# Patient Record
Sex: FEMALE | Race: BLACK OR AFRICAN AMERICAN | NOT HISPANIC OR LATINO | Employment: UNEMPLOYED | URBAN - METROPOLITAN AREA
[De-identification: names, ages, dates, MRNs, and addresses within clinical notes are randomized per-mention and may not be internally consistent; named-entity substitution may affect disease eponyms.]

---

## 2018-08-14 ENCOUNTER — HOSPITAL ENCOUNTER (OUTPATIENT)
Facility: HOSPITAL | Age: 48
Discharge: HOME | DRG: 305 | End: 2018-08-15
Attending: EMERGENCY MEDICINE | Admitting: EMERGENCY MEDICINE
Payer: MEDICAID

## 2018-08-14 DIAGNOSIS — I16.0 HYPERTENSIVE URGENCY: Primary | ICD-10-CM

## 2018-08-14 DIAGNOSIS — E21.3 HYPERPARATHYROIDISM: ICD-10-CM

## 2018-08-14 DIAGNOSIS — I15.2 HYPERTENSION DUE TO ENDOCRINE DISORDER: ICD-10-CM

## 2018-08-14 DIAGNOSIS — I10 HYPERTENSION: ICD-10-CM

## 2018-08-14 DIAGNOSIS — E83.52 HYPERCALCEMIA: ICD-10-CM

## 2018-08-14 DIAGNOSIS — F17.210 CIGARETTE NICOTINE DEPENDENCE WITHOUT COMPLICATION: Chronic | ICD-10-CM

## 2018-08-14 LAB
ALBUMIN SERPL BCP-MCNC: 3.6 G/DL
ALP SERPL-CCNC: 117 U/L
ALT SERPL W/O P-5'-P-CCNC: 11 U/L
ANION GAP SERPL CALC-SCNC: 5 MMOL/L
AST SERPL-CCNC: 13 U/L
B-HCG UR QL: NEGATIVE
BASOPHILS # BLD AUTO: 0.04 K/UL
BASOPHILS NFR BLD: 0.4 %
BILIRUB SERPL-MCNC: 0.2 MG/DL
BILIRUB UR QL STRIP: NEGATIVE
BUN SERPL-MCNC: 9 MG/DL
CALCIUM SERPL-MCNC: 12.2 MG/DL
CHLORIDE SERPL-SCNC: 109 MMOL/L
CLARITY UR REFRACT.AUTO: CLEAR
CO2 SERPL-SCNC: 25 MMOL/L
COLOR UR AUTO: COLORLESS
CREAT SERPL-MCNC: 0.8 MG/DL
CTP QC/QA: YES
DIFFERENTIAL METHOD: ABNORMAL
EOSINOPHIL # BLD AUTO: 0.4 K/UL
EOSINOPHIL NFR BLD: 4 %
ERYTHROCYTE [DISTWIDTH] IN BLOOD BY AUTOMATED COUNT: 14.1 %
EST. GFR  (AFRICAN AMERICAN): >60 ML/MIN/1.73 M^2
EST. GFR  (NON AFRICAN AMERICAN): >60 ML/MIN/1.73 M^2
GLUCOSE SERPL-MCNC: 90 MG/DL
GLUCOSE UR QL STRIP: NEGATIVE
HCT VFR BLD AUTO: 42.4 %
HGB BLD-MCNC: 13.9 G/DL
HGB UR QL STRIP: ABNORMAL
IMM GRANULOCYTES # BLD AUTO: 0.03 K/UL
IMM GRANULOCYTES NFR BLD AUTO: 0.3 %
KETONES UR QL STRIP: NEGATIVE
LEUKOCYTE ESTERASE UR QL STRIP: NEGATIVE
LYMPHOCYTES # BLD AUTO: 2.3 K/UL
LYMPHOCYTES NFR BLD: 24.2 %
MAGNESIUM SERPL-MCNC: 2.1 MG/DL
MCH RBC QN AUTO: 31.2 PG
MCHC RBC AUTO-ENTMCNC: 32.8 G/DL
MCV RBC AUTO: 95 FL
MICROSCOPIC COMMENT: NORMAL
MONOCYTES # BLD AUTO: 0.6 K/UL
MONOCYTES NFR BLD: 6.7 %
NEUTROPHILS # BLD AUTO: 6.1 K/UL
NEUTROPHILS NFR BLD: 64.4 %
NITRITE UR QL STRIP: NEGATIVE
NRBC BLD-RTO: 0 /100 WBC
PH UR STRIP: 7 [PH] (ref 5–8)
PHOSPHATE SERPL-MCNC: 2 MG/DL
PLATELET # BLD AUTO: 177 K/UL
PMV BLD AUTO: 11.3 FL
POTASSIUM SERPL-SCNC: 4 MMOL/L
PROT SERPL-MCNC: 7.1 G/DL
PROT UR QL STRIP: NEGATIVE
PTH-INTACT SERPL-MCNC: 322 PG/ML
RBC # BLD AUTO: 4.45 M/UL
RBC #/AREA URNS AUTO: 1 /HPF (ref 0–4)
SODIUM SERPL-SCNC: 139 MMOL/L
SP GR UR STRIP: 1 (ref 1–1.03)
SQUAMOUS #/AREA URNS AUTO: 0 /HPF
TROPONIN I SERPL DL<=0.01 NG/ML-MCNC: <0.006 NG/ML
TSH SERPL DL<=0.005 MIU/L-ACNC: 1.23 UIU/ML
URN SPEC COLLECT METH UR: ABNORMAL
UROBILINOGEN UR STRIP-ACNC: NEGATIVE EU/DL
WBC # BLD AUTO: 9.41 K/UL
WBC #/AREA URNS AUTO: 0 /HPF (ref 0–5)

## 2018-08-14 PROCEDURE — 25000003 PHARM REV CODE 250: Performed by: PHYSICIAN ASSISTANT

## 2018-08-14 PROCEDURE — 83970 ASSAY OF PARATHORMONE: CPT

## 2018-08-14 PROCEDURE — 85025 COMPLETE CBC W/AUTO DIFF WBC: CPT

## 2018-08-14 PROCEDURE — 81025 URINE PREGNANCY TEST: CPT | Performed by: STUDENT IN AN ORGANIZED HEALTH CARE EDUCATION/TRAINING PROGRAM

## 2018-08-14 PROCEDURE — 84484 ASSAY OF TROPONIN QUANT: CPT

## 2018-08-14 PROCEDURE — 99291 CRITICAL CARE FIRST HOUR: CPT | Mod: ,,, | Performed by: EMERGENCY MEDICINE

## 2018-08-14 PROCEDURE — 93010 ELECTROCARDIOGRAM REPORT: CPT | Mod: ,,, | Performed by: INTERNAL MEDICINE

## 2018-08-14 PROCEDURE — 96376 TX/PRO/DX INJ SAME DRUG ADON: CPT

## 2018-08-14 PROCEDURE — 25000003 PHARM REV CODE 250: Performed by: EMERGENCY MEDICINE

## 2018-08-14 PROCEDURE — 96361 HYDRATE IV INFUSION ADD-ON: CPT

## 2018-08-14 PROCEDURE — G0378 HOSPITAL OBSERVATION PER HR: HCPCS

## 2018-08-14 PROCEDURE — 84100 ASSAY OF PHOSPHORUS: CPT

## 2018-08-14 PROCEDURE — 80053 COMPREHEN METABOLIC PANEL: CPT

## 2018-08-14 PROCEDURE — 81001 URINALYSIS AUTO W/SCOPE: CPT

## 2018-08-14 PROCEDURE — 84443 ASSAY THYROID STIM HORMONE: CPT

## 2018-08-14 PROCEDURE — 99223 1ST HOSP IP/OBS HIGH 75: CPT | Mod: ,,, | Performed by: PHYSICIAN ASSISTANT

## 2018-08-14 PROCEDURE — 12000002 HC ACUTE/MED SURGE SEMI-PRIVATE ROOM

## 2018-08-14 PROCEDURE — 25000003 PHARM REV CODE 250: Performed by: STUDENT IN AN ORGANIZED HEALTH CARE EDUCATION/TRAINING PROGRAM

## 2018-08-14 PROCEDURE — 96374 THER/PROPH/DIAG INJ IV PUSH: CPT

## 2018-08-14 PROCEDURE — 99285 EMERGENCY DEPT VISIT HI MDM: CPT

## 2018-08-14 PROCEDURE — 83735 ASSAY OF MAGNESIUM: CPT

## 2018-08-14 RX ORDER — GLUCAGON 1 MG
1 KIT INJECTION
Status: DISCONTINUED | OUTPATIENT
Start: 2018-08-15 | End: 2018-08-15 | Stop reason: HOSPADM

## 2018-08-14 RX ORDER — IBUPROFEN 200 MG
24 TABLET ORAL
Status: DISCONTINUED | OUTPATIENT
Start: 2018-08-15 | End: 2018-08-15 | Stop reason: HOSPADM

## 2018-08-14 RX ORDER — AMOXICILLIN 250 MG
1 CAPSULE ORAL 2 TIMES DAILY
Status: DISCONTINUED | OUTPATIENT
Start: 2018-08-14 | End: 2018-08-15 | Stop reason: HOSPADM

## 2018-08-14 RX ORDER — LISINOPRIL 10 MG/1
10 TABLET ORAL
Status: COMPLETED | OUTPATIENT
Start: 2018-08-14 | End: 2018-08-14

## 2018-08-14 RX ORDER — NIFEDIPINE 60 MG/1
60 TABLET, EXTENDED RELEASE ORAL DAILY
Status: DISCONTINUED | OUTPATIENT
Start: 2018-08-15 | End: 2018-08-14

## 2018-08-14 RX ORDER — PROMETHAZINE HYDROCHLORIDE 12.5 MG/1
25 TABLET ORAL EVERY 6 HOURS PRN
Status: DISCONTINUED | OUTPATIENT
Start: 2018-08-15 | End: 2018-08-15 | Stop reason: HOSPADM

## 2018-08-14 RX ORDER — RAMELTEON 8 MG/1
8 TABLET ORAL NIGHTLY PRN
Status: DISCONTINUED | OUTPATIENT
Start: 2018-08-15 | End: 2018-08-15 | Stop reason: HOSPADM

## 2018-08-14 RX ORDER — ACETAMINOPHEN 325 MG/1
650 TABLET ORAL EVERY 4 HOURS PRN
Status: DISCONTINUED | OUTPATIENT
Start: 2018-08-15 | End: 2018-08-15 | Stop reason: HOSPADM

## 2018-08-14 RX ORDER — LABETALOL HYDROCHLORIDE 5 MG/ML
20 INJECTION, SOLUTION INTRAVENOUS
Status: COMPLETED | OUTPATIENT
Start: 2018-08-14 | End: 2018-08-14

## 2018-08-14 RX ORDER — SODIUM CHLORIDE 0.9 % (FLUSH) 0.9 %
5 SYRINGE (ML) INJECTION
Status: DISCONTINUED | OUTPATIENT
Start: 2018-08-15 | End: 2018-08-15 | Stop reason: HOSPADM

## 2018-08-14 RX ORDER — LISINOPRIL 20 MG/1
20 TABLET ORAL
Status: DISCONTINUED | OUTPATIENT
Start: 2018-08-14 | End: 2018-08-14

## 2018-08-14 RX ORDER — SODIUM CHLORIDE 9 MG/ML
1000 INJECTION, SOLUTION INTRAVENOUS
Status: COMPLETED | OUTPATIENT
Start: 2018-08-14 | End: 2018-08-14

## 2018-08-14 RX ORDER — ONDANSETRON 8 MG/1
8 TABLET, ORALLY DISINTEGRATING ORAL EVERY 8 HOURS PRN
Status: DISCONTINUED | OUTPATIENT
Start: 2018-08-15 | End: 2018-08-15 | Stop reason: HOSPADM

## 2018-08-14 RX ORDER — IPRATROPIUM BROMIDE AND ALBUTEROL SULFATE 2.5; .5 MG/3ML; MG/3ML
3 SOLUTION RESPIRATORY (INHALATION) EVERY 4 HOURS PRN
Status: DISCONTINUED | OUTPATIENT
Start: 2018-08-15 | End: 2018-08-15 | Stop reason: HOSPADM

## 2018-08-14 RX ORDER — NIFEDIPINE 30 MG/1
60 TABLET, EXTENDED RELEASE ORAL DAILY
Status: DISCONTINUED | OUTPATIENT
Start: 2018-08-14 | End: 2018-08-15 | Stop reason: HOSPADM

## 2018-08-14 RX ORDER — IBUPROFEN 200 MG
16 TABLET ORAL
Status: DISCONTINUED | OUTPATIENT
Start: 2018-08-15 | End: 2018-08-15 | Stop reason: HOSPADM

## 2018-08-14 RX ORDER — LABETALOL HYDROCHLORIDE 5 MG/ML
10 INJECTION, SOLUTION INTRAVENOUS
Status: COMPLETED | OUTPATIENT
Start: 2018-08-14 | End: 2018-08-14

## 2018-08-14 RX ORDER — IBUPROFEN 200 MG
1 TABLET ORAL DAILY
Status: DISCONTINUED | OUTPATIENT
Start: 2018-08-15 | End: 2018-08-15 | Stop reason: HOSPADM

## 2018-08-14 RX ADMIN — SODIUM CHLORIDE 1000 ML: 0.9 INJECTION, SOLUTION INTRAVENOUS at 06:08

## 2018-08-14 RX ADMIN — DOCUSATE SODIUM -SENNOSIDES 1 TABLET: 50; 8.6 TABLET, COATED ORAL at 11:08

## 2018-08-14 RX ADMIN — LABETALOL HYDROCHLORIDE 20 MG: 5 INJECTION, SOLUTION INTRAVENOUS at 09:08

## 2018-08-14 RX ADMIN — NIFEDIPINE 60 MG: 30 TABLET, FILM COATED, EXTENDED RELEASE ORAL at 07:08

## 2018-08-14 RX ADMIN — LABETALOL HYDROCHLORIDE 10 MG: 5 INJECTION, SOLUTION INTRAVENOUS at 11:08

## 2018-08-14 RX ADMIN — LISINOPRIL 10 MG: 20 TABLET ORAL at 08:08

## 2018-08-14 NOTE — ED TRIAGE NOTES
Pt came in c/o 8/10 headache and having high blood pressure for the first time of her knowledge. Pt denies cp, SOB, dizziness and n/v.

## 2018-08-15 ENCOUNTER — TELEPHONE (OUTPATIENT)
Dept: ENDOCRINOLOGY | Facility: HOSPITAL | Age: 48
End: 2018-08-15

## 2018-08-15 VITALS
RESPIRATION RATE: 20 BRPM | WEIGHT: 156.13 LBS | HEART RATE: 74 BPM | TEMPERATURE: 98 F | DIASTOLIC BLOOD PRESSURE: 98 MMHG | OXYGEN SATURATION: 98 % | SYSTOLIC BLOOD PRESSURE: 160 MMHG | HEIGHT: 72 IN | BODY MASS INDEX: 21.15 KG/M2

## 2018-08-15 PROBLEM — E21.0 PRIMARY HYPERPARATHYROIDISM: Status: ACTIVE | Noted: 2018-08-15

## 2018-08-15 PROBLEM — E55.9 VITAMIN D DEFICIENCY: Status: ACTIVE | Noted: 2018-08-15

## 2018-08-15 PROBLEM — I15.2 HYPERTENSION DUE TO ENDOCRINE DISORDER: Status: ACTIVE | Noted: 2018-08-14

## 2018-08-15 LAB
25(OH)D3+25(OH)D2 SERPL-MCNC: 9 NG/ML
ALBUMIN SERPL BCP-MCNC: 3.1 G/DL
ALBUMIN SERPL BCP-MCNC: 3.2 G/DL
ALP SERPL-CCNC: 104 U/L
ALT SERPL W/O P-5'-P-CCNC: 10 U/L
ANION GAP SERPL CALC-SCNC: 4 MMOL/L
ANION GAP SERPL CALC-SCNC: 5 MMOL/L
AST SERPL-CCNC: 13 U/L
BASOPHILS # BLD AUTO: 0.04 K/UL
BASOPHILS NFR BLD: 0.4 %
BILIRUB SERPL-MCNC: 0.4 MG/DL
BUN SERPL-MCNC: 6 MG/DL
BUN SERPL-MCNC: 9 MG/DL
CA-I BLDV-SCNC: 1.53 MMOL/L
CALCIUM SERPL-MCNC: 11.2 MG/DL
CALCIUM SERPL-MCNC: 11.3 MG/DL
CHLORIDE SERPL-SCNC: 108 MMOL/L
CHLORIDE SERPL-SCNC: 110 MMOL/L
CO2 SERPL-SCNC: 23 MMOL/L
CO2 SERPL-SCNC: 26 MMOL/L
CREAT SERPL-MCNC: 0.6 MG/DL
CREAT SERPL-MCNC: 0.7 MG/DL
DIFFERENTIAL METHOD: ABNORMAL
EOSINOPHIL # BLD AUTO: 0.4 K/UL
EOSINOPHIL NFR BLD: 4.2 %
ERYTHROCYTE [DISTWIDTH] IN BLOOD BY AUTOMATED COUNT: 14.3 %
EST. GFR  (AFRICAN AMERICAN): >60 ML/MIN/1.73 M^2
EST. GFR  (AFRICAN AMERICAN): >60 ML/MIN/1.73 M^2
EST. GFR  (NON AFRICAN AMERICAN): >60 ML/MIN/1.73 M^2
EST. GFR  (NON AFRICAN AMERICAN): >60 ML/MIN/1.73 M^2
ESTIMATED AVG GLUCOSE: 88 MG/DL
GLUCOSE SERPL-MCNC: 83 MG/DL
GLUCOSE SERPL-MCNC: 84 MG/DL
HBA1C MFR BLD HPLC: 4.7 %
HCT VFR BLD AUTO: 40.8 %
HGB BLD-MCNC: 12.8 G/DL
IMM GRANULOCYTES # BLD AUTO: 0.03 K/UL
IMM GRANULOCYTES NFR BLD AUTO: 0.3 %
LYMPHOCYTES # BLD AUTO: 2.8 K/UL
LYMPHOCYTES NFR BLD: 28.7 %
MAGNESIUM SERPL-MCNC: 2.1 MG/DL
MCH RBC QN AUTO: 30.4 PG
MCHC RBC AUTO-ENTMCNC: 31.4 G/DL
MCV RBC AUTO: 97 FL
MONOCYTES # BLD AUTO: 0.7 K/UL
MONOCYTES NFR BLD: 7.3 %
NEUTROPHILS # BLD AUTO: 5.7 K/UL
NEUTROPHILS NFR BLD: 59.1 %
NRBC BLD-RTO: 0 /100 WBC
PHOSPHATE SERPL-MCNC: 2 MG/DL
PHOSPHATE SERPL-MCNC: 2.1 MG/DL
PLATELET # BLD AUTO: 174 K/UL
PMV BLD AUTO: 10.8 FL
POCT GLUCOSE: 101 MG/DL (ref 70–110)
POTASSIUM SERPL-SCNC: 3.6 MMOL/L
POTASSIUM SERPL-SCNC: 4.4 MMOL/L
PROT SERPL-MCNC: 6.3 G/DL
RBC # BLD AUTO: 4.21 M/UL
SODIUM SERPL-SCNC: 137 MMOL/L
SODIUM SERPL-SCNC: 139 MMOL/L
WBC # BLD AUTO: 9.61 K/UL

## 2018-08-15 PROCEDURE — 83735 ASSAY OF MAGNESIUM: CPT

## 2018-08-15 PROCEDURE — 25000003 PHARM REV CODE 250: Performed by: EMERGENCY MEDICINE

## 2018-08-15 PROCEDURE — 82306 VITAMIN D 25 HYDROXY: CPT

## 2018-08-15 PROCEDURE — 99232 SBSQ HOSP IP/OBS MODERATE 35: CPT | Mod: ,,, | Performed by: INTERNAL MEDICINE

## 2018-08-15 PROCEDURE — 82652 VIT D 1 25-DIHYDROXY: CPT

## 2018-08-15 PROCEDURE — 25000003 PHARM REV CODE 250: Performed by: PHYSICIAN ASSISTANT

## 2018-08-15 PROCEDURE — G0378 HOSPITAL OBSERVATION PER HR: HCPCS

## 2018-08-15 PROCEDURE — 94761 N-INVAS EAR/PLS OXIMETRY MLT: CPT

## 2018-08-15 PROCEDURE — 80053 COMPREHEN METABOLIC PANEL: CPT

## 2018-08-15 PROCEDURE — 99239 HOSP IP/OBS DSCHRG MGMT >30: CPT | Mod: ,,, | Performed by: HOSPITALIST

## 2018-08-15 PROCEDURE — 20600001 HC STEP DOWN PRIVATE ROOM

## 2018-08-15 PROCEDURE — 36415 COLL VENOUS BLD VENIPUNCTURE: CPT

## 2018-08-15 PROCEDURE — 80069 RENAL FUNCTION PANEL: CPT

## 2018-08-15 PROCEDURE — 85025 COMPLETE CBC W/AUTO DIFF WBC: CPT

## 2018-08-15 PROCEDURE — 83036 HEMOGLOBIN GLYCOSYLATED A1C: CPT

## 2018-08-15 PROCEDURE — 84100 ASSAY OF PHOSPHORUS: CPT

## 2018-08-15 PROCEDURE — 25000003 PHARM REV CODE 250: Performed by: HOSPITALIST

## 2018-08-15 PROCEDURE — 82330 ASSAY OF CALCIUM: CPT

## 2018-08-15 RX ORDER — VIT C/E/ZN/COPPR/LUTEIN/ZEAXAN 250MG-90MG
1000 CAPSULE ORAL DAILY
Refills: 0 | Status: ON HOLD | COMMUNITY
Start: 2018-08-15 | End: 2022-06-23 | Stop reason: HOSPADM

## 2018-08-15 RX ORDER — HYDRALAZINE HYDROCHLORIDE 25 MG/1
50 TABLET, FILM COATED ORAL EVERY 6 HOURS PRN
Status: DISCONTINUED | OUTPATIENT
Start: 2018-08-15 | End: 2018-08-15 | Stop reason: HOSPADM

## 2018-08-15 RX ORDER — NIFEDIPINE 60 MG/1
60 TABLET, EXTENDED RELEASE ORAL DAILY
Qty: 90 TABLET | Refills: 3 | Status: ON HOLD | OUTPATIENT
Start: 2018-08-16 | End: 2022-06-02 | Stop reason: HOSPADM

## 2018-08-15 RX ORDER — SODIUM CHLORIDE 9 MG/ML
INJECTION, SOLUTION INTRAVENOUS CONTINUOUS
Status: DISCONTINUED | OUTPATIENT
Start: 2018-08-15 | End: 2018-08-15 | Stop reason: HOSPADM

## 2018-08-15 RX ORDER — LISINOPRIL 10 MG/1
10 TABLET ORAL DAILY
Qty: 90 TABLET | Refills: 3 | Status: ON HOLD | OUTPATIENT
Start: 2018-08-16 | End: 2022-06-02 | Stop reason: HOSPADM

## 2018-08-15 RX ORDER — LISINOPRIL 10 MG/1
10 TABLET ORAL DAILY
Status: DISCONTINUED | OUTPATIENT
Start: 2018-08-15 | End: 2018-08-15 | Stop reason: HOSPADM

## 2018-08-15 RX ADMIN — SODIUM CHLORIDE: 0.9 INJECTION, SOLUTION INTRAVENOUS at 03:08

## 2018-08-15 RX ADMIN — NIFEDIPINE 60 MG: 30 TABLET, FILM COATED, EXTENDED RELEASE ORAL at 09:08

## 2018-08-15 RX ADMIN — SODIUM CHLORIDE: 0.9 INJECTION, SOLUTION INTRAVENOUS at 09:08

## 2018-08-15 RX ADMIN — LISINOPRIL 10 MG: 10 TABLET ORAL at 09:08

## 2018-08-15 NOTE — H&P
[facility] Hospital Medicine  History & Physical    Patient Name: Sammie Belcher  MRN: [id number]  Admission Date: 8/14/2018  Attending Physician: Karishma Clements MD   Primary Care Provider: Primary Doctor [facility] Medicine Team: [facility]OSP MED O Elidia Bauer PA-C     Patient information was obtained from patient, past medical records and ER records.     Subjective:     Principal Problem:Hypertensive urgency    Chief Complaint:   Chief Complaint   Patient presents with    Hypertension        HPI: Patient is a 48 year old lady with a h/o tobacco abuse.  She presents with headache x3 days.  She denies any chest pain, SOB, dizziness, palpitations, fever/chills, N/V/D.  Denies vision changes, unilateral weakness, slurred speech.  She states that she took her BP at home for the first time in years and noticed it was very high.  She has not been to the doctor in years.  She does not take any antihypertensive medications.      On arrival in the ER, her BP was noted to be 219/127.  She received Nifedipine 60mg, lisinopril 10mg, Labetalol IV 20mg, and Labetalol 10mg IV with improvement to 170/91.  She notes her headache has improved with decrease in blood pressure.    History reviewed. No pertinent past medical history.    History reviewed. No pertinent surgical history.    Review of patient's allergies indicates:  No Known Allergies    No current facility-administered medications on file prior to encounter.      No current outpatient medications on file prior to encounter.     Family History     None        Tobacco Use    Smoking status: Current Every Day Smoker     Types: Cigarettes    Smokeless tobacco: Current User   Substance and Sexual Activity    Alcohol use: No     Frequency: Never    Drug use: No    Sexual activity: Not on file     Review of Systems   Constitutional: Negative for activity change, appetite change, chills, fatigue, fever and unexpected weight change.   HENT:  Negative for congestion, rhinorrhea, sore throat, trouble swallowing and voice change.    Eyes: Negative for visual disturbance.   Respiratory: Negative for cough, choking, chest tightness, shortness of breath and wheezing.    Cardiovascular: Negative for chest pain, palpitations and leg swelling.   Gastrointestinal: Negative for abdominal distention, abdominal pain, anal bleeding, blood in stool, constipation, diarrhea, nausea and vomiting.   Endocrine: Negative for cold intolerance, heat intolerance, polydipsia and polyuria.   Genitourinary: Negative for dysuria, flank pain, frequency, hematuria and urgency.   Musculoskeletal: Negative for arthralgias, back pain, joint swelling and myalgias.   Skin: Negative for color change and rash.   Neurological: Positive for headaches. Negative for dizziness, seizures, syncope, facial asymmetry, speech difficulty, weakness, light-headedness and numbness.   Hematological: Negative for adenopathy. Does not bruise/bleed easily.   Psychiatric/Behavioral: Negative for agitation, confusion, hallucinations and suicidal ideas.     Objective:     Vital Signs (Most Recent):  Temp: 97.7 °F (36.5 °C) (08/14/18 2349)  Pulse: 75 (08/15/18 0002)  Resp: (!) 22 (08/15/18 0002)  BP: (!) 170/91 (08/15/18 0002)  SpO2: 98 % (08/15/18 0002) Vital Signs (24h Range):  Temp:  [97.7 °F (36.5 °C)-97.9 °F (36.6 °C)] 97.7 °F (36.5 °C)  Pulse:  [] 75  Resp:  [11-24] 22  SpO2:  [97 %-100 %] 98 %  BP: (168-223)/() 170/91     Weight: 65.8 kg (145 lb 1 oz)  Body mass index is 19.14 kg/m².    Physical Exam   Constitutional: She is oriented to person, place, and time. She appears well-developed and well-nourished. No distress.   HENT:   Head: Normocephalic and atraumatic.   Neck: Neck supple. Carotid bruit is not present. No thyromegaly present.   Cardiovascular: Normal rate and regular rhythm. Exam reveals no gallop.   No murmur heard.  Pulmonary/Chest: Effort normal and breath sounds normal. No  respiratory distress. She has no wheezes.   Abdominal: Soft. Bowel sounds are normal. She exhibits no distension and no mass. There is no splenomegaly or hepatomegaly. There is no tenderness.   Musculoskeletal: Normal range of motion. She exhibits no edema or deformity.   Neurological: She is alert and oriented to person, place, and time. No cranial nerve deficit or sensory deficit.   Skin: Skin is warm and dry. No rash noted.   Psychiatric: She has a normal mood and affect.           Significant Labs:   CBC:   Recent Labs   Lab  08/14/18   1749   WBC  9.41   HGB  13.9   HCT  42.4   PLT  177     CMP:   Recent Labs   Lab  08/14/18   1749   NA  139   K  4.0   CL  109   CO2  25   GLU  90   BUN  9   CREATININE  0.8   CALCIUM  12.2*   PROT  7.1   ALBUMIN  3.6   BILITOT  0.2   ALKPHOS  117   AST  13   ALT  11   ANIONGAP  5*   EGFRNONAA  >60.0     Cardiac Markers: No results for input(s): CKMB, MYOGLOBIN, BNP, TROPISTAT in the last 48 hours.  Troponin:   Recent Labs   Lab  08/14/18   1749   TROPONINI  <0.006       Significant Imaging: I have reviewed all pertinent imaging results/findings within the past 24 hours.    Assessment/Plan:     * Hypertensive urgency    - SBP as high as 223 in ER.  She received Nifedipine 60mg, lisinopril 10mg, Labetalol IV 20mg, and Labetalol 10mg IV with improvement to 170/91.  - No acute abnormalities on CT head.  - Will start patient on Lisinopril 10mg daily and titrate as needed.  - Patient has not seen a doctor in years.  Will need follow-up with PCP on discharge.          Hypercalcemia    - Ca 12.2.  - Will check PTH, vitamin D, urine Ca, and ionized Ca.  - Will consult endocrine.          Cigarette nicotine dependence without complication    - Complete cessation recommended.  Nicoderm patch.            VTE Risk Mitigation (From admission, onward)        Ordered     IP VTE LOW RISK PATIENT  Once      08/14/18 9295     Place sequential compression device  Until discontinued      08/14/18  2337     Place JOSEFINA hose  Until discontinued      08/14/18 2337             Elidia Bauer PA-C  Department of [facility] Medicine   [facility]

## 2018-08-15 NOTE — PLAN OF CARE
Problem: Patient Care Overview  Goal: Plan of Care Review  Outcome: Ongoing (interventions implemented as appropriate)   08/15/18 1118   Coping/Psychosocial   Plan Of Care Reviewed With patient       Problem: Infection, Risk/Actual (Adult)  Goal: Identify Related Risk Factors and Signs and Symptoms  Related risk factors and signs and symptoms are identified upon initiation of Human Response Clinical Practice Guideline (CPG)  Outcome: Ongoing (interventions implemented as appropriate)   08/15/18 1118   Infection, Risk/Actual   Related Risk Factors (Infection, Risk/Actual) treatment plan   Signs and Symptoms (Infection, Risk/Actual) heart rate increase

## 2018-08-15 NOTE — NURSING
Reviewed discharge instructions with pt, satisfactory teach back given per pt.  IV discontinued, cath intact, no redness, swelling or pain to site.  Gauze and coban applied.  Copy of AVS given to pt.  Waiting for pharmacy to deliver pt medications then transport will be called.  Will monitor until transport arrives.

## 2018-08-15 NOTE — SUBJECTIVE & OBJECTIVE
Interval HPI:   Overnight events:  VS stable    Eatin%  Nausea: No  Hypoglycemia and intervention: No  Fever: No  TPN and/or TF: No    PMH, PSH, FH, SH updated and reviewed     ROS:  Review of Systems   Constitutional: Negative for unexpected weight change.   Eyes: Negative for visual disturbance.   Respiratory: Negative for shortness of breath.    Cardiovascular: Negative for chest pain.   Gastrointestinal: Negative for abdominal pain.   Genitourinary: Negative for urgency.   Musculoskeletal: Negative for arthralgias.   Skin: Negative for wound.   Neurological: Negative for headaches.   Hematological: Does not bruise/bleed easily.   Psychiatric/Behavioral: Negative for sleep disturbance.       PHYSICAL EXAMINATION:  Vitals:    08/15/18 1118   BP:    Pulse: 70   Resp:    Temp:      Body mass index is 20.59 kg/m².    Physical Exam   Constitutional: She is oriented to person, place, and time. She appears well-developed and well-nourished.   HENT:   Head: Normocephalic and atraumatic.   Neck: Neck supple. No thyromegaly present.   Cardiovascular: Normal rate, regular rhythm and normal heart sounds.   Pulmonary/Chest: Effort normal. No respiratory distress.   Abdominal: Soft. There is no tenderness.   Neurological: She is alert and oriented to person, place, and time.   Skin: Skin is warm. No rash noted.   Psychiatric: She has a normal mood and affect. Her behavior is normal.

## 2018-08-15 NOTE — ASSESSMENT & PLAN NOTE
Pt currently has asymptomatic mild hypercalcemia (Ca++: 12.2).   Given her current labs, hypercalcemia is most likely secondary to primary hyperparathyroidism (322).     -Encourage PO hydration  -Avoid Hydrochlorothiazide, which can lead to hypercalcemia    Discharge recommendations:   -Pt can follow up with Endocrinology in 2 weeks for further workup.   -Given her current labs, pt meets criteria for parathyroidectomy. Discussed treatment options (both medical and surgical) with pt today.

## 2018-08-15 NOTE — SUBJECTIVE & OBJECTIVE
History reviewed. No pertinent past medical history.    History reviewed. No pertinent surgical history.    Review of patient's allergies indicates:  No Known Allergies    No current facility-administered medications on file prior to encounter.      No current outpatient medications on file prior to encounter.     Family History     None        Tobacco Use    Smoking status: Current Every Day Smoker     Types: Cigarettes    Smokeless tobacco: Current User   Substance and Sexual Activity    Alcohol use: No     Frequency: Never    Drug use: No    Sexual activity: Not on file     Review of Systems   Constitutional: Negative for activity change, appetite change, chills, fatigue, fever and unexpected weight change.   HENT: Negative for congestion, rhinorrhea, sore throat, trouble swallowing and voice change.    Eyes: Negative for visual disturbance.   Respiratory: Negative for cough, choking, chest tightness, shortness of breath and wheezing.    Cardiovascular: Negative for chest pain, palpitations and leg swelling.   Gastrointestinal: Negative for abdominal distention, abdominal pain, anal bleeding, blood in stool, constipation, diarrhea, nausea and vomiting.   Endocrine: Negative for cold intolerance, heat intolerance, polydipsia and polyuria.   Genitourinary: Negative for dysuria, flank pain, frequency, hematuria and urgency.   Musculoskeletal: Negative for arthralgias, back pain, joint swelling and myalgias.   Skin: Negative for color change and rash.   Neurological: Positive for headaches. Negative for dizziness, seizures, syncope, facial asymmetry, speech difficulty, weakness, light-headedness and numbness.   Hematological: Negative for adenopathy. Does not bruise/bleed easily.   Psychiatric/Behavioral: Negative for agitation, confusion, hallucinations and suicidal ideas.     Objective:     Vital Signs (Most Recent):  Temp: 97.7 °F (36.5 °C) (08/14/18 2349)  Pulse: 75 (08/15/18 0002)  Resp: (!) 22 (08/15/18  0002)  BP: (!) 170/91 (08/15/18 0002)  SpO2: 98 % (08/15/18 0002) Vital Signs (24h Range):  Temp:  [97.7 °F (36.5 °C)-97.9 °F (36.6 °C)] 97.7 °F (36.5 °C)  Pulse:  [] 75  Resp:  [11-24] 22  SpO2:  [97 %-100 %] 98 %  BP: (168-223)/() 170/91     Weight: 65.8 kg (145 lb 1 oz)  Body mass index is 19.14 kg/m².    Physical Exam   Constitutional: She is oriented to person, place, and time. She appears well-developed and well-nourished. No distress.   HENT:   Head: Normocephalic and atraumatic.   Neck: Neck supple. Carotid bruit is not present. No thyromegaly present.   Cardiovascular: Normal rate and regular rhythm. Exam reveals no gallop.   No murmur heard.  Pulmonary/Chest: Effort normal and breath sounds normal. No respiratory distress. She has no wheezes.   Abdominal: Soft. Bowel sounds are normal. She exhibits no distension and no mass. There is no splenomegaly or hepatomegaly. There is no tenderness.   Musculoskeletal: Normal range of motion. She exhibits no edema or deformity.   Neurological: She is alert and oriented to person, place, and time. No cranial nerve deficit or sensory deficit.   Skin: Skin is warm and dry. No rash noted.   Psychiatric: She has a normal mood and affect.           Significant Labs:   CBC:   Recent Labs   Lab  08/14/18   1749   WBC  9.41   HGB  13.9   HCT  42.4   PLT  177     CMP:   Recent Labs   Lab  08/14/18   1749   NA  139   K  4.0   CL  109   CO2  25   GLU  90   BUN  9   CREATININE  0.8   CALCIUM  12.2*   PROT  7.1   ALBUMIN  3.6   BILITOT  0.2   ALKPHOS  117   AST  13   ALT  11   ANIONGAP  5*   EGFRNONAA  >60.0     Cardiac Markers: No results for input(s): CKMB, MYOGLOBIN, BNP, TROPISTAT in the last 48 hours.  Troponin:   Recent Labs   Lab  08/14/18   1749   TROPONINI  <0.006       Significant Imaging: I have reviewed all pertinent imaging results/findings within the past 24 hours.

## 2018-08-15 NOTE — PROGRESS NOTES
[facility] Hospital Medicine  Progress Note    Patient Name: Sammie Belcher  MRN: [id number]  Patient Class: IP- Inpatient   Admission Date: 8/14/2018  Length of Stay: 1 days  Attending Physician: Karishma Clements MD  Primary Care Provider: Primary Doctor     Hospital Medicine Team: [facility] HOSP MED O Karishma Clements MD    Subjective:     Principal Problem:Hypertensive urgency    HPI: Patient is a 48 year old lady with a h/o tobacco abuse.  She presents with headache x3 days.    Hospital Course: Patient admitted to hospital medicine, started on antihypertensives with improvement to BP and headache.  Additionally found to have elevated calcium level.    Interval History: Improvement in BP control overnight.  States she has to go home today regardless of recommendations.  Denies any complaints at this time.    Review of Systems   Respiratory: Negative for cough and shortness of breath.    Cardiovascular: Negative for chest pain.   All other systems reviewed and are negative.    Objective:     Vital Signs (Most Recent):  Temp: 97.4 °F (36.3 °C) (08/15/18 0728)  Pulse: 79 (08/15/18 0728)  Resp: 20 (08/15/18 0728)  BP: 131/81 (08/15/18 0728)  SpO2: 97 % (08/15/18 0728) Vital Signs (24h Range):  Temp:  [97.4 °F (36.3 °C)-97.9 °F (36.6 °C)] 97.4 °F (36.3 °C)  Pulse:  [] 79  Resp:  [11-24] 20  SpO2:  [97 %-100 %] 97 %  BP: (131-223)/() 131/81     Weight: 70.8 kg (156 lb 1.6 oz)  Body mass index is 20.59 kg/m².    Intake/Output Summary (Last 24 hours) at 8/15/2018 0824  Last data filed at 8/15/2018 0700  Gross per 24 hour   Intake --   Output 250 ml   Net -250 ml      Physical Exam   Constitutional: She is oriented to person, place, and time. No distress.   HENT:   Head: Normocephalic and atraumatic.   Eyes: EOM are normal. No scleral icterus.   Cardiovascular: Normal rate and regular rhythm.   Pulmonary/Chest: Effort normal. No respiratory distress.   Abdominal: Soft. There is no  tenderness.   Neurological: She is alert and oriented to person, place, and time.   Skin: Skin is warm and dry. She is not diaphoretic.       Significant Labs: Reviewed in Epic.  Of note, 25-OH Vit D low at 9, iPTH elevated at 322.  CBC wnl.  Calcium remains elevated at 11.3 (ionized 1.53), corrects to 11.9 for albumin.  Phos low at 2.1.    Significant Imaging: Reviewed in Epic.    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] 08/14/2018 Yes    Primary hyperparathyroidism [E21.0] 08/15/2018 Yes    Vitamin D deficiency [E55.9] 08/15/2018 Yes    Hypercalcemia [E83.52] 08/14/2018 Yes    Cigarette nicotine dependence without complication [F17.210] 08/14/2018 Yes     Chronic      Problems Resolved During this Admission:     Hypercalcemia  Primary Hyperparathyroidism  Vitamin D deficiency  - Follow up urine calcium, 1,25-OH Vit D  - Follow up endocrine consult  - Initiate NS infusion @150cc/hr for clearance, monitor for need for concurrent loop diuretic  - Trend corrected calcium  - Anticipate Vit D correction, but will discuss with endocrine prior to initiation    Hypertension due to endocrine disorder  Much improved from admission  - Continue ACEi/CCB  - Treat hypercalcemia    Nicotine dependence  - Continue patch    Discharge planning  - Pending endocrinology recommendations  - Patient adamant about leaving hospital today, if plan for safe ongoing outpatient care able to be established will attempt to meet her wishes    VTE Risk Mitigation (From admission, onward)        Ordered     IP VTE LOW RISK PATIENT  Once      08/14/18 2337     Place sequential compression device  Until discontinued      08/14/18 2337     Place JOSEFINA hose  Until discontinued      08/14/18 2337             Karishma Clements MD  Department of Hospital Medicine   [facility]

## 2018-08-15 NOTE — PLAN OF CARE
"CM to bedside - pt provided assessment info. Pt is independent w/ no DME in place, lives w/ her uncle. Pt will likely d/c home w/ no needs. Pt is currently in [place] assisting a family member that recently had a transplant. Pt reports having no local PCP.    CM provided patient anticipated JARET which will be update by nursing staff. Patient provided a Blue "My Health Packet" for d/c planning and health tool. Patient verbalized understanding.     08/15/18 8080   Discharge Assessment   Assessment Type Discharge Planning Assessment   Confirmed/corrected address and phone number on facesheet? Yes   Assessment information obtained from? Patient   Expected Length of Stay (days) 2   Communicated expected length of stay with patient/caregiver yes   Prior to hospitilization cognitive status: Alert/Oriented   Prior to hospitalization functional status: Independent   Current cognitive status: Alert/Oriented   Current Functional Status: Independent   Facility Arrived From: N/A   Lives With other relative(s)  (uncle)   Able to Return to Prior Arrangements yes   Is patient able to care for self after discharge? Yes   Who are your caregiver(s) and their phone number(s)? aunt Khalida Braga [phone]   Patient's perception of discharge disposition home or selfcare   Readmission Within The Last 30 Days no previous admission in last 30 days   Patient currently being followed by outpatient case management? No   Patient currently receives any other outside agency services? No   Equipment Currently Used at Home none   Do you have any problems affording any of your prescribed medications? No   Is the patient taking medications as prescribed? yes   Does the patient have transportation home? Yes   Transportation Available public transportation  (bus)   Dialysis Name and Scheduled days N/A   Does the patient receive services at the [facility]madin Clinic? No   Discharge Plan A Home with family   Discharge Plan B Home with family;Home Health "   Patient/Family In Agreement With Plan yes

## 2018-08-15 NOTE — HPI
Patient is a 48 year old lady with a h/o tobacco abuse.  She presents with headache x3 days.  She denies any chest pain, SOB, dizziness, palpitations, fever/chills, N/V/D.  Denies vision changes, unilateral weakness, slurred speech.  She states that she took her BP at home for the first time in years and noticed it was very high.  She has not been to the doctor in years.  She does not take any antihypertensive medications.      On arrival in the ER, her BP was noted to be 219/127.  She received Nifedipine 60mg, lisinopril 10mg, Labetalol IV 20mg, and Labetalol 10mg IV with improvement to 170/91.  She notes her headache has improved with decrease in blood pressure.

## 2018-08-15 NOTE — ED NOTES
Telemetry Verification   Patient placed on Telemetry Box  Verified with War Room  Box # [id number]   Monitor Tech SHANONN   Rate 82   Rhythm NSR

## 2018-08-15 NOTE — ASSESSMENT & PLAN NOTE
- SBP as high as 223 in ER.  She received Nifedipine 60mg, lisinopril 10mg, Labetalol IV 20mg, and Labetalol 10mg IV with improvement to 170/91.  - No acute abnormalities on CT head.  - Will start patient on Lisinopril 10mg daily and titrate as needed.  - Patient has not seen a doctor in years.  Will need follow-up with PCP on discharge.

## 2018-08-15 NOTE — ASSESSMENT & PLAN NOTE
Most likely secondary to primary hyperparathyroidism, which causes an increase in the metabolism of 25-hydroxyvitamin D to 1,25 hydroxyvitamin D.     Would supplement with Cholecalciferol 1000 units, daily.

## 2018-08-15 NOTE — HPI
A 47 yo woman with no significant PMH has been admitted for treatment of hypertensive urgency. CT Head showed chronic infarcts. Labs showed elevated serum calcium level. Endocrinology has been consulted for evaluation of hypercalcemia.   She denies any symptoms of constipation, polyuria, N/V, abdominal pain. There is no h/o renal stones, or fractures. She doesn't take any medications or supplements at home.   There is no family history of hypercalcemia.   She reports smoking about 1 PPD. Denies any alcohol abuse.     At the time of admission, labs showed:   Serum calcium: 12.2 mg/dl  Phos: 2.1  M.1  PTH: 322  TSH: 1.233  Vitamin D, 25-OH: 9

## 2018-08-15 NOTE — CONSULTS
[facility]  Endocrinology  Consult Note    Consult Requested by: Karishma Clements MD   Reason for admit: Hypertension due to endocrine disorder    HISTORY OF PRESENT ILLNESS:  A 47 yo woman with no significant PMH has been admitted for treatment of hypertensive urgency. CT Head showed chronic infarcts. Labs showed elevated serum calcium level. Endocrinology has been consulted for evaluation of hypercalcemia.   She denies any symptoms of constipation, polyuria, N/V, abdominal pain. There is no h/o renal stones, or fractures. She doesn't take any medications or supplements at home.   There is no family history of hypercalcemia.   She reports smoking about 1 PPD. Denies any alcohol abuse.     At the time of admission, labs showed:   Serum calcium: 12.2 mg/dl  Phos: 2.1  M.1  PTH: 322  TSH: 1.233  Vitamin D, 25-OH: 9    Medications and/or Treatments Impacting Glycemic Control:  Immunotherapy:    Immunosuppressants     None        Steroids:   Hormones (From admission, onward)    None        Pressors:    Autonomic Drugs (From admission, onward)    None          No medications prior to admission.       Current Facility-Administered Medications   Medication Dose Route Frequency Provider Last Rate Last Dose    0.9%  NaCl infusion   Intravenous Continuous Karishma Clements  mL/hr at 08/15/18 1544      acetaminophen tablet 650 mg  650 mg Oral Q4H PRN Elidia Bauer PA-C        albuterol-ipratropium 2.5 mg-0.5 mg/3 mL nebulizer solution 3 mL  3 mL Nebulization Q4H PRN Elidia Bauer PA-C        dextrose 50% injection 12.5 g  12.5 g Intravenous PRN Elidia Bauer PA-C        dextrose 50% injection 25 g  25 g Intravenous PRN Elidia Bauer PA-C        glucagon (human recombinant) injection 1 mg  1 mg Intramuscular PRN Elidia Bauer PA-C        glucose chewable tablet 16 g  16 g Oral PRN Elidia Bauer PA-C        glucose chewable tablet 24 g  24 g Oral  PRN Elidia Bauer PA-C        hydrALAZINE tablet 50 mg  50 mg Oral Q6H PRN Elidia Bauer PA-C        lisinopril tablet 10 mg  10 mg Oral Daily Elidia Bauer PA-C   10 mg at 08/15/18 0912    nicotine 21 mg/24 hr 1 patch  1 patch Transdermal Daily Elidia Bauer PA-C        NIFEdipine 24 hr tablet 60 mg  60 mg Oral Daily Michael Bentley MD   60 mg at 08/15/18 0912    ondansetron disintegrating tablet 8 mg  8 mg Oral Q8H PRN Elidia Bauer PA-C        promethazine tablet 25 mg  25 mg Oral Q6H PRN Elidia Bauer PA-C        ramelteon tablet 8 mg  8 mg Oral Nightly PRN Elidia Bauer PA-C        senna-docusate 8.6-50 mg per tablet 1 tablet  1 tablet Oral BID Elidia Bauer PA-C   1 tablet at 18 2350    sodium chloride 0.9% flush 5 mL  5 mL Intravenous PRN Elidia Bauer PA-C           Interval HPI:   Overnight events:  VS stable    Eatin%  Nausea: No  Hypoglycemia and intervention: No  Fever: No  TPN and/or TF: No    PMH, PSH, FH, SH updated and reviewed     ROS:  Review of Systems   Constitutional: Negative for unexpected weight change.   Eyes: Negative for visual disturbance.   Respiratory: Negative for shortness of breath.    Cardiovascular: Negative for chest pain.   Gastrointestinal: Negative for abdominal pain.   Genitourinary: Negative for urgency.   Musculoskeletal: Negative for arthralgias.   Skin: Negative for wound.   Neurological: Negative for headaches.   Hematological: Does not bruise/bleed easily.   Psychiatric/Behavioral: Negative for sleep disturbance.       PHYSICAL EXAMINATION:  Vitals:    08/15/18 1118   BP:    Pulse: 70   Resp:    Temp:      Body mass index is 20.59 kg/m².    Physical Exam   Constitutional: She is oriented to person, place, and time. She appears well-developed and well-nourished.   HENT:   Head: Normocephalic and atraumatic.   Neck: Neck supple. No thyromegaly present.   Cardiovascular: Normal rate,  regular rhythm and normal heart sounds.   Pulmonary/Chest: Effort normal. No respiratory distress.   Abdominal: Soft. There is no tenderness.   Neurological: She is alert and oriented to person, place, and time.   Skin: Skin is warm. No rash noted.   Psychiatric: She has a normal mood and affect. Her behavior is normal.     .     ASSESSMENT and PLAN:    Vitamin D deficiency    Most likely secondary to primary hyperparathyroidism, which causes an increase in the metabolism of 25-hydroxyvitamin D to 1,25 hydroxyvitamin D.     Would supplement with Cholecalciferol 1000 units, daily.           Hypercalcemia    Pt currently has asymptomatic mild hypercalcemia (Ca++: 12.2).   Given her current labs, hypercalcemia is most likely secondary to primary hyperparathyroidism (322).     -Encourage PO hydration  -Avoid Hydrochlorothiazide, which can lead to hypercalcemia    Discharge recommendations:   -Pt can follow up with Endocrinology in 2 weeks for further workup.   -Given her current labs, pt meets criteria for parathyroidectomy. Discussed treatment options (both medical and surgical) with pt today.            DISCHARGE NEEDS: will assess daily    Cinthya Conrad MD  Endocrinology  [facility]

## 2018-08-15 NOTE — DISCHARGE SUMMARY
[facility] Hospital Medicine  Discharge Summary      Patient Name: Sammie Belcher  MRN: [id number]  Admission Date: 8/14/2018  Hospital Length of Stay: 1 days  Discharge Date and Time:  08/15/2018 5:06 PM  Attending Physician: Karishma Clements MD   Discharging Provider: Karishma Clements MD  Primary Care Provider: Primary Doctor [facility] Medicine Team: [facility] MED O Karishma Clements MD    HPI: Patient is a 48 year old lady with a h/o tobacco abuse.  She presents with headache x3 days.  She denies any chest pain, SOB, dizziness, palpitations, fever/chills, N/V/D.  Denies vision changes, unilateral weakness, slurred speech.  She states that she took her BP at home for the first time in years and noticed it was very high.  She has not been to the doctor in years.  She does not take any antihypertensive medications.       On arrival in the ER, her BP was noted to be 219/127.  She received Nifedipine 60mg, lisinopril 10mg, Labetalol IV 20mg, and Labetalol 10mg IV with improvement to 170/91.  She notes her headache has improved with decrease in blood pressure.    Hospital Course: Patient admitted to hospital medicine service and continued on Nifedipine and lisinopril with good control of BP achieved and resolution of headache.  Her calcium was found to be elevated to 12.2 with an elevated iPTH, low phos, and low vitamin D consistent with primary hyperparathyroidism.  She was evaluated by endocrinology who discussed management options with the patient.  She will follow up with endocrinology in clinic in 2 weeks.  The need for a PCP was discussed with the patient; she declines to establish one here at this time as she is only staying in [place] temporarily and would like to find a long term provider closer to home.  She was discharged on Lisinopril 10mg and Nifedipine-ER 60mg daily (delivered bedside), as well as 1000 units cholecalciferol daily (to be purchased OTC).  She was instructed  to remain hydrated and follow up with endocrine as instructed, and to contact the hospital if any new symptoms or concerns.    Consults:   Consults (From admission, onward)        Status Ordering Provider     Inpatient consult to Endocrinology  Once     Provider:  (Not yet assigned)    PANFILO Estevez        Final Active Diagnoses:    Diagnosis Date Noted POA    PRINCIPAL PROBLEM:  Hypertension due to endocrine disorder [I15.2] 08/14/2018 Yes    Primary hyperparathyroidism [E21.0] 08/15/2018 Yes    Vitamin D deficiency [E55.9] 08/15/2018 Yes    Hypercalcemia [E83.52] 08/14/2018 Yes    Cigarette nicotine dependence without complication [F17.210] 08/14/2018 Yes     Chronic      Problems Resolved During this Admission:      Discharged Condition: good    Disposition: Home or Self Care    Follow Up:  Follow-up Information     Cinthya Conrad MD In 2 weeks.    Specialty:  Endocrinology  Why:  Follow up with endocrinology for high calcium.  Contact information:  [address]  [facility] [address]  [phone]                 Patient Instructions:   Epic hypercalcemia patient instructions were provided.    Medications:  Reconciled Home Medications:      Medication List      START taking these medications    cholecalciferol (vitamin D3) 1,000 unit capsule  Take 1 capsule (1,000 Units total) by mouth once daily.     lisinopril 10 MG tablet  Take 1 tablet (10 mg total) by mouth once daily.     NIFEdipine 60 MG (OSM) 24 hr tablet  Commonly known as:  PROCARDIA-XL  Take 1 tablet (60 mg total) by mouth once daily.            Significant Diagnostic Studies: Labs: Calcium 12.2, iPTH 322, Phos 2, Vit D 9    Pending Diagnostic Studies:     Procedure Component Value Units Date/Time    2D echo with color flow doppler [[id number]]     Order Status:  Sent Lab Status:  No result     Renal function panel [[id number]]     Order Status:  Sent Lab Status:  No result     Specimen:  Blood         Indwelling Lines/Drains at  time of discharge:   Lines/Drains/Airways          None        Time spent on the discharge of patient: 33 minutes  Patient was seen and examined on the date of discharge and determined to be suitable for discharge.    Karishma Clements MD  Department of Hospital Medicine  [facility]

## 2018-08-16 LAB — 1,25(OH)2D3 SERPL-MCNC: 96 PG/ML

## 2021-10-20 ENCOUNTER — HISTORICAL (OUTPATIENT)
Dept: ADMINISTRATIVE | Facility: HOSPITAL | Age: 51
End: 2021-10-20

## 2021-10-20 LAB
ABS NEUT (OLG): 4.25 X10(3)/MCL (ref 2.1–9.2)
ALBUMIN SERPL-MCNC: 4 GM/DL (ref 3.5–5)
APPEARANCE, UA: CLEAR
BACTERIA #/AREA URNS AUTO: ABNORMAL /HPF
BASOPHILS # BLD AUTO: 0 X10(3)/MCL (ref 0–0.2)
BASOPHILS NFR BLD AUTO: 1 %
BILIRUB UR QL STRIP: NEGATIVE
BUN SERPL-MCNC: 8 MG/DL (ref 9.8–20.1)
CALCIUM SERPL-MCNC: 12.3 MG/DL (ref 8.4–10.2)
CHLORIDE SERPL-SCNC: 108 MMOL/L (ref 98–107)
CO2 SERPL-SCNC: 28 MMOL/L (ref 22–29)
COLOR UR: ABNORMAL
CREAT SERPL-MCNC: 0.73 MG/DL (ref 0.55–1.02)
DEPRECATED CALCIDIOL+CALCIFEROL SERPL-MC: 9.8 NG/ML (ref 30–80)
EOSINOPHIL # BLD AUTO: 0.3 X10(3)/MCL (ref 0–0.9)
EOSINOPHIL NFR BLD AUTO: 4 %
ERYTHROCYTE [DISTWIDTH] IN BLOOD BY AUTOMATED COUNT: 13.1 % (ref 11.5–14.5)
GLUCOSE (UA): NEGATIVE
GLUCOSE SERPL-MCNC: 86 MG/DL (ref 74–100)
HCT VFR BLD AUTO: 46.1 % (ref 35–46)
HGB BLD-MCNC: 15.3 GM/DL (ref 12–16)
HGB UR QL STRIP: 0.06 MG/DL
HYALINE CASTS #/AREA URNS LPF: ABNORMAL /LPF
IMM GRANULOCYTES # BLD AUTO: 0.01 10*3/UL
IMM GRANULOCYTES NFR BLD AUTO: 0 %
KETONES UR QL STRIP: NEGATIVE
LEUKOCYTE ESTERASE UR QL STRIP: NEGATIVE
LYMPHOCYTES # BLD AUTO: 2.3 X10(3)/MCL (ref 0.6–4.6)
LYMPHOCYTES NFR BLD AUTO: 31 %
MCH RBC QN AUTO: 31.8 PG (ref 26–34)
MCHC RBC AUTO-ENTMCNC: 33.2 GM/DL (ref 31–37)
MCV RBC AUTO: 95.8 FL (ref 80–100)
MONOCYTES # BLD AUTO: 0.5 X10(3)/MCL (ref 0.1–1.3)
MONOCYTES NFR BLD AUTO: 7 %
NEUTROPHILS # BLD AUTO: 4.25 X10(3)/MCL (ref 2.1–9.2)
NEUTROPHILS NFR BLD AUTO: 58 %
NITRITE UR QL STRIP: NEGATIVE
NRBC BLD AUTO-RTO: 0 % (ref 0–0.2)
PH UR STRIP: 6 [PH] (ref 4.5–8)
PHOSPHATE SERPL-MCNC: 1.7 MG/DL (ref 2.3–4.7)
PLATELET # BLD AUTO: 206 X10(3)/MCL (ref 130–400)
PMV BLD AUTO: 10.8 FL (ref 7.4–10.4)
POTASSIUM SERPL-SCNC: 4.1 MMOL/L (ref 3.5–5.1)
PROT UR QL STRIP: NEGATIVE
PTH-INTACT SERPL-MCNC: 554.4 PG/ML (ref 8.7–77)
RBC # BLD AUTO: 4.81 X10(6)/MCL (ref 4–5.2)
RBC #/AREA URNS AUTO: ABNORMAL /HPF
SODIUM SERPL-SCNC: 139 MMOL/L (ref 136–145)
SP GR UR STRIP: 1 (ref 1–1.03)
SQUAMOUS #/AREA URNS LPF: ABNORMAL /LPF
UROBILINOGEN UR STRIP-ACNC: NORMAL
WBC # SPEC AUTO: 7.4 X10(3)/MCL (ref 4.5–11)
WBC #/AREA URNS AUTO: ABNORMAL /HPF

## 2021-11-02 ENCOUNTER — HISTORICAL (OUTPATIENT)
Dept: RADIOLOGY | Facility: HOSPITAL | Age: 51
End: 2021-11-02

## 2021-11-22 ENCOUNTER — HISTORICAL (OUTPATIENT)
Dept: ADMINISTRATIVE | Facility: HOSPITAL | Age: 51
End: 2021-11-22

## 2021-11-22 LAB
ALBUMIN SERPL-MCNC: 3.9 GM/DL (ref 3.5–5)
BUN SERPL-MCNC: 7.7 MG/DL (ref 9.8–20.1)
CALCIUM SERPL-MCNC: 12.8 MG/DL (ref 8.7–10.5)
CHLORIDE SERPL-SCNC: 109 MMOL/L (ref 98–107)
CO2 SERPL-SCNC: 27 MMOL/L (ref 22–29)
CREAT SERPL-MCNC: 0.75 MG/DL (ref 0.55–1.02)
DEPRECATED CALCIDIOL+CALCIFEROL SERPL-MC: 29.5 NG/ML (ref 30–80)
GLUCOSE SERPL-MCNC: 82 MG/DL (ref 74–100)
PHOSPHATE SERPL-MCNC: 1.4 MG/DL (ref 2.3–4.7)
POTASSIUM SERPL-SCNC: 4.4 MMOL/L (ref 3.5–5.1)
PTH-INTACT SERPL-MCNC: 461.5 PG/ML (ref 8.7–77)
SODIUM SERPL-SCNC: 142 MMOL/L (ref 136–145)

## 2021-11-30 ENCOUNTER — HISTORICAL (OUTPATIENT)
Dept: RADIOLOGY | Facility: HOSPITAL | Age: 51
End: 2021-11-30

## 2021-12-06 ENCOUNTER — HISTORICAL (OUTPATIENT)
Dept: ADMINISTRATIVE | Facility: HOSPITAL | Age: 51
End: 2021-12-06

## 2021-12-06 LAB
CALCIUM 24H UR-MCNC: 158 MG/DAY (ref 100–300)
CREAT 24H UR-MCNC: 670 MG/DAY (ref 710–1650)
CREAT UR-MCNC: 67 MG/DL (ref 47–110)

## 2022-01-31 ENCOUNTER — HISTORICAL (OUTPATIENT)
Dept: ADMINISTRATIVE | Facility: HOSPITAL | Age: 52
End: 2022-01-31

## 2022-01-31 ENCOUNTER — HISTORICAL (OUTPATIENT)
Dept: RADIOLOGY | Facility: HOSPITAL | Age: 52
End: 2022-01-31

## 2022-01-31 LAB — POC CREATININE: 0.8 (ref 0.6–1.3)

## 2022-02-21 ENCOUNTER — HOSPITAL ENCOUNTER (OUTPATIENT)
Dept: MEDSURG UNIT | Facility: HOSPITAL | Age: 52
End: 2022-02-24
Attending: INTERNAL MEDICINE | Admitting: INTERNAL MEDICINE

## 2022-02-21 LAB
ABS NEUT (OLG): 3.72 (ref 2.1–9.2)
ALBUMIN SERPL-MCNC: 4 G/DL (ref 3.5–5)
ALBUMIN/GLOB SERPL: 1.2 {RATIO} (ref 1.1–2)
ALP SERPL-CCNC: 93 U/L (ref 40–150)
ALT SERPL-CCNC: 8 U/L (ref 0–55)
AST SERPL-CCNC: 10 U/L (ref 5–34)
BASOPHILS # BLD AUTO: 0.1 10*3/UL (ref 0–0.2)
BASOPHILS NFR BLD AUTO: 1 %
BILIRUB SERPL-MCNC: 0.4 MG/DL
BILIRUBIN DIRECT+TOT PNL SERPL-MCNC: 0.2 (ref 0–0.5)
BILIRUBIN DIRECT+TOT PNL SERPL-MCNC: 0.2 (ref 0–0.8)
BUN SERPL-MCNC: 11.2 MG/DL (ref 9.8–20.1)
CALCIUM SERPL-MCNC: 13.1 MG/DL (ref 8.7–10.5)
CHLORIDE SERPL-SCNC: 105 MMOL/L (ref 98–107)
CO2 SERPL-SCNC: 29 MMOL/L (ref 22–29)
CREAT SERPL-MCNC: 0.83 MG/DL (ref 0.55–1.02)
EOSINOPHIL # BLD AUTO: 0.5 10*3/UL (ref 0–0.9)
EOSINOPHIL NFR BLD AUTO: 6 %
ERYTHROCYTE [DISTWIDTH] IN BLOOD BY AUTOMATED COUNT: 13.1 % (ref 11.5–14.5)
FLAG2 (OHS): 70
FLAG3 (OHS): 80
FLAGS (OHS): 80
GLOBULIN SER-MCNC: 3.3 G/DL (ref 2.4–3.5)
GLUCOSE SERPL-MCNC: 115 MG/DL (ref 74–100)
HCT VFR BLD AUTO: 44.4 % (ref 35–46)
HGB BLD-MCNC: 14.9 G/DL (ref 12–16)
ICTERIC INTERF INDEX SERPL-ACNC: 0
IMM. NE 2 SUSPECT FLAG (OHS): 10
LIPEMIC INTERF INDEX SERPL-ACNC: 4
LOW EVENT # SUSPECT FLAG (OHS): 80
LYMPHOCYTES # BLD AUTO: 3.7 10*3/UL (ref 0.6–4.6)
LYMPHOCYTES NFR BLD AUTO: 44 %
MANUAL DIFF? (OHS): NO
MCH RBC QN AUTO: 31.2 PG (ref 26–34)
MCHC RBC AUTO-ENTMCNC: 33.6 G/DL (ref 31–37)
MCV RBC AUTO: 93.1 FL (ref 80–100)
MO BLASTS SUSPECT FLAG (OHS): 70
MONOCYTES # BLD AUTO: 0.5 10*3/UL (ref 0.1–1.3)
MONOCYTES NFR BLD AUTO: 6 %
NEUTROPHILS # BLD AUTO: 3.72 10*3/UL (ref 2.1–9.2)
NEUTROPHILS NFR BLD AUTO: 44 %
NRBC BLD AUTO-RTO: 0 % (ref 0–0.2)
PLATELET # BLD AUTO: 208 10*3/UL (ref 130–400)
PLATELET CLUMPS SUSPECT FLAG (OHS): 110
PMV BLD AUTO: 10.6 FL (ref 7.4–10.4)
POTASSIUM SERPL-SCNC: 3.5 MMOL/L (ref 3.5–5.1)
PROT SERPL-MCNC: 7.3 G/DL (ref 6.4–8.3)
RBC # BLD AUTO: 4.77 10*6/UL (ref 4–5.2)
SODIUM SERPL-SCNC: 136 MMOL/L (ref 136–145)
WBC # SPEC AUTO: 8.5 10*3/UL (ref 4.5–11)

## 2022-02-22 LAB
ABS NEUT (OLG): 2.7 (ref 2.1–9.2)
ALBUMIN SERPL-MCNC: 3.3 G/DL (ref 3.5–5)
ALBUMIN/GLOB SERPL: 1.3 {RATIO} (ref 1.1–2)
ALP SERPL-CCNC: 78 U/L (ref 40–150)
ALT SERPL-CCNC: 6 U/L (ref 0–55)
AST SERPL-CCNC: 8 U/L (ref 5–34)
BASOPHILS # BLD AUTO: 0 10*3/UL (ref 0–0.2)
BASOPHILS NFR BLD AUTO: 1 %
BILIRUB SERPL-MCNC: 0.3 MG/DL
BILIRUBIN DIRECT+TOT PNL SERPL-MCNC: 0.1 (ref 0–0.5)
BILIRUBIN DIRECT+TOT PNL SERPL-MCNC: 0.2 (ref 0–0.8)
BUN SERPL-MCNC: 11.6 MG/DL (ref 9.8–20.1)
CALCIUM SERPL-MCNC: 11.7 MG/DL (ref 8.7–10.5)
CHLORIDE SERPL-SCNC: 108 MMOL/L (ref 98–107)
CO2 SERPL-SCNC: 27 MMOL/L (ref 22–29)
CREAT SERPL-MCNC: 0.73 MG/DL (ref 0.55–1.02)
EOSINOPHIL # BLD AUTO: 0.4 10*3/UL (ref 0–0.9)
EOSINOPHIL NFR BLD AUTO: 5 %
ERYTHROCYTE [DISTWIDTH] IN BLOOD BY AUTOMATED COUNT: 13 % (ref 11.5–14.5)
FLAG2 (OHS): 70
FLAG3 (OHS): 80
FLAGS (OHS): 70
GLOBULIN SER-MCNC: 2.5 G/DL (ref 2.4–3.5)
GLUCOSE SERPL-MCNC: 86 MG/DL (ref 74–100)
HCT VFR BLD AUTO: 37.5 % (ref 35–46)
HEMOLYSIS INTERF INDEX SERPL-ACNC: 1
HEMOLYSIS INTERF INDEX SERPL-ACNC: <0
HGB BLD-MCNC: 12.4 G/DL (ref 12–16)
ICTERIC INTERF INDEX SERPL-ACNC: 0
IMM GRANULOCYTES # BLD AUTO: 0.01 10*3/UL
IMM GRANULOCYTES NFR BLD AUTO: 0 %
LIPEMIC INTERF INDEX SERPL-ACNC: 1
LOW EVENT # SUSPECT FLAG (OHS): 70
LYMPHOCYTES # BLD AUTO: 3.3 10*3/UL (ref 0.6–4.6)
LYMPHOCYTES NFR BLD AUTO: 48 %
MAGNESIUM SERPL-MCNC: 1.8 MG/DL (ref 1.6–2.6)
MANUAL DIFF? (OHS): NO
MCH RBC QN AUTO: 31.6 PG (ref 26–34)
MCHC RBC AUTO-ENTMCNC: 33.1 G/DL (ref 31–37)
MCV RBC AUTO: 95.4 FL (ref 80–100)
MO BLASTS SUSPECT FLAG (OHS): 70
MONOCYTES # BLD AUTO: 0.4 10*3/UL (ref 0.1–1.3)
MONOCYTES NFR BLD AUTO: 6 %
NEUTROPHILS # BLD AUTO: 2.7 10*3/UL (ref 2.1–9.2)
NEUTROPHILS NFR BLD AUTO: 40 %
NRBC BLD AUTO-RTO: 0 % (ref 0–0.2)
PLATELET # BLD AUTO: 179 10*3/UL (ref 130–400)
PLATELET CLUMPS SUSPECT FLAG (OHS): 10
PMV BLD AUTO: 10.9 FL (ref 7.4–10.4)
POTASSIUM SERPL-SCNC: 3.2 MMOL/L (ref 3.5–5.1)
PROT SERPL-MCNC: 5.8 G/DL (ref 6.4–8.3)
RBC # BLD AUTO: 3.93 10*6/UL (ref 4–5.2)
SODIUM SERPL-SCNC: 138 MMOL/L (ref 136–145)
WBC # SPEC AUTO: 6.8 10*3/UL (ref 4.5–11)

## 2022-02-23 LAB
ABS NEUT (OLG): 2.77 (ref 2.1–9.2)
ALBUMIN SERPL-MCNC: 3.4 G/DL (ref 3.5–5)
ALBUMIN/GLOB SERPL: 1.4 {RATIO} (ref 1.1–2)
ALP SERPL-CCNC: 75 U/L (ref 40–150)
ALT SERPL-CCNC: 6 U/L (ref 0–55)
AST SERPL-CCNC: 10 U/L (ref 5–34)
BASOPHILS # BLD AUTO: 0 10*3/UL (ref 0–0.2)
BASOPHILS NFR BLD AUTO: 1 %
BILIRUB SERPL-MCNC: 0.3 MG/DL
BILIRUBIN DIRECT+TOT PNL SERPL-MCNC: 0.1 (ref 0–0.5)
BILIRUBIN DIRECT+TOT PNL SERPL-MCNC: 0.2 (ref 0–0.8)
BUN SERPL-MCNC: 6.6 MG/DL (ref 9.8–20.1)
CALCIUM SERPL-MCNC: 11.5 MG/DL (ref 8.7–10.5)
CHLORIDE SERPL-SCNC: 112 MMOL/L (ref 98–107)
CO2 SERPL-SCNC: 24 MMOL/L (ref 22–29)
CREAT SERPL-MCNC: 0.67 MG/DL (ref 0.55–1.02)
EOSINOPHIL # BLD AUTO: 0.4 10*3/UL (ref 0–0.9)
EOSINOPHIL NFR BLD AUTO: 6 %
ERYTHROCYTE [DISTWIDTH] IN BLOOD BY AUTOMATED COUNT: 13.1 % (ref 11.5–14.5)
FLAG2 (OHS): 70
FLAG3 (OHS): 80
FLAGS (OHS): 70
GLOBULIN SER-MCNC: 2.5 G/DL (ref 2.4–3.5)
GLUCOSE SERPL-MCNC: 83 MG/DL (ref 74–100)
HCT VFR BLD AUTO: 38.8 % (ref 35–46)
HEMOLYSIS INTERF INDEX SERPL-ACNC: 3
HGB BLD-MCNC: 12.9 G/DL (ref 12–16)
ICTERIC INTERF INDEX SERPL-ACNC: 0
IMM GRANULOCYTES # BLD AUTO: 0.01 10*3/UL
IMM GRANULOCYTES NFR BLD AUTO: 0 %
LIPEMIC INTERF INDEX SERPL-ACNC: 3
LOW EVENT # SUSPECT FLAG (OHS): 70
LYMPHOCYTES # BLD AUTO: 3 10*3/UL (ref 0.6–4.6)
LYMPHOCYTES NFR BLD AUTO: 44 %
MANUAL DIFF? (OHS): NO
MCH RBC QN AUTO: 31.3 PG (ref 26–34)
MCHC RBC AUTO-ENTMCNC: 33.2 G/DL (ref 31–37)
MCV RBC AUTO: 94.2 FL (ref 80–100)
MO BLASTS SUSPECT FLAG (OHS): 70
MONOCYTES # BLD AUTO: 0.5 10*3/UL (ref 0.1–1.3)
MONOCYTES NFR BLD AUTO: 7 %
NEUTROPHILS # BLD AUTO: 2.77 10*3/UL (ref 2.1–9.2)
NEUTROPHILS NFR BLD AUTO: 42 %
NRBC BLD AUTO-RTO: 0 % (ref 0–0.2)
PLATELET # BLD AUTO: 167 10*3/UL (ref 130–400)
PLATELET CLUMPS SUSPECT FLAG (OHS): 10
PMV BLD AUTO: 11.3 FL (ref 7.4–10.4)
POTASSIUM SERPL-SCNC: 3.6 MMOL/L (ref 3.5–5.1)
PROT SERPL-MCNC: 5.9 G/DL (ref 6.4–8.3)
PTH-INTACT SERPL-MCNC: 78.3 PG/ML (ref 8.7–77)
RBC # BLD AUTO: 4.12 10*6/UL (ref 4–5.2)
SODIUM SERPL-SCNC: 137 MMOL/L (ref 136–145)
WBC # SPEC AUTO: 6.6 10*3/UL (ref 4.5–11)

## 2022-02-24 LAB
ABS NEUT (OLG): 8.76 (ref 2.1–9.2)
ALBUMIN SERPL-MCNC: 3.7 G/DL (ref 3.5–5)
ALBUMIN/GLOB SERPL: 1.2 {RATIO} (ref 1.1–2)
ALP SERPL-CCNC: 86 U/L (ref 40–150)
ALT SERPL-CCNC: 7 U/L (ref 0–55)
AST SERPL-CCNC: 10 U/L (ref 5–34)
BASOPHILS # BLD AUTO: 0 10*3/UL (ref 0–0.2)
BASOPHILS NFR BLD AUTO: 0 %
BILIRUB SERPL-MCNC: 0.3 MG/DL
BILIRUBIN DIRECT+TOT PNL SERPL-MCNC: 0.1 (ref 0–0.5)
BILIRUBIN DIRECT+TOT PNL SERPL-MCNC: 0.2 (ref 0–0.8)
BUN SERPL-MCNC: 12 MG/DL (ref 9.8–20.1)
CALCIUM SERPL-MCNC: 11.2 MG/DL (ref 8.7–10.5)
CHLORIDE SERPL-SCNC: 106 MMOL/L (ref 98–107)
CO2 SERPL-SCNC: 24 MMOL/L (ref 22–29)
CREAT SERPL-MCNC: 0.79 MG/DL (ref 0.55–1.02)
ERYTHROCYTE [DISTWIDTH] IN BLOOD BY AUTOMATED COUNT: 13 % (ref 11.5–14.5)
FLAG2 (OHS): 70
FLAG3 (OHS): 80
FLAGS (OHS): 80
GLOBULIN SER-MCNC: 3 G/DL (ref 2.4–3.5)
GLUCOSE SERPL-MCNC: 121 MG/DL (ref 74–100)
HCT VFR BLD AUTO: 39.6 % (ref 35–46)
HEMOLYSIS INTERF INDEX SERPL-ACNC: <0
HEMOLYSIS INTERF INDEX SERPL-ACNC: <0
HGB BLD-MCNC: 13.3 G/DL (ref 12–16)
ICTERIC INTERF INDEX SERPL-ACNC: 0
IMM GRANULOCYTES # BLD AUTO: 0.05 10*3/UL
IMM GRANULOCYTES NFR BLD AUTO: 0 %
LIPEMIC INTERF INDEX SERPL-ACNC: 1
LOW EVENT # SUSPECT FLAG (OHS): 80
LYMPHOCYTES # BLD AUTO: 1.6 10*3/UL (ref 0.6–4.6)
LYMPHOCYTES NFR BLD AUTO: 14 %
MAGNESIUM SERPL-MCNC: 1.7 MG/DL (ref 1.6–2.6)
MANUAL DIFF? (OHS): NO
MCH RBC QN AUTO: 31.2 PG (ref 26–34)
MCHC RBC AUTO-ENTMCNC: 33.6 G/DL (ref 31–37)
MCV RBC AUTO: 93 FL (ref 80–100)
MO BLASTS SUSPECT FLAG (OHS): 40
MONOCYTES # BLD AUTO: 0.4 10*3/UL (ref 0.1–1.3)
MONOCYTES NFR BLD AUTO: 4 %
NEUTROPHILS # BLD AUTO: 8.76 10*3/UL (ref 2.1–9.2)
NEUTROPHILS NFR BLD AUTO: 81 %
NRBC BLD AUTO-RTO: 0 % (ref 0–0.2)
PHOSPHATE SERPL-MCNC: 1.7 MG/DL (ref 2.3–4.7)
PLATELET # BLD AUTO: 168 10*3/UL (ref 130–400)
PLATELET CLUMPS SUSPECT FLAG (OHS): 10
PMV BLD AUTO: 11 FL (ref 7.4–10.4)
POTASSIUM SERPL-SCNC: 4.2 MMOL/L (ref 3.5–5.1)
PROT SERPL-MCNC: 6.7 G/DL (ref 6.4–8.3)
PTH-INTACT SERPL-MCNC: 23.2 PG/ML (ref 8.7–77)
RBC # BLD AUTO: 4.26 10*6/UL (ref 4–5.2)
SODIUM SERPL-SCNC: 134 MMOL/L (ref 136–145)
WBC # SPEC AUTO: 10.8 10*3/UL (ref 4.5–11)

## 2022-04-11 ENCOUNTER — HISTORICAL (OUTPATIENT)
Dept: ADMINISTRATIVE | Facility: HOSPITAL | Age: 52
End: 2022-04-11
Payer: MEDICAID

## 2022-04-29 VITALS
DIASTOLIC BLOOD PRESSURE: 54 MMHG | HEIGHT: 71 IN | WEIGHT: 155.44 LBS | SYSTOLIC BLOOD PRESSURE: 98 MMHG | BODY MASS INDEX: 21.76 KG/M2

## 2022-05-14 NOTE — DISCHARGE SUMMARY
Admit and Discharge Dates  Admit Date: 02/21/2022  Discharge Date: 02/24/2022  Physicians  Attending Physician - Stanton FLYNN, Loretta  Admitting Physician - Stanton FLYNN, Loretta  Consulting Physician - Daryl FLYNN, James MASCORRO  Consulting Physician - Meera, Kenton MASCORRO  Consulting Physician - Josue FLYNN, Deepak SPENCER  Primary Care Physician - Arabella FLYNN, Wanda Seo  Discharge Diagnosis  1.?Hypercalcemia?E83.52  ?  2.?Hyperparathyroidism, primary?E21.0  ?  3.?Hypophosphatemia?E83.39  ?  4.?HTN (hypertension)?I10  ?  5.?Hypokalemia?E87.6  ?  6.?Vitamin D deficiency?E55.9  ?  Abnormal diagnostic test?[id number]  ?  Surgical Procedures  02/23/2022 - [id number] - Parathyroidectomy  Immunizations  No immunizations recorded for this visit.  Admission Information  51yo F with Hx of Hypercalcemia presented to the ED for evaluation of a critical lab. ?Patient was seen in endocrinology clinic earlier today, she has been?worked up for?chronic hypercalcemia?for the last?few months. ?Patient?had labs?drawn this afternoon?that showed a calcium of 13.1,?previous calcium?12.8?in?11/2021.? Patient reports?some mild?muscle weakness, however?reports?that this is not elevated from baseline.? Recent imaging that showed no parathyroid adenoma.? Patient also had a 4D CT parathyroid that showed a suspected left lower parathyroid adenoma and multiple subcentimeter thyroid nodules that have been unchanged since 11/2021.?  Hospital Course  Labs were significant for calcium 13.1, all other labs were unremarkable.? Patient was given a 1 L bolus of LR.? patient admitted?for moderate hypercalcemia secondary to primary hyperparathyroidism. Pt was initiated on normal saline at 125 mL/hr for hypercalcemia. Day 2 of admission calcium level decreased to 11.7. Pt labs significant for hypokalemia, hypomagnesemia, and hypophosphatemia with daily CMP; repletion given accordingly. Pt taken for parathyroidectomy with ENT on 2/23/22. No  complications and patient tolerated procedure well. PTH trended from 401 >>78>>23.2. Endocrinology consulted; agreed with plan and made recommendations for outpatient follow up.  Refills provided for home medications. Discontinue Alendronate home medication. Prescribed vitamin D 50,000 units for 6 weeks.  Appointments scheduled to follow outpatient with Endocrinology and ENT. Instructions to follow with PCP Dr. Bell.  Time Spent on discharge  >30 mins  Objective  Vitals & Measurements  T:?36.3? ?C (Oral)? TMIN:?36.2? ?C (Temporal Artery)? TMAX:?36.6? ?C (Oral)? HR:?79(Peripheral)? RR:?18? BP:?163/71? SpO2:?99%?  Physical Exam  General:?thin, Alert and oriented,?no acute distress  HEENT: clean bandage in place  Respiratory:?Lungs clear to auscultation bilaterally,?No increased work of breathing  Cardiovascular:?S1-S2, regular rate, regular rhythm,?2+ radial pulses,?No lower extremity edema  Gastrointestinal:?Soft, nontender, nondistended  Musculoskeletal: Appropriate movement of extremities bilaterally  Integumentary: No rashes or breakdown noted  Psych: Calm, cooperative  Patient Discharge Condition  stable  Discharge Disposition  Discharge home with PCP, Endocrine, and ENT follow up plans  Attending Physician Addendum  Patient was seen and examined on AM rounds. Management and plan were?discussed with resident. Care was reasonable and necessary.?   Discharge Medication Reconciliation  Prescribed  amLODIPine (amLODIPine 5 mg oral tablet)?5 mg, Oral, Daily  buPROPion (buPROPion 150 mg/24 hours (XL) oral tablet, extended release)?150 mg, Oral, Daily  carvedilol (carvedilol 12.5 mg oral tablet)?12.5 mg, Oral, BID  losartan (losartan 50 mg oral tablet)?50 mg, Oral, Daily  sertraline (sertraline 25 mg oral tablet)?25 mg, Oral, Daily  Continue  cloNIDine (cloniDINE 0.1 mg oral tablet)?0.1 mg, Oral, BID  ergocalciferol (ergocalciferol 50,000 intl units (1.25 mg) oral capsule)?50,000 IntUnit, Oral, qWeek  metoprolol  (metoprolol succinate 25 mg oral tablet extended release)?25 mg, Oral, Daily  promethazine (promethazine 25 mg oral tablet)?25 mg, Oral, q4hr, PRN for nausea/vomiting  Discontinue  alendronate (alendronate 70 mg oral tablet)?70 mg, Oral, qWeek  carvedilol (carvedilol 25 mg oral tablet)?25 mg, Oral, BID  Education and Orders Provided  Hypertension, Easy-to-Read  Vitamin D Deficiency, Easy-to-Read  Hypercalcemia  Discharge - 02/24/22 12:41:00 CST, Home?  Follow up  [facility], on 03/02/2022  ????Keep scheduled appointment  Deepak Salas, on 06/22/2022  ????Keep scheduled appointment  Wanda Bell  ????Follow-up with PCP in 3 to 5 days. Call for appt.

## 2022-05-14 NOTE — ED PROVIDER NOTES
Patient:   Sammie Belcher             MRN: [id number]            FIN: [id number]               Age:   52 years     Sex:  Female     :  1970   Associated Diagnoses:   Hypercalcemia   Author:   Brayan Lee MD      Basic Information   Time seen: Immediately upon arrival.   Additional information: Chief Complaint from Nursing Triage Note : Chief Complaint   2022 13:50 CST      Chief Complaint           Pt had lab workand saw PCP  this morning and was called to come back in due to elevated Calcium levels.    2022 7:49 CST       Chief Complaint           Hyperparathyroidism follow up    2022 7:45 CST       Chief Complaint           Hyperparathyroidism follow up  .   Provider/Visit info:   Time Seen:  Brayan Lee MD / 2022 13:40  .   History of Present Illness   52-year-old female with no significant past medical history presenting today with abnormal laboratory test.  She had lab work done by her PCP this morning that showed a critical calcium of 13.  Her PCP told her to come to the emergency department for further evaluation.  She has no symptoms at this time.  She denies any chest pain, belly pain, nausea, vomiting, myalgias. She is c/o some mild fatigue   The patient presents with abnormal lab test.  The onset was just prior to arrival.  Lab test value Ca: 13 mg/dl Lab test was performed by: primary care physician.  Associated symptoms: fatigue.  Risk factors consist of none.  Prior episodes: none.  Therapy today: none.     The onset was just prior to arrival.  The course/duration of symptoms is constant.        Review of Systems   Constitutional symptoms:  No fever, no chills.    Skin symptoms:  No jaundice,    Eye symptoms:  Vision unchanged.   Respiratory symptoms:  No shortness of breath, no cough.    Cardiovascular symptoms:  No chest pain, no syncope.    Gastrointestinal symptoms:  No abdominal pain, no nausea, no vomiting, no diarrhea.    Genitourinary symptoms:   No dysuria,    Neurologic symptoms:  No altered level of consciousness,              Additional review of systems information: All other systems reviewed and otherwise negative.      Health Status   Allergies:    Allergic Reactions (Selected)  No Known Allergies,    Allergies (1) Active Reaction  No Known Allergies None Documented  .   Medications:  (Selected)   Inpatient Medications  Ordered  Lactated Ringers 1000ml 1,000 mL: 1,000 mL, 1,000 mL, IV, 1,000 mL/hr, start date 02/21/22 14:01:00 CST, 1.88, m2  Prescriptions  Prescribed  Norvasc 10 mg oral tablet: 10 mg = 1 tab(s), Oral, Daily, # 30 tab(s), 2 Refill(s), Pharmacy: [email or web address] #[id number], 185, cm, Height/Length Dosing, 01/24/21 11:31:00 CST, 66, kg, Weight Dosing, 01/24/21 11:31:00 CST  cloniDINE 0.1 mg oral tablet: 0.1 mg = 1 tab(s), Oral, BID, # 60 tab(s), 1 Refill(s), Pharmacy: [email or web address] #[id number]  ergocalciferol 50,000 intl units (1.25 mg) oral capsule: 50,000 IntUnit = 1 cap(s), Oral, 2x/Wk, # 8 cap(s), 1 Refill(s), Pharmacy: [email or web address] #[id number], 180, cm, Height/Length Dosing, 10/20/21 8:04:00 CDT, 70.5, kg, Weight Dosing, 10/20/21 8:04:00 CDT  metoprolol succinate 25 mg oral tablet extended release: 25 mg = 1 tab(s), Oral, Daily, # 30 tab(s), 0 Refill(s), Pharmacy: [email or web address] #[id number]  promethazine 25 mg oral tablet: 25 mg = 1 tab(s), Oral, q4hr, PRN PRN for nausea/vomiting, # 30 tab(s), 0 Refill(s), Pharmacy: [email or web address] #[id number], 185, cm, Height/Length Dosing, 01/24/21 11:31:00 CST, 66, kg, Weight Dosing, 01/24/21 11:31:00 CST  Documented Medications  Documented  amLODIPine 5 mg oral tablet: 5 mg = 1 tab(s), Oral, Daily  buPROPion 150 mg/24 hours (XL) oral tablet, extended release: 150 mg = 1 tab(s), Oral, Daily  carvedilol 12.5 mg oral tablet: 12.5 mg = 1 tab(s), Oral, BID  carvedilol 25 mg oral tablet: 25 mg = 1 tab(s), Oral, BID  sertraline 25 mg oral tablet: 25 mg = 1 tab(s), Oral, Daily.      Past Medical/  Family/ Social History   Medical history: Reviewed as documented in chart.   Surgical history: Reviewed as documented in chart.   Family history: Not significant.      Physical Examination               Vital Signs   Vital Signs   2/21/2022 13:50 CST      Temperature Oral          36.7 DegC                             Temperature Oral (calculated)             98.06 DegF                             Peripheral Pulse Rate     70 bpm                             Respiratory Rate          14 br/min                             SpO2                      100 %                             Oxygen Therapy            Room air                             Systolic Blood Pressure   162 mmHg  HI                             Diastolic Blood Pressure  91 mmHg  HI    2/21/2022 7:49 CST       Temperature Oral          36.7 DegC                             Temperature Oral (calculated)             98.06 DegF                             Peripheral Pulse Rate     80 bpm                             Respiratory Rate          20 br/min                             Systolic Blood Pressure   123 mmHg                             Diastolic Blood Pressure  89 mmHg                             Blood Pressure Location   Right arm  .   General:  Alert, no acute distress.    Skin:  Warm, dry.    Head:  Normocephalic, atraumatic.    Neck:  Supple, no tenderness.    Cardiovascular:  Regular rate and rhythm, No murmur.    Respiratory:  Lungs are clear to auscultation, respirations are non-labored.    Back:  Nontender, Normal range of motion.    Musculoskeletal:  Normal ROM.   Chest wall   Gastrointestinal:  Soft, Nontender, Non distended.    Neurological:  Alert and oriented to person, place, time, and situation.   Lymphatics   Psychiatric:  Cooperative.      Medical Decision Making   Electrocardiogram:  Time 2/21/2022 14:25:00, rate 71, normal sinus rhythm, No ST-T changes, no ectopy, normal CA & QRS intervals, EP Interp.    Pt presents to the ED with  hypercalcemia    Evaluated the patient in the emergency department.  She was stable and well-appearing.  Her EKG showed no QR prolongation.  QTC prolongation.  Her calcium was elevated at 13.1.  The rest of her blood work was unremarkable.  Spoke with hospital medicine at this point for admission.  She is being admitted to hospital in stable condition.      Impression and Plan   Diagnosis   Hypercalcemia ([id number] E83.52)      Calls-Consults   -  2/21/2022 17:01:00 , hospital medicine, phone call, consult.    Plan   Disposition: Admit time  2/21/2022 17:02:00, Admit to Inpatient Unit.

## 2022-05-14 NOTE — OP NOTE
Patient:   Sammie Belcher             MRN: [id number]            FIN: [id number]               Age:   52 years     Sex:  Female     :  1970   Associated Diagnoses:   None   Author:   Meghan Parmar MD      DATE OF SURGERY: 22    ATTENDING PHYSICIAN: Kenton Estes MD    RESIDENT SURGEON: Meghan Parmar MD    PREOPERATIVE DIAGNOSIS: Parathyroid adenoma, Hypercalcemia    POSTOPERATIVE DIAGNOSIS: Parathyroid adenoma, Hypercalcemia    PROCEDURE:   1. Left Parathyroidectomy    INDICATIONS: This is a 52-year-old female with past medical history of hypertension as well as primary hyperparathyroidism with left parathyroid adenoma. Admitted for the second time inpatient for hypercalcemia overnight. Discussed with patient what is entailed in parathyroidectomy surgery including all risk benefit and alternative therapy.     PROCEDURE IN DETAIL: The patient was brought to the operating room and placed in supine position. Anesthesia was induced via endo tracheal intubation with a Nim tube which was confirmed in good placement.  An approximately 4 cm incision was planned approximately 2 fingerbreadths above the sternum.  5 cc of 1% lidocaine with 1 100,000 epinephrine was injected into the dermis.  The patient was prepped and draped in the usual sterile fashion.  The incision was made through the platysma.  Subplatysmal flaps were raised with electrocautery Bovie.  The strap muscles were divided in the midline and carried inferiorly.  The leftt thyroid lobe was exposed and dissection continued inferior to the lobe but medial to the carotid and lateral to the trachea.  An approximately 2 cm well circumscribed parathyroid tissue was encountered and easily dissected. This was sent as frozen specimen and confirmed parathyroid hyperplasia. The recurrent laryngeal nerve was encountered, stimulated and preserved. A Valsalva was obtained and hemostasis was achieved.  The strap muscles were reapproximated  with 3-0 interrupted Vicryl sutures.  The incision was closed in 2 layers with 3-0 Vicryl deep and a 4-0 Monocryl in a subcuticular fashion. PTH dropped from 401 to 78.    Patient was handed back to Anesthesia to be awakened and transferred to the postanesthesia care unit in stable condition.    COMPLICATIONS: None.    ESTIMATED BLOOD LOSS: 1 cc.    SPECIMENS: Left parathyroid       Meghan Parmar MD  HO-V

## 2022-05-18 ENCOUNTER — HOSPITAL ENCOUNTER (INPATIENT)
Facility: HOSPITAL | Age: 52
LOS: 15 days | Discharge: NURSING, REHAB OR LONG-TERM CARE | DRG: 065 | End: 2022-06-02
Attending: STUDENT IN AN ORGANIZED HEALTH CARE EDUCATION/TRAINING PROGRAM | Admitting: INTERNAL MEDICINE
Payer: MEDICAID

## 2022-05-18 DIAGNOSIS — I63.9 STROKE: ICD-10-CM

## 2022-05-18 DIAGNOSIS — I63.9 CVA (CEREBRAL VASCULAR ACCIDENT): ICD-10-CM

## 2022-05-18 DIAGNOSIS — I63.9 CEREBROVASCULAR ACCIDENT (CVA), UNSPECIFIED MECHANISM: Primary | ICD-10-CM

## 2022-05-18 DIAGNOSIS — A59.9 TRICHOMONIASIS: ICD-10-CM

## 2022-05-18 DIAGNOSIS — R94.31 ST ELEVATION: ICD-10-CM

## 2022-05-18 DIAGNOSIS — I10 UNCOMPLICATED HYPERTENSION: ICD-10-CM

## 2022-05-18 DIAGNOSIS — I63.9 ISCHEMIC STROKE OF FRONTAL LOBE: ICD-10-CM

## 2022-05-18 LAB
ALBUMIN SERPL-MCNC: 4.2 GM/DL (ref 3.5–5)
ALBUMIN/GLOB SERPL: 1.1 RATIO (ref 1.1–2)
ALP SERPL-CCNC: 84 UNIT/L (ref 40–150)
ALT SERPL-CCNC: 7 UNIT/L (ref 0–55)
AMPHET UR QL SCN: NEGATIVE
APPEARANCE UR: ABNORMAL
APTT PPP: 29.6 SECONDS (ref 23.4–33.9)
AST SERPL-CCNC: 18 UNIT/L (ref 5–34)
B-HCG FREE SERPL-ACNC: <2.42 MIU/ML
BACTERIA #/AREA URNS AUTO: ABNORMAL /HPF
BARBITURATE SCN PRESENT UR: NEGATIVE
BASOPHILS # BLD AUTO: 0.03 X10(3)/MCL (ref 0–0.2)
BASOPHILS NFR BLD AUTO: 0.2 %
BENZODIAZ UR QL SCN: NEGATIVE
BILIRUB UR QL STRIP.AUTO: NEGATIVE MG/DL
BILIRUBIN DIRECT+TOT PNL SERPL-MCNC: 0.8 MG/DL
BUN SERPL-MCNC: 13.3 MG/DL (ref 9.8–20.1)
CALCIUM SERPL-MCNC: 9.4 MG/DL (ref 8.4–10.2)
CANNABINOIDS UR QL SCN: POSITIVE
CHLORIDE SERPL-SCNC: 107 MMOL/L (ref 98–107)
CHOLEST SERPL-MCNC: 223 MG/DL
CHOLEST/HDLC SERPL: 4 {RATIO} (ref 0–5)
CO2 SERPL-SCNC: 22 MMOL/L (ref 22–29)
COCAINE UR QL SCN: NEGATIVE
COLOR UR AUTO: YELLOW
CREAT SERPL-MCNC: 0.97 MG/DL (ref 0.55–1.02)
EOSINOPHIL # BLD AUTO: 0.17 X10(3)/MCL (ref 0–0.9)
EOSINOPHIL NFR BLD AUTO: 1.4 %
ERYTHROCYTE [DISTWIDTH] IN BLOOD BY AUTOMATED COUNT: 14.1 % (ref 11.5–17)
GLOBULIN SER-MCNC: 3.7 GM/DL (ref 2.4–3.5)
GLUCOSE SERPL-MCNC: 97 MG/DL (ref 74–100)
GLUCOSE UR QL STRIP.AUTO: NEGATIVE MG/DL
HCT VFR BLD AUTO: 46.5 % (ref 37–47)
HDLC SERPL-MCNC: 62 MG/DL (ref 35–60)
HGB BLD-MCNC: 15.6 GM/DL (ref 12–16)
HIV 1+2 AB+HIV1 P24 AG SERPL QL IA: NONREACTIVE
IMM GRANULOCYTES # BLD AUTO: 0.03 X10(3)/MCL (ref 0–0.02)
IMM GRANULOCYTES NFR BLD AUTO: 0.2 % (ref 0–0.43)
INR BLD: 1.06 (ref 2–3)
KETONES UR QL STRIP.AUTO: NEGATIVE MG/DL
LDLC SERPL CALC-MCNC: 144 MG/DL (ref 50–140)
LEUKOCYTE ESTERASE UR QL STRIP.AUTO: ABNORMAL UNIT/L
LYMPHOCYTES # BLD AUTO: 2.5 X10(3)/MCL (ref 0.6–4.6)
LYMPHOCYTES NFR BLD AUTO: 20.5 %
MCH RBC QN AUTO: 31 PG (ref 27–31)
MCHC RBC AUTO-ENTMCNC: 33.5 MG/DL (ref 33–36)
MCV RBC AUTO: 92.3 FL (ref 80–94)
MDMA UR QL SCN: NEGATIVE
MONOCYTES # BLD AUTO: 0.75 X10(3)/MCL (ref 0.1–1.3)
MONOCYTES NFR BLD AUTO: 6.2 %
NEUTROPHILS # BLD AUTO: 8.7 X10(3)/MCL (ref 2.1–9.2)
NEUTROPHILS NFR BLD AUTO: 71.5 %
NITRITE UR QL STRIP.AUTO: NEGATIVE
NRBC BLD AUTO-RTO: 0 %
OPIATES UR QL SCN: NEGATIVE
PCP UR QL: NEGATIVE
PH UR STRIP.AUTO: 5.5 [PH]
PH UR: 5.5 [PH] (ref 3–11)
PLATELET # BLD AUTO: 256 X10(3)/MCL (ref 130–400)
PMV BLD AUTO: 11.2 FL (ref 9.4–12.4)
POTASSIUM SERPL-SCNC: 3.6 MMOL/L (ref 3.5–5.1)
PROT SERPL-MCNC: 7.9 GM/DL (ref 6.4–8.3)
PROT UR QL STRIP.AUTO: NEGATIVE MG/DL
PROTHROMBIN TIME: 13.6 SECONDS (ref 11.7–14.5)
RBC # BLD AUTO: 5.04 X10(6)/MCL (ref 4.2–5.4)
RBC #/AREA URNS AUTO: ABNORMAL /HPF
RBC UR QL AUTO: ABNORMAL UNIT/L
SODIUM SERPL-SCNC: 141 MMOL/L (ref 136–145)
SP GR UR STRIP.AUTO: 1.01
SPECIFIC GRAVITY, URINE AUTO (.000) (OHS): 1.01 (ref 1–1.03)
SQUAMOUS #/AREA URNS AUTO: ABNORMAL /LPF
T PALLIDUM AB SER QL: ABNORMAL
T PALLIDUM AB SER QL: REACTIVE
T VAGINALIS URNS QL MICRO: ABNORMAL /HPF
TRIGL SERPL-MCNC: 86 MG/DL (ref 37–140)
TSH SERPL-ACNC: 0.47 UIU/ML (ref 0.35–4.94)
UROBILINOGEN UR STRIP-ACNC: 0.2 MG/DL
VLDLC SERPL CALC-MCNC: 17 MG/DL
WBC # SPEC AUTO: 12.2 X10(3)/MCL (ref 4.5–11.5)
WBC #/AREA URNS AUTO: ABNORMAL /HPF

## 2022-05-18 PROCEDURE — 25000003 PHARM REV CODE 250: Performed by: NURSE PRACTITIONER

## 2022-05-18 PROCEDURE — 80053 COMPREHEN METABOLIC PANEL: CPT | Performed by: STUDENT IN AN ORGANIZED HEALTH CARE EDUCATION/TRAINING PROGRAM

## 2022-05-18 PROCEDURE — 25000003 PHARM REV CODE 250: Performed by: INTERNAL MEDICINE

## 2022-05-18 PROCEDURE — 99223 PR INITIAL HOSPITAL CARE,LEVL III: ICD-10-PCS | Mod: ,,, | Performed by: PSYCHIATRY & NEUROLOGY

## 2022-05-18 PROCEDURE — 85610 PROTHROMBIN TIME: CPT | Performed by: STUDENT IN AN ORGANIZED HEALTH CARE EDUCATION/TRAINING PROGRAM

## 2022-05-18 PROCEDURE — 99223 1ST HOSP IP/OBS HIGH 75: CPT | Mod: ,,, | Performed by: PSYCHIATRY & NEUROLOGY

## 2022-05-18 PROCEDURE — 96376 TX/PRO/DX INJ SAME DRUG ADON: CPT

## 2022-05-18 PROCEDURE — 80061 LIPID PANEL: CPT | Performed by: STUDENT IN AN ORGANIZED HEALTH CARE EDUCATION/TRAINING PROGRAM

## 2022-05-18 PROCEDURE — 81025 URINE PREGNANCY TEST: CPT | Performed by: STUDENT IN AN ORGANIZED HEALTH CARE EDUCATION/TRAINING PROGRAM

## 2022-05-18 PROCEDURE — 93005 ELECTROCARDIOGRAM TRACING: CPT

## 2022-05-18 PROCEDURE — 86780 TREPONEMA PALLIDUM: CPT | Performed by: STUDENT IN AN ORGANIZED HEALTH CARE EDUCATION/TRAINING PROGRAM

## 2022-05-18 PROCEDURE — 63600175 PHARM REV CODE 636 W HCPCS: Performed by: STUDENT IN AN ORGANIZED HEALTH CARE EDUCATION/TRAINING PROGRAM

## 2022-05-18 PROCEDURE — 25000003 PHARM REV CODE 250: Performed by: STUDENT IN AN ORGANIZED HEALTH CARE EDUCATION/TRAINING PROGRAM

## 2022-05-18 PROCEDURE — 85025 COMPLETE CBC W/AUTO DIFF WBC: CPT | Performed by: STUDENT IN AN ORGANIZED HEALTH CARE EDUCATION/TRAINING PROGRAM

## 2022-05-18 PROCEDURE — 99291 CRITICAL CARE FIRST HOUR: CPT | Mod: 25

## 2022-05-18 PROCEDURE — 86592 SYPHILIS TEST NON-TREP QUAL: CPT | Performed by: STUDENT IN AN ORGANIZED HEALTH CARE EDUCATION/TRAINING PROGRAM

## 2022-05-18 PROCEDURE — 63600175 PHARM REV CODE 636 W HCPCS: Performed by: PSYCHIATRY & NEUROLOGY

## 2022-05-18 PROCEDURE — 96374 THER/PROPH/DIAG INJ IV PUSH: CPT

## 2022-05-18 PROCEDURE — 99292 CRITICAL CARE ADDL 30 MIN: CPT | Mod: 25

## 2022-05-18 PROCEDURE — 81001 URINALYSIS AUTO W/SCOPE: CPT | Performed by: STUDENT IN AN ORGANIZED HEALTH CARE EDUCATION/TRAINING PROGRAM

## 2022-05-18 PROCEDURE — 80307 DRUG TEST PRSMV CHEM ANLYZR: CPT | Performed by: STUDENT IN AN ORGANIZED HEALTH CARE EDUCATION/TRAINING PROGRAM

## 2022-05-18 PROCEDURE — 96375 TX/PRO/DX INJ NEW DRUG ADDON: CPT

## 2022-05-18 PROCEDURE — 36415 COLL VENOUS BLD VENIPUNCTURE: CPT | Performed by: STUDENT IN AN ORGANIZED HEALTH CARE EDUCATION/TRAINING PROGRAM

## 2022-05-18 PROCEDURE — 93010 EKG 12-LEAD: ICD-10-PCS | Mod: ,,, | Performed by: INTERNAL MEDICINE

## 2022-05-18 PROCEDURE — 84443 ASSAY THYROID STIM HORMONE: CPT | Performed by: STUDENT IN AN ORGANIZED HEALTH CARE EDUCATION/TRAINING PROGRAM

## 2022-05-18 PROCEDURE — 93010 ELECTROCARDIOGRAM REPORT: CPT | Mod: ,,, | Performed by: INTERNAL MEDICINE

## 2022-05-18 PROCEDURE — 11000001 HC ACUTE MED/SURG PRIVATE ROOM

## 2022-05-18 PROCEDURE — 87389 HIV-1 AG W/HIV-1&-2 AB AG IA: CPT | Performed by: STUDENT IN AN ORGANIZED HEALTH CARE EDUCATION/TRAINING PROGRAM

## 2022-05-18 PROCEDURE — 85730 THROMBOPLASTIN TIME PARTIAL: CPT | Performed by: STUDENT IN AN ORGANIZED HEALTH CARE EDUCATION/TRAINING PROGRAM

## 2022-05-18 RX ORDER — AMLODIPINE BESYLATE 5 MG/1
5 TABLET ORAL DAILY
Status: ON HOLD | COMMUNITY
Start: 2022-05-10 | End: 2022-06-02 | Stop reason: HOSPADM

## 2022-05-18 RX ORDER — LABETALOL HYDROCHLORIDE 5 MG/ML
10 INJECTION, SOLUTION INTRAVENOUS
Status: COMPLETED | OUTPATIENT
Start: 2022-05-18 | End: 2022-05-18

## 2022-05-18 RX ORDER — THIAMINE HCL 100 MG
100 TABLET ORAL DAILY
Status: DISCONTINUED | OUTPATIENT
Start: 2022-05-19 | End: 2022-05-25

## 2022-05-18 RX ORDER — ALENDRONATE SODIUM 70 MG/1
70 TABLET ORAL
Status: ON HOLD | COMMUNITY
Start: 2022-04-23 | End: 2022-06-23 | Stop reason: HOSPADM

## 2022-05-18 RX ORDER — LABETALOL HCL 20 MG/4 ML
10 SYRINGE (ML) INTRAVENOUS
Status: DISCONTINUED | OUTPATIENT
Start: 2022-05-18 | End: 2022-05-18

## 2022-05-18 RX ORDER — CARVEDILOL 12.5 MG/1
12.5 TABLET ORAL 2 TIMES DAILY
Status: DISCONTINUED | OUTPATIENT
Start: 2022-05-18 | End: 2022-05-28

## 2022-05-18 RX ORDER — SERTRALINE HYDROCHLORIDE 25 MG/1
25 TABLET, FILM COATED ORAL DAILY
Status: ON HOLD | COMMUNITY
Start: 2022-05-10 | End: 2022-06-02 | Stop reason: SDUPTHER

## 2022-05-18 RX ORDER — SODIUM CHLORIDE 0.9 % (FLUSH) 0.9 %
10 SYRINGE (ML) INJECTION
Status: DISCONTINUED | OUTPATIENT
Start: 2022-05-18 | End: 2022-06-02 | Stop reason: HOSPADM

## 2022-05-18 RX ORDER — ATORVASTATIN CALCIUM 40 MG/1
40 TABLET, FILM COATED ORAL DAILY
Status: DISCONTINUED | OUTPATIENT
Start: 2022-05-19 | End: 2022-05-19

## 2022-05-18 RX ORDER — CLONIDINE HYDROCHLORIDE 0.1 MG/1
0.1 TABLET ORAL 2 TIMES DAILY
Status: ON HOLD | COMMUNITY
Start: 2022-02-24 | End: 2022-05-18 | Stop reason: CLARIF

## 2022-05-18 RX ORDER — METRONIDAZOLE 500 MG/1
2 TABLET ORAL ONCE
Status: COMPLETED | OUTPATIENT
Start: 2022-05-18 | End: 2022-05-18

## 2022-05-18 RX ORDER — ASPIRIN 81 MG/1
81 TABLET ORAL DAILY
Status: DISCONTINUED | OUTPATIENT
Start: 2022-05-19 | End: 2022-05-24

## 2022-05-18 RX ORDER — ERGOCALCIFEROL 1.25 MG/1
50000 CAPSULE ORAL
Status: ON HOLD | COMMUNITY
Start: 2022-04-23 | End: 2022-06-02 | Stop reason: HOSPADM

## 2022-05-18 RX ORDER — HYDRALAZINE HYDROCHLORIDE 20 MG/ML
10 INJECTION INTRAMUSCULAR; INTRAVENOUS
Status: COMPLETED | OUTPATIENT
Start: 2022-05-18 | End: 2022-05-18

## 2022-05-18 RX ORDER — LOSARTAN POTASSIUM 50 MG/1
50 TABLET ORAL DAILY
Status: ON HOLD | COMMUNITY
Start: 2022-05-10 | End: 2022-05-18

## 2022-05-18 RX ORDER — BUPROPION HYDROCHLORIDE 150 MG/1
150 TABLET ORAL DAILY
Status: ON HOLD | COMMUNITY
Start: 2022-02-24 | End: 2022-06-02 | Stop reason: SDUPTHER

## 2022-05-18 RX ORDER — SERTRALINE HYDROCHLORIDE 25 MG/1
25 TABLET, FILM COATED ORAL DAILY
Status: DISCONTINUED | OUTPATIENT
Start: 2022-05-19 | End: 2022-06-02 | Stop reason: HOSPADM

## 2022-05-18 RX ORDER — AMLODIPINE BESYLATE 5 MG/1
5 TABLET ORAL DAILY
Status: DISCONTINUED | OUTPATIENT
Start: 2022-05-18 | End: 2022-05-24

## 2022-05-18 RX ORDER — IBUPROFEN 200 MG
1 TABLET ORAL DAILY
Status: DISCONTINUED | OUTPATIENT
Start: 2022-05-19 | End: 2022-05-19

## 2022-05-18 RX ORDER — CARVEDILOL 12.5 MG/1
12.5 TABLET ORAL 2 TIMES DAILY
Status: ON HOLD | COMMUNITY
Start: 2022-02-26 | End: 2022-06-02 | Stop reason: HOSPADM

## 2022-05-18 RX ORDER — BUPROPION HYDROCHLORIDE 150 MG/1
150 TABLET ORAL DAILY
Status: DISCONTINUED | OUTPATIENT
Start: 2022-05-19 | End: 2022-06-02 | Stop reason: HOSPADM

## 2022-05-18 RX ORDER — AMLODIPINE BESYLATE 5 MG/1
5 TABLET ORAL DAILY
Status: DISCONTINUED | OUTPATIENT
Start: 2022-05-19 | End: 2022-05-18

## 2022-05-18 RX ORDER — HYDRALAZINE HYDROCHLORIDE 20 MG/ML
10 INJECTION INTRAMUSCULAR; INTRAVENOUS ONCE
Status: COMPLETED | OUTPATIENT
Start: 2022-05-18 | End: 2022-05-18

## 2022-05-18 RX ORDER — LABETALOL HCL 20 MG/4 ML
10 SYRINGE (ML) INTRAVENOUS
Status: COMPLETED | OUTPATIENT
Start: 2022-05-18 | End: 2022-05-18

## 2022-05-18 RX ORDER — LABETALOL HYDROCHLORIDE 5 MG/ML
10 INJECTION, SOLUTION INTRAVENOUS EVERY 4 HOURS PRN
Status: DISCONTINUED | OUTPATIENT
Start: 2022-05-18 | End: 2022-06-02 | Stop reason: HOSPADM

## 2022-05-18 RX ORDER — ATORVASTATIN CALCIUM 40 MG/1
40 TABLET, FILM COATED ORAL DAILY
Status: DISCONTINUED | OUTPATIENT
Start: 2022-05-19 | End: 2022-06-02 | Stop reason: HOSPADM

## 2022-05-18 RX ORDER — LABETALOL HCL 20 MG/4 ML
10 SYRINGE (ML) INTRAVENOUS EVERY 4 HOURS
Status: DISCONTINUED | OUTPATIENT
Start: 2022-05-18 | End: 2022-05-18

## 2022-05-18 RX ADMIN — AMLODIPINE BESYLATE 5 MG: 5 TABLET ORAL at 11:05

## 2022-05-18 RX ADMIN — CARVEDILOL 12.5 MG: 12.5 TABLET, FILM COATED ORAL at 11:05

## 2022-05-18 RX ADMIN — LABETALOL HYDROCHLORIDE 10 MG: 5 INJECTION, SOLUTION INTRAVENOUS at 12:05

## 2022-05-18 RX ADMIN — HYDRALAZINE HYDROCHLORIDE 10 MG: 20 INJECTION INTRAMUSCULAR; INTRAVENOUS at 07:05

## 2022-05-18 RX ADMIN — METRONIDAZOLE 2 G: 500 TABLET ORAL at 11:05

## 2022-05-18 RX ADMIN — LABETALOL HYDROCHLORIDE 10 MG: 5 INJECTION, SOLUTION INTRAVENOUS at 01:05

## 2022-05-18 RX ADMIN — HYDRALAZINE HYDROCHLORIDE 10 MG: 20 INJECTION, SOLUTION INTRAMUSCULAR; INTRAVENOUS at 03:05

## 2022-05-18 NOTE — ED TRIAGE NOTES
Sister states pt has been incontient of bowel and bladder for the past three days and that her speech has changed.

## 2022-05-18 NOTE — ED PROVIDER NOTES
Encounter Date: 5/18/2022       History     Chief Complaint   Patient presents with    Altered Mental Status     Sister states pt has been incontient of bowel and bladder for the past three days and that her speech has changed.     The history is provided by the patient and a relative. The history is limited by the condition of the patient.   Altered Mental Status  This is a new problem. The current episode started more than 2 days ago. The problem occurs constantly. The problem has not changed since onset.Pertinent negatives include no chest pain and no abdominal pain. Nothing aggravates the symptoms. Nothing relieves the symptoms. She has tried nothing for the symptoms.     52-year-old female with a past history of poorly-controlled hypertension, anxiety/depression and hyperparathyroidism presents emergency department for altered mental status.  Supposedly for last 3 days she has been having difficulty speaking and has been having bowel and bladder incontinence.  Patient is able to speak a few words although it seems that she is having difficulty getting them out.  Patient's family member states that this is been going on for last 3 days and she did realize how bad it was not so she seen her today.    Review of patient's allergies indicates:  No Known Allergies  Past Medical History:   Diagnosis Date    Hypertension     Thyroid disease      Past Surgical History:   Procedure Laterality Date    THYROIDECTOMY      TUBAL LIGATION       History reviewed. No pertinent family history.  Social History     Tobacco Use    Smoking status: Current Every Day Smoker     Types: Cigarettes    Smokeless tobacco: Current User   Substance Use Topics    Alcohol use: Yes     Alcohol/week: 3.0 standard drinks     Types: 3 Standard drinks or equivalent per week    Drug use: No     Review of Systems   Unable to perform ROS: Mental status change   Constitutional: Negative for fever.   Cardiovascular: Negative for chest pain.    Gastrointestinal: Negative for abdominal pain.       Physical Exam     Initial Vitals [05/18/22 1129]   BP Pulse Resp Temp SpO2   (!) 187/124 (!) 114 20 98.2 °F (36.8 °C) 98 %      MAP       --         Physical Exam    Nursing note and vitals reviewed.  Constitutional: She appears well-developed and well-nourished. No distress.   HENT:   Head: Normocephalic and atraumatic.   Eyes: Pupils are equal, round, and reactive to light.   Cardiovascular: Normal rate and regular rhythm.   Pulmonary/Chest: Breath sounds normal. No respiratory distress.   Abdominal: Abdomen is soft. There is no abdominal tenderness.   Musculoskeletal:         General: No tenderness. Normal range of motion.     Neurological: She is alert. She has normal strength. A cranial nerve deficit (Mild right-sided facial droop to the lip) and sensory deficit (right sided facial numbness) is present. Coordination normal. GCS eye subscore is 4. GCS verbal subscore is 5. GCS motor subscore is 6.   Patient with difficulty finding words         ED Course   Critical Care    Date/Time: 5/18/2022 4:02 PM  Performed by: James Clark MD  Authorized by: James Clark MD   Direct patient critical care time: 60 minutes  Additional history critical care time: 5 minutes  Ordering / reviewing critical care time: 5 minutes  Documentation critical care time: 5 minutes  Consulting other physicians critical care time: 2 minutes  Total critical care time (exclusive of procedural time) : 77 minutes  Critical care time was exclusive of separately billable procedures and treating other patients.  Critical care was necessary to treat or prevent imminent or life-threatening deterioration of the following conditions: CNS failure or compromise.  Critical care was time spent personally by me on the following activities: development of treatment plan with patient or surrogate, discussions with consultants, evaluation of patient's response to treatment, examination of  patient, obtaining history from patient or surrogate, ordering and performing treatments and interventions, ordering and review of laboratory studies, ordering and review of radiographic studies and re-evaluation of patient's condition.        Admission on 05/18/2022   Component Date Value Ref Range Status    Sodium Level 05/18/2022 141  136 - 145 mmol/L Final    Potassium Level 05/18/2022 3.6  3.5 - 5.1 mmol/L Final    Chloride 05/18/2022 107  98 - 107 mmol/L Final    Carbon Dioxide 05/18/2022 22  22 - 29 mmol/L Final    Glucose Level 05/18/2022 97  74 - 100 mg/dL Final    Blood Urea Nitrogen 05/18/2022 13.3  9.8 - 20.1 mg/dL Final    Creatinine 05/18/2022 0.97  0.55 - 1.02 mg/dL Final    Calcium Level Total 05/18/2022 9.4  8.4 - 10.2 mg/dL Final    Protein Total 05/18/2022 7.9  6.4 - 8.3 gm/dL Final    Albumin Level 05/18/2022 4.2  3.5 - 5.0 gm/dL Final    Globulin 05/18/2022 3.7 (A) 2.4 - 3.5 gm/dL Final    Albumin/Globulin Ratio 05/18/2022 1.1  1.1 - 2.0 ratio Final    Bilirubin Total 05/18/2022 0.8  <=1.5 mg/dL Final    Alkaline Phosphatase 05/18/2022 84  40 - 150 unit/L Final    Alanine Aminotransferase 05/18/2022 7  0 - 55 unit/L Final    Aspartate Aminotransferase 05/18/2022 18  5 - 34 unit/L Final    Estimated GFR- 05/18/2022 >60  mls/min/1.73/m2 Final    PT 05/18/2022 13.6  11.7 - 14.5 seconds Final    INR 05/18/2022 1.06 (A) 2.00 - 3.00 Final    Thyroid Stimulating Hormone 05/18/2022 0.4730  0.3500 - 4.9400 uIU/mL Final    Cholesterol Total 05/18/2022 223 (A) <=200 mg/dL Final    HDL Cholesterol 05/18/2022 62 (A) 35 - 60 mg/dL Final    Triglyceride 05/18/2022 86  37 - 140 mg/dL Final    Cholesterol/HDL Ratio 05/18/2022 4  0 - 5 Final    Very Low Density Lipoprotein 05/18/2022 17   Final    LDL Cholesterol 05/18/2022 144.00 (A) 50.00 - 140.00 mg/dL Final    Color, UA 05/18/2022 Yellow  Yellow, Colorless, Other, Clear Final    Appearance, UA 05/18/2022 Cloudy (A)  Clear Final    Specific Gravity, UA 05/18/2022 1.015   Final    pH, UA 05/18/2022 5.5  5.0, 5.5, 6.0, 6.5, 7.0, 7.5, 8.0, 8.5 Final    Protein, UA 05/18/2022 Negative  Negative, 300  mg/dL Final    Glucose, UA 05/18/2022 Negative  Negative, Normal mg/dL Final    Ketones, UA 05/18/2022 Negative  Negative, +1, +2, +3, +4, +5, >=160 mg/dL Final    Blood, UA 05/18/2022 Small (A) Negative unit/L Final    Bilirubin, UA 05/18/2022 Negative  Negative mg/dL Final    Urobilinogen, UA 05/18/2022 0.2  0.2, 1.0, Normal mg/dL Final    Nitrites, UA 05/18/2022 Negative  Negative Final    Leukocyte Esterase, UA 05/18/2022 Small (A) Negative, 75  unit/L Final    Amphetamines, Urine 05/18/2022 Negative  Negative Final    Barbituates, Urine 05/18/2022 Negative  Negative Final    Benzodiazepine, Urine 05/18/2022 Negative  Negative Final    Cannabinoids, Urine 05/18/2022 Positive (A) Negative Final    Cocaine, Urine 05/18/2022 Negative  Negative Final    Mdma, Urine 05/18/2022 Negative  Negative Final    Opiates, Urine 05/18/2022 Negative  Negative Final    Phencyclidine, Urine 05/18/2022 Negative  Negative Final    pH, Urine 05/18/2022 5.5  3.0 - 11.0 Final    Specific Gravity, Urine Auto 05/18/2022 1.015  1.001 - 1.035 Final    Beta Human Chorionic Gonadotropin * 05/18/2022 <2.42  <=5.00 mIU/mL Final    PTT 05/18/2022 29.6  23.4 - 33.9 seconds Final    WBC 05/18/2022 12.2 (A) 4.5 - 11.5 x10(3)/mcL Final    RBC 05/18/2022 5.04  4.20 - 5.40 x10(6)/mcL Final    Hgb 05/18/2022 15.6  12.0 - 16.0 gm/dL Final    Hct 05/18/2022 46.5  37.0 - 47.0 % Final    MCV 05/18/2022 92.3  80.0 - 94.0 fL Final    MCH 05/18/2022 31.0  27.0 - 31.0 pg Final    MCHC 05/18/2022 33.5  33.0 - 36.0 mg/dL Final    RDW 05/18/2022 14.1  11.5 - 17.0 % Final    Platelet 05/18/2022 256  130 - 400 x10(3)/mcL Final    MPV 05/18/2022 11.2  9.4 - 12.4 fL Final    Neut % 05/18/2022 71.5  % Final    Lymph % 05/18/2022 20.5  % Final    Mono %  05/18/2022 6.2  % Final    Eos % 05/18/2022 1.4  % Final    Basophil % 05/18/2022 0.2  % Final    Lymph # 05/18/2022 2.50  0.6 - 4.6 x10(3)/mcL Final    Neut # 05/18/2022 8.7  2.1 - 9.2 x10(3)/mcL Final    Mono # 05/18/2022 0.75  0.1 - 1.3 x10(3)/mcL Final    Eos # 05/18/2022 0.17  0 - 0.9 x10(3)/mcL Final    Baso # 05/18/2022 0.03  0 - 0.2 x10(3)/mcL Final    IG# 05/18/2022 0.03 (A) 0 - 0.0155 x10(3)/mcL Final    IG% 05/18/2022 0.2  0 - 0.43 % Final    NRBC% 05/18/2022 0.0  % Final    Bacteria, UA 05/18/2022 Few (A) None Seen, Rare, Occasional /HPF Final    Trichomonas, UA 05/18/2022 Few (A) None Seen /HPF Final    RBC, UA 05/18/2022 0-2  None Seen, 0-2, 3-5, 0-5 /HPF Final    WBC, UA 05/18/2022 0-2  None Seen, 0-2, 3-5, 0-5 /HPF Final    Squamous Epithelial Cells, UA 05/18/2022 Few (A) None Seen, Rare, Occasional, Occ /LPF Final       EKG Readings: (Independently Interpreted)   Rhythm: Normal Sinus Rhythm. Heart Rate: 93. Ectopy: No Ectopy. ST Segments: Normal ST Segments. T Waves Elevated: V1, V2 and V3. Axis: Normal. Clinical Impression: Normal Sinus Rhythm     ECG Results          ECG 12 lead (In process)  Result time 05/18/22 15:40:52    In process by Interface, Lab In [facility] (05/18/22 15:40:52)                 Narrative:    Test Reason : I63.9,    Vent. Rate : 093 BPM     Atrial Rate : 093 BPM     P-R Int : 166 ms          QRS Dur : 090 ms      QT Int : 374 ms       P-R-T Axes : 075 065 070 degrees     QTc Int : 465 ms    Normal sinus rhythm  Moderate voltage criteria for LVH, may be normal variant  Anterior infarct ,age undetermined  Abnormal ECG  When compared with ECG of 14-AUG-2018 17:21,  Anterior infarct is now Present  QT has lengthened    Referred By: [email or web address]   SELF           Confirmed By:                             Imaging Results          MRI Brain Without Contrast (Final result)  Result time 05/18/22 15:42:12    Final result by Raul Campbell MD (05/18/22 15:42:12)                  Impression:      1.  Left frontal lobe extending into the corpus callosum acute nonhemorrhagic infarct in the territory of the anterior cerebral artery.    2.  Old lacunar infarcts, chronic microangiopathic ischemia and atrophy.      Electronically signed by: Raul Campbell  Date:    05/18/2022  Time:    15:42             Narrative:    EXAMINATION:  MRI BRAIN WITHOUT CONTRAST    CLINICAL HISTORY:  Stroke, follow up;    TECHNIQUE:  Multiplanar MRI sequences were performed of the brain without contrast.    COMPARISON:  CT brain without contrast May 18, 2020    FINDINGS:  There is left frontal lobe in the parasagittal location extending to the corpus callosum diffusion weighted restriction with corresponding signal dropout on the ADC map and T2 FLAIR hyperintensity which is consistent with acute hemorrhage infarct and results in effacement of the sulci and local mass effect.  There are old lacunar infarcts which involve bilateral basal ganglia and the left corona radiata.  Bilateral cerebral periventricular and subcortical white matter variable size T2-FLAIR hyperintense signals are consistent with mild chronic microangiopathic ischemia. Gradient echo sequences demonstrate no evidence of de phasing artifact to suggest hemorrhagic byproducts. The sella and suprasellar areas are unremarkable.    The cerebellar tonsils are normally positioned. There is no acute intracranial hemorrhage, or hydrocephalus.  No acute extra axial fluid collections identified. The mastoid air cells are clear.                               CT Head Without Contrast (Final result)  Result time 05/18/22 12:38:31    Final result by Xiomaar Hassan MD (05/18/22 12:38:31)                 Impression:      1. Left frontal 6 cm cortical based hypodensity suggests an acute-subacute nonhemorrhagic infarct in the left KENISHA vascular territory.  This could be confirmed by MRI.  2. Otherwise no acute intracranial abnormalities.  .      Electronically signed  by: Xiomara Hassan  Date:    05/18/2022  Time:    12:38             Narrative:    EXAMINATION:  CT HEAD WITHOUT CONTRAST    CLINICAL HISTORY:  Neuro deficit, acute, stroke suspected;    TECHNIQUE:  Axial scans were obtained from skull base to the vertex.    Coronal and sagittal reconstructions obtained from the axial data.    Automatic exposure control was utilized to limit radiation dose.    Contrast: None    Radiation Dose:    Total DLP: 858 mGy*cm    COMPARISON:  Head CT 08/14/2018    FINDINGS:  Scalp/Skull:    No abnormalities.    Brain sulci: Minimally effacing in the paramedian left frontal region.  Otherwise appropriate for patient's age.    Ventricles: Left frontal horn and anterior body are partially effaced.  Otherwise normal in size and configuration. No hydrocephalus.    Extra-axial spaces:    No masses or fluid collections.    Parenchyma:    Left cingulate/superior frontal approximately 6 x 2 cm cortical/subcortical hypodensity (series 8, image 17; sagittal image 20).    Old bilateral basal ganglia lacunar insults.    Discrete and confluent supratentorial white matter hypodensities are mild-moderate nonspecific chronic microangiopathic changes, statistically chronic microvascular ischemia    No mass or hemorrhage.    Dural sinuses: No abnormal densities.    Sellar/Suprasellar region: No abnormalities.    Skull base and Craniocervical junction: No abnormalities.    Incidental findings:    Carotid siphon and vertebrobasilar atherosclerotic vascular calcifications.                                 Medications   sodium chloride 0.9% flush 10 mL (has no administration in time range)   labetalol 20 mg/4 mL (5 mg/mL) IV syring (has no administration in time range)   atorvastatin tablet 40 mg (has no administration in time range)   labetalol 20 mg/4 mL (5 mg/mL) IV syring (10 mg Intravenous Given 5/18/22 1236)   labetaloL injection 10 mg (10 mg Intravenous Given 5/18/22 1323)   hydrALAZINE injection 10 mg (10 mg  Intravenous Given 5/18/22 1550)     Medical Decision Making:   Differential Diagnosis:   CVA, hypercalcemia, electrolyte abnormality, drug abuse    Additional MDM:     NIH Stroke Scale:   Level of consciousness = 0 - alert  LOC questions = 0 - answers both correctly  LOC commands = 0 - performs both correctly  Best gaze = 0 - normal  Visual = 0 - no visual loss  Facial palsy = 1 - minor  Motor left arm =  0 - no drift  Motor right arm =  0 - no drift  Motor left leg = 0 - no drift  Motor right leg =  0 - no drift  Limb ataxia = 0 - absent  Sensory = 1 - mild to moderate loss  Best language = 1 - mild to moderate aphasia  Dysarthria = 1 - mild to moderate dysarthria  Extinction and inattention = 0 - no neglect  NIH Stroke Scale Total = 4          ED Course as of 05/18/22 1602   Wed May 18, 2022   1301 BP(!): 176/125  Labetalol IV given.  Will recheck. [BS]   1321 Spoke with Teleneurology.  They state to work on the blood pressure but no need to drop it significantly.  Okay to admit for stroke workup. [BS]   1541 Blood pressure is improving.  Will admit to the floor.  Stroke team was consulted and recommended they will finish the stroke workup inpatient. [BS]   1551 Spoke with PIKNY Robbins okay with admit [BS]      ED Course User Index  [BS] James Clark MD             Clinical Impression:   Final diagnoses:  [I63.9] Stroke  [I63.9] Cerebrovascular accident (CVA), unspecified mechanism (Primary)  [I63.9] Ischemic stroke of frontal lobe  [I10] Uncomplicated hypertension  [A59.9] Trichomoniasis  [I63.9] CVA (cerebral vascular accident)          ED Disposition Condition    Admit               James Clark MD  05/18/22 1603

## 2022-05-19 LAB
ALBUMIN SERPL-MCNC: 3.7 GM/DL (ref 3.5–5)
ALBUMIN/GLOB SERPL: 1.5 RATIO (ref 1.1–2)
ALP SERPL-CCNC: 77 UNIT/L (ref 40–150)
ALT SERPL-CCNC: 8 UNIT/L (ref 0–55)
APTT PPP: 32.8 SECONDS (ref 23.2–33.7)
AST SERPL-CCNC: 13 UNIT/L (ref 5–34)
AV INDEX (PROSTH): 0.46
AV LVOT PEAK GRADIENT: 3 MMHG
AV MEAN GRADIENT: 6 MMHG
AV PEAK GRADIENT: 13 MMHG
AV VELOCITY RATIO: 0.5
BASOPHILS # BLD AUTO: 0.05 X10(3)/MCL (ref 0–0.2)
BASOPHILS NFR BLD AUTO: 0.5 %
BILIRUBIN DIRECT+TOT PNL SERPL-MCNC: 0.7 MG/DL
BSA FOR ECHO PROCEDURE: 1.83 M2
BUN SERPL-MCNC: 10.7 MG/DL (ref 9.8–20.1)
C TRACH DNA SPEC QL NAA+PROBE: NOT DETECTED
CALCIUM SERPL-MCNC: 8.5 MG/DL (ref 8.4–10.2)
CHLORIDE SERPL-SCNC: 110 MMOL/L (ref 98–107)
CK MB SERPL-MCNC: 0.7 NG/ML
CO2 SERPL-SCNC: 23 MMOL/L (ref 22–29)
CREAT SERPL-MCNC: 0.79 MG/DL (ref 0.55–1.02)
CV ECHO LV RWT: 1.09 CM
DOP CALC AO PEAK VEL: 1.8 M/S
DOP CALC AO VTI: 38.5 CM
DOP CALC LVOT PEAK VEL: 0.9 M/S
DOP CALCLVOT PEAK VEL VTI: 17.9 CM
E WAVE DECELERATION TIME: 306 MSEC
E/A RATIO: 0.83
E/E' RATIO: 0.91 M/S
ECHO LV POSTERIOR WALL: 1.8 CM (ref 0.6–1.1)
EJECTION FRACTION: 74 %
EOSINOPHIL # BLD AUTO: 0.23 X10(3)/MCL (ref 0–0.9)
EOSINOPHIL NFR BLD AUTO: 2.3 %
ERYTHROCYTE [DISTWIDTH] IN BLOOD BY AUTOMATED COUNT: 14.3 % (ref 11.5–17)
EST. AVERAGE GLUCOSE BLD GHB EST-MCNC: 102.5 MG/DL
FRACTIONAL SHORTENING: 27 % (ref 28–44)
GLOBULIN SER-MCNC: 2.5 GM/DL (ref 2.4–3.5)
GLUCOSE SERPL-MCNC: 104 MG/DL (ref 74–100)
HBA1C MFR BLD: 5.2 %
HCT VFR BLD AUTO: 44.6 % (ref 37–47)
HGB BLD-MCNC: 14.6 GM/DL (ref 12–16)
IMM GRANULOCYTES # BLD AUTO: 0.04 X10(3)/MCL (ref 0–0.02)
IMM GRANULOCYTES NFR BLD AUTO: 0.4 % (ref 0–0.43)
INR BLD: 1.13 (ref 0–1.3)
INTERVENTRICULAR SEPTUM: 1.8 CM (ref 0.6–1.1)
LEFT INTERNAL DIMENSION IN SYSTOLE: 2.4 CM (ref 2.1–4)
LEFT VENTRICLE MASS INDEX: 131 G/M2
LEFT VENTRICULAR INTERNAL DIMENSION IN DIASTOLE: 3.3 CM (ref 3.5–6)
LEFT VENTRICULAR MASS: 244.02 G
LV LATERAL E/E' RATIO: 0.83 M/S
LV SEPTAL E/E' RATIO: 1 M/S
LVOT MG: 1 MMHG
LVOT MV: 0.5 CM/S
LYMPHOCYTES # BLD AUTO: 1.81 X10(3)/MCL (ref 0.6–4.6)
LYMPHOCYTES NFR BLD AUTO: 18.2 %
MAGNESIUM SERPL-MCNC: 2 MG/DL (ref 1.6–2.6)
MCH RBC QN AUTO: 30.5 PG (ref 27–31)
MCHC RBC AUTO-ENTMCNC: 32.7 MG/DL (ref 33–36)
MCV RBC AUTO: 93.3 FL (ref 80–94)
MONOCYTES # BLD AUTO: 0.62 X10(3)/MCL (ref 0.1–1.3)
MONOCYTES NFR BLD AUTO: 6.2 %
MV PEAK A VEL: 0.6 M/S
MV PEAK E VEL: 0.5 M/S
N GONORRHOEA DNA SPEC QL NAA+PROBE: NOT DETECTED
NEUTROPHILS # BLD AUTO: 7.2 X10(3)/MCL (ref 2.1–9.2)
NEUTROPHILS NFR BLD AUTO: 72.4 %
NRBC BLD AUTO-RTO: 0 %
PHOSPHATE SERPL-MCNC: 2.8 MG/DL (ref 2.3–4.7)
PLATELET # BLD AUTO: 276 X10(3)/MCL (ref 130–400)
PMV BLD AUTO: 11 FL (ref 9.4–12.4)
POCT GLUCOSE: 109 MG/DL (ref 70–110)
POTASSIUM SERPL-SCNC: 3.4 MMOL/L (ref 3.5–5.1)
PROT SERPL-MCNC: 6.2 GM/DL (ref 6.4–8.3)
PROTHROMBIN TIME: 14.2 SECONDS (ref 12.5–14.5)
RA PRESSURE: 8 MMHG
RA WIDTH: 3.5 CM
RBC # BLD AUTO: 4.78 X10(6)/MCL (ref 4.2–5.4)
RIGHT VENTRICULAR END-DIASTOLIC DIMENSION: 3.1 CM
RPR SER QL: ABNORMAL
RPR SER QL: ABNORMAL
RPR SER-TITR: ABNORMAL {TITER}
SODIUM SERPL-SCNC: 141 MMOL/L (ref 136–145)
TDI LATERAL: 0.6 M/S
TDI SEPTAL: 0.5 M/S
TDI: 0.55 M/S
TRICUSPID ANNULAR PLANE SYSTOLIC EXCURSION: 1.8 CM
WBC # SPEC AUTO: 10 X10(3)/MCL (ref 4.5–11.5)

## 2022-05-19 PROCEDURE — 97162 PT EVAL MOD COMPLEX 30 MIN: CPT

## 2022-05-19 PROCEDURE — 83036 HEMOGLOBIN GLYCOSYLATED A1C: CPT | Performed by: NURSE PRACTITIONER

## 2022-05-19 PROCEDURE — 92610 EVALUATE SWALLOWING FUNCTION: CPT

## 2022-05-19 PROCEDURE — 25000003 PHARM REV CODE 250: Performed by: NURSE PRACTITIONER

## 2022-05-19 PROCEDURE — 86803 HEPATITIS C AB TEST: CPT | Performed by: STUDENT IN AN ORGANIZED HEALTH CARE EDUCATION/TRAINING PROGRAM

## 2022-05-19 PROCEDURE — 83735 ASSAY OF MAGNESIUM: CPT | Performed by: NURSE PRACTITIONER

## 2022-05-19 PROCEDURE — 87340 HEPATITIS B SURFACE AG IA: CPT | Performed by: STUDENT IN AN ORGANIZED HEALTH CARE EDUCATION/TRAINING PROGRAM

## 2022-05-19 PROCEDURE — 95816 PR EEG,W/AWAKE & DROWSY RECORD: ICD-10-PCS | Mod: 26,,, | Performed by: SPECIALIST

## 2022-05-19 PROCEDURE — 87491 CHLMYD TRACH DNA AMP PROBE: CPT | Performed by: STUDENT IN AN ORGANIZED HEALTH CARE EDUCATION/TRAINING PROGRAM

## 2022-05-19 PROCEDURE — 84100 ASSAY OF PHOSPHORUS: CPT | Performed by: NURSE PRACTITIONER

## 2022-05-19 PROCEDURE — 25000003 PHARM REV CODE 250: Performed by: INTERNAL MEDICINE

## 2022-05-19 PROCEDURE — 85730 THROMBOPLASTIN TIME PARTIAL: CPT | Performed by: NURSE PRACTITIONER

## 2022-05-19 PROCEDURE — 94761 N-INVAS EAR/PLS OXIMETRY MLT: CPT

## 2022-05-19 PROCEDURE — 95816 EEG AWAKE AND DROWSY: CPT | Mod: 26,,, | Performed by: SPECIALIST

## 2022-05-19 PROCEDURE — 63600175 PHARM REV CODE 636 W HCPCS: Performed by: INTERNAL MEDICINE

## 2022-05-19 PROCEDURE — 36415 COLL VENOUS BLD VENIPUNCTURE: CPT | Performed by: STUDENT IN AN ORGANIZED HEALTH CARE EDUCATION/TRAINING PROGRAM

## 2022-05-19 PROCEDURE — 85025 COMPLETE CBC W/AUTO DIFF WBC: CPT | Performed by: NURSE PRACTITIONER

## 2022-05-19 PROCEDURE — 11000001 HC ACUTE MED/SURG PRIVATE ROOM

## 2022-05-19 PROCEDURE — S4991 NICOTINE PATCH NONLEGEND: HCPCS | Performed by: NURSE PRACTITIONER

## 2022-05-19 PROCEDURE — 82553 CREATINE MB FRACTION: CPT | Performed by: NURSE PRACTITIONER

## 2022-05-19 PROCEDURE — 36415 COLL VENOUS BLD VENIPUNCTURE: CPT | Performed by: NURSE PRACTITIONER

## 2022-05-19 PROCEDURE — 85610 PROTHROMBIN TIME: CPT | Performed by: NURSE PRACTITIONER

## 2022-05-19 PROCEDURE — 97166 OT EVAL MOD COMPLEX 45 MIN: CPT

## 2022-05-19 PROCEDURE — 80053 COMPREHEN METABOLIC PANEL: CPT | Performed by: NURSE PRACTITIONER

## 2022-05-19 PROCEDURE — 63600175 PHARM REV CODE 636 W HCPCS: Performed by: NURSE PRACTITIONER

## 2022-05-19 PROCEDURE — 87591 N.GONORRHOEAE DNA AMP PROB: CPT | Performed by: STUDENT IN AN ORGANIZED HEALTH CARE EDUCATION/TRAINING PROGRAM

## 2022-05-19 PROCEDURE — 63600175 PHARM REV CODE 636 W HCPCS: Mod: JG | Performed by: STUDENT IN AN ORGANIZED HEALTH CARE EDUCATION/TRAINING PROGRAM

## 2022-05-19 RX ORDER — ONDANSETRON 2 MG/ML
4 INJECTION INTRAMUSCULAR; INTRAVENOUS 4 TIMES DAILY PRN
Status: DISCONTINUED | OUTPATIENT
Start: 2022-05-19 | End: 2022-06-02 | Stop reason: HOSPADM

## 2022-05-19 RX ORDER — HYDRALAZINE HYDROCHLORIDE 20 MG/ML
10 INJECTION INTRAMUSCULAR; INTRAVENOUS EVERY 4 HOURS PRN
Status: DISCONTINUED | OUTPATIENT
Start: 2022-05-19 | End: 2022-06-02 | Stop reason: HOSPADM

## 2022-05-19 RX ORDER — IBUPROFEN 200 MG
1 TABLET ORAL DAILY
Status: DISCONTINUED | OUTPATIENT
Start: 2022-05-19 | End: 2022-06-02 | Stop reason: HOSPADM

## 2022-05-19 RX ORDER — CLONIDINE HYDROCHLORIDE 0.2 MG/1
0.2 TABLET ORAL 3 TIMES DAILY PRN
Status: DISCONTINUED | OUTPATIENT
Start: 2022-05-19 | End: 2022-06-02 | Stop reason: HOSPADM

## 2022-05-19 RX ADMIN — CLONIDINE HYDROCHLORIDE 0.2 MG: 0.2 TABLET ORAL at 03:05

## 2022-05-19 RX ADMIN — CARVEDILOL 12.5 MG: 12.5 TABLET, FILM COATED ORAL at 09:05

## 2022-05-19 RX ADMIN — HYDRALAZINE HYDROCHLORIDE 10 MG: 20 INJECTION INTRAMUSCULAR; INTRAVENOUS at 01:05

## 2022-05-19 RX ADMIN — AMLODIPINE BESYLATE 5 MG: 5 TABLET ORAL at 09:05

## 2022-05-19 RX ADMIN — THIAMINE HCL TAB 100 MG 100 MG: 100 TAB at 09:05

## 2022-05-19 RX ADMIN — NICOTINE 1 PATCH: 21 PATCH, EXTENDED RELEASE TRANSDERMAL at 09:05

## 2022-05-19 RX ADMIN — SERTRALINE 25 MG: 25 TABLET, FILM COATED ORAL at 09:05

## 2022-05-19 RX ADMIN — PENICILLIN G BENZATHINE 2.4 MILLION UNITS: 1200000 INJECTION, SUSPENSION INTRAMUSCULAR at 05:05

## 2022-05-19 RX ADMIN — ONDANSETRON 4 MG: 2 INJECTION INTRAMUSCULAR; INTRAVENOUS at 09:05

## 2022-05-19 RX ADMIN — FOLIC ACID: 5 INJECTION, SOLUTION INTRAMUSCULAR; INTRAVENOUS; SUBCUTANEOUS at 12:05

## 2022-05-19 RX ADMIN — ONDANSETRON 4 MG: 2 INJECTION INTRAMUSCULAR; INTRAVENOUS at 03:05

## 2022-05-19 RX ADMIN — BUPROPION HYDROCHLORIDE 150 MG: 150 TABLET, FILM COATED, EXTENDED RELEASE ORAL at 09:05

## 2022-05-19 RX ADMIN — MULTIPLE VITAMINS W/ MINERALS TAB 1 TABLET: TAB at 09:05

## 2022-05-19 RX ADMIN — ASPIRIN 81 MG: 81 TABLET, COATED ORAL at 09:05

## 2022-05-19 RX ADMIN — ATORVASTATIN CALCIUM 40 MG: 40 TABLET, FILM COATED ORAL at 04:05

## 2022-05-19 NOTE — PROGRESS NOTES
[facility] Medicine Progress Note        Chief Complaint: Inpatient Follow-up for     HPI:   Ms. Belcher is a 52-year-old female with past medical history of HTN, anxiety, depression, and hypothyroidism who presents to [facility] ED with complaints of dysarthria and expressive aphasia x3 days.  Patient's family member states that she has been having difficulty speaking for the past 3 days however today was worse so she was brought to the ED for further evaluation. Her sister at bedside states that she is unsure if she has been compliant with her home medications.  She does smoke marijuana daily with her significant other.  And drinks about a six-pack of beer every day.  No history of stroke.  She is not on aspirin.  Sister at bedside reports that she has had abnormal behavior over the past 3 days and has been having episodes of bladder and bowel incontinence and is very restless/fidgety.  No reported history of falls or back pain or any saddle anesthesia.  Patient is oriented x4 but is an extremely poor historian and it is very difficult to get a history from her.  She does report left-sided weakness and paresthesias x2 days     Upon arrival to outside facility the patient was markedly hypertensive but otherwise hemodynamically stable and afebrile. EKG: NSR with LVH. Labs unremarkable except for a reactive RPR, patient denies high risk sexual behavior, states unprotected sexual intercourse with significant other but she is in a monogamous relationships. CT Head showed left frontal 6 cm cortical base hypodensity suggesting an acute to subacute nonhemorrhagic infarct in the left KENISHA vascular territory. MRI Brain was then obtained confirming the left frontal lobe extending into the corpus callosum with an acute nonhemorrhagic infarct in the territory of the anterior cerebral artery, and old lacunar infarcts. She is being transferred to [facility] for further CVA workup.    Interval Hx:    Seen and examined patient afebrile vitals stable hemodynamically stable.  She has expressive aphasia with right-sided weakness    Objective/physical exam:  GENERAL: awake, alert, oriented and in no acute distress, non-toxic appearing   HEENT: normocephalic atraumatic   NECK: supple   LUNGS: Clear bilaterally, no wheezing or rales, no accessory muscle use   CVS: Regular rate and rhythm, normal peripheral perfusion  ABD: Soft, non-tender, non-distended, bowel sounds present  EXTREMITIES: no clubbing or cyanosis  SKIN: Warm, dry.   NEURO: Motor strength RUE 4/5, LUE 5/5; RLE 4/5, LLE 5/5, expressive aphasia and slurred speech,+ paresthesia to right face, RUE and RLE. Gait not assessed.   PSYCHIATRIC: Cooperative, restless in room    VITAL SIGNS: 24 HRS MIN & MAX LAST   Temp  Min: 97.5 °F (36.4 °C)  Max: 98.5 °F (36.9 °C) 97.5 °F (36.4 °C)   BP  Min: 115/76  Max: 202/110 125/81   Pulse  Min: 82  Max: 101  83   Resp  Min: 10  Max: 20 19   SpO2  Min: 92 %  Max: 100 % 97 %       Recent Labs   Lab 05/18/22  1157 05/19/22  0509   WBC 12.2* 10.0   RBC 5.04 4.78   HGB 15.6 14.6   HCT 46.5 44.6   MCV 92.3 93.3   MCH 31.0 30.5   MCHC 33.5 32.7*   RDW 14.1 14.3    276   MPV 11.2 11.0       Recent Labs   Lab 05/18/22  1202 05/19/22  0509    141   K 3.6 3.4*   CO2 22 23   BUN 13.3 10.7   CREATININE 0.97 0.79   CALCIUM 9.4 8.5   MG  --  2.00   ALBUMIN 4.2 3.7   ALKPHOS 84 77   ALT 7 8   AST 18 13   BILITOT 0.8 0.7          Microbiology Results (last 7 days)     ** No results found for the last 168 hours. **           See below for Radiology    Scheduled Med:   amLODIPine  5 mg Oral Daily    aspirin  81 mg Oral Daily    atorvastatin  40 mg Oral Daily    buPROPion  150 mg Oral Daily    carvediloL  12.5 mg Oral BID    multivitamin  1 tablet Oral Daily    nicotine  1 patch Transdermal Daily    penicillin G benzathine  2.4 Million Units Intramuscular Once    sertraline  25 mg Oral Daily    thiamine  100 mg Oral  Daily        Continuous Infusions:       PRN Meds:  cloNIDine, hydrALAZINE, labetalol, ondansetron, sodium chloride 0.9%, sodium chloride 0.9%       Assessment/Plan:  Acute Non-hemorrhagic CVA - left KENISHA vascular territory  Dysarthria and Expressive aphasia 2/2 above  HTN Urgency; Hx Essential HTN (uncontrolled)  HLD, new onset  Abnormal Behavior with reports of bladder and bowel incontinence  Reactive RPR  Trichomonas   Hypothyroidism  THC Use Disorder   Alcohol Use Disorder        PLAN:   - patient drinks 6+ beer every day and also some liquor,   Last drink was 2 days ago however she does not show any signs of withdrawal.   - shows normal sinus rhythm she is on continuous telemetry.   - neurology is onboard, appreciate their recommendations.   - syphilis positive, will get STI screen neisseria and chlamydia NAAT.   - will give penicillin G2 point 4,000,000 units IM for syphilis.   - MRA neck and head to see if any and vascular involvement or vascular inflammation   Appreciate neurology's recommendations on if they think this is related to neurosyphilis??  - echo is EF 74% with severe concentric hypertrophy and hyperdynamic systolic function with negative bubble study.   - aspirin and atorvastatin- Check FTA-ABS, if positive will consult ID  - Flagyl 2gm x1 dose. Pt instructed to let significant other know, that he can be treated.  - Banana Bag x1. MVI and Thiamine daily. CIWA Q6H.   - spent 5 minutes discussing tobacco and alcohol cessation.  Nicotine patch if desires.         Chilo Lea MD   05/19/2022

## 2022-05-19 NOTE — PLAN OF CARE
Problem: Infection  Goal: Absence of Infection Signs and Symptoms  5/19/2022 1315 by Marti Figueroa RN  Outcome: Ongoing, Progressing  5/19/2022 0804 by Marti Figueroa RN  Outcome: Ongoing, Progressing     Problem: Adjustment to Illness (Stroke, Ischemic/Transient Ischemic Attack)  Goal: Optimal Coping  5/19/2022 1315 by Marti Figueroa RN  Outcome: Ongoing, Progressing  5/19/2022 0804 by Marti Figueroa RN  Outcome: Ongoing, Progressing  Intervention: Support Psychosocial Response to Stroke  5/19/2022 1315 by Marti Figueroa RN  Flowsheets (Taken 5/19/2022 1315)  Supportive Measures:   active listening utilized   decision-making supported   goal-setting facilitated  Family/Support System Care:   caregiver stress acknowledged   involvement promoted   presence promoted   self-care encouraged  5/19/2022 0804 by Marti Figueroa RN  Flowsheets (Taken 5/19/2022 0804)  Supportive Measures:   active listening utilized   goal-setting facilitated   verbalization of feelings encouraged   self-care encouraged  Family/Support System Care: caregiver stress acknowledged     Problem: Adjustment to Illness (Stroke, Ischemic/Transient Ischemic Attack)  Goal: Optimal Coping  Intervention: Support Psychosocial Response to Stroke  5/19/2022 1315 by Marti Figueroa RN  Flowsheets (Taken 5/19/2022 1315)  Supportive Measures:   active listening utilized   decision-making supported   goal-setting facilitated  Family/Support System Care:   caregiver stress acknowledged   involvement promoted   presence promoted   self-care encouraged  5/19/2022 0804 by Marti Figueroa RN  Flowsheets (Taken 5/19/2022 0804)  Supportive Measures:   active listening utilized   goal-setting facilitated   verbalization of feelings encouraged   self-care encouraged  Family/Support System Care: caregiver stress acknowledged     Problem: Cerebral Tissue Perfusion (Stroke, Ischemic/Transient Ischemic Attack)  Goal: Optimal Cerebral Tissue Perfusion  Outcome:  Ongoing, Progressing     Problem: Communication Impairment (Stroke, Ischemic/Transient Ischemic Attack)  Goal: Improved Communication Skills  Outcome: Ongoing, Progressing  Intervention: Optimize Communication Skills  Flowsheets (Taken 5/19/2022 1315)  Communication Enhancement Strategies:   call light answered in person   family/caregiver assisted with communication   extra time allowed for response   verbal communication attempts encouraged   nonverbal strategies used   verbal and visual cues paired   family involved in communication plan     Problem: Communication Impairment (Stroke, Ischemic/Transient Ischemic Attack)  Goal: Improved Communication Skills  Intervention: Optimize Communication Skills  Flowsheets (Taken 5/19/2022 1315)  Communication Enhancement Strategies:   call light answered in person   family/caregiver assisted with communication   extra time allowed for response   verbal communication attempts encouraged   nonverbal strategies used   verbal and visual cues paired   family involved in communication plan     Problem: Functional Ability Impaired (Stroke, Ischemic/Transient Ischemic Attack)  Goal: Optimal Functional Ability  5/19/2022 1315 by Marti Figueroa RN  Outcome: Ongoing, Progressing  5/19/2022 0804 by Marti Figueroa RN  Outcome: Ongoing, Progressing  Intervention: Optimize Functional Ability  Flowsheets (Taken 5/19/2022 1315)  Self-Care Promotion:   independence encouraged   BADL personal routines maintained   meal set-up provided   BADL personal objects within reach  Activity Management: Walk with assistive devise and /or staff member - L3     Problem: Sensorimotor Impairment (Stroke, Ischemic/Transient Ischemic Attack)  Goal: Improved Sensorimotor Function  Outcome: Ongoing, Progressing  Intervention: Optimize Range of Motion, Motor Control and Function  Flowsheets (Taken 5/19/2022 1315)  Positioning: Shoulder: safety cues provided  Range of Motion: active ROM (range of motion)  encouraged     Problem: Sensorimotor Impairment (Stroke, Ischemic/Transient Ischemic Attack)  Goal: Improved Sensorimotor Function  Intervention: Optimize Range of Motion, Motor Control and Function  Flowsheets (Taken 5/19/2022 1315)  Positioning: Shoulder: safety cues provided  Range of Motion: active ROM (range of motion) encouraged     Problem: Adult Inpatient Plan of Care  Goal: Plan of Care Review  Outcome: Ongoing, Progressing     Problem: Adult Inpatient Plan of Care  Goal: Absence of Hospital-Acquired Illness or Injury  Outcome: Ongoing, Progressing  Intervention: Identify and Manage Fall Risk  Flowsheets (Taken 5/19/2022 1315)  Safety Promotion/Fall Prevention:   assistive device/personal item within reach   supervised activity   Supervised toileting - stay within arms reach   instructed to call staff for mobility  Intervention: Prevent Skin Injury  Flowsheets (Taken 5/19/2022 1315)  Body Position: position changed independently     Problem: Adult Inpatient Plan of Care  Goal: Absence of Hospital-Acquired Illness or Injury  Intervention: Identify and Manage Fall Risk  Flowsheets (Taken 5/19/2022 1315)  Safety Promotion/Fall Prevention:   assistive device/personal item within reach   supervised activity   Supervised toileting - stay within arms reach   instructed to call staff for mobility     Problem: Adult Inpatient Plan of Care  Goal: Absence of Hospital-Acquired Illness or Injury  Intervention: Prevent Skin Injury  Flowsheets (Taken 5/19/2022 1315)  Body Position: position changed independently     Problem: Adult Inpatient Plan of Care  Goal: Optimal Comfort and Wellbeing  Outcome: Ongoing, Progressing  Intervention: Monitor Pain and Promote Comfort  Flowsheets (Taken 5/19/2022 1315)  Pain Management Interventions: pain management plan reviewed with patient/caregiver  Intervention: Provide Person-Centered Care  Flowsheets (Taken 5/19/2022 1315)  Trust Relationship/Rapport:   care explained   choices  provided   emotional support provided   empathic listening provided   questions answered   questions encouraged   reassurance provided   thoughts/feelings acknowledged     Problem: Adult Inpatient Plan of Care  Goal: Optimal Comfort and Wellbeing  Intervention: Monitor Pain and Promote Comfort  Flowsheets (Taken 5/19/2022 1315)  Pain Management Interventions: pain management plan reviewed with patient/caregiver     Problem: Adult Inpatient Plan of Care  Goal: Optimal Comfort and Wellbeing  Intervention: Monitor Pain and Promote Comfort  Flowsheets (Taken 5/19/2022 1315)  Pain Management Interventions: pain management plan reviewed with patient/caregiver     Problem: Adult Inpatient Plan of Care  Goal: Optimal Comfort and Wellbeing  Intervention: Provide Person-Centered Care  Flowsheets (Taken 5/19/2022 1315)  Trust Relationship/Rapport:   care explained   choices provided   emotional support provided   empathic listening provided   questions answered   questions encouraged   reassurance provided   thoughts/feelings acknowledged     Problem: Adult Inpatient Plan of Care  Goal: Optimal Comfort and Wellbeing  Intervention: Provide Person-Centered Care  Flowsheets (Taken 5/19/2022 1315)  Trust Relationship/Rapport:   care explained   choices provided   emotional support provided   empathic listening provided   questions answered   questions encouraged   reassurance provided   thoughts/feelings acknowledged

## 2022-05-19 NOTE — PROCEDURES
"Sammie Belcher is a 52 y.o. female patient.    Temp: 97.5 °F (36.4 °C) (22 113)  Pulse: 83 (22 113)  Resp: 19 (22 113)  BP: 125/81 (22 113)  SpO2: 97 % (22)  Weight: 65 kg (143 lb 4.8 oz) (22 07)  Height: 6' 0.84" (185 cm) (22 0741)       EEG Extended Monitoring up to one hour    Date/Time: 2022 1:31 PM  Performed by: Fede Lopez MD  Authorized by: Mackenzie Caicedo [facility]       EEG REPORT    PATIENT: Sammie Belcher  : 1970    DATE:     INTERPRETING PHYSICIAN: Fede Lopez    HISTORY OF PRESENT ILLNESS: This is a patient with a history of stroke.     Reason for EEG: Patient was brought to ER with complaints of CVA.    MEDICATIONS:     Current Facility-Administered Medications:     amLODIPine tablet 5 mg, 5 mg, Oral, Daily, SIRISHA Live, 5 mg at 22    aspirin EC tablet 81 mg, 81 mg, Oral, Daily, SIRISHA Live, 81 mg at 22    atorvastatin tablet 40 mg, 40 mg, Oral, Daily, SIRISHA Live    buPROPion TB24 tablet 150 mg, 150 mg, Oral, Daily, SIRISHA Live, 150 mg at 22 09    carvediloL tablet 12.5 mg, 12.5 mg, Oral, BID, Brayan Trotter MD, 12.5 mg at 22 09    cloNIDine tablet 0.2 mg, 0.2 mg, Oral, TID PRN, SIRISHA Live, 0.2 mg at 22 0314    hydrALAZINE injection 10 mg, 10 mg, Intravenous, Q4H PRN, Brayan Trotter MD, 10 mg at 22 0129    labetaloL injection 10 mg, 10 mg, Intravenous, Q4H PRN, SIRISHA Live    multivitamin tablet, 1 tablet, Oral, Daily, Brayan Trotter MD, 1 tablet at 22 0942    nicotine 21 mg/24 hr 1 patch, 1 patch, Transdermal, Daily, SIRISHA Live, 1 patch at 22 0943    ondansetron injection 4 mg, 4 mg, Intravenous, QID PRN, SIRISHA Live, 4 mg at 22 0949    penicillin G benzathine (BICILLIN LA) injection 2.4 Million Units, 2.4 Million Units, Intramuscular, Once, Chilo Lea MD    " sertraline tablet 25 mg, 25 mg, Oral, Daily, Veena Chung, FNP, 25 mg at 22 0942    sodium chloride 0.9% flush 10 mL, 10 mL, Intravenous, PRN, Veena HIGGINBOTHAM Sheldon, FNP    sodium chloride 0.9% flush 10 mL, 10 mL, Intravenous, PRN, Veena HIGGINBOTHAM Sheldon, FNP    thiamine tablet 100 mg, 100 mg, Oral, Daily, Brayan Trotter MD, 100 mg at 22 0942       Duration of recordin minutes     This EEG is performed with the patient described as awake and drowsy.     The resting posterior background activity consists of symmetrical background theta activity.    Left temporal sharp waves were present but rare.     Technical character:  Good      EKG:  Sinus       IMPRESSION: This  EEG shows rare left temporal sharp waves which could imply but is not diagnostic of epileptogenic focus.   Could also represent recent or chronic ischemic stroke or other lesion(s).         Fede Lopez MD  [facility] Medical Director   [facility] neurology; [facility] sleep; [facility] neuroimaging              2022

## 2022-05-19 NOTE — PT/OT/SLP EVAL
Occupational Therapy   Evaluation    Name: Sammie Belcher  MRN: [id number]  Admitting Diagnosis:  Acute L frontal lobe hypodensity and ischemic infarct   Recent Surgery: * No surgery found *      Recommendations:     Discharge Recommendations: rehabilitation facility  Discharge Equipment Recommendations:  walker, rolling  Barriers to discharge:       Assessment:     Sammie Belcher is a 52 y.o. female with a medical diagnosis of acute L frontal lobe hypodensity and ischemic infarct.  She presents with the following performance deficits affecting function: weakness, impaired endurance, impaired self care skills, impaired functional mobilty, gait instability, impaired balance, impaired cognition, decreased coordination, decreased upper extremity function, impaired coordination, impaired fine motor, other (comment) (aphasia).      Rehab Prognosis: Good; patient would benefit from acute skilled OT services to address these deficits and reach maximum level of function.       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  Patient found HOB elevated with peripheral IV, bed alarm upon OT entry to room.    General Precautions: Standard,     Orthopedic Precautions:    Braces:    Respiratory Status: Room air    Occupational Performance:    Bed Mobility:     · Patient completed Supine to Sit with minimum assistance    Functional Mobility/Transfers:  · Patient completed Sit <> Stand Transfer with minimum assistance  with  rolling walker   · Functional Mobility: min A for sit <> stand, took 3 steps towards HOB, unsteady and ataxic RLE     Activities of Daily Living:  · Grooming: minimum assistance .  · Upper Body Dressing: minimum assistance .  · Lower Body Dressing: minimum assistance .  · Toileting: moderate assistance .    Cognitive/Visual Perceptual:  Cognitive/Psychosocial Skills:     -       Communication: expressive aphasia  Visual/Perceptual:      -Impaired  R side coordination deficits  .   Difficulty assessing vision 2/2 difficulties with command following.     Physical Exam:  Upper Extremity Strength:    -       Right Upper Extremity: Deficits: 2/5 shoulder; elbow, wrist digits 3/5   -       Left Upper Extremity: WNL  Fine Motor Coordination:    -       Impaired  Right hand, finger to nose . and Right hand thumb/finger opposition skills .    AMPAC 6 Click ADL:  AMPAC Total Score: 18      Education:    Patient left HOB elevated with all lines intact, call button in reach, bed alarm on, nrsg notified and family present    GOALS:   Multidisciplinary Problems     Occupational Therapy Goals        Problem: Occupational Therapy    Goal Priority Disciplines Outcome Interventions   Occupational Therapy Goal     OT, PT/OT Ongoing, Progressing    Description: Goals to be met by: 6/9/22    Patient will increase functional independence with ADLs by performing:    UE Dressing with Modified [place].  LE Dressing with Modified [place].  Grooming while seated with Modified [place].  Toileting from toilet with Modified [place] for hygiene and clothing management.   Toilet transfer to toilet with Modified [place].                     History:     Past Medical History:   Diagnosis Date    Hypertension     Thyroid disease        Past Surgical History:    Procedure Laterality Date    THYROIDECTOMY      TUBAL LIGATION         Time Tracking:     OT Date of Treatment:    OT Start Time: 1128  OT Stop Time: 1140  OT Total Time (min): 12 min    Billable Minutes:Evaluation Moderate Complexity 12min    5/19/2022

## 2022-05-19 NOTE — SUBJECTIVE & OBJECTIVE
Past Medical History:   Diagnosis Date    Hypertension     Thyroid disease      Past Surgical History:   Procedure Laterality Date    THYROIDECTOMY      TUBAL LIGATION       History reviewed. No pertinent family history.  Social History     Tobacco Use    Smoking status: Current Every Day Smoker     Types: Cigarettes    Smokeless tobacco: Current User   Substance Use Topics    Alcohol use: Yes     Alcohol/week: 3.0 standard drinks     Types: 3 Standard drinks or equivalent per week    Drug use: No     Review of patient's allergies indicates:  No Known Allergies    Medications: I have reviewed the current medication administration record.    Medications Prior to Admission   Medication Sig Dispense Refill Last Dose    alendronate (FOSAMAX) 70 MG tablet Take 70 mg by mouth every 7 days.       amLODIPine (NORVASC) 5 MG tablet Take 5 mg by mouth once daily.       buPROPion (WELLBUTRIN XL) 150 MG TB24 tablet Take 150 mg by mouth once daily.       carvediloL (COREG) 12.5 MG tablet Take 12.5 mg by mouth 2 (two) times daily.       cholecalciferol, vitamin D3, 1,000 unit capsule Take 1 capsule (1,000 Units total) by mouth once daily.  0     cloNIDine (CATAPRES) 0.1 MG tablet Take 0.1 mg by mouth 2 (two) times daily.       ergocalciferol (ERGOCALCIFEROL) 50,000 unit Cap Take 50,000 Units by mouth every 7 days.       lisinopril 10 MG tablet Take 1 tablet (10 mg total) by mouth once daily. 90 tablet 3     losartan (COZAAR) 50 MG tablet Take 50 mg by mouth once daily.       NIFEdipine (PROCARDIA-XL) 60 MG (OSM) 24 hr tablet Take 1 tablet (60 mg total) by mouth once daily. 90 tablet 3     sertraline (ZOLOFT) 25 MG tablet Take 25 mg by mouth once daily.          Review of Systems Unable to obtain due to pt's AMS.   Objective:     Vital Signs (Most Recent):  Temp: 97.9 °F (36.6 °C) (05/18/22 1856)  Pulse: 91 (05/18/22 1856)  Resp: 18 (05/18/22 1856)  BP: (!) 202/110 (05/18/22 1856)  SpO2: 100 % (05/18/22 1856)    Vital Signs  Range (Last 24H):  Temp:  [97.5 °F (36.4 °C)-98.2 °F (36.8 °C)]   Pulse:  []   Resp:  [10-20]   BP: (162-202)/()   SpO2:  [94 %-100 %]     Physical Exam  GEN: NAD, pleasant, cooperative   CVS: RRR  CHEST: No signs of resp distress   ABD: Soft, NTTP  NEURO  Mental Status: oriented to self and age  Language/Speech: Expressive aphasia, minimal stuttered speech  Cranial Nerves:    II: PERRLA, no VF deficits     III, IV, VI: EOMi, no gaze preference or deviation, no nystagmus, no ptosis     V: unable to accurately assess, senses being touched    VII: face symmetric with normal eye closure and smile     IX, X: normal palatal elevation, no uvular deviation     XI: head turning and shoulder shrug intact    XII: midline tongue protrusion   Motor: no pronator drift, strength full bilaterally   Reflexes: hyperreflexic  Sensory: unable to accurately assess, senses being touched  Coordination: no ataxia on spontaneous movement, no tremor, no dysmetria     NIH: 2    Laboratory:  I have reviewed all pertinent labs.       Brain imaging:  I have reviewed all pertinent imaging.      ASSESSMENT:   Acute L frontal lobe infarct  Uncontrolled HTN  Anxiety/depression  Substance abuse      STROKE WORK-UP:   CTH: L frontal lobe 6cm hypodensity  MRI: Acute L frontal lobe ischemic infarct      RECOMMENDATIONS:   Continue stroke protocol  ASA/Statin for secondary stroke prevention  Normotensive BP goals, symptoms ongoing x 3 days  MRA brain w/o, Echo w/bs, CUS, A1c, EEG  DT precautions   Maintain euvolemia, euglycemia, euthermia  ST, OT, PT eval's    Further rec's may follow by MD.

## 2022-05-19 NOTE — PT/OT/SLP EVAL
Speech Language Pathology Evaluation  Bedside Swallow    Patient Name:  Sammie Belcher   MRN:  [id number]  Admitting Diagnosis: CVA workup    Recommendations:                 General Recommendations:  Speech language evaluation  Diet recommendations:   Easy to Chew solids, Thin liquids  Aspiration Precautions: Alternating bites/sips and Small bites/sips   General Precautions: Standard, aspiration    History:     Past Medical History:   Diagnosis Date    Hypertension     Thyroid disease        Past Surgical History:   Procedure Laterality Date    THYROIDECTOMY      TUBAL LIGATION       Pt passed initial nursing swallow screen, however nursing reports decline in pt's verbal output with concerns regarding safety of regular solids and thin liquids.    Subjective     Patient awake, alert and cooperative.    Patient goals: to eat/drink     Pain/Comfort:  · Pain Rating 1: 0/10    Objective:     Oral Musculature Evaluation  · Oral Musculature: WFL  · Dentition: scattered dentition, teeth in poor condition  · Secretion Management: adequate  · Mucosal Quality: good  · Oral Labial Strength and Mobility: WFL  · Lingual Strength and Mobility: WFL  · Voice Prior to PO Intake: clear    Bedside Swallow Eval:   Consistency Route Oral Symptoms Pharyngeal Symptoms   Thin liquids Cup and straw none none   Puree By SLP Slowed oral transit time with oral residue, residual material cleared with liquid wash none   Chewable Solid Self fed Prolonged mastication with facial grimace (dentition in poor condition) none         Assessment:     Pt exhibited mild oral dysphagia secondary to poor dentition.  No overt/clinical signs/sx aspiration noted during this exam.    Plan:     SLP to complete speech/language/cognitive assessment as appropriate.    Discharge recommendations:  nursing facility, skilled, rehabilitation facility   Barriers to Discharge:  Level of Skilled Assistance Needed      Time Tracking:     SLP Treatment Date:    05/19/22  Speech Start Time:  1015  Speech Stop Time:  1030     Speech Total Time (min):  15 min    Billable Minutes: Eval Swallow and Oral Function 15 minutes    05/19/2022

## 2022-05-19 NOTE — H&P
[facility] Medicine History & Physical Examination       Patient Name: Sammie Belcher  MRN: [id number]  Patient Class: IP- Inpatient   Admission Date: 5/18/2022 11:35 AM  Length of Stay: 0  Attending Physician: Brayan Trotter MD   Primary Care Provider: Primary Doctor No  Chief Complaint: Altered Mental Status (Sister states pt has been incontient of bowel and bladder for the past three days and that her speech has changed.)        Patient information was obtained from patient, patient's family, past medical records and ER records.     HISTORY OF PRESENT ILLNESS:     Ms. Belcher is a 52-year-old female with past medical history of HTN, anxiety, depression, and hypothyroidism who presents to [facility] ED with complaints of dysarthria and expressive aphasia x3 days.  Patient's family member states that she has been having difficulty speaking for the past 3 days however today was worse so she was brought to the ED for further evaluation. Her sister at bedside states that she is unsure if she has been compliant with her home medications.  She does smoke marijuana daily with her significant other.  And drinks about a six-pack of beer every day.  No history of stroke.  She is not on aspirin.  Sister at bedside reports that she has had abnormal behavior over the past 3 days and has been having episodes of bladder and bowel incontinence and is very restless/fidgety.  No reported history of falls or back pain or any saddle anesthesia.  Patient is oriented x4 but is an extremely poor historian and it is very difficult to get a history from her.  She does report left-sided weakness and paresthesias x2 days    Upon arrival to outside facility the patient was markedly hypertensive but otherwise hemodynamically stable and afebrile. EKG: NSR with LVH. Labs unremarkable except for a reactive RPR, patient denies high risk sexual behavior, states unprotected sexual intercourse with  significant other but she is in a monogamous relationships. CT Head showed left frontal 6 cm cortical base hypodensity suggesting an acute to subacute nonhemorrhagic infarct in the left KENISHA vascular territory. MRI Brain was then obtained confirming the left frontal lobe extending into the corpus callosum with an acute nonhemorrhagic infarct in the territory of the anterior cerebral artery, and old lacunar infarcts. She is being transferred to [facility] for further CVA workup.    PAST MEDICAL HISTORY:   Essential HTN  HLD  Hypothyroidism  Anxiety  Depression  Hx Cocaine Use Disorder (sober over 1 yr)  THC Use    PAST SURGICAL HISTORY:   Thyroid Surgery  BTL      ALLERGIES:   Patient has no known allergies.    FAMILY HISTORY:   Reviewed and non-contributory     SOCIAL HISTORY:   Smokes 2 packs per day. Drinks 6 pack beer daily, sometime more according to her sister. Smokes THC. Prior cocaine hx. No hx of IVDU.     HOME MEDICATIONS:     Prior to Admission medications    Medication Sig Start Date End Date Taking? Authorizing Provider   alendronate (FOSAMAX) 70 MG tablet Take 70 mg by mouth every 7 days. 4/23/22   Historical Provider   amLODIPine (NORVASC) 5 MG tablet Take 5 mg by mouth once daily. 5/10/22   Historical Provider   buPROPion (WELLBUTRIN XL) 150 MG TB24 tablet Take 150 mg by mouth once daily. 2/24/22   Historical Provider   carvediloL (COREG) 12.5 MG tablet Take 12.5 mg by mouth 2 (two) times daily. 2/26/22   Historical Provider   cholecalciferol, vitamin D3, 1,000 unit capsule Take 1 capsule (1,000 Units total) by mouth once daily. 8/15/18   Karishma Clements MD   cloNIDine (CATAPRES) 0.1 MG tablet Take 0.1 mg by mouth 2 (two) times daily. 2/24/22   Historical Provider   ergocalciferol (ERGOCALCIFEROL) 50,000 unit Cap Take 50,000 Units by mouth every 7 days. 4/23/22   Historical Provider   lisinopril 10 MG tablet Take 1 tablet (10 mg total) by mouth once daily. 8/16/18 8/16/19  Karishma Clements MD   losartan  (COZAAR) 50 MG tablet Take 50 mg by mouth once daily. 5/10/22   Historical Provider   NIFEdipine (PROCARDIA-XL) 60 MG (OSM) 24 hr tablet Take 1 tablet (60 mg total) by mouth once daily. 8/16/18 8/16/19  Karishma Clements MD   sertraline (ZOLOFT) 25 MG tablet Take 25 mg by mouth once daily. 5/10/22   Historical Provider       REVIEW OF SYSTEMS:   Except as documented, all other systems reviewed and negative     PHYSICAL EXAM:   T 97.9 °F (36.6 °C)   BP (!) 202/110   P 91   RR 18   O2 100 %  GENERAL: awake, alert, oriented and in no acute distress, non-toxic appearing   HEENT: normocephalic atraumatic   NECK: supple   LUNGS: Clear bilaterally, no wheezing or rales, no accessory muscle use   CVS: Regular rate and rhythm, normal peripheral perfusion  ABD: Soft, non-tender, non-distended, bowel sounds present  EXTREMITIES: no clubbing or cyanosis  SKIN: Warm, dry.   NEURO: Motor strength RUE 4/5, LUE 5/5; RLE 4/5, LLE 5/5, expressive aphasia and slurred speech,+ paresthesia to right face, RUE and RLE. Gait not assessed.   PSYCHIATRIC: Cooperative, restless in room    LABS AND IMAGING:         Recent Labs   Lab 05/18/22  1157   WBC 12.2*   RBC 5.04   HGB 15.6   HCT 46.5   MCV 92.3   MCH 31.0   MCHC 33.5   RDW 14.1      MPV 11.2     Recent Labs   Lab 05/18/22  1202      K 3.6   CO2 22   BUN 13.3   CREATININE 0.97   CALCIUM 9.4   ALBUMIN 4.2   ALKPHOS 84   ALT 7   AST 18   BILITOT 0.8     MRI Brain Without Contrast  Narrative: EXAMINATION:  MRI BRAIN WITHOUT CONTRAST    CLINICAL HISTORY:  Stroke, follow up;    TECHNIQUE:  Multiplanar MRI sequences were performed of the brain without contrast.    COMPARISON:  CT brain without contrast May 18, 2020    FINDINGS:  There is left frontal lobe in the parasagittal location extending to the corpus callosum diffusion weighted restriction with corresponding signal dropout on the ADC map and T2 FLAIR hyperintensity which is consistent with acute hemorrhage infarct and  results in effacement of the sulci and local mass effect.  There are old lacunar infarcts which involve bilateral basal ganglia and the left corona radiata.  Bilateral cerebral periventricular and subcortical white matter variable size T2-FLAIR hyperintense signals are consistent with mild chronic microangiopathic ischemia. Gradient echo sequences demonstrate no evidence of de phasing artifact to suggest hemorrhagic byproducts. The sella and suprasellar areas are unremarkable.    The cerebellar tonsils are normally positioned. There is no acute intracranial hemorrhage, or hydrocephalus.  No acute extra axial fluid collections identified. The mastoid air cells are clear.  Impression: 1.  Left frontal lobe extending into the corpus callosum acute nonhemorrhagic infarct in the territory of the anterior cerebral artery.    2.  Old lacunar infarcts, chronic microangiopathic ischemia and atrophy.    Electronically signed by: Raul Campbell  Date:    05/18/2022  Time:    15:42  CT Head Without Contrast  Narrative: EXAMINATION:  CT HEAD WITHOUT CONTRAST    CLINICAL HISTORY:  Neuro deficit, acute, stroke suspected;    TECHNIQUE:  Axial scans were obtained from skull base to the vertex.    Coronal and sagittal reconstructions obtained from the axial data.    Automatic exposure control was utilized to limit radiation dose.    Contrast: None    Radiation Dose:    Total DLP: 858 mGy*cm    COMPARISON:  Head CT 08/14/2018    FINDINGS:  Scalp/Skull:    No abnormalities.    Brain sulci: Minimally effacing in the paramedian left frontal region.  Otherwise appropriate for patient's age.    Ventricles: Left frontal horn and anterior body are partially effaced.  Otherwise normal in size and configuration. No hydrocephalus.    Extra-axial spaces:    No masses or fluid collections.    Parenchyma:    Left cingulate/superior frontal approximately 6 x 2 cm cortical/subcortical hypodensity (series 8, image 17; sagittal image 20).    Old  bilateral basal ganglia lacunar insults.    Discrete and confluent supratentorial white matter hypodensities are mild-moderate nonspecific chronic microangiopathic changes, statistically chronic microvascular ischemia    No mass or hemorrhage.    Dural sinuses: No abnormal densities.    Sellar/Suprasellar region: No abnormalities.    Skull base and Craniocervical junction: No abnormalities.    Incidental findings:    Carotid siphon and vertebrobasilar atherosclerotic vascular calcifications.  Impression: 1. Left frontal 6 cm cortical based hypodensity suggests an acute-subacute nonhemorrhagic infarct in the left KENISHA vascular territory.  This could be confirmed by MRI.  2. Otherwise no acute intracranial abnormalities.  .    Electronically signed by: Xiomara Hassan  Date:    05/18/2022  Time:    12:38      ASSESSMENT & PLAN:   Acute Non-hemorrhagic CVA - left KENISHA vascular territory  Dysarthria and Expressive aphasia 2/2 above  HTN Urgency; Hx Essential HTN (uncontrolled)  HLD, new onset  Abnormal Behavior with reports of bladder and bowel incontinence  Reactive RPR  Trichomonas   Hypothyroidism  THC Use Disorder   Alcohol Use Disorder      PLAN:   - Stroke Protocol. Echocardiogram and CUS and EEG  - Neurology consult pending. ASA 81mg QD.   - no need to allow for permissive hypertension at this time as patient is over 24 hr from symptom onset. Restart home meds  - , start Atorvastatin.   - Neuro checks Q4H. Check UA  - Check FTA-ABS, if positive will consult ID  - Flagyl 2gm x1 dose. Pt instructed to let significant other know, that he can be treated.  - Banana Bag x1. MVI and Thiamine daily. CIWA Q6H.   - spent 5 minutes discussing tobacco and alcohol cessation.  Nicotine patch if desires.        IVeena, FNP-C have discussed the patient case with Dr. Trotter who agrees with the diagnosis and treatment plan.    DVT Prophylaxis:  Lovenox    __________________________________________________________________  I, Dr. Brayan Trotter assumed care of this patient.  For the patient encounter, I performed the substantive portion of the visit, I reviewed the NPPA documentation, treatment plan, and medical decision making.  I had face to face time with this patient.  All diagnosis and differential diagnosis have been reviewed; assessment and plan has been documented; I have personally reviewed the labs and test results that are presently available; I have reviewed the patients medication list; I have reviewed the consulting providers response and recommendations. I have reviewed or attempted to review medical records based upon their availability. If patient was admitted under observational status it is with my approval.      52-year-old female presents with expressive aphasia for several days, as well as some bizarre behavior including agitation, bowel and bladder incontinence per report, and confusion.  Her RPR is positive.  CT of her head shows an acute left KENISHA vascular territory infarct.  Patient was over 24 hours from symptom onset so she is not a candidate for any sort of intervention.  At bedside she has mild right-sided weakness and expressive aphasia.  Continue stroke protocol, obtain FTA-ABS.  Banana bag and monitor for alcohol withdrawal.    Time seen: 11PM  Critical care time:  55 minutes  Critical Care diagnosis:  Acute ischemic stroke

## 2022-05-19 NOTE — HPI
Neurology consulted for stroke on this 51y/o AAF with pMHx of HTN, anxiety/depression, hyperparathyroidism, EtOH abuse, Marijuana abuse. Pt presented to [facility] earlier today accompanied by her sister who reported pt had a change in mental status and difficulty getting words out x 3 days. [facility] imaging reports: CTH shows L frontal 6cm cortical based hypodensity L KENISHA vascular territory suggestive of acute to subacute infarct; MRI confirms L frontal lobe acute infarct extending into corpus callosum, as well as old lacunar infarcts. Pt was transferred to [facility] for Neurology care and stroke work-up.     On my exam tonight pt's  and her sister are at bedside. All history provided by family. Sister states pt and her  drinks atleast 2-3 16-oz beers daily as well as smokes marijuana daily and have for years.  at bedside confirms this. He reports since Sunday pt has not been feeling well, she has stuttering speech at baseline but for the past 3 days she has been having difficulty getting words out to the point of being mute at times.  denies any noticeable extremity weakness, falls, or other complaints. He reports pt has issues with high BP and that she is on several meds which he reports she is compliant in taking with no missed doses including the past 3 days. He also reports pt's last drink was yesterday. Her UDS is pos for marijuana which  endorse they smoke daily. Family denies a known hx of TIA or stroke.

## 2022-05-19 NOTE — CONSULTS
[facility] - 9th Floor Med Surg  Vascular Neurology   Stroke Center  Consult Note        Subjective:     History of Present Illness:  Neurology consulted for stroke on this 51y/o AAF with pMHx of HTN, anxiety/depression, hyperparathyroidism, EtOH abuse, Marijuana abuse. Pt presented to  [facility]th[facility] earlier today accompanied by her sister who reported pt had a change in mental status and difficulty getting words out x 3 days. [facility] imaging reports: CTH shows L frontal 6cm cortical based hypodensity L KENISHA vascular territory suggestive of acute to subacute infarct; MRI confirms L frontal lobe acute infarct extending into corpus callosum, as well as old lacunar infarcts. Pt was transferred to [facility] for Neurology care and stroke work-up.     On my exam tonight pt's  and her sister are at bedside. All history provided by family. Sister states pt and her  drinks atleast 2-3 16-oz beers daily as well as smokes marijuana daily and have for years.  at bedside confirms this. He reports since Sunday pt has not been feeling well, she has stuttering speech at baseline but for the past 3 days she has been having difficulty getting words out to the point of being mute at times.  denies any noticeable extremity weakness, falls, or other complaints. He reports pt has issues with high BP and that she is on several meds which he reports she is compliant in taking with no missed doses including the past 3 days. He also reports pt's last drink was yesterday. Her UDS is pos for marijuana which  endorse they smoke daily. Family denies a known hx of TIA or stroke.           Past Medical History:   Diagnosis Date    Hypertension     Thyroid disease      Past Surgical History:   Procedure Laterality Date    THYROIDECTOMY      TUBAL LIGATION       History reviewed. No pertinent family history.  Social History     Tobacco Use    Smoking status: Current Every Day Smoker     Types: Cigarettes     Smokeless tobacco: Current User   Substance Use Topics    Alcohol use: Yes     Alcohol/week: 3.0 standard drinks     Types: 3 Standard drinks or equivalent per week    Drug use: No     Review of patient's allergies indicates:  No Known Allergies    Medications: I have reviewed the current medication administration record.    Medications Prior to Admission   Medication Sig Dispense Refill Last Dose    alendronate (FOSAMAX) 70 MG tablet Take 70 mg by mouth every 7 days.       amLODIPine (NORVASC) 5 MG tablet Take 5 mg by mouth once daily.       buPROPion (WELLBUTRIN XL) 150 MG TB24 tablet Take 150 mg by mouth once daily.       carvediloL (COREG) 12.5 MG tablet Take 12.5 mg by mouth 2 (two) times daily.       cholecalciferol, vitamin D3, 1,000 unit capsule Take 1 capsule (1,000 Units total) by mouth once daily.  0     cloNIDine (CATAPRES) 0.1 MG tablet Take 0.1 mg by mouth 2 (two) times daily.       ergocalciferol (ERGOCALCIFEROL) 50,000 unit Cap Take 50,000 Units by mouth every 7 days.       lisinopril 10 MG tablet Take 1 tablet (10 mg total) by mouth once daily. 90 tablet 3     losartan (COZAAR) 50 MG tablet Take 50 mg by mouth once daily.       NIFEdipine (PROCARDIA-XL) 60 MG (OSM) 24 hr tablet Take 1 tablet (60 mg total) by mouth once daily. 90 tablet 3     sertraline (ZOLOFT) 25 MG tablet Take 25 mg by mouth once daily.          Review of Systems Unable to obtain due to pt's AMS.   Objective:     Vital Signs (Most Recent):  Temp: 97.9 °F (36.6 °C) (05/18/22 1856)  Pulse: 91 (05/18/22 1856)  Resp: 18 (05/18/22 1856)  BP: (!) 202/110 (05/18/22 1856)  SpO2: 100 % (05/18/22 1856)    Vital Signs Range (Last 24H):  Temp:  [97.5 °F (36.4 °C)-98.2 °F (36.8 °C)]   Pulse:  []   Resp:  [10-20]   BP: (162-202)/()   SpO2:  [94 %-100 %]     Physical Exam  GEN: NAD, pleasant, cooperative   CVS: RRR  CHEST: No signs of resp distress   ABD: Soft, NTTP  NEURO  Mental Status: oriented to self and  "age  Language/Speech: Expressive aphasia, minimal stuttered speech  Cranial Nerves:    II: PERRLA, no VF deficits     III, IV, VI: EOMi, no gaze preference or deviation, no nystagmus, no ptosis     V: unable to accurately assess, senses being touched    VII: face symmetric with normal eye closure and smile     IX, X: normal palatal elevation, no uvular deviation     XI: head turning and shoulder shrug intact    XII: midline tongue protrusion   Motor: no pronator drift, strength full bilaterally   Reflexes: hyperreflexic  Sensory: unable to accurately assess, senses being touched  Coordination: no ataxia on spontaneous movement, no tremor, no dysmetria     NIH: 2    Laboratory:  I have reviewed all pertinent labs.       Brain imaging:  I have reviewed all pertinent imaging.      ASSESSMENT:   Acute L frontal lobe infarct  Uncontrolled HTN  Anxiety/depression  Substance abuse      STROKE WORK-UP:   CTH: L frontal lobe 6cm hypodensity  MRI: Acute L frontal lobe ischemic infarct      RECOMMENDATIONS:   Continue stroke protocol  ASA/Statin for secondary stroke prevention  Normotensive BP goals, symptoms ongoing x 3 days  MRA brain w/o, Echo w/bs, CUS, A1c, EEG  DT precautions   Maintain euvolemia, euglycemia, euthermia  ST, OT, PT eval's    Further rec's may follow by MD. Mackenzie Caicedo, [facility]   Stroke Center  Department of Vascular Neurology   [facility] - 9th Floor Med Surg     -----------------    Pt seen and examined by me today.   She is admitted for aphasia and weakness.   I could not get any word from her today to elaborate on the story.   So all the info of the HPI is obtained form the chart.   She is sitting in bed in no acute distress, the only word she said when I asked her what brought her to the hospital was " the, the, the" several times, I did not hear any other word or even syllable from her today.   I do not see a face droop, she is totally aphasic, she also has " trouble following complex commands, she no VF cut, eye movement seems to be full.   She has mild pronator drift on the RUE and was not able to move the RLE for me at all.   A/P :   Acute ischemic stroke of the L KENISHA territory.   this is a large territory stroke, and although she has HTN, substance abuse and known medication non compliance, I do think we should make sure that her carotids and her heart are not the source of this embolus, before starting to think or consider the substance abuse and alcohol triggered arrhythmia.   By that I mean, if the CUS is negative, and the TTE is unrevealing, I will get MARIO and LINQ on her.   Asa daily for now.   etoh withdrawal precautions.   abstinence from substance abuse, although I do not think I was able to successfully relay that to her today with her aphasia.   BP goal < 140.   syphilis and trichomonas positive.   I think we should get CTA neck with contrast, including the aortic arch to make sure she does not have syphilitic aortitis.   - I think we also should get ID involved.

## 2022-05-19 NOTE — PLAN OF CARE
Problem: Infection  Goal: Absence of Infection Signs and Symptoms  Outcome: Ongoing, Progressing     Problem: Adjustment to Illness (Stroke, Ischemic/Transient Ischemic Attack)  Goal: Optimal Coping  Outcome: Ongoing, Progressing     Problem: Bowel Elimination Impaired (Stroke, Ischemic/Transient Ischemic Attack)  Goal: Effective Bowel Elimination  Outcome: Ongoing, Progressing     Problem: Cerebral Tissue Perfusion (Stroke, Ischemic/Transient Ischemic Attack)  Goal: Optimal Cerebral Tissue Perfusion  Outcome: Ongoing, Progressing     Problem: Cognitive Impairment (Stroke, Ischemic/Transient Ischemic Attack)  Goal: Optimal Cognitive Function  Outcome: Ongoing, Progressing     Problem: Communication Impairment (Stroke, Ischemic/Transient Ischemic Attack)  Goal: Improved Communication Skills  Outcome: Ongoing, Progressing     Problem: Functional Ability Impaired (Stroke, Ischemic/Transient Ischemic Attack)  Goal: Optimal Functional Ability  Outcome: Ongoing, Progressing     Problem: Respiratory Compromise (Stroke, Ischemic/Transient Ischemic Attack)  Goal: Effective Oxygenation and Ventilation  Outcome: Ongoing, Progressing

## 2022-05-19 NOTE — PLAN OF CARE
Problem: Infection  Goal: Absence of Infection Signs and Symptoms  Outcome: Ongoing, Progressing     Problem: Adjustment to Illness (Stroke, Ischemic/Transient Ischemic Attack)  Goal: Optimal Coping  Outcome: Ongoing, Progressing     Problem: Bowel Elimination Impaired (Stroke, Ischemic/Transient Ischemic Attack)  Goal: Effective Bowel Elimination  Outcome: Ongoing, Progressing     Problem: Cerebral Tissue Perfusion (Stroke, Ischemic/Transient Ischemic Attack)  Goal: Optimal Cerebral Tissue Perfusion  Outcome: Ongoing, Progressing     Problem: Cognitive Impairment (Stroke, Ischemic/Transient Ischemic Attack)  Goal: Optimal Cognitive Function  Outcome: Ongoing, Progressing     Problem: Communication Impairment (Stroke, Ischemic/Transient Ischemic Attack)  Goal: Improved Communication Skills  Outcome: Ongoing, Progressing     Problem: Functional Ability Impaired (Stroke, Ischemic/Transient Ischemic Attack)  Goal: Optimal Functional Ability  Outcome: Ongoing, Progressing     Problem: Respiratory Compromise (Stroke, Ischemic/Transient Ischemic Attack)  Goal: Effective Oxygenation and Ventilation  Outcome: Ongoing, Progressing     Problem: Sensorimotor Impairment (Stroke, Ischemic/Transient Ischemic Attack)  Goal: Improved Sensorimotor Function  Outcome: Ongoing, Progressing     Problem: Urinary Elimination Impaired (Stroke, Ischemic/Transient Ischemic Attack)  Goal: Effective Urinary Elimination  Outcome: Ongoing, Progressing     Problem: Adult Inpatient Plan of Care  Goal: Plan of Care Review  Outcome: Ongoing, Progressing  Goal: Patient-Specific Goal (Individualized)  Outcome: Ongoing, Progressing  Goal: Absence of Hospital-Acquired Illness or Injury  Outcome: Ongoing, Progressing  Goal: Optimal Comfort and Wellbeing  Outcome: Ongoing, Progressing  Goal: Readiness for Transition of Care  Outcome: Ongoing, Progressing

## 2022-05-19 NOTE — PT/OT/SLP EVAL
Physical Therapy Evaluation    Patient Name:  Sammie Belcher   MRN:  [id number]    Recommendations:     Discharge Recommendations:  rehabilitation facility, nursing facility, basic, home health PT   Discharge Equipment Recommendations: walker, rolling   Barriers to discharge: Increased need of assistance    Assessment:     Sammie Belcher is a 52 y.o. female admitted for CVA. Patient was having difficulty speaking x 3 days. MRI showed left frontal lobe acute nonhemorrhagic infarct. History given by family member in room as patient is aphasic. Patient lives in multi-level apartment with mom, brother, and boyfriend. She is usually independent.  She presents with the following impairments/functional limitations:  weakness, impaired endurance, impaired functional mobilty, gait instability, impaired balance, decreased coordination, decreased upper extremity function, decreased lower extremity function, decreased safety awareness. She required MIN A for bed mobility. Ambulated 30 ft with RW & MIN A. Difficulty advancing RLE. Balance was poor. Discharge recs will depend on progress over next couple of sessions. At the very least, she will need  PT & RW.     Rehab Prognosis: Good; patient would benefit from acute skilled PT services to address these deficits and reach maximum level of function.    Recent Surgery: * No surgery found *      Plan:     During this hospitalization, patient to be seen 5 x/week to address the identified rehab impairments via gait training, therapeutic activities, therapeutic exercises, neuromuscular re-education and progress toward the following goals:    · Plan of Care Expires:  06/09/22    Subjective     Chief Complaint: none  Patient/Family Comments/goals:   Pain/Comfort:  · Pain Rating 1: 0/10    Patients cultural, spiritual, Rastafari conflicts given the current situation: no    Living Environment:  Multi-level apartment with mom, brother, and boyfriend.  Prior to admission, patients  level of function was independent.  Equipment used at home: none.  DME owned (not currently used): none.  Upon discharge, patient will have assistance from family.    Objective:     Communicated with nurse prior to session.  Patient found supine with telemetry  upon PT entry to room.    General Precautions: Standard, aphasia (BP<220/120)   Orthopedic Precautions:    Braces: N/A  Respiratory Status: Room air   BP: 143/87    Exams:  · Cognitive Exam:  Patient is oriented to Person, Place, Time and Situation  · Sensation:    · -       Intact  · RLE ROM: WFL  · RLE Strength: 3/5 grossly  · LLE ROM: WFL  · LLE Strength: 4/5 grossly    Functional Mobility:  · Bed Mobility:     · Supine to Sit: minimum assistance  · Sit to Supine: minimum assistance  · Transfers:     · Sit to Stand:  minimum assistance with rolling walker  · Gait: 30 ft with RW & MIN A. Difficulty advancing RLE.  · Balance: poor      AM-PAC 6 CLICK MOBILITY  Total Score:17     Patient left supine with all lines intact, call button in reach, bed alarm on and family.    GOALS:   Multidisciplinary Problems     Physical Therapy Goals        Problem: Physical Therapy    Goal Priority Disciplines Outcome Goal Variances Interventions   Physical Therapy Goal     PT, PT/OT Ongoing, Progressing     Description: Goals to be met by: 2022     Patient will increase functional independence with mobility by performin. Supine to sit with Stand-by Assistance  2. Sit to supine with Stand-by Assistance  3. Sit to stand transfer with Stand-by Assistance  4. Gait  x 300 feet with Contact Guard Assistance using Rolling Walker.   5. RLE strength to improve to 4/5 MMT.  6. LLE strength to improve to 5/5 MMT.                     History:     Past Medical History:   Diagnosis Date    Hypertension     Thyroid disease        Past Surgical History:   Procedure Laterality Date    THYROIDECTOMY      TUBAL LIGATION         Time Tracking:     PT Received On: 22  PT  Start Time: 0745     PT Stop Time: 0805  PT Total Time (min): 20 min     Billable Minutes: Evaluation 20 minutes      05/19/2022

## 2022-05-19 NOTE — PLAN OF CARE
Problem: Physical Therapy  Goal: Physical Therapy Goal  Description: Goals to be met by: 2022     Patient will increase functional independence with mobility by performin. Supine to sit with Stand-by Assistance  2. Sit to supine with Stand-by Assistance  3. Sit to stand transfer with Stand-by Assistance  4. Gait  x 300 feet with Contact Guard Assistance using Rolling Walker.   5. RLE strength to improve to 4/5 MMT.  6. LLE strength to improve to 5/5 MMT.  7. Patient ascended/descended 10 steps with MIN A  Outcome: Ongoing, Progressing

## 2022-05-19 NOTE — PLAN OF CARE
Problem: Occupational Therapy  Goal: Occupational Therapy Goal  Description: Goals to be met by: 6/9/22    Patient will increase functional independence with ADLs by performing:    UE Dressing with Modified [place].  LE Dressing with Modified [place].  Grooming while seated with Modified [place].  Toileting from toilet with Modified [place] for hygiene and clothing management.   Toilet transfer to toilet with Modified [place].    Outcome: Ongoing, Progressing

## 2022-05-20 ENCOUNTER — HOSPITAL ENCOUNTER (OUTPATIENT)
Dept: RADIOLOGY | Facility: HOSPITAL | Age: 52
Discharge: HOME | DRG: 065 | End: 2022-05-20
Attending: RADIOLOGY
Payer: MEDICAID

## 2022-05-20 LAB
ALBUMIN SERPL-MCNC: 3.6 GM/DL (ref 3.5–5)
ALBUMIN/GLOB SERPL: 1.4 RATIO (ref 1.1–2)
ALP SERPL-CCNC: 71 UNIT/L (ref 40–150)
ALT SERPL-CCNC: 7 UNIT/L (ref 0–55)
APTT PPP: 30.5 SECONDS (ref 23.2–33.7)
AST SERPL-CCNC: 12 UNIT/L (ref 5–34)
BASOPHILS # BLD AUTO: 0.04 X10(3)/MCL (ref 0–0.2)
BASOPHILS NFR BLD AUTO: 0.4 %
BILIRUBIN DIRECT+TOT PNL SERPL-MCNC: 0.6 MG/DL
BUN SERPL-MCNC: 9 MG/DL (ref 9.8–20.1)
CALCIUM SERPL-MCNC: 9.1 MG/DL (ref 8.4–10.2)
CHLORIDE SERPL-SCNC: 107 MMOL/L (ref 98–107)
CO2 SERPL-SCNC: 23 MMOL/L (ref 22–29)
CREAT SERPL-MCNC: 0.8 MG/DL (ref 0.55–1.02)
EOSINOPHIL # BLD AUTO: 0.3 X10(3)/MCL (ref 0–0.9)
EOSINOPHIL NFR BLD AUTO: 3.3 %
ERYTHROCYTE [DISTWIDTH] IN BLOOD BY AUTOMATED COUNT: 14.6 % (ref 11.5–17)
GLOBULIN SER-MCNC: 2.5 GM/DL (ref 2.4–3.5)
GLUCOSE SERPL-MCNC: 96 MG/DL (ref 74–100)
HBV SURFACE AG SERPL QL IA: NONREACTIVE
HCT VFR BLD AUTO: 43.1 % (ref 37–47)
HCV AB SERPL QL IA: NONREACTIVE
HGB BLD-MCNC: 13.8 GM/DL (ref 12–16)
IMM GRANULOCYTES # BLD AUTO: 0.03 X10(3)/MCL (ref 0–0.02)
IMM GRANULOCYTES NFR BLD AUTO: 0.3 % (ref 0–0.43)
INR BLD: 1.11 (ref 0–1.3)
LYMPHOCYTES # BLD AUTO: 2.52 X10(3)/MCL (ref 0.6–4.6)
LYMPHOCYTES NFR BLD AUTO: 27.7 %
MCH RBC QN AUTO: 30.6 PG (ref 27–31)
MCHC RBC AUTO-ENTMCNC: 32 MG/DL (ref 33–36)
MCV RBC AUTO: 95.6 FL (ref 80–94)
MONOCYTES # BLD AUTO: 0.63 X10(3)/MCL (ref 0.1–1.3)
MONOCYTES NFR BLD AUTO: 6.9 %
NEUTROPHILS # BLD AUTO: 5.6 X10(3)/MCL (ref 2.1–9.2)
NEUTROPHILS NFR BLD AUTO: 61.4 %
NRBC BLD AUTO-RTO: 0 %
PLATELET # BLD AUTO: 253 X10(3)/MCL (ref 130–400)
PMV BLD AUTO: 11.2 FL (ref 9.4–12.4)
POCT GLUCOSE: 116 MG/DL (ref 70–110)
POCT GLUCOSE: 94 MG/DL (ref 70–110)
POTASSIUM SERPL-SCNC: 3.5 MMOL/L (ref 3.5–5.1)
PROT SERPL-MCNC: 6.1 GM/DL (ref 6.4–8.3)
PROTHROMBIN TIME: 14 SECONDS (ref 12.5–14.5)
RBC # BLD AUTO: 4.51 X10(6)/MCL (ref 4.2–5.4)
SODIUM SERPL-SCNC: 140 MMOL/L (ref 136–145)
WBC # SPEC AUTO: 9.1 X10(3)/MCL (ref 4.5–11.5)

## 2022-05-20 PROCEDURE — 97116 GAIT TRAINING THERAPY: CPT | Mod: CQ

## 2022-05-20 PROCEDURE — 36415 COLL VENOUS BLD VENIPUNCTURE: CPT | Performed by: NURSE PRACTITIONER

## 2022-05-20 PROCEDURE — 99232 PR SUBSEQUENT HOSPITAL CARE,LEVL II: ICD-10-PCS | Mod: ,,, | Performed by: SPECIALIST

## 2022-05-20 PROCEDURE — 70498 CT ANGIOGRAPHY NECK: CPT | Mod: TC

## 2022-05-20 PROCEDURE — 97535 SELF CARE MNGMENT TRAINING: CPT

## 2022-05-20 PROCEDURE — 25000003 PHARM REV CODE 250: Performed by: NURSE PRACTITIONER

## 2022-05-20 PROCEDURE — 25000003 PHARM REV CODE 250: Performed by: INTERNAL MEDICINE

## 2022-05-20 PROCEDURE — 94761 N-INVAS EAR/PLS OXIMETRY MLT: CPT

## 2022-05-20 PROCEDURE — 85730 THROMBOPLASTIN TIME PARTIAL: CPT | Performed by: STUDENT IN AN ORGANIZED HEALTH CARE EDUCATION/TRAINING PROGRAM

## 2022-05-20 PROCEDURE — 85610 PROTHROMBIN TIME: CPT | Performed by: STUDENT IN AN ORGANIZED HEALTH CARE EDUCATION/TRAINING PROGRAM

## 2022-05-20 PROCEDURE — 11000001 HC ACUTE MED/SURG PRIVATE ROOM

## 2022-05-20 PROCEDURE — 97110 THERAPEUTIC EXERCISES: CPT | Mod: CQ

## 2022-05-20 PROCEDURE — 92523 SPEECH SOUND LANG COMPREHEN: CPT

## 2022-05-20 PROCEDURE — 80053 COMPREHEN METABOLIC PANEL: CPT | Performed by: NURSE PRACTITIONER

## 2022-05-20 PROCEDURE — 25500020 PHARM REV CODE 255: Performed by: NURSE PRACTITIONER

## 2022-05-20 PROCEDURE — 85025 COMPLETE CBC W/AUTO DIFF WBC: CPT | Performed by: NURSE PRACTITIONER

## 2022-05-20 PROCEDURE — S4991 NICOTINE PATCH NONLEGEND: HCPCS | Performed by: NURSE PRACTITIONER

## 2022-05-20 PROCEDURE — 63600175 PHARM REV CODE 636 W HCPCS: Performed by: INTERNAL MEDICINE

## 2022-05-20 PROCEDURE — 99232 SBSQ HOSP IP/OBS MODERATE 35: CPT | Mod: ,,, | Performed by: SPECIALIST

## 2022-05-20 PROCEDURE — 36415 COLL VENOUS BLD VENIPUNCTURE: CPT | Performed by: STUDENT IN AN ORGANIZED HEALTH CARE EDUCATION/TRAINING PROGRAM

## 2022-05-20 RX ORDER — METRONIDAZOLE 500 MG/1
500 TABLET ORAL EVERY 8 HOURS
Status: DISCONTINUED | OUTPATIENT
Start: 2022-05-20 | End: 2022-05-26

## 2022-05-20 RX ADMIN — NICOTINE 1 PATCH: 21 PATCH, EXTENDED RELEASE TRANSDERMAL at 09:05

## 2022-05-20 RX ADMIN — ATORVASTATIN CALCIUM 40 MG: 40 TABLET, FILM COATED ORAL at 09:05

## 2022-05-20 RX ADMIN — HYDRALAZINE HYDROCHLORIDE 10 MG: 20 INJECTION INTRAMUSCULAR; INTRAVENOUS at 05:05

## 2022-05-20 RX ADMIN — METRONIDAZOLE 500 MG: 500 TABLET ORAL at 09:05

## 2022-05-20 RX ADMIN — SERTRALINE 25 MG: 25 TABLET, FILM COATED ORAL at 09:05

## 2022-05-20 RX ADMIN — CARVEDILOL 12.5 MG: 12.5 TABLET, FILM COATED ORAL at 09:05

## 2022-05-20 RX ADMIN — THIAMINE HCL TAB 100 MG 100 MG: 100 TAB at 09:05

## 2022-05-20 RX ADMIN — ASPIRIN 81 MG: 81 TABLET, COATED ORAL at 09:05

## 2022-05-20 RX ADMIN — AMLODIPINE BESYLATE 5 MG: 5 TABLET ORAL at 09:05

## 2022-05-20 RX ADMIN — CARVEDILOL 12.5 MG: 12.5 TABLET, FILM COATED ORAL at 08:05

## 2022-05-20 RX ADMIN — BUPROPION HYDROCHLORIDE 150 MG: 150 TABLET, FILM COATED, EXTENDED RELEASE ORAL at 09:05

## 2022-05-20 RX ADMIN — IOPAMIDOL 100 ML: 755 INJECTION, SOLUTION INTRAVENOUS at 10:05

## 2022-05-20 RX ADMIN — MULTIPLE VITAMINS W/ MINERALS TAB 1 TABLET: TAB at 09:05

## 2022-05-20 NOTE — PLAN OF CARE
Problem: Cerebral Tissue Perfusion (Stroke, Ischemic/Transient Ischemic Attack)  Goal: Optimal Cerebral Tissue Perfusion  Outcome: Ongoing, Progressing     Problem: Functional Ability Impaired (Stroke, Ischemic/Transient Ischemic Attack)  Goal: Optimal Functional Ability  Outcome: Ongoing, Progressing     Problem: Sensorimotor Impairment (Stroke, Ischemic/Transient Ischemic Attack)  Goal: Improved Sensorimotor Function  Outcome: Ongoing, Progressing     Problem: Urinary Elimination Impaired (Stroke, Ischemic/Transient Ischemic Attack)  Goal: Effective Urinary Elimination  Outcome: Ongoing, Progressing     Problem: Adult Inpatient Plan of Care  Goal: Patient-Specific Goal (Individualized)  Outcome: Ongoing, Progressing     Problem: Swallowing Impairment (Stroke, Ischemic/Transient Ischemic Attack)  Goal: Optimal Eating and Swallowing without Aspiration  Outcome: Ongoing, Not Progressing     Problem: Swallowing Impairment (Stroke, Ischemic/Transient Ischemic Attack)  Goal: Optimal Eating and Swallowing without Aspiration  Outcome: Ongoing, Not Progressing     Problem: Adjustment to Illness (Stroke, Ischemic/Transient Ischemic Attack)  Goal: Optimal Coping  Outcome: Met     Problem: Bowel Elimination Impaired (Stroke, Ischemic/Transient Ischemic Attack)  Goal: Effective Bowel Elimination  Outcome: Met     Problem: Respiratory Compromise (Stroke, Ischemic/Transient Ischemic Attack)  Goal: Effective Oxygenation and Ventilation  Outcome: Met     Problem: Adult Inpatient Plan of Care  Goal: Plan of Care Review  Outcome: Met  Goal: Absence of Hospital-Acquired Illness or Injury  Outcome: Met     Problem: Skin Injury Risk Increased  Goal: Skin Health and Integrity  Outcome: Met

## 2022-05-20 NOTE — PT/OT/SLP PROGRESS
Occupational Therapy   Treatment    Name: Sammie Belcher  MRN: [id number]  Admitting Diagnosis:  <principal problem not specified>       Recommendations:     Discharge Recommendations: rehabilitation facility  Discharge Equipment Recommendations:  walker, rolling  Barriers to discharge:       Assessment:     Sammie Belcher is a 52 y.o. female with a medical diagnosis of CVA.   Performance deficits affecting function are weakness, impaired endurance, impaired self care skills, impaired functional mobilty, gait instability, impaired balance, impaired cognition, decreased upper extremity function, decreased coordination.   /104 nrsg aware     Rehab Prognosis:  Good; patient would benefit from acute skilled OT services to address these deficits and reach maximum level of function.       Plan:     Patient to be seen 5 x/week, 6 x/week to address the above listed problems via self-care/home management, therapeutic activities, therapeutic exercises  · Plan of Care Expires: 06/18/22  · Plan of Care Reviewed with: patient    Subjective     Pain/Comfort:  · Pain Rating 1: 0/10    Objective:     Communicated with:   Patient found HOB elevated with PureWick, peripheral IV upon OT entry to room.    General Precautions: Standard,     Orthopedic Precautions:    Braces:    Respiratory Status: Room air     Occupational Performance:     Bed Mobility:    · Patient completed Supine to Sit with minimum assistance  · Patient completed Sit to Supine with minimum assistance     Functional Mobility/Transfers:  · Did not complete FM/transfers today 2/2 elevated BP while at /104- nrsg notified     Activities of Daily Living:  · Grooming: supervision increased time and VC required for oral care 2/2 coordination and cognition deficits    · Pt completed grooming sitting EOB x10min       AMPAC 6 Click ADL: 18      Patient left HOB elevated with all lines intact, call button in reach and nrsg  presentEducation:      GOALS:    Multidisciplinary Problems     Occupational Therapy Goals        Problem: Occupational Therapy    Goal Priority Disciplines Outcome Interventions   Occupational Therapy Goal     OT, PT/OT Ongoing, Progressing    Description: Goals to be met by: 6/9/22    Patient will increase functional independence with ADLs by performing:    UE Dressing with Modified [place].  LE Dressing with Modified [place].  Grooming while seated with Modified [place].  Toileting from toilet with Modified [place] for hygiene and clothing management.   Toilet transfer to toilet with Modified [place].                     Time Tracking:     OT Date of Treatment:    OT Start Time: 1120  OT Stop Time: 1128  OT Total Time (min): 8 min    Billable Minutes:Self Care/Home Management 8    OT/BETH: OT     BETH Visit Number: 1    5/20/2022

## 2022-05-20 NOTE — HISTORICAL [facility]ER
This is a historical note converted from Harry. Formatting and pictures may have been removed.  Please reference [facility] for original formatting and attached multimedia. Chief Complaint  Generalized Weakness  Reason for Consultation  Hypercalcemia 2/2 Hyperparathyroidism  History of Present Illness  Mrs Belcher is a 52 year old lady with PMH of HTN, Osteoporosis, Primary Hyperparathyroidism who has been admitted for symptomatic moderate hypercalcemia. Mrs Belcher was called from the [facility]ology [facility] on 02/21 for serum Ca level of 13.1 on routine labs. She was requested to go to the ER for evaluation and management. She stated that she has been experiencing fatigue and generalized weakness for a few months. She also endorsed having intermittent spasms in B/L hands, difficulty with balance and dysphagia with globus sensation which has been ongoing for years and has been progressively worsening this year. She denied having fevers, chills, chest pain, SOB, cough, sputum production, abdominal pain,?headache, dizziness/lightheadedness, LOC, weakness or numbness but admitted to having nausea with intermittent episodic NBNB emesis.  ?  On admission, Mrs Belcher was noted to be vitally stable with /89 mmHg, HR of 80/min. She was saturating adequately and was in no respiratory distress. Her labs showed serum calcium of 13.1, BUN/creatinine 11.2/0.83, Na 136, Cl 105, AST/ALT of 10/8, Hb/HCT 14.9/44.4, WBCs 8.5, Platelets 208. She was given 1 L IV?LR and has been continued on IV NS at 125 ml/hr with improvement in her Ca levels to 11.7. ENT has been consulted and are planning for parathyroidectomy tomorrow for which anticoagulation will be held and patient will be kept NPO overnight.  ?  Of note, Mrs Belcher underwent NM Parathyroid Scan 11/2021 which showed no parathyroid adenoma with homogeneous activity within the thyroid gland.? She then underwent a CT Parathyroid w/ & w/o 4D which showed suspected left  lower parathyroid adenoma with demonstrated venous washout as well as multiple subcentimeter thyroid nodules unchanged from previous US. Last PTH was 401.3 on 02/21  ?  Endocrinology has been consulted to follow along.  Review of Systems  Constitutional: no fever, no chills, no weight loss, no night sweats, + generalized weakness  ENMT: no ear pain or sore throat, no nasal congestion, no nasal discharge, no sinus pain  Respiratory: no cough,?no shortness of breath or wheezing, no orthopnea, no PND, no sputum production  Cardiovascular: no chest pain, no palpitations or syncope, no orthopnea, no nocturnal dyspnea  Gastrointestinal: no abdominal pain, no epigastric burning, no nausea/vomiting, no diarrhea or constipation, no hematochezia/melena  Genitourinary: no dysuria, no urinary frequency, no urinary urgency or hematuria  Musculoskeletal: no myalgias or back pain, no joint pain, no joint swelling  Physical Exam  Vitals & Measurements  T:?36.4? ?C (Oral)? TMIN:?36.4? ?C (Oral)? TMAX:?36.7? ?C (Oral)? HR:?63(Peripheral)? RR:?18? BP:?142/89? SpO2:?100%? WT:?66.25?kg? BMI:?19.36?  General: alert?lady sitting comfortably in clinic, in no acute distress  HENT: oral and oropharyngeal mucosa moist, pink, with no erythema or exudates, no ear pain or discharge  Neck: normal neck movement,?no lymph nodes or swellings, no JVD or Carotid bruit  Respiratory:?diminished breathing sounds bilaterally, no crackles, rales, rhonchi or wheezes  Cardiovascular: clear S1 and S2, no murmurs, rubs or gallops  Peripheral Vascular: no lesions, ulcers or erosions, normal peripheral pulses and no pedal edema  Gastrointestinal: soft, non-tender, non-distended abdomen, no guarding, rigidity or rebound tenderness, normal bowel sounds  Integumentary: normal skin color, no rashes or lesions  Neuro: AAO x 3; motor strength 5/5 in B/L UEs & LEs; sensation intact to gross and fine touch B/L; CN II-XII grossly intact  Assessment/Plan  Moderate  Hypercalcemia, Hypophosphatemia?2/2 Primary Hyperparathyroidism  Patient reports improvement in symptoms of fatigue  Patient continues to have elevated serum Ca of 11.7  Serum Ca of 13.1 on admission; Phos of 2.1  Given IV?LR 1 L as bolus and then kept on IV /hr  ENT on board; planning for Parathyroidectomy tomorrow for which patient will remain NPO  Agree with IV NS for volume expansion  May use Calcitonin at 4 IU/kg q6hrs/q4hrs as needed in case of worsening of calcium levels  No need for use of Zoledronic Acid for now  Will F/U post-surgery; repeat PTH post-parathyroidectomy  ?  Osteoporosis  Patient was on Alendronate 70 mg qweek at home  Resume once patient has had surgery prior to DC  ?  I saw the patient with the resident and discussed the case, assisted with the development of the assessment and agree with the plan of care in as much as pt has moderate hypercalcemia from primary hyperparathyroidism now on her second hospitalization for the same.? Has comorbid vit d deficiency and on alendronate per hx for her hyperpara not osteoporosis.? Doesnt recall having had a DXA.? S/p fluid resuscitation by primary team will stabilization of the calcium situation.? Also w/ MNG w/ none meeting FNA criteria.? ENT consulted and plans parathyroidectomy tomorrow.? Agree with operation.? Pt has post op f/u in Endo clinic in 4 months.? As outpt, can sort DXA and whether to continue alendronate or discontinue as well as f/u on normalization of vit d and MNG.  Deepak Salas MD   Problem List/Past Medical History  Ongoing  No qualifying data  Historical  HTN (hypertension)  Procedure/Surgical History  None   Medications  Inpatient  amlodipine 5 mg oral tablet, 5 mg= 1 tab(s), Oral, Daily  buPROPion 24 hour extended release, 150 mg= 1 tab(s), Oral, Daily  carvedilol 12.5 mg oral tablet, 12.5 mg= 1 tab(s), Oral, BID  cloniDINE 0.1 mg oral tablet, 0.1 mg= 1 tab(s), Oral, BID  hydrALAZINE (Apresoline) Inj., 10 mg= 0.5 mL, IV  Push, q2hr, PRN  IVF Normal Saline NS Infusion 1,000 mL, 1000 mL, IV  Lactated Ringers 1000ml 1,000 mL, 1000 mL, IV  losartan 50 mg oral tablet, 50 mg= 1 tab(s), Oral, Daily  Lovenox, 40 mg= 0.4 mL, Subcutaneous, Daily  metoprolol succinate 25 mg oral tablet extended release, 25 mg= 1 tab(s), Oral, Daily  nicotine 7 mg/24 hr transdermal film, extended release, 7 mg= 1 patch(es), TD, Daily  potassium chloride 20 mEq oral powder, 40 mEq= 2 packet(s), Oral, q4hr  sertraline, 25 mg= 0.5 tab(s), Oral, Daily  Zofran, 4 mg= 2 mL, IV Push, q4hr, PRN  Home  alendronate 70 mg oral tablet, 70 mg= 1 tab(s), Oral, qWeek,? ?Not Taking, Completed Rx  amLODIPine 5 mg oral tablet, 5 mg= 1 tab(s), Oral, Daily  buPROPion 150 mg/24 hours (XL) oral tablet, extended release, 150 mg= 1 tab(s), Oral, Daily  carvedilol 12.5 mg oral tablet, 12.5 mg= 1 tab(s), Oral, BID  carvedilol 25 mg oral tablet, 25 mg= 1 tab(s), Oral, BID  cloniDINE 0.1 mg oral tablet, 0.1 mg= 1 tab(s), Oral, BID, 1 refills  ergocalciferol 50,000 intl units (1.25 mg) oral capsule, 87298 IntUnit= 1 cap(s), Oral, qWeek, 2 refills  ergocalciferol 50,000 intl units (1.25 mg) oral capsule, 90032 IntUnit= 1 cap(s), Oral, 2x/Wk, 1 refills  losartan 50 mg oral tablet, 50 mg= 1 tab(s), Oral, Daily  metoprolol succinate 25 mg oral tablet extended release, 25 mg= 1 tab(s), Oral, Daily  Norvasc 10 mg oral tablet, 10 mg= 1 tab(s), Oral, Daily, 2 refills  promethazine 25 mg oral tablet, 25 mg= 1 tab(s), Oral, q4hr, PRN  sertraline 25 mg oral tablet, 25 mg= 1 tab(s), Oral, Daily  Allergies  No Known Allergies  Social History  Abuse/Neglect  No, No, Yes, 02/21/2022  Alcohol - Low Risk, 08/16/2015  Current, Beer, Wine, Liquor, 1-2 times per month, 05/27/2021  Current, 09/17/2020  Employment/School  Unemployed, 10/20/2021  Home/Environment  Lives with Children, Significant other., 10/20/2021  Lives with Father., 10/20/2021  Nutrition/Health  Regular,  10/20/2021  Spiritual/Cultural  Yarsani, 10/20/2021  Substance Use - Denies Substance Abuse, 08/16/2015  Never, 09/17/2020  Tobacco  10 or more cigarettes (1/2 pack or more)/day in last 30 days, No, 02/21/2022  Family History  Hypertension.: Mother.  Immunizations  Vaccine Date Status   COVID-19 MRNA, LNP-S, PF- Pfizer 08/25/2021 Recorded   COVID-19 MRNA, LNP-S, PF- Pfizer 08/04/2021 Recorded

## 2022-05-20 NOTE — PLAN OF CARE
LTG: To increase expressive/receptive language skills to enhance functional communication to express basic wants and needs with 100% effectiveness. ST. Answer personal yes/no questions with 90% accuracy. 2. Answer simple yes/no questions with 90% accuracy. 3. Complete automatic speech tasks with moderate cueing. 4. Name objects with 70% accuracy. 5. Complete simple phrases with moderate cueing.

## 2022-05-20 NOTE — ASSESSMENT & PLAN NOTE
Stroke workup and Plan  CTA head and neck and CUS pending  Mri brain: Left KENISHA territory infarct   ECHO: BS negative; EF: 74%  LDL: 144  A1c: 14.6     Continue ASA and statin, could consider MARIO/LINQ if CUS negative       Antithrombotics for secondary stroke prevention: Antiplatelets: Aspirin: 81 mg daily    Statins for secondary stroke prevention and hyperlipidemia, if present:   Statins: Atorvastatin- 40 mg daily    Aggressive risk factor modification: HTN, Smoking, DM     Rehab efforts: The patient has been evaluated by a stroke team provider and the therapy needs have been fully considered based off the presenting complaints and exam findings. The following therapy evaluations are needed: PM&R evaluate for appropriate placement    Diagnostics ordered/pending: Carotid ultrasound to assess vasculature, CT scan of head without contrast to asses brain parenchyma, CTA Neck/Arch to assess vasculature, HgbA1C to assess blood glucose levels, Lipid Profile to assess cholesterol levels, MRA head to assess vasculature, MRA neck/arch to assess vasculature, MRI head without contrast to assess brain parenchyma, TTE to assess cardiac function/status     VTE prophylaxis: Mechanical prophylaxis: Place SCDs    BP parameters: SBP less than 140

## 2022-05-20 NOTE — PROGRESS NOTES
[facility] - 9th Floor Med Surg  Neurology  Progress Note    Patient Name: Sammie Belcher  MRN: [id number]  Admission Date: 5/18/2022  Hospital Length of Stay: 2 days  Code Status: Full Code   Attending Provider: Stacia Jovel MD  Primary Care Physician: Primary Doctor No   Principal Problem:<principal problem not specified>    HPI:   52 year old female presented to [facility] on 5/18 for change in mental status and difficulty getting words out x 3 days. At [facility] showed a left frontal cortical based hypodensity in the left KENISHA vascular territory possible acute to subacute infarct. MRI confirms L frontal lobe acute infarct extending into corpus callosum, as well as old lacunar infarcts. Pt was transferred to [facility] for Neurology care and stroke work-up.      Today, her  is at bedside, reports she has a speech impediment but her speech is worse than her baseline, RLE weakness.      PMH: HTN, anxiety/depression, hyperparathyroidism, EtOH abuse, Marijuana abuse      Overview/Hospital Course:  No notes on file        Subjective:     Interval History: no new events overnight,  at bedside     Current Neurological Medications: medications reviewed    Current Facility-Administered Medications   Medication Dose Route Frequency Provider Last Rate Last Admin    amLODIPine tablet 5 mg  5 mg Oral Daily Veena Chung, FNP   5 mg at 05/20/22 0935    aspirin EC tablet 81 mg  81 mg Oral Daily Veena Chung, FNP   81 mg at 05/20/22 0935    atorvastatin tablet 40 mg  40 mg Oral Daily Veena Chung, FNP   40 mg at 05/20/22 0935    buPROPion TB24 tablet 150 mg  150 mg Oral Daily Veena Chung, FNP   150 mg at 05/20/22 0935    carvediloL tablet 12.5 mg  12.5 mg Oral BID Brayan Trotter MD   12.5 mg at 05/20/22 0935    cloNIDine tablet 0.2 mg  0.2 mg Oral TID PRN GARY LiveP   0.2 mg at 05/19/22 0314    hydrALAZINE injection 10 mg  10 mg Intravenous Q4H PRN Brayan Trotter MD   10 mg  at 05/20/22 0528    labetaloL injection 10 mg  10 mg Intravenous Q4H PRN Veena Chung, FNP        multivitamin tablet  1 tablet Oral Daily Brayan Trotter MD   1 tablet at 05/20/22 0935    nicotine 21 mg/24 hr 1 patch  1 patch Transdermal Daily Veena Chung, FNP   1 patch at 05/20/22 0935    ondansetron injection 4 mg  4 mg Intravenous QID PRN Veena Chnug, FNP   4 mg at 05/19/22 0949    sertraline tablet 25 mg  25 mg Oral Daily Veena Chung, FNP   25 mg at 05/20/22 0935    sodium chloride 0.9% flush 10 mL  10 mL Intravenous PRN Veena Chung, FNP        sodium chloride 0.9% flush 10 mL  10 mL Intravenous PRN Veena Chung, FNP        thiamine tablet 100 mg  100 mg Oral Daily Brayan Trotter MD   100 mg at 05/20/22 0935       Review of Systems   Unable to perform ROS: Acuity of condition   Objective:     Vital Signs (Most Recent):  Temp: 97.7 °F (36.5 °C) (05/20/22 0700)  Pulse: 92 (05/20/22 0700)  Resp: 18 (05/20/22 0700)  BP: (!) 148/89 (05/20/22 0700)  SpO2: (!) 94 % (05/20/22 0700)   Vital Signs (24h Range):  Temp:  [97.5 °F (36.4 °C)-98.5 °F (36.9 °C)] 97.7 °F (36.5 °C)  Pulse:  [68-92] 92  Resp:  [16-19] 18  SpO2:  [94 %-98 %] 94 %  BP: (125-163)/(81-94) 148/89     Weight: 65 kg (143 lb 4.8 oz)  Body mass index is 18.99 kg/m².    Physical Exam  Eyes:      Extraocular Movements: EOM normal.      Pupils: Pupils are equal, round, and reactive to light.       NEUROLOGICAL EXAMINATION:     MENTAL STATUS   Speech: (Oriented to self, expressive aphasia, some receptive aphasia, slowed responses )  Level of consciousness: alert    CRANIAL NERVES     CN II   Visual fields full to confrontation.     CN III, IV, VI   Pupils are equal, round, and reactive to light.  Extraocular motions are normal.     CN V   Facial sensation intact.     CN XII   CN XII normal.     MOTOR EXAM   Right arm pronator drift: absent       5/5 LUE, LLE   3/5 RUE, 1/5 RLE      SENSORY EXAM   Light touch normal.      Significant Labs:   Recent Lab Results  (Last 5 results in the past 24 hours)        05/20/22  0523   05/20/22  0405   05/19/22  2117   05/19/22  1553   05/19/22  1248        Albumin/Globulin Ratio   1.4             Albumin   3.6             Alkaline Phosphatase   71             ALT   7             AST   12             Baso #   0.04             Basophil %   0.4             BILIRUBIN TOTAL   0.6             BUN   9.0             Calcium   9.1             Chlamydia trachomatis PCR       Not Detected         Chloride   107             CO2   23             Creatinine   0.80             eGFR if    >60             Eos #   0.30             Eosinophil %   3.3             Globulin, Total   2.5             Glucose   96             Hematocrit   43.1             Hemoglobin   13.8             Hepatitis C Ab         Nonreactive       Hepatitis B Surface Antigen         Nonreactive       Immature Grans (Abs)   0.03             Immature Granulocytes   0.3             Lymph #   2.52             LYMPH %   27.7             MCH   30.6             MCHC   32.0             MCV   95.6             Mono #   0.63             Mono %   6.9             MPV   11.2             N. gonorrhea PCR       Not Detected         Neut #   5.6             Neut %   61.4             nRBC   0.0             Platelets   253             POCT Glucose 94     109           Potassium   3.5             PROTEIN TOTAL   6.1             RBC   4.51             RDW   14.6             Sodium   140             WBC   9.1                                    Significant Imaging: I have reviewed all pertinent imaging results/findings within the past 24 hours.    Assessment and Plan:     CVA (cerebral vascular accident)  Stroke workup and Plan  CTA head and neck and CUS pending  Mri brain: Left KENISHA territory infarct   ECHO: BS negative; EF: 74%  LDL: 144  A1c: 14.6     Continue ASA and statin, could consider MARIO/LINQ if CUS negative       Antithrombotics for  secondary stroke prevention: Antiplatelets: Aspirin: 81 mg daily    Statins for secondary stroke prevention and hyperlipidemia, if present:   Statins: Atorvastatin- 40 mg daily    Aggressive risk factor modification: HTN, Smoking, DM     Rehab efforts: The patient has been evaluated by a stroke team provider and the therapy needs have been fully considered based off the presenting complaints and exam findings. The following therapy evaluations are needed: PM&R evaluate for appropriate placement    Diagnostics ordered/pending: Carotid ultrasound to assess vasculature, CT scan of head without contrast to asses brain parenchyma, CTA Neck/Arch to assess vasculature, HgbA1C to assess blood glucose levels, Lipid Profile to assess cholesterol levels, MRA head to assess vasculature, MRA neck/arch to assess vasculature, MRI head without contrast to assess brain parenchyma, TTE to assess cardiac function/status     VTE prophylaxis: Mechanical prophylaxis: Place SCDs    BP parameters: SBP less than 140         Further recommendations to follow from MD     VTE Risk Mitigation (From admission, onward)         Ordered     IP VTE LOW RISK PATIENT  Once         05/18/22 1601     Place sequential compression device  Until discontinued         05/18/22 1601                Marleny Robles NP  Neurology  [facility] - 9th Floor Med Surg    Neurologist Addendum/Attestation    I have interviewed and examined the patient, reviewed images and reports, and reviewed the Nurse Practitioner's consultation note.     Still aphasic with mild R hparesis     Images and imaging reports reviewed.  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

## 2022-05-20 NOTE — PT/OT/SLP PROGRESS
Physical Therapy         Treatment        Sammie Belcher   MRN: [id number]     PT Received On: 05/20/22  PT Start Time: 1359     PT Stop Time: 1424    PT Total Time (min): 25 min       Billable Minutes:  Gait [id number] and Therapeutic Exercise 15  Total Minutes: 25       PT/PTA: PTA     PTA Visit Number: 1       General Precautions: Standard, aphasia (BP<220/120)  Orthopedic Precautions:     Braces:      Spiritual, Cultural Beliefs, Church Practices, Values that Affect Care: no      Objective:  Patient found in bed upon entry, with family in room.    Functional Mobility:  Bed Mobility:   Supine to sit: Minimal Assistance   Sit to supine: Minimal Assistance   Rolling: Minimal Assistance   Scooting: Minimal Assistance    Gait Training:  Patient gait trained  46  feet on level tile with Rolling Walker with min/mod A. Pt presents with decreased HELADIO step length and required assistance for swing through of RLE. .Impairments contributing to gait deviations include impaired balance, impaired coordination, impaired motor control and decreased strength      Additional Treatment:    Strengthening/Endurance/weight bearing   R foot placed further back than L foot for increased weight bearing during sit-to-stand. Pt performed sit-to-stand 2 X 5 reps from bedside chair.    Therapeutic Exercises   RLE: hip flexion, LAQ, hip abd/add x 12 reps.       Activity Tolerance:  Patient tolerated treatment well    Patient left HOB elevated with call button in reach and family present.    Assessment:  Sammie Belcher is a 52 y.o. female with a medical diagnosis of CVA  .   Rehab potential is excellent.    Activity tolerance: Excellent    Discharge recommendations: Discharge Facility/Level of Care Needs: rehabilitation facility     Equipment recommendations:       GOALS:   Multidisciplinary Problems     Physical Therapy Goals        Problem: Physical Therapy    Goal Priority Disciplines Outcome Goal Variances Interventions   Physical  Therapy Goal     PT, PT/OT Ongoing, Progressing     Description: Goals to be met by: 2022     Patient will increase functional independence with mobility by performin. Supine to sit with Stand-by Assistance  2. Sit to supine with Stand-by Assistance  3. Sit to stand transfer with Stand-by Assistance  4. Gait  x 300 feet with Contact Guard Assistance using Rolling Walker.   5. RLE strength to improve to 4/5 MMT.  6. LLE strength to improve to 5/5 MMT.                     PLAN:    Patient to be seen daily  to address the above listed problems via gait training, therapeutic activities, therapeutic exercises  Plan of Care expires: 22  Plan of Care reviewed with: patient, family         2022

## 2022-05-20 NOTE — HISTORICAL [facility]
This is a historical note converted from [place]. Formatting and pictures may have been removed.  Please reference Harry for original formatting and attached multimedia. Chief Complaint  Pt had lab workand saw PCP ?this morning and was called to come back in due to elevated Calcium levels.  Reason for Consultation  Hyperparathyroidism  History of Present Illness  Patient is a 52-year-old female with?past medical history of hypertension who presents to the [facility] ER on 2/21?with hypercalcemia.? Patient was previously referred to endocrine clinic for?possible?primary hyperparathyroidism, hypercalcemia.? A sestamibi scan was negative?at that time.? She has been admitted to?an outside hospital?last fall for hypercalcemia symptoms.? More recently in?January 2022 she had a 4D CT scan?which showed a possible left?parathyroid adenoma.? She was seen in endocrine clinic yesterday on 2/21?for new labs were ordered. ?PTH was found to be 400 calcium 13.1. ?She was admitted yesterday?to the medicine team for?IV hydration. ?EKG at the time?was stable.? Patient reports?dysphagia with some choking as well as?muscle pains.  Negative flu and Covid?on 2/21.  Review of Systems  General: denies fatigue, unintentional weight loss, fever or chills  Eyes: denies vision change  Ears: denies change in hearing, otorrhea, otalgia, tinnitus, or vertigo  Nose: denies rhinorrhea, nasal congestion, sneezing  Mouth: denies sore throat  Neck: denies new neck mass or lymphadenopathy  Respiratory: denies cough, dyspnea  Cardiovascular: denies chest pain  GI: denies dysphagia, or nausea  Skin: denies rash or changing skin lesion  Endocrine: denies heat or cold intolerance  Neurologic: denies headache, syncope, motor or sensory deficit  Psych: denies anxiety or depression  Physical Exam  Vitals & Measurements  T:?36.7? ?C (Oral)? TMIN:?36.4? ?C (Oral)? TMAX:?36.7? ?C (Oral)? HR:?70(Peripheral)? RR:?18? BP:?156/91? SpO2:?98%? WT:?66.25?kg?  BMI:?19.36?  General: Awake, alert and oriented. No acute distress  Head: Normocephalic and atraumatic  Eyes: Pupils are equal and reactive to light and accommodation. EOMI  Ears: EACS clear, TMs intact, no effusions  Nose: External examination reveals no abnormalities. Septum midline and no sites of nasal obstruction.  Oral cavity/Oropharynx: Floor of mouth is soft. No posterior pharyngeal erythema.  Neck: Soft and supple. No palpable adenopathy or other neck mass.  Chest: Symmetric excursion bilaterally. There is no respiratory distress.  Cardiovascular: 2+ radial pulses bilaterally. Regular rate and rhythm.  Integument: No rashes or other skin lesions visualized.  Neurologic: Cranial nerves II - XII are intact.  Assessment/Plan  Abnormal diagnostic test?[id number]  Hypercalcemia?E83.52  52-year-old female with past medical history of hypertension?as well as?primary hyperparathyroidism?with?left parathyroid adenoma. ?Admitted?for the second time inpatient for hypercalcemia?overnight. ?Discussed with patient?what is entailed in parathyroidectomy surgery including all risk?benefit and alternative therapy.? Also discussed with patient that it is very rare for us to consider?to do surgery on an inpatient in this setting however?if there is operative time available tomorrow?and in the light of her?previous admissions to the hospital for?hypercalcemia?we could consider an inpatient surgery.  ?  --Consent signed today with patient  --Discussed with?the OR staff and there is time available to perform parathyroidectomy on 2/23  --N.p.o. at midnight, hold Lovenox  --ENT will continue to follow  ?  Meghan Parmar MD  HO-V?  ?  ?   Patients history and physical exam were reviewed with the resident. ?Agree with findings and proposed plan.  ?   Kenton Estes M.D.   Problem List/Past Medical History  Ongoing  No qualifying data  Historical  HTN (hypertension)  Procedure/Surgical History  None    Medications  Inpatient  amlodipine 5 mg oral tablet, 5 mg= 1 tab(s), Oral, Daily  buPROPion 24 hour extended release, 150 mg= 1 tab(s), Oral, Daily  carvedilol 12.5 mg oral tablet, 12.5 mg= 1 tab(s), Oral, BID  cloniDINE 0.1 mg oral tablet, 0.1 mg= 1 tab(s), Oral, BID  hydrALAZINE (Apresoline) Inj., 10 mg= 0.5 mL, IV Push, q2hr, PRN  IVF Normal Saline NS Infusion 1,000 mL, 1000 mL, IV  Lactated Ringers 1000ml 1,000 mL, 1000 mL, IV  losartan 50 mg oral tablet, 50 mg= 1 tab(s), Oral, Daily  Lovenox, 40 mg= 0.4 mL, Subcutaneous, Daily  metoprolol succinate 25 mg oral tablet extended release, 25 mg= 1 tab(s), Oral, Daily  nicotine 7 mg/24 hr transdermal film, extended release, 7 mg= 1 patch(es), TD, Daily  sertraline, 25 mg= 0.5 tab(s), Oral, Daily  Zofran, 4 mg= 2 mL, IV Push, q4hr, PRN  Home  alendronate 70 mg oral tablet, 70 mg= 1 tab(s), Oral, qWeek,? ?Not Taking, Completed Rx  amLODIPine 5 mg oral tablet, 5 mg= 1 tab(s), Oral, Daily  buPROPion 150 mg/24 hours (XL) oral tablet, extended release, 150 mg= 1 tab(s), Oral, Daily  carvedilol 12.5 mg oral tablet, 12.5 mg= 1 tab(s), Oral, BID  carvedilol 25 mg oral tablet, 25 mg= 1 tab(s), Oral, BID  cloniDINE 0.1 mg oral tablet, 0.1 mg= 1 tab(s), Oral, BID, 1 refills  ergocalciferol 50,000 intl units (1.25 mg) oral capsule, 16951 IntUnit= 1 cap(s), Oral, qWeek, 2 refills  ergocalciferol 50,000 intl units (1.25 mg) oral capsule, 96940 IntUnit= 1 cap(s), Oral, 2x/Wk, 1 refills  losartan 50 mg oral tablet, 50 mg= 1 tab(s), Oral, Daily  metoprolol succinate 25 mg oral tablet extended release, 25 mg= 1 tab(s), Oral, Daily  Norvasc 10 mg oral tablet, 10 mg= 1 tab(s), Oral, Daily, 2 refills  promethazine 25 mg oral tablet, 25 mg= 1 tab(s), Oral, q4hr, PRN  sertraline 25 mg oral tablet, 25 mg= 1 tab(s), Oral, Daily  Allergies  No Known Allergies  Social History  Abuse/Neglect  No, No, Yes, 02/21/2022  Alcohol - Low Risk, 08/16/2015  Current, Beer, Wine, Liquor, 1-2 times per  month, 05/27/2021  Current, 09/17/2020  Employment/School  Unemployed, 10/20/2021  Home/Environment  Lives with Children, Significant other., 10/20/2021  Lives with Father., 10/20/2021  Nutrition/Health  Regular, 10/20/2021  Spiritual/Cultural  Jainism, 10/20/2021  Substance Use - Denies Substance Abuse, 08/16/2015  Never, 09/17/2020  Tobacco  10 or more cigarettes (1/2 pack or more)/day in last 30 days, No, 02/21/2022  Family History  Hypertension.: Mother.  Immunizations  Vaccine Date Status   COVID-19 MRNA, LNP-S, PF- Pfizer 08/25/2021 Recorded   COVID-19 MRNA, LNP-S, PF- Pfizer 08/04/2021 Recorded   Diagnostic Results  NM parathyroid on 11/30/2021 IMPRESSION:??No parathyroid adenoma with sestamibi scan identified.? CT?of the parathyroid 1/31/2022. IMPRESSION???1. Suspected left lower parathyroid adenoma demonstrate venous washout, as detailed above.???2. Enhancing, venous washout structure posterior to the left mid thyroid pole is nonspecific and could represent normal parathyroid appearance, although possibility of multiglandular disease is not entirely excluded.???3. No definite abnormal focus of hyperenhancing or suspicious venous washout consistent with abnormal right parathyroid.???4. Multiple subcentimeter thyroid nodules, relatively unchanged from the November 2021 sonographic appearance. [1]     [1]?Office Visit Note; Romi Mcmahan NP 02/21/2022 08:10 CST

## 2022-05-20 NOTE — HPI
52 year old female presented to [facility] on 5/18 for change in mental status and difficulty getting words out x 3 days. At [facility] showed a left frontal cortical based hypodensity in the left KENISHA vascular territory possible acute to subacute infarct. MRI confirms L frontal lobe acute infarct extending into corpus callosum, as well as old lacunar infarcts. Pt was transferred to [facility] for Neurology care and stroke work-up.      Today, her  is at bedside, reports she has a speech impediment but her speech is worse than her baseline, RLE weakness.      PMH: HTN, anxiety/depression, hyperparathyroidism, EtOH abuse, Marijuana abuse

## 2022-05-20 NOTE — PT/OT/SLP EVAL
Speech Language Pathology Evaluation  Cognitive Communication    Patient Name:  Sammie Belcher   MRN:  [id number]  Admitting Diagnosis: <principal problem not specified>    Recommendations:     Recommendations:                General Recommendations:  Speech/language therapy  Diet recommendations:   (Easy to Chew), Thin   Aspiration Precautions: Standard aspiration precautions   General Precautions: Standard, aspiration  Communication strategies:  none    History:     Past Medical History:   Diagnosis Date    Hypertension     Thyroid disease        Past Surgical History:   Procedure Laterality Date    THYROIDECTOMY      TUBAL LIGATION             Subjective     Pt awake/alert.    Objective:        Speech Intelligibility  Known Context: Less than 25%  Unknown Context: Less than 25%      AUDITORY COMPREHENSION  Identification:   Body part 25%    Following Directions:   1-Step: 100   2-Step: 80    Yes/No Questions:   Biographical: 28   Environmental: 0   Simple: 0   Complex: 0      VERBAL EXPRESSION  Automatic Speech:   Functional needs: 20   Days of the week: 0   Countin-5    Repetition:   Phonemes: 100   Single syllable words: 25%    Phrase Completion:        Confrontation Naming   Body Parts: 0   Objects: 0    Wh- Questions:   Object name: 0   Object function: 0            Assessment:   Sammie Belcher is a 52 y.o. female presenting with severe expressive and receptive language deficits. Pt able to answer simple questions but not accurate to provide any information or answer any questions regardless of complexity. Pt able to repeat single phonemes but no other repetition obtained. Skilled SLP intervention is warranted to establish a reliable communcation method.  Goals:   Multidisciplinary Problems     SLP Goals        Problem: SLP    Goal Priority Disciplines Outcome   SLP Goal     SLP    Description: LTG:   To increase expressive/receptive language skills to enhance functional  communication to express basic wants and needs with 100% effectiveness.   ST. Answer personal yes/no questions with 90% accuracy.   2. Answer simple yes/no questions with 90% accuracy.   3. Complete automatic speech tasks with moderate cueing.   4. Name objects with 70% accuracy.   5. Complete simple phrases with moderate cueing.                      Plan:   · Patient to be seen:      · Plan of Care expires:     · Plan of Care reviewed with:  patient, sibling   · SLP Follow-Up:          Discharge recommendations:  Discharge Facility/Level of Care Needs: nursing facility, skilled, rehabilitation facility   Barriers to Discharge:  Level of Skilled Assistance Needed   and Safety Awareness      Time Tracking:   SLP Treatment Date:      Speech Start Time:     Speech Stop Time:        Speech Total Time (min):       Billable Minutes: Paul       2022

## 2022-05-20 NOTE — PROGRESS NOTES
[facility] Medicine Progress Note        Chief Complaint: Inpatient Follow-up for     HPI:   Ms. Belcher is a 52-year-old female with past medical history of HTN, anxiety, depression, and hypothyroidism who presents to [facility] ED with complaints of dysarthria and expressive aphasia x3 days.  Patient's family member states that she has been having difficulty speaking for the past 3 days however today was worse so she was brought to the ED for further evaluation. Her sister at bedside states that she is unsure if she has been compliant with her home medications.  She does smoke marijuana daily with her significant other.  And drinks about a six-pack of beer every day.  No history of stroke.  She is not on aspirin.  Sister at bedside reports that she has had abnormal behavior over the past 3 days and has been having episodes of bladder and bowel incontinence and is very restless/fidgety.  No reported history of falls or back pain or any saddle anesthesia.  Patient is oriented x4 but is an extremely poor historian and it is very difficult to get a history from her.  She does report left-sided weakness and paresthesias x2 days     Upon arrival to outside facility the patient was markedly hypertensive but otherwise hemodynamically stable and afebrile. EKG: NSR with LVH. Labs unremarkable except for a reactive RPR, patient denies high risk sexual behavior, states unprotected sexual intercourse with significant other but she is in a monogamous relationships. CT Head showed left frontal 6 cm cortical base hypodensity suggesting an acute to subacute nonhemorrhagic infarct in the left KENISHA vascular territory. MRI Brain was then obtained confirming the left frontal lobe extending into the corpus callosum with an acute nonhemorrhagic infarct in the territory of the anterior cerebral artery, and old lacunar infarcts. She is being transferred to [facility] for further CVA workup.    Interval Hx:    Seen and examined patient afebrile vitals stable hemodynamically stable.  She has expressive aphasia with right-sided weakness,   No changing in the status.     Objective/physical exam:  GENERAL: awake, alert, oriented and in no acute distress, non-toxic appearing   HEENT: normocephalic atraumatic   NECK: supple   LUNGS: Clear bilaterally, no wheezing or rales, no accessory muscle use   CVS: Regular rate and rhythm, normal peripheral perfusion  ABD: Soft, non-tender, non-distended, bowel sounds present  EXTREMITIES: no clubbing or cyanosis  SKIN: Warm, dry.   NEURO: Motor strength RUE 4/5, LUE 5/5; RLE 4/5, LLE 5/5, expressive aphasia and slurred speech,+ paresthesia to right face, RUE and RLE. Gait not assessed.   PSYCHIATRIC: Cooperative, restless in room    VITAL SIGNS: 24 HRS MIN & MAX LAST   Temp  Min: 97.5 °F (36.4 °C)  Max: 98.5 °F (36.9 °C) 97.7 °F (36.5 °C)   BP  Min: 146/93  Max: 163/94 (!) 148/89   Pulse  Min: 68  Max: 92  92   Resp  Min: 16  Max: 18 18   SpO2  Min: 94 %  Max: 98 % (!) 94 %       Recent Labs   Lab 05/18/22  1157 05/19/22  0509 05/20/22  0405   WBC 12.2* 10.0 9.1   RBC 5.04 4.78 4.51   HGB 15.6 14.6 13.8   HCT 46.5 44.6 43.1   MCV 92.3 93.3 95.6*   MCH 31.0 30.5 30.6   MCHC 33.5 32.7* 32.0*   RDW 14.1 14.3 14.6    276 253   MPV 11.2 11.0 11.2       Recent Labs   Lab 05/18/22  1202 05/19/22  0509 05/20/22  0405    141 140   K 3.6 3.4* 3.5   CO2 22 23 23   BUN 13.3 10.7 9.0*   CREATININE 0.97 0.79 0.80   CALCIUM 9.4 8.5 9.1   MG  --  2.00  --    ALBUMIN 4.2 3.7 3.6   ALKPHOS 84 77 71   ALT 7 8 7   AST 18 13 12   BILITOT 0.8 0.7 0.6          Microbiology Results (last 7 days)     Procedure Component Value Units Date/Time    Chlamydia/GC, PCR [[id number]]  (Normal) Collected: 05/19/22 3263    Order Status: Completed Specimen: Urine Updated: 05/19/22 1712     Chlamydia trachomatis PCR Not Detected     N. gonorrhea PCR Not Detected           See below for Radiology    Scheduled  Med:   amLODIPine  5 mg Oral Daily    aspirin  81 mg Oral Daily    atorvastatin  40 mg Oral Daily    buPROPion  150 mg Oral Daily    carvediloL  12.5 mg Oral BID    multivitamin  1 tablet Oral Daily    nicotine  1 patch Transdermal Daily    sertraline  25 mg Oral Daily    thiamine  100 mg Oral Daily        Continuous Infusions:       PRN Meds:  cloNIDine, hydrALAZINE, labetalol, ondansetron, sodium chloride 0.9%, sodium chloride 0.9%       Assessment/Plan:  Acute Non-hemorrhagic CVA - left KENISHA vascular territory  Dysarthria and Expressive aphasia 2/2 above  HTN Urgency; Hx Essential HTN (uncontrolled)  HLD, new onset  Abnormal Behavior with reports of bladder and bowel incontinence  Reactive RPR  Trichomonas   Hypothyroidism  THC Use Disorder   Alcohol Use Disorder        PLAN:   MRA showed moderate severe multifocal flow signal abnormality about the bilateral ACAs bilateral distal MCAs and to lesser extent bilateral PCAs,   - MRI showed a frontal lobe extending into the corpus callosum acute nonhemorrhagic infarct  frontal cerebral artery or lacunar infarcts chronic microangiopathic ischemia and atrophy.   -vascular neurology suggested possibility of syphilis involvement in the stroke.  Discussed with neurology today removal of the plan of LP, if she has lymphocytic pleocytosis and elevated protein level most likely suggest no surface and we will proceed with treatment.  -We will order INR PT PTT as stat.   - patient drinks 6+ beer every day and also some liquor,   Last drink was 2 days ago however she does not show any signs of withdrawal.   - shows normal sinus rhythm she is on continuous telemetry.   - neurology is onboard, appreciate their recommendations.   - syphilis positive, will get STI screen neisseria and chlamydia NAAT.   - was penicillin G2 point 4,000,000 units IM for syphilis.   - echo is EF 74% with severe concentric hypertrophy and hyperdynamic systolic function with negative  bubble study.   - aspirin and atorvastatin- Check FTA-ABS, if positive will consult ID  - Flagyl 2gm x1 dose. Pt instructed to let significant other know, that he can be treated.  - Banana Bag x1. MVI and Thiamine daily. CIWA Q6H.          Chilo Lea MD   05/20/2022

## 2022-05-20 NOTE — HISTORICAL [facility] CERNER
This is a historical note converted from Harry. Formatting and pictures may have been removed.  Please reference Harry for original formatting and attached multimedia. Chief Complaint  Pt had lab workand saw PCP ?this morning and was called to come back in due to elevated Calcium levels.  History of Present Illness  51yo F with Hx of Hypercalcemia presented to the ED for evaluation of a critical lab. ?Patient was seen in endocrinology clinic earlier today, she has been?worked up for?chronic hypercalcemia?for the last?few months. ?Patient?had labs?drawn this afternoon?that showed a calcium of 13.1,?previous calcium?12.8?in?11/2021.? Patient reports?some mild?muscle weakness, however?reports?that this is not elevated from baseline.? Patient denies any?bone or joint pain,?abdominal pain,?dysuria, hematuria,?flank pain,?or?changes in mental status.? Of note patient recently had Nuclear medicine parathyroid imaging that showed no parathyroid adenoma.? Patient also had a 4D CT parathyroid that showed a suspected left lower parathyroid adenoma and multiple subcentimeter thyroid nodules that have been unchanged since 11/2021.? Patient has been referred to ENT, however has not been seen yet.  ?  In the ED vital signs were within normal limits.? Labs were significant for calcium 13.1, all other labs were unremarkable.? Patient was given a 1 L bolus of LR.? Internal medicine was consulted for admission secondary to moderate hypercalcemia.  ?  Review of Systems  Constitutional:?no fever or?fatigue  ENMT:?no sore throat, ear pain, sinus pain/congestion, nasal congestion/drainage  Respiratory:?no cough, no wheezing, no shortness of breath  Cardiovascular:?no chest pain, no palpitations, no edema  Gastrointestinal:?no nausea, vomiting, or diarrhea. No abdominal pain  Genitourinary:?no dysuria, no urinary frequency or urgency, no hematuria  Hema/Lymph:?no abnormal bruising or bleeding  Endocrine:?no heat or cold intolerance, no  excessive thirst or excessive urination  Musculoskeletal:?no joint pain, no joint swelling  Integumentary:?no skin rash or abnormal lesion  Neurologic: occasional headaches, occasional dizziness,?+ weakness  Physical Exam  Vitals & Measurements  T:?36.7? ?C (Oral)? HR:?69(Peripheral)? RR:?14? BP:?190/106? SpO2:?96%? WT:?66.25?kg?  General:?well-developed well-nourished in no acute distress  Eye: PERRLA, EOMI, clear conjunctiva, eyelids normal  HENT:?oropharynx without erythema/exudate, oropharynx and nasal mucosal surfaces moist  Neck: full range of motion, no thyromegaly or lymphadenopathy  Respiratory:?clear to auscultation bilaterally, non labored respirations  Cardiovascular:?regular rate and rhythm, 2+ peripheral pulses, no edema  Gastrointestinal:?soft, non-tender, non-distended with normal bowel sounds, without masses to palpation  Genitourinary: no CVA tenderness to palpation  Musculoskeletal:?full range of motion of all extremities/spine without limitation or discomfort  Integumentary: no rashes or skin lesions present  ?   Labs  Labs Last 24 Hours?  ?Chemistry? Hematology/Coagulation?   Sodium Lvl: 136 mmol/L (02/21/22 15:51:00) WBC: 8.5 x10(3)/mcL (02/21/22 15:51:00)   Potassium Lvl: 3.5 mmol/L (02/21/22 15:51:00) RBC: 4.77 x10(6)/mcL (02/21/22 15:51:00)   Chloride: 105 mmol/L (02/21/22 15:51:00) Hgb: 14.9 gm/dL (02/21/22 15:51:00)   CO2: 29 mmol/L (02/21/22 15:51:00) Hct: 44.4 % (02/21/22 15:51:00)   Calcium Lvl:?13.1 mg/dL?Critical (02/21/22 15:51:00) Platelet: 208 x10(3)/mcL (02/21/22 15:51:00)   Glucose Lvl:?115 mg/dL?High (02/21/22 15:51:00) MCV: 93.1 fL (02/21/22 15:51:00)   BUN: 11.2 mg/dL (02/21/22 15:51:00) MCH: 31.2 pg (02/21/22 15:51:00)   Creatinine: 0.83 mg/dL (02/21/22 15:51:00) MCHC: 33.6 gm/dL (02/21/22 15:51:00)   Est Creat Clearance Ser: 93.9 mL/min (02/21/22 16:29:58) RDW: 13.1 % (02/21/22 15:51:00)   eGFR-AA: 93 (02/21/22 15:51:00) MPV:?10.6 fL?High (02/21/22 15:51:00)   eGFR-PHILOMENA:?77  mL/min/1.73 m2?Low (02/21/22 15:51:00) Abs Neut: 3.72 x10(3)/mcL (02/21/22 15:51:00)   Bili Total: 0.4 mg/dL (02/21/22 15:51:00) Neutro Auto: 44 % (02/21/22 15:51:00)   Bili Direct: 0.2 mg/dL (02/21/22 15:51:00) Lymph Auto: 44 % (02/21/22 15:51:00)   Bili Indirect: 0.2 mg/dL (02/21/22 15:51:00) Mono Auto: 6 % (02/21/22 15:51:00)   AST: 10 unit/L (02/21/22 15:51:00) Eos Auto: 6 % (02/21/22 15:51:00)   ALT: 8 unit/L (02/21/22 15:51:00) Abs Eos: 0.5 x10(3)/mcL (02/21/22 15:51:00)   Alk Phos: 93 unit/L (02/21/22 15:51:00) Basophil Auto: 1 % (02/21/22 15:51:00)   Total Protein: 7.3 gm/dL (02/21/22 15:51:00) Abs Neutro: 3.72 x10(3)/mcL (02/21/22 15:51:00)   Albumin Lvl: 4 gm/dL (02/21/22 15:51:00) Abs Lymph: 3.7 x10(3)/mcL (02/21/22 15:51:00)   Globulin: 3.3 gm/dL (02/21/22 15:51:00) Abs Mono: 0.5 x10(3)/mcL (02/21/22 15:51:00)   A/G Ratio: 1.2 ratio (02/21/22 15:51:00) Abs Baso: 0.1 x10(3)/mcL (02/21/22 15:51:00)   ? NRBC%: 0.0 (02/21/22 15:51:00)   ?  ?  Assessment/Plan  Chronic Hypercalcemia  Possible Parathyroid Adenoma  Patient has HX of chronic hypercalcemia, last calcium level 12.8 (11/2021)  Patient?follows with endocrine?clinic,?has had extensive?work-up?for?hypercalcemia including?intact .3, 24-hour urine calcium 158, 24-hour urine creatinine 670, vitamin D 14.5  Calcium 13.1 on admission, repeat in ED also 13.1, albumin?4.0,?so corrected calcium?remains 13.1  Recent sestamibi scan showed no parathyroid adenoma, 4D CT parathyroid scan showed a possible adenoma  Patient has been referred to ENT, however has not seen ENT yet  Will consult ENT eval patient is?inpatient?for initial evaluation  Normal saline?at?125 cc/hr, patients?calcium level?not in severe range and patient?is mildly symptomatic, does not currently require?aggressive fluid resuscitation?or?bisphosphonate treatment at this time  Repeat?CMP in the AM  ?  HTN  Blood pressure?became elevated?after several hours in the ED  We will continue home  blood pressure medications  Continue to monitor?blood pressure,?will add?as needed blood pressure medication?for severe range pressures  ?  ?  Consults: ENT  Lines: PIV  Nutrition: Regular  Fluids: NS at 125 cc/hr  DVT Prophylaxis: Lovenox  GI Prophylaxis: None  ?  ?   Disposition: Patient with?chronic?hypercalcemia,?will fluid resuscitate?overnight?and recheck?CMP in the morning.? Patient was referred to ENT outpatient, however has not seen ENT as yet, will?consult ENT while inpatient for initial evaluation.  ?   Karen Coppola MD  [employer] [facility]? PGy-2   Problem List/Past Medical History  Historical  HTN (hypertension)  Medications  Inpatient  amlodipine 5 mg oral tablet, 5 mg= 1 tab(s), Oral, Daily  buPROPion 24 hour extended release, 150 mg= 1 tab(s), Oral, Daily  carvedilol 12.5 mg oral tablet, 12.5 mg= 1 tab(s), Oral, BID  cloniDINE 0.1 mg oral tablet, 0.1 mg= 1 tab(s), Oral, BID  IVF Normal Saline NS Infusion 1,000 mL, 1000 mL, IV  Lactated Ringers 1000ml 1,000 mL, 1000 mL, IV  losartan 50 mg oral tablet, 50 mg= 1 tab(s), Oral, Daily  Lovenox, 40 mg= 0.4 mL, Subcutaneous, Daily  metoprolol succinate 25 mg oral tablet extended release, 25 mg= 1 tab(s), Oral, Daily  sertraline, 25 mg= 0.5 tab(s), Oral, Daily  Zofran, 4 mg= 2 mL, IV Push, q4hr, PRN  Home  alendronate 70 mg oral tablet, 70 mg= 1 tab(s), Oral, qWeek,? ?Not Taking, Completed Rx  amLODIPine 5 mg oral tablet, 5 mg= 1 tab(s), Oral, Daily  buPROPion 150 mg/24 hours (XL) oral tablet, extended release, 150 mg= 1 tab(s), Oral, Daily  carvedilol 12.5 mg oral tablet, 12.5 mg= 1 tab(s), Oral, BID  carvedilol 25 mg oral tablet, 25 mg= 1 tab(s), Oral, BID  cloniDINE 0.1 mg oral tablet, 0.1 mg= 1 tab(s), Oral, BID, 1 refills  ergocalciferol 50,000 intl units (1.25 mg) oral capsule, 89817 IntUnit= 1 cap(s), Oral, qWeek, 2 refills  ergocalciferol 50,000 intl units (1.25 mg) oral capsule, 38928 IntUnit= 1 cap(s), Oral, 2x/Wk, 1 refills  losartan 50 mg oral tablet, 50 mg= 1  tab(s), Oral, Daily  metoprolol succinate 25 mg oral tablet extended release, 25 mg= 1 tab(s), Oral, Daily  Norvasc 10 mg oral tablet, 10 mg= 1 tab(s), Oral, Daily, 2 refills  promethazine 25 mg oral tablet, 25 mg= 1 tab(s), Oral, q4hr, PRN  sertraline 25 mg oral tablet, 25 mg= 1 tab(s), Oral, Daily  Social History  Abuse/Neglect  No, No, Yes, 02/21/2022  Alcohol - Low Risk, 08/16/2015  Current, Beer, Wine, Liquor, 1-2 times per month, 05/27/2021  Current, 09/17/2020  Employment/School  Unemployed, 10/20/2021  Home/Environment  Lives with Children, Significant other., 10/20/2021  Lives with Father., 10/20/2021  Nutrition/Health  Regular, 10/20/2021  Spiritual/Cultural  Buddhist, 10/20/2021  Substance Use - Denies Substance Abuse, 08/16/2015  Never, 09/17/2020  Tobacco  10 or more cigarettes (1/2 pack or more)/day in last 30 days, No, 02/21/2022  Family History  Hypertension.: Mother.  Immunizations  Vaccine Date Status   COVID-19 MRNA, LNP-S, PF- Pfizer 08/25/2021 Recorded   COVID-19 MRNA, LNP-S, PF- Pfizer 08/04/2021 Recorded       Treatment plan?was discussed with the team on rounds this morning. Plan of care, as outlined above, is reasonable and appropriate.  This is a 52-year-old female with a history of hypertension and primary hyperparathyroidism who was admitted for hypercalcemia.  Patient was seen and examined on 2/22. On physical exam, regular rate and rhythm and clear to auscultation bilaterally. ?Bowel sounds present.  Continue fluids at this time. ?Calcium level is?improved greatly with fluids.? ENT is on board and we appreciate their assistance.? There is a plan for?parathyroidectomy on 2/23/2022  ?  ?   Leanna Walsh, DO?

## 2022-05-20 NOTE — SUBJECTIVE & OBJECTIVE
Subjective:     Interval History: no new events overnight,  at bedside     Current Neurological Medications: medications reviewed    Current Facility-Administered Medications   Medication Dose Route Frequency Provider Last Rate Last Admin    amLODIPine tablet 5 mg  5 mg Oral Daily Veena Chung FNP   5 mg at 05/20/22 0935    aspirin EC tablet 81 mg  81 mg Oral Daily Veena Chung, FNP   81 mg at 05/20/22 0935    atorvastatin tablet 40 mg  40 mg Oral Daily Veena Chung FNP   40 mg at 05/20/22 0935    buPROPion TB24 tablet 150 mg  150 mg Oral Daily Veena Chung, FNP   150 mg at 05/20/22 0935    carvediloL tablet 12.5 mg  12.5 mg Oral BID Brayan Trotter MD   12.5 mg at 05/20/22 0935    cloNIDine tablet 0.2 mg  0.2 mg Oral TID PRN GARY LiveP   0.2 mg at 05/19/22 0314    hydrALAZINE injection 10 mg  10 mg Intravenous Q4H PRN Brayan Trotter MD   10 mg at 05/20/22 0528    labetaloL injection 10 mg  10 mg Intravenous Q4H PRN SIRISHA Live        multivitamin tablet  1 tablet Oral Daily Brayan Trotter MD   1 tablet at 05/20/22 0935    nicotine 21 mg/24 hr 1 patch  1 patch Transdermal Daily SIRISHA Live   1 patch at 05/20/22 0935    ondansetron injection 4 mg  4 mg Intravenous QID PRN GARY LiveP   4 mg at 05/19/22 0949    sertraline tablet 25 mg  25 mg Oral Daily Veena Chung, FNP   25 mg at 05/20/22 0935    sodium chloride 0.9% flush 10 mL  10 mL Intravenous PRN Veena Chung, FNP        sodium chloride 0.9% flush 10 mL  10 mL Intravenous PRN Veena Chung, SIRISHA        thiamine tablet 100 mg  100 mg Oral Daily Brayan Trotter MD   100 mg at 05/20/22 0935       Review of Systems   Unable to perform ROS: Acuity of condition   Objective:     Vital Signs (Most Recent):  Temp: 97.7 °F (36.5 °C) (05/20/22 0700)  Pulse: 92 (05/20/22 0700)  Resp: 18 (05/20/22 0700)  BP: (!) 148/89 (05/20/22 0700)  SpO2: (!) 94 % (05/20/22 0700)   Vital Signs (24h Range):  Temp:   [97.5 °F (36.4 °C)-98.5 °F (36.9 °C)] 97.7 °F (36.5 °C)  Pulse:  [68-92] 92  Resp:  [16-19] 18  SpO2:  [94 %-98 %] 94 %  BP: (125-163)/(81-94) 148/89     Weight: 65 kg (143 lb 4.8 oz)  Body mass index is 18.99 kg/m².    Physical Exam  Eyes:      Extraocular Movements: EOM normal.      Pupils: Pupils are equal, round, and reactive to light.       NEUROLOGICAL EXAMINATION:     MENTAL STATUS   Speech: (Oriented to self, expressive aphasia, some receptive aphasia, slowed responses )  Level of consciousness: alert    CRANIAL NERVES     CN II   Visual fields full to confrontation.     CN III, IV, VI   Pupils are equal, round, and reactive to light.  Extraocular motions are normal.     CN V   Facial sensation intact.     CN XII   CN XII normal.     MOTOR EXAM   Right arm pronator drift: absent       5/5 LUE, LLE   3/5 RUE, 1/5 RLE      SENSORY EXAM   Light touch normal.     Significant Labs:   Recent Lab Results  (Last 5 results in the past 24 hours)        05/20/22  0523   05/20/22  0405   05/19/22  2117   05/19/22  1553   05/19/22  1248        Albumin/Globulin Ratio   1.4             Albumin   3.6             Alkaline Phosphatase   71             ALT   7             AST   12             Baso #   0.04             Basophil %   0.4             BILIRUBIN TOTAL   0.6             BUN   9.0             Calcium   9.1             Chlamydia trachomatis PCR       Not Detected         Chloride   107             CO2   23             Creatinine   0.80             eGFR if    >60             Eos #   0.30             Eosinophil %   3.3             Globulin, Total   2.5             Glucose   96             Hematocrit   43.1             Hemoglobin   13.8             Hepatitis C Ab         Nonreactive       Hepatitis B Surface Antigen         Nonreactive       Immature Grans (Abs)   0.03             Immature Granulocytes   0.3             Lymph #   2.52             LYMPH %   27.7             MCH   30.6             MCHC    32.0             MCV   95.6             Mono #   0.63             Mono %   6.9             MPV   11.2             N. gonorrhea PCR       Not Detected         Neut #   5.6             Neut %   61.4             nRBC   0.0             Platelets   253             POCT Glucose 94     109           Potassium   3.5             PROTEIN TOTAL   6.1             RBC   4.51             RDW   14.6             Sodium   140             WBC   9.1                                    Significant Imaging: I have reviewed all pertinent imaging results/findings within the past 24 hours.

## 2022-05-21 LAB
ALBUMIN SERPL-MCNC: 3.6 GM/DL (ref 3.5–5)
ALBUMIN/GLOB SERPL: 1.2 RATIO (ref 1.1–2)
ALP SERPL-CCNC: 73 UNIT/L (ref 40–150)
ALT SERPL-CCNC: 6 UNIT/L (ref 0–55)
APPEARANCE CSF: ABNORMAL
AST SERPL-CCNC: 14 UNIT/L (ref 5–34)
BASOPHILS # BLD AUTO: 0.05 X10(3)/MCL (ref 0–0.2)
BASOPHILS NFR BLD AUTO: 0.5 %
BILIRUBIN DIRECT+TOT PNL SERPL-MCNC: 0.5 MG/DL
BUN SERPL-MCNC: 8.9 MG/DL (ref 9.8–20.1)
CALCIUM SERPL-MCNC: 9 MG/DL (ref 8.4–10.2)
CHLORIDE SERPL-SCNC: 107 MMOL/L (ref 98–107)
CO2 SERPL-SCNC: 21 MMOL/L (ref 22–29)
COLOR CSF: ABNORMAL
CREAT SERPL-MCNC: 0.77 MG/DL (ref 0.55–1.02)
DIFF LYMPH % CSF (OHS): 65 %
DIFF POLY % CSF (OHS): 35 %
EOSINOPHIL # BLD AUTO: 0.25 X10(3)/MCL (ref 0–0.9)
EOSINOPHIL NFR BLD AUTO: 2.3 %
ERYTHROCYTE [DISTWIDTH] IN BLOOD BY AUTOMATED COUNT: 14.5 % (ref 11.5–17)
GLOBULIN SER-MCNC: 2.9 GM/DL (ref 2.4–3.5)
GLUCOSE CSF-MCNC: 57 MG/DL (ref 40–70)
GLUCOSE SERPL-MCNC: 104 MG/DL (ref 74–100)
GRAM STN SPEC: NORMAL
HCT VFR BLD AUTO: 42.4 % (ref 37–47)
HGB BLD-MCNC: 14.4 GM/DL (ref 12–16)
IMM GRANULOCYTES # BLD AUTO: 0.04 X10(3)/MCL (ref 0–0.02)
IMM GRANULOCYTES NFR BLD AUTO: 0.4 % (ref 0–0.43)
LYMPHOCYTES # BLD AUTO: 2.23 X10(3)/MCL (ref 0.6–4.6)
LYMPHOCYTES NFR BLD AUTO: 20.8 %
MCH RBC QN AUTO: 31.3 PG (ref 27–31)
MCHC RBC AUTO-ENTMCNC: 34 MG/DL (ref 33–36)
MCV RBC AUTO: 92.2 FL (ref 80–94)
MONOCYTES # BLD AUTO: 0.72 X10(3)/MCL (ref 0.1–1.3)
MONOCYTES NFR BLD AUTO: 6.7 %
NEUTROPHILS # BLD AUTO: 7.5 X10(3)/MCL (ref 2.1–9.2)
NEUTROPHILS NFR BLD AUTO: 69.3 %
NRBC BLD AUTO-RTO: 0 %
PLATELET # BLD AUTO: 237 X10(3)/MCL (ref 130–400)
PMV BLD AUTO: 11 FL (ref 9.4–12.4)
POTASSIUM SERPL-SCNC: 3.3 MMOL/L (ref 3.5–5.1)
PROT CSF-MCNC: 42.8 MG/DL (ref 15–45)
PROT SERPL-MCNC: 6.5 GM/DL (ref 6.4–8.3)
RBC # BLD AUTO: 4.6 X10(6)/MCL (ref 4.2–5.4)
RBC # CSF MANUAL: 1490 /MCL OR /MM3 (ref 0–0)
SODIUM SERPL-SCNC: 140 MMOL/L (ref 136–145)
WBC # CSF MANUAL: 5 /MM3 (ref 0–5)
WBC # SPEC AUTO: 10.7 X10(3)/MCL (ref 4.5–11.5)

## 2022-05-21 PROCEDURE — 25000003 PHARM REV CODE 250: Performed by: INTERNAL MEDICINE

## 2022-05-21 PROCEDURE — 99232 SBSQ HOSP IP/OBS MODERATE 35: CPT | Mod: ,,, | Performed by: SPECIALIST

## 2022-05-21 PROCEDURE — 25000003 PHARM REV CODE 250: Performed by: NURSE PRACTITIONER

## 2022-05-21 PROCEDURE — 99232 PR SUBSEQUENT HOSPITAL CARE,LEVL II: ICD-10-PCS | Mod: ,,, | Performed by: SPECIALIST

## 2022-05-21 PROCEDURE — 89051 BODY FLUID CELL COUNT: CPT | Performed by: INTERNAL MEDICINE

## 2022-05-21 PROCEDURE — 63600175 PHARM REV CODE 636 W HCPCS: Performed by: NURSE PRACTITIONER

## 2022-05-21 PROCEDURE — 82945 GLUCOSE OTHER FLUID: CPT | Performed by: STUDENT IN AN ORGANIZED HEALTH CARE EDUCATION/TRAINING PROGRAM

## 2022-05-21 PROCEDURE — 80053 COMPREHEN METABOLIC PANEL: CPT | Performed by: STUDENT IN AN ORGANIZED HEALTH CARE EDUCATION/TRAINING PROGRAM

## 2022-05-21 PROCEDURE — 84157 ASSAY OF PROTEIN OTHER: CPT | Performed by: STUDENT IN AN ORGANIZED HEALTH CARE EDUCATION/TRAINING PROGRAM

## 2022-05-21 PROCEDURE — 11000001 HC ACUTE MED/SURG PRIVATE ROOM

## 2022-05-21 PROCEDURE — 94761 N-INVAS EAR/PLS OXIMETRY MLT: CPT

## 2022-05-21 PROCEDURE — 87205 SMEAR GRAM STAIN: CPT | Performed by: INTERNAL MEDICINE

## 2022-05-21 PROCEDURE — S4991 NICOTINE PATCH NONLEGEND: HCPCS | Performed by: NURSE PRACTITIONER

## 2022-05-21 PROCEDURE — 36415 COLL VENOUS BLD VENIPUNCTURE: CPT | Performed by: STUDENT IN AN ORGANIZED HEALTH CARE EDUCATION/TRAINING PROGRAM

## 2022-05-21 PROCEDURE — 92526 ORAL FUNCTION THERAPY: CPT

## 2022-05-21 PROCEDURE — 63600175 PHARM REV CODE 636 W HCPCS: Performed by: INTERNAL MEDICINE

## 2022-05-21 PROCEDURE — 85025 COMPLETE CBC W/AUTO DIFF WBC: CPT | Performed by: STUDENT IN AN ORGANIZED HEALTH CARE EDUCATION/TRAINING PROGRAM

## 2022-05-21 PROCEDURE — 87070 CULTURE OTHR SPECIMN AEROBIC: CPT | Performed by: INTERNAL MEDICINE

## 2022-05-21 RX ORDER — ENALAPRILAT 1.25 MG/ML
1.25 INJECTION INTRAVENOUS EVERY 6 HOURS PRN
Status: DISCONTINUED | OUTPATIENT
Start: 2022-05-21 | End: 2022-06-02 | Stop reason: HOSPADM

## 2022-05-21 RX ORDER — ENALAPRILAT 1.25 MG/ML
1.25 INJECTION INTRAVENOUS EVERY 6 HOURS
Status: DISCONTINUED | OUTPATIENT
Start: 2022-05-21 | End: 2022-05-21

## 2022-05-21 RX ADMIN — HYDRALAZINE HYDROCHLORIDE 10 MG: 20 INJECTION INTRAMUSCULAR; INTRAVENOUS at 12:05

## 2022-05-21 RX ADMIN — ONDANSETRON 4 MG: 2 INJECTION INTRAMUSCULAR; INTRAVENOUS at 09:05

## 2022-05-21 RX ADMIN — METRONIDAZOLE 500 MG: 500 TABLET ORAL at 09:05

## 2022-05-21 RX ADMIN — CARVEDILOL 12.5 MG: 12.5 TABLET, FILM COATED ORAL at 09:05

## 2022-05-21 RX ADMIN — METRONIDAZOLE 500 MG: 500 TABLET ORAL at 02:05

## 2022-05-21 RX ADMIN — NICOTINE 1 PATCH: 21 PATCH, EXTENDED RELEASE TRANSDERMAL at 09:05

## 2022-05-21 RX ADMIN — CLONIDINE HYDROCHLORIDE 0.2 MG: 0.2 TABLET ORAL at 03:05

## 2022-05-21 RX ADMIN — CLONIDINE HYDROCHLORIDE 0.2 MG: 0.2 TABLET ORAL at 12:05

## 2022-05-21 NOTE — PROGRESS NOTES
[facility] Medicine Progress Note        Chief Complaint: Inpatient Follow-up for     HPI:   Ms. Belcher is a 52-year-old female with past medical history of HTN, anxiety, depression, and hypothyroidism who presents to [facility] ED with complaints of dysarthria and expressive aphasia x3 days.  Patient's family member states that she has been having difficulty speaking for the past 3 days however today was worse so she was brought to the ED for further evaluation. Her sister at bedside states that she is unsure if she has been compliant with her home medications.  She does smoke marijuana daily with her significant other.  And drinks about a six-pack of beer every day.  No history of stroke.  She is not on aspirin.  Sister at bedside reports that she has had abnormal behavior over the past 3 days and has been having episodes of bladder and bowel incontinence and is very restless/fidgety.  No reported history of falls or back pain or any saddle anesthesia.  Patient is oriented x4 but is an extremely poor historian and it is very difficult to get a history from her.  She does report left-sided weakness and paresthesias x2 days     Upon arrival to outside facility the patient was markedly hypertensive but otherwise hemodynamically stable and afebrile. EKG: NSR with LVH. Labs unremarkable except for a reactive RPR, patient denies high risk sexual behavior, states unprotected sexual intercourse with significant other but she is in a monogamous relationships. CT Head showed left frontal 6 cm cortical base hypodensity suggesting an acute to subacute nonhemorrhagic infarct in the left KENISHA vascular territory. MRI Brain was then obtained confirming the left frontal lobe extending into the corpus callosum with an acute nonhemorrhagic infarct in the territory of the anterior cerebral artery, and old lacunar infarcts. She is being transferred to [facility] for further CVA workup.    Interval Hx:    Seen and examined the patient, no new neurological symptoms.    Objective/physical exam:  GENERAL: awake, alert, oriented and in no acute distress, non-toxic appearing   HEENT: normocephalic atraumatic   NECK: supple   LUNGS: Clear bilaterally, no wheezing or rales, no accessory muscle use   CVS: Regular rate and rhythm, normal peripheral perfusion  ABD: Soft, non-tender, non-distended, bowel sounds present  EXTREMITIES: no clubbing or cyanosis  SKIN: Warm, dry.   NEURO: Motor strength RUE 4/5, LUE 5/5; RLE 4/5, LLE 5/5, expressive aphasia and slurred speech,+ paresthesia to right face, RUE and RLE. Gait not assessed.   PSYCHIATRIC: Cooperative, restless in room    VITAL SIGNS: 24 HRS MIN & MAX LAST   Temp  Min: 97.9 °F (36.6 °C)  Max: 98.6 °F (37 °C) 98.1 °F (36.7 °C)   BP  Min: 157/90  Max: 192/94 (!) 167/99   Pulse  Min: 75  Max: 90  82   Resp  Min: 18  Max: 20 18   SpO2  Min: 96 %  Max: 99 % 96 %       Recent Labs   Lab 05/19/22  0509 05/20/22  0405 05/21/22  0515   WBC 10.0 9.1 10.7   RBC 4.78 4.51 4.60   HGB 14.6 13.8 14.4   HCT 44.6 43.1 42.4   MCV 93.3 95.6* 92.2   MCH 30.5 30.6 31.3*   MCHC 32.7* 32.0* 34.0   RDW 14.3 14.6 14.5    253 237   MPV 11.0 11.2 11.0       Recent Labs   Lab 05/19/22  0509 05/20/22  0405 05/21/22  0515    140 140   K 3.4* 3.5 3.3*   CO2 23 23 21*   BUN 10.7 9.0* 8.9*   CREATININE 0.79 0.80 0.77   CALCIUM 8.5 9.1 9.0   MG 2.00  --   --    ALBUMIN 3.7 3.6 3.6   ALKPHOS 77 71 73   ALT 8 7 6   AST 13 12 14   BILITOT 0.7 0.6 0.5          Microbiology Results (last 7 days)     Procedure Component Value Units Date/Time    Cerebrospinal Fluid Culture [facility] [[id number]] Collected: 05/21/22 1132    Order Status: Sent Specimen: CSF (Spinal Fluid) Updated: 05/21/22 1132    Gram stain [[id number]] Collected: 05/21/22 1052    Order Status: Sent Specimen: CSF (Spinal Fluid) Updated: 05/21/22 1124    CSF culture [[id number]] Collected: 05/21/22 1052    Order Status: Canceled Specimen: CSF  (Spinal Fluid) Updated: 05/21/22 1124    Chlamydia/GC, PCR [[id number]]  (Normal) Collected: 05/19/22 1553    Order Status: Completed Specimen: Urine Updated: 05/19/22 1747     Chlamydia trachomatis PCR Not Detected     N. gonorrhea PCR Not Detected           See below for Radiology    Scheduled Med:   amLODIPine  5 mg Oral Daily    aspirin  81 mg Oral Daily    atorvastatin  40 mg Oral Daily    buPROPion  150 mg Oral Daily    carvediloL  12.5 mg Oral BID    metroNIDAZOLE  500 mg Oral Q8H    multivitamin  1 tablet Oral Daily    nicotine  1 patch Transdermal Daily    sertraline  25 mg Oral Daily    thiamine  100 mg Oral Daily        Continuous Infusions:       PRN Meds:  cloNIDine, enalaprilat, hydrALAZINE, labetalol, ondansetron, sodium chloride 0.9%, sodium chloride 0.9%       Assessment/Plan:  Acute Non-hemorrhagic CVA - left KENISHA vascular territory  Dysarthria and Expressive aphasia 2/2 above  HTN Urgency; Hx Essential HTN (uncontrolled)  HLD, new onset  Abnormal Behavior with reports of bladder and bowel incontinence  Reactive RPR  Trichomonas   Hypothyroidism  THC Use Disorder   Alcohol Use Disorder        PLAN:   - pending LP today.  Id was consulted appreciate recommendations on possible neurosyphilis.   MRA showed moderate severe multifocal flow signal abnormality about the bilateral ACAs bilateral distal MCAs and to lesser extent bilateral PCAs,   - MRI showed a frontal lobe extending into the corpus callosum acute nonhemorrhagic infarct  frontal cerebral artery or lacunar infarcts chronic microangiopathic ischemia and atrophy.   -vascular neurology suggested possibility of syphilis involvement in the stroke.  Discussed with neurology today removal of the plan of LP, if she has lymphocytic pleocytosis and elevated protein level most likely suggest no surface and we will proceed with treatment.  -We will order INR PT PTT as stat.   - patient drinks 6+ beer every day and also some liquor,    Last drink was 2 days ago however she does not show any signs of withdrawal.   - shows normal sinus rhythm she is on continuous telemetry.   - neurology is onboard, appreciate their recommendations.   - syphilis positive, will get STI screen neisseria and chlamydia NAAT.   - was penicillin G2 point 4,000,000 units IM for syphilis.   - echo is EF 74% with severe concentric hypertrophy and hyperdynamic systolic function with negative bubble study.   - aspirin and atorvastatin  - Flagyl 2gm x1 dose. Pt instructed to let significant other know, that he can be treated.  - Banana Bag x1. MVI and Thiamine daily. CIWA Q6H.          Chilo Lea MD   05/21/2022

## 2022-05-21 NOTE — SUBJECTIVE & OBJECTIVE
Subjective:     Interval History:   Long term partner at bedside, they are not legally .  He reports she is about the same, remains with aphasia, although he reports she did have speech difficulties prior to hospital admission.  Remains with left sided weakness, although she was able to help with assistance to sit on the side of the bed.  No new issues. Awaiting possible LP today.     Current Neurological Medications:     Current Facility-Administered Medications   Medication Dose Route Frequency Provider Last Rate Last Admin    amLODIPine tablet 5 mg  5 mg Oral Daily GARY LiveP   5 mg at 05/20/22 0935    aspirin EC tablet 81 mg  81 mg Oral Daily GARY LiveP   81 mg at 05/20/22 0935    atorvastatin tablet 40 mg  40 mg Oral Daily SIRISHA Live   40 mg at 05/20/22 0935    buPROPion TB24 tablet 150 mg  150 mg Oral Daily GARY LiveP   150 mg at 05/20/22 0935    carvediloL tablet 12.5 mg  12.5 mg Oral BID Brayan Trotter MD   12.5 mg at 05/20/22 2031    cloNIDine tablet 0.2 mg  0.2 mg Oral TID PRN SIRISHA Live   0.2 mg at 05/21/22 0034    enalaprilat injection 1.25 mg  1.25 mg Intravenous Q6H PRN SIRISHA Live        hydrALAZINE injection 10 mg  10 mg Intravenous Q4H PRN Brayan Trotter MD   10 mg at 05/21/22 0000    labetaloL injection 10 mg  10 mg Intravenous Q4H PRN SIRISHA Live        metroNIDAZOLE tablet 500 mg  500 mg Oral Q8H Marcelina Nguyen MD   500 mg at 05/20/22 2145    multivitamin tablet  1 tablet Oral Daily Brayan Trotter MD   1 tablet at 05/20/22 0935    nicotine 21 mg/24 hr 1 patch  1 patch Transdermal Daily SIRISHA Live   1 patch at 05/21/22 0927    ondansetron injection 4 mg  4 mg Intravenous QID PRN SIRISHA Live   4 mg at 05/19/22 0949    sertraline tablet 25 mg  25 mg Oral Daily GARY LiveP   25 mg at 05/20/22 0935    sodium chloride 0.9% flush 10 mL  10 mL Intravenous PRN Veena Chung, FNP        sodium  chloride 0.9% flush 10 mL  10 mL Intravenous PRN SIRISHA Live        thiamine tablet 100 mg  100 mg Oral Daily Brayan Trotter MD   100 mg at 05/20/22 0935       Review of Systems   Unable to perform ROS: Acuity of condition   Objective:     Vital Signs (Most Recent):  Temp: 98.1 °F (36.7 °C) (05/21/22 0717)  Pulse: 82 (05/21/22 0717)  Resp: 18 (05/21/22 0717)  BP: (!) 167/99 (05/21/22 0717)  SpO2: 96 % (05/21/22 0717)   Vital Signs (24h Range):  Temp:  [97.9 °F (36.6 °C)-98.6 °F (37 °C)] 98.1 °F (36.7 °C)  Pulse:  [75-90] 82  Resp:  [18-20] 18  SpO2:  [96 %-99 %] 96 %  BP: (157-192)/() 167/99     Weight: 65 kg (143 lb 4.8 oz)  Body mass index is 18.99 kg/m².    Physical Exam  Vitals reviewed.   Constitutional:       General: She is awake. She is not in acute distress.  Eyes:      Extraocular Movements: EOM normal.      Pupils: Pupils are equal, round, and reactive to light.   Neurological:      Mental Status: She is alert.       NEUROLOGICAL EXAMINATION:     MENTAL STATUS   Speech: mute (Nonverbal during exam, attempts to make sounds and stutters but she does not form words during the exam, receptive and expressive aphasia )       Limited PE 2/2 aphasia      CRANIAL NERVES     CN II   Visual fields full to confrontation.     CN III, IV, VI   Pupils are equal, round, and reactive to light.  Extraocular motions are normal.   Right pupil: Shape: regular.   Left pupil: Shape: regular.     CN VII   Right facial weakness: none  Left facial weakness: none    MOTOR EXAM   Right arm tone: normal  Left arm tone: normal  Right leg tone: normal  Left leg tone: normal       4/5 LUE, 3/5 LLE  5/5 RUE, RLE     Significant Labs:   Recent Lab Results         05/21/22  0515   05/20/22  1553   05/20/22  1144        Albumin/Globulin Ratio 1.2           Albumin 3.6           Alkaline Phosphatase 73           ALT 6           aPTT   30.5  Comment: For Minimal Heparin Infusion, the goal aPTT 64-85 seconds corresponds to  an anti-Xa of 0.3-0.5.    For Low Intensity and High Intensity Heparin, the goal aPTT  seconds corresponds to an anti-Xa of 0.3-0.7         AST 14           Baso # 0.05           Basophil % 0.5           BILIRUBIN TOTAL 0.5           BUN 8.9           Calcium 9.0           Chloride 107           CO2 21           Creatinine 0.77           eGFR if  >60           Eos # 0.25           Eosinophil % 2.3           Globulin, Total 2.9           Glucose 104           Hematocrit 42.4           Hemoglobin 14.4           Immature Grans (Abs) 0.04           Immature Granulocytes 0.4           INR   1.11         Lymph # 2.23           LYMPH % 20.8           MCH 31.3           MCHC 34.0           MCV 92.2           Mono # 0.72           Mono % 6.7           MPV 11.0           Neut # 7.5           Neut % 69.3           nRBC 0.0           Platelets 237           POCT Glucose     116       Potassium 3.3           PROTEIN TOTAL 6.5           Protime   14.0         RBC 4.60           RDW 14.5           Sodium 140           WBC 10.7                   Significant Imaging:   CTA Neck:    1. Moderate narrowing of the left common carotid artery.  2. Moderate to severe narrowing at the origin of the left vertebral artery.

## 2022-05-21 NOTE — PLAN OF CARE
Problem: Infection  Goal: Absence of Infection Signs and Symptoms  Outcome: Ongoing, Progressing     Problem: Cerebral Tissue Perfusion (Stroke, Ischemic/Transient Ischemic Attack)  Goal: Optimal Cerebral Tissue Perfusion  Outcome: Ongoing, Progressing     Problem: Cognitive Impairment (Stroke, Ischemic/Transient Ischemic Attack)  Goal: Optimal Cognitive Function  Outcome: Ongoing, Progressing     Problem: Communication Impairment (Stroke, Ischemic/Transient Ischemic Attack)  Goal: Improved Communication Skills  Outcome: Ongoing, Progressing     Problem: Functional Ability Impaired (Stroke, Ischemic/Transient Ischemic Attack)  Goal: Optimal Functional Ability  Outcome: Ongoing, Progressing     Problem: Sensorimotor Impairment (Stroke, Ischemic/Transient Ischemic Attack)  Goal: Improved Sensorimotor Function  Outcome: Ongoing, Progressing     Problem: Swallowing Impairment (Stroke, Ischemic/Transient Ischemic Attack)  Goal: Optimal Eating and Swallowing without Aspiration  Outcome: Ongoing, Progressing     Problem: Urinary Elimination Impaired (Stroke, Ischemic/Transient Ischemic Attack)  Goal: Effective Urinary Elimination  Outcome: Ongoing, Progressing     Problem: Adult Inpatient Plan of Care  Goal: Patient-Specific Goal (Individualized)  Outcome: Ongoing, Progressing  Goal: Optimal Comfort and Wellbeing  Outcome: Ongoing, Progressing  Goal: Readiness for Transition of Care  Outcome: Ongoing, Progressing

## 2022-05-21 NOTE — PT/OT/SLP PROGRESS
Speech Language Pathology Treatment    Patient Name:  Sammie Belcher   MRN:  [id number]  Admitting Diagnosis: <principal problem not specified>    Recommendations:                 General Recommendations:  GI evaluation, Dysphagia therapy and Speech/language therapy  Diet recommendations:  Other (see comments) (Easy to chew), Liquid Diet Level: Thin   Aspiration Precautions: Alternating bites/sips, Assistance with meals, Check for pocketing/oral residue, Meds crushed in puree and Small bites/sips   General Precautions: Standard, aspiration  Communication strategies:  none    Subjective     Patient awake/alert. RN reports regurgitation of medications; requesting SLP follow up regarding patient's swallowing. Recommended easy to chew/thin liquid diet upon initial bedside swallow evaluation.    Pain/Comfort:  · Pain Rating 1: 0/10    Respiratory Status: Room air    Objective:     Has the patient been evaluated by SLP for swallowing?   Yes  Keep patient NPO? No     Patient tolerated PO trials of thin liquids via cup rim and straw, puree, and solids without clinical signs/symptoms of aspiration. RN present at the end of treatment. Discussed re-initiating easy to chew diet and proposed GI referral for complaints of vomiting/regurgitation of medications. RN verbalized good understanding.    Assessment:     Sammie Belcher is a 52 y.o. female presenting with severe expressive and receptive language deficits. Appropriate for easy to chew diet and thin liquids. Skilled SLP intervention is warranted to establish a reliable communication method.    Goals:   Multidisciplinary Problems     SLP Goals        Problem: SLP    Goal Priority Disciplines Outcome   SLP Goal     SLP    Description: LTG:   To increase expressive/receptive language skills to enhance functional communication to express basic wants and needs with 100% effectiveness.   ST. Answer personal yes/no questions with 90% accuracy.   2. Answer simple yes/no  questions with 90% accuracy.   3. Complete automatic speech tasks with moderate cueing.   4. Name objects with 70% accuracy.   5. Complete simple phrases with moderate cueing.                      Plan:     · Patient to be seen:  5 x/week   · Plan of Care reviewed with:  patient   · SLP Follow-Up:  Yes       Discharge recommendations:  rehabilitation facility, nursing facility, skilled   Barriers to Discharge:  Impaired communication, decreased safety awareness    Time Tracking:     SLP Treatment Date:   05/21/22  Speech Start Time:  1015  Speech Stop Time:  1030     Speech Total Time (min):  15 min    Billable Minutes: Treatment Swallowing Dysfunction 15 minutes    05/21/2022

## 2022-05-21 NOTE — ASSESSMENT & PLAN NOTE
Stroke workup and Plan  CUS pending  Mri brain: Left KENISHA territory infarct   ECHO: BS negative; EF: 74%  LDL: 144  A1c: 5.2  CTA neck: 1. Moderate narrowing of the left common carotid artery. 2. Moderate to severe narrowing at the origin of the left vertebral artery.    Continue ASA and statin, could consider MARIO/LINQ if CUS negative   CUS has been completed, but there is not a reading, CUS completed on 5/18      Antithrombotics for secondary stroke prevention: Antiplatelets: Aspirin: 81 mg daily    Statins for secondary stroke prevention and hyperlipidemia, if present:   Statins: Atorvastatin- 40 mg daily    Aggressive risk factor modification: HTN, Smoking, DM     Rehab efforts: The patient has been evaluated by a stroke team provider and the therapy needs have been fully considered based off the presenting complaints and exam findings. The following therapy evaluations are needed: PM&R evaluate for appropriate placement    Diagnostics ordered/pending: Carotid ultrasound to assess vasculature, CT scan of head without contrast to asses brain parenchyma, CTA Neck/Arch to assess vasculature, HgbA1C to assess blood glucose levels, Lipid Profile to assess cholesterol levels, MRA head to assess vasculature, MRA neck/arch to assess vasculature, MRI head without contrast to assess brain parenchyma, TTE to assess cardiac function/status     VTE prophylaxis: Mechanical prophylaxis: Place SCDs    BP parameters: SBP less than 140

## 2022-05-21 NOTE — PROGRESS NOTES
[facility] 9th Floor Med Surg  Neurology  Progress Note    Patient Name: Sammie Belcher  MRN: [id number]  Admission Date: 5/18/2022  Hospital Length of Stay: 3 days  Code Status: Full Code   Attending Provider: Stacia Jovel MD  Primary Care Physician: Primary Doctor No   Principal Problem:<principal problem not specified>        Subjective:     Interval History:   Long term partner at bedside, they are not legally .  He reports she is about the same, remains with aphasia, although he reports she did have speech difficulties prior to hospital admission.  Remains with right sided weakness, although she was able to help with assistance to sit on the side of the bed.  No new issues. Awaiting possible LP today.     Current Neurological Medications:     Current Facility-Administered Medications   Medication Dose Route Frequency Provider Last Rate Last Admin    amLODIPine tablet 5 mg  5 mg Oral Daily GARY LiveP   5 mg at 05/20/22 0935    aspirin EC tablet 81 mg  81 mg Oral Daily GARY LiveP   81 mg at 05/20/22 0935    atorvastatin tablet 40 mg  40 mg Oral Daily GARY LiveP   40 mg at 05/20/22 0935    buPROPion TB24 tablet 150 mg  150 mg Oral Daily GARY LiveP   150 mg at 05/20/22 0935    carvediloL tablet 12.5 mg  12.5 mg Oral BID Brayan Trotter MD   12.5 mg at 05/20/22 2031    cloNIDine tablet 0.2 mg  0.2 mg Oral TID PRN SIRISHA Live   0.2 mg at 05/21/22 0034    enalaprilat injection 1.25 mg  1.25 mg Intravenous Q6H PRN SIRISHA Live        hydrALAZINE injection 10 mg  10 mg Intravenous Q4H PRN Brayan Trotter MD   10 mg at 05/21/22 0000    labetaloL injection 10 mg  10 mg Intravenous Q4H PRN SIRISHA Live        metroNIDAZOLE tablet 500 mg  500 mg Oral Q8H Marcelina Nguyen MD   500 mg at 05/20/22 2145    multivitamin tablet  1 tablet Oral Daily Brayan Trotter MD   1 tablet at 05/20/22 0935    nicotine 21 mg/24 hr 1  patch  1 patch Transdermal Daily SIRISHA Live   1 patch at 05/21/22 0927    ondansetron injection 4 mg  4 mg Intravenous QID PRN GARY LiveP   4 mg at 05/19/22 0949    sertraline tablet 25 mg  25 mg Oral Daily Veena Chung, FNP   25 mg at 05/20/22 0935    sodium chloride 0.9% flush 10 mL  10 mL Intravenous PRN Veena Chung, FNP        sodium chloride 0.9% flush 10 mL  10 mL Intravenous PRN Veena Chung, FNP        thiamine tablet 100 mg  100 mg Oral Daily Brayan Trotter MD   100 mg at 05/20/22 0935       Review of Systems   Unable to perform ROS: Acuity of condition   Objective:     Vital Signs (Most Recent):  Temp: 98.1 °F (36.7 °C) (05/21/22 0717)  Pulse: 82 (05/21/22 0717)  Resp: 18 (05/21/22 0717)  BP: (!) 167/99 (05/21/22 0717)  SpO2: 96 % (05/21/22 0717)   Vital Signs (24h Range):  Temp:  [97.9 °F (36.6 °C)-98.6 °F (37 °C)] 98.1 °F (36.7 °C)  Pulse:  [75-90] 82  Resp:  [18-20] 18  SpO2:  [96 %-99 %] 96 %  BP: (157-192)/() 167/99     Weight: 65 kg (143 lb 4.8 oz)  Body mass index is 18.99 kg/m².    Physical Exam  Vitals reviewed.   Constitutional:       General: She is awake. She is not in acute distress.  Eyes:      Extraocular Movements: EOM normal.      Pupils: Pupils are equal, round, and reactive to light.   Neurological:      Mental Status: She is alert.       NEUROLOGICAL EXAMINATION:     MENTAL STATUS   Speech: mute (Nonverbal during exam, attempts to make sounds and stutters but she does not form words during the exam, receptive and expressive aphasia )       Limited PE 2/2 aphasia      CRANIAL NERVES     CN II   Visual fields full to confrontation.     CN III, IV, VI   Pupils are equal, round, and reactive to light.  Extraocular motions are normal.   Right pupil: Shape: regular.   Left pupil: Shape: regular.     CN VII   Right facial weakness: none  Left facial weakness: none    MOTOR EXAM   Right arm tone: normal  Left arm tone: normal  Right leg tone: normal  Left  leg tone: normal       4/5 RUE, 3/5 RLE  5/5 LUE, LLE     Significant Labs:   Recent Lab Results         05/21/22  0515   05/20/22  1553   05/20/22  1144        Albumin/Globulin Ratio 1.2           Albumin 3.6           Alkaline Phosphatase 73           ALT 6           aPTT   30.5  Comment: For Minimal Heparin Infusion, the goal aPTT 64-85 seconds corresponds to an anti-Xa of 0.3-0.5.    For Low Intensity and High Intensity Heparin, the goal aPTT  seconds corresponds to an anti-Xa of 0.3-0.7         AST 14           Baso # 0.05           Basophil % 0.5           BILIRUBIN TOTAL 0.5           BUN 8.9           Calcium 9.0           Chloride 107           CO2 21           Creatinine 0.77           eGFR if  >60           Eos # 0.25           Eosinophil % 2.3           Globulin, Total 2.9           Glucose 104           Hematocrit 42.4           Hemoglobin 14.4           Immature Grans (Abs) 0.04           Immature Granulocytes 0.4           INR   1.11         Lymph # 2.23           LYMPH % 20.8           MCH 31.3           MCHC 34.0           MCV 92.2           Mono # 0.72           Mono % 6.7           MPV 11.0           Neut # 7.5           Neut % 69.3           nRBC 0.0           Platelets 237           POCT Glucose     116       Potassium 3.3           PROTEIN TOTAL 6.5           Protime   14.0         RBC 4.60           RDW 14.5           Sodium 140           WBC 10.7                   Significant Imaging:   CTA Neck:    1. Moderate narrowing of the left common carotid artery.  2. Moderate to severe narrowing at the origin of the left vertebral artery.    Assessment and Plan:     CVA (cerebral vascular accident)  Stroke workup and Plan  CUS pending  Mri brain: Left KENISHA territory infarct   ECHO: BS negative; EF: 74%  LDL: 144  A1c: 5.2  CTA neck: 1. Moderate narrowing of the left common carotid artery. 2. Moderate to severe narrowing at the origin of the left vertebral artery.    Continue ASA  and statin, could consider MARIO/LINQ if CUS negative   CUS has been completed, but there is not a reading, CUS completed on 5/18      Antithrombotics for secondary stroke prevention: Antiplatelets: Aspirin: 81 mg daily    Statins for secondary stroke prevention and hyperlipidemia, if present:   Statins: Atorvastatin- 40 mg daily    Aggressive risk factor modification: HTN, Smoking, DM     Rehab efforts: The patient has been evaluated by a stroke team provider and the therapy needs have been fully considered based off the presenting complaints and exam findings. The following therapy evaluations are needed: PM&R evaluate for appropriate placement    Diagnostics ordered/pending: Carotid ultrasound to assess vasculature, CT scan of head without contrast to asses brain parenchyma, CTA Neck/Arch to assess vasculature, HgbA1C to assess blood glucose levels, Lipid Profile to assess cholesterol levels, MRA head to assess vasculature, MRA neck/arch to assess vasculature, MRI head without contrast to assess brain parenchyma, TTE to assess cardiac function/status     VTE prophylaxis: Mechanical prophylaxis: Place SCDs    BP parameters: SBP less than 140         Further recommendations to follow from MD     VTE Risk Mitigation (From admission, onward)         Ordered     IP VTE LOW RISK PATIENT  Once         05/18/22 1601     Place sequential compression device  Until discontinued         05/18/22 1601                Marleny Robles NP  Neurology  [facility] - 9th Floor Med Surg    Neurologist Addendum/Attestation    I have interviewed and examined the patient, reviewed images and reports,   and reviewed the Nurse Practitioner's   progress note.     Exam:   Cannot speak in sentences  Mild R hparesis   Long term common law husb at bedside   dw nursing       Impression:  L KENISHA iCVA  Possibility of it being vasculitic from syphilis     Plan/Recs:   ..appreciate rads doing LP for her/us  ..  _._Aim re eval tomorrow        Fede Lopez MD

## 2022-05-21 NOTE — CONSULTS
Infectious Diseases Consultation       Inpatient consult to Infectious Diseases  Consult performed by: Marcelina Nguyen MD  Consult ordered by: Chilo Lea MD        HPI:  52-year-old female with past medical history of depression and anxiety, hypothyroidism, HTN, is admitted to [facility] on 05/18/2022, brought in through the ED with complaints of dysarthria and expressive aphasia of about 3 day duration.  She was extensively evaluated and noted to have no fevers but had slight leukocytosis of 12.2.  Urine toxicology was positive for cannabis.  Urinalysis was abnormal with small leukocyte history is few bacteria, few Trichomonas and 0-2 wbc's.  She was noted to have some syphilis antibody reactive and RPR 1:1.  She is nonverbal and unable to provide any significant history and hence history obtained from her  of 4 years.  He denies any knowledge of previous syphilis diagnosis and tells me he has tested himself since finding was told of his wife's test result and was told his test was negative.  She has had extensive neuroimaging studies with MRI of the brain showing left frontal acute CVA in the territory of anterior carotid artery.  MRA of brain showed moderate to severe multifocal flow signal abnormalities.  MRA of the neck showed no large vessel occlusion or critical stenosis.  CTA of neck done today 5/20 showed moderate narrowing of left CCA moderate to severe narrowing of left vertebral artery.  She is not on any antibiotics      Past Medical and Surgical History  Allergies :   Patient has no known allergies.      Medical :   She has a past medical history of Hypertension and Thyroid disease.    Surgical :   She has a past surgical history that includes Thyroidectomy and Tubal ligation.     Family History  Family history reviewed and noncontributory  Her family history is not on file.    Social History  She reports that she has been smoking cigarettes. She uses smokeless  "tobacco. She reports current alcohol use of about 3.0 standard drinks of alcohol per week. She reports that she does not use drugs.     Review of Systems   Unable to perform ROS: Patient nonverbal       Objective   Physical Exam  Vitals reviewed.   Constitutional:       General: She is not in acute distress.     Comments:  at the bedside   HENT:      Head: Normocephalic and atraumatic.   Eyes:      Pupils: Pupils are equal, round, and reactive to light.   Cardiovascular:      Rate and Rhythm: Normal rate and regular rhythm.   Pulmonary:      Breath sounds: Normal breath sounds.   Abdominal:      General: Bowel sounds are normal. There is no distension.      Palpations: Abdomen is soft.      Tenderness: There is no abdominal tenderness.   Genitourinary:     Comments: No suprapubic tenderness  Musculoskeletal:      Cervical back: Neck supple.      Right lower leg: No edema.      Left lower leg: No edema.   Skin:     Findings: No erythema or rash.   Neurological:      Mental Status: She is alert.      Comments: Noted with right hemiparesis.  Follows commands.   Psychiatric:      Comments: Calm and cooperative       VITAL SIGNS: 24 HR MIN & MAX LAST    Temp  Min: 97.7 °F (36.5 °C)  Max: 98.6 °F (37 °C)  97.9 °F (36.6 °C)        BP  Min: 148/89  Max: 171/96  (!) 170/97     Pulse  Min: 68  Max: 92  78     Resp  Min: 16  Max: 20  20    SpO2  Min: 94 %  Max: 99 %  99 %      HT: 6' 0.84" (185 cm)  WT: 65 kg (143 lb 4.8 oz)  BMI: 19     Recent Results (from the past 24 hour(s))   POCT glucose    Collection Time: 05/19/22  9:17 PM   Result Value Ref Range    POCT Glucose 109 70 - 110 mg/dL   Comprehensive metabolic panel    Collection Time: 05/20/22  4:05 AM   Result Value Ref Range    Sodium Level 140 136 - 145 mmol/L    Potassium Level 3.5 3.5 - 5.1 mmol/L    Chloride 107 98 - 107 mmol/L    Carbon Dioxide 23 22 - 29 mmol/L    Glucose Level 96 74 - 100 mg/dL    Blood Urea Nitrogen 9.0 (L) 9.8 - 20.1 mg/dL    Creatinine " 0.80 0.55 - 1.02 mg/dL    Calcium Level Total 9.1 8.4 - 10.2 mg/dL    Protein Total 6.1 (L) 6.4 - 8.3 gm/dL    Albumin Level 3.6 3.5 - 5.0 gm/dL    Globulin 2.5 2.4 - 3.5 gm/dL    Albumin/Globulin Ratio 1.4 1.1 - 2.0 ratio    Bilirubin Total 0.6 <=1.5 mg/dL    Alkaline Phosphatase 71 40 - 150 unit/L    Alanine Aminotransferase 7 0 - 55 unit/L    Aspartate Aminotransferase 12 5 - 34 unit/L    Estimated GFR- >60 mls/min/1.73/m2   CBC with Differential    Collection Time: 05/20/22  4:05 AM   Result Value Ref Range    WBC 9.1 4.5 - 11.5 x10(3)/mcL    RBC 4.51 4.20 - 5.40 x10(6)/mcL    Hgb 13.8 12.0 - 16.0 gm/dL    Hct 43.1 37.0 - 47.0 %    MCV 95.6 (H) 80.0 - 94.0 fL    MCH 30.6 27.0 - 31.0 pg    MCHC 32.0 (L) 33.0 - 36.0 mg/dL    RDW 14.6 11.5 - 17.0 %    Platelet 253 130 - 400 x10(3)/mcL    MPV 11.2 9.4 - 12.4 fL    Neut % 61.4 %    Lymph % 27.7 %    Mono % 6.9 %    Eos % 3.3 %    Basophil % 0.4 %    Lymph # 2.52 0.6 - 4.6 x10(3)/mcL    Neut # 5.6 2.1 - 9.2 x10(3)/mcL    Mono # 0.63 0.1 - 1.3 x10(3)/mcL    Eos # 0.30 0 - 0.9 x10(3)/mcL    Baso # 0.04 0 - 0.2 x10(3)/mcL    IG# 0.03 (H) 0 - 0.0155 x10(3)/mcL    IG% 0.3 0 - 0.43 %    NRBC% 0.0 %   POCT glucose    Collection Time: 05/20/22  5:23 AM   Result Value Ref Range    POCT Glucose 94 70 - 110 mg/dL   POCT glucose    Collection Time: 05/20/22 11:44 AM   Result Value Ref Range    POCT Glucose 116 (H) 70 - 110 mg/dL   Protime-INR    Collection Time: 05/20/22  3:53 PM   Result Value Ref Range    PT 14.0 12.5 - 14.5 seconds    INR 1.11 0.00 - 1.30   APTT    Collection Time: 05/20/22  3:53 PM   Result Value Ref Range    PTT 30.5 23.2 - 33.7 seconds       Imaging  Imaging Results          MRI Brain Without Contrast (Final result)  Result time 05/18/22 15:42:12    Final result by Raul Campbell MD (05/18/22 15:42:12)                 Impression:      1.  Left frontal lobe extending into the corpus callosum acute nonhemorrhagic infarct in the territory of  the anterior cerebral artery.    2.  Old lacunar infarcts, chronic microangiopathic ischemia and atrophy.      Electronically signed by: Raul Campbell  Date:    05/18/2022  Time:    15:42             Narrative:    EXAMINATION:  MRI BRAIN WITHOUT CONTRAST    CLINICAL HISTORY:  Stroke, follow up;    TECHNIQUE:  Multiplanar MRI sequences were performed of the brain without contrast.    COMPARISON:  CT brain without contrast May 18, 2020    FINDINGS:  There is left frontal lobe in the parasagittal location extending to the corpus callosum diffusion weighted restriction with corresponding signal dropout on the ADC map and T2 FLAIR hyperintensity which is consistent with acute hemorrhage infarct and results in effacement of the sulci and local mass effect.  There are old lacunar infarcts which involve bilateral basal ganglia and the left corona radiata.  Bilateral cerebral periventricular and subcortical white matter variable size T2-FLAIR hyperintense signals are consistent with mild chronic microangiopathic ischemia. Gradient echo sequences demonstrate no evidence of de phasing artifact to suggest hemorrhagic byproducts. The sella and suprasellar areas are unremarkable.    The cerebellar tonsils are normally positioned. There is no acute intracranial hemorrhage, or hydrocephalus.  No acute extra axial fluid collections identified. The mastoid air cells are clear.                               CT Head Without Contrast (Final result)  Result time 05/18/22 12:38:31    Final result by Xiomara Hassan MD (05/18/22 12:38:31)                 Impression:      1. Left frontal 6 cm cortical based hypodensity suggests an acute-subacute nonhemorrhagic infarct in the left KENISHA vascular territory.  This could be confirmed by MRI.  2. Otherwise no acute intracranial abnormalities.  .      Electronically signed by: Xiomara Hassan  Date:    05/18/2022  Time:    12:38             Narrative:    EXAMINATION:  CT HEAD WITHOUT  CONTRAST    CLINICAL HISTORY:  Neuro deficit, acute, stroke suspected;    TECHNIQUE:  Axial scans were obtained from skull base to the vertex.    Coronal and sagittal reconstructions obtained from the axial data.    Automatic exposure control was utilized to limit radiation dose.    Contrast: None    Radiation Dose:    Total DLP: 858 mGy*cm    COMPARISON:  Head CT 08/14/2018    FINDINGS:  Scalp/Skull:    No abnormalities.    Brain sulci: Minimally effacing in the paramedian left frontal region.  Otherwise appropriate for patient's age.    Ventricles: Left frontal horn and anterior body are partially effaced.  Otherwise normal in size and configuration. No hydrocephalus.    Extra-axial spaces:    No masses or fluid collections.    Parenchyma:    Left cingulate/superior frontal approximately 6 x 2 cm cortical/subcortical hypodensity (series 8, image 17; sagittal image 20).    Old bilateral basal ganglia lacunar insults.    Discrete and confluent supratentorial white matter hypodensities are mild-moderate nonspecific chronic microangiopathic changes, statistically chronic microvascular ischemia    No mass or hemorrhage.    Dural sinuses: No abnormal densities.    Sellar/Suprasellar region: No abnormalities.    Skull base and Craniocervical junction: No abnormalities.    Incidental findings:    Carotid siphon and vertebrobasilar atherosclerotic vascular calcifications.                                 Impression  1. Syphilis  2. 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 Uncontrolled hypertension  4. Polysubstance abuse  5. Trichomoniasis    Recommendations  I agree with the management of this patient.  She presented with findings of acute CVA with several risk factors but noted to have reactive specific syphilis antibodies as well as a positive RPR but with very low titers.  Unfortunately we cannot elicit any much history from this lady who is nonverbal and do not have any extensive medical records  available to  us.  The history from her  is very limited since he has been with her for about 4 years and is a poor historian.  We may be dealing with a previously successfully treated syphilis with serofast status but cannot rule out the possibility of advanced syphilis including neurosyphilis.  We will proceed with a lumbar puncture, plan for a.m. and will follow results of CSF analysis including cell count and differentials, protein and VDRL.  We will in the meantime place on Flagyl for treatment of trichomoniasis.  Guarded prognosis.  Case is discussed at length with patient and .  Questions solicited, some came from  and were answered.  I have also discussed the case with nursing staff.  I would like to thank the team very much for the opportunity to assist in the care of this patient.

## 2022-05-22 LAB
ALBUMIN SERPL-MCNC: 3.3 GM/DL (ref 3.5–5)
ALBUMIN/GLOB SERPL: 1.2 RATIO (ref 1.1–2)
ALP SERPL-CCNC: 69 UNIT/L (ref 40–150)
ALT SERPL-CCNC: 7 UNIT/L (ref 0–55)
AST SERPL-CCNC: 13 UNIT/L (ref 5–34)
BASOPHILS # BLD AUTO: 0.04 X10(3)/MCL (ref 0–0.2)
BASOPHILS NFR BLD AUTO: 0.5 %
BILIRUBIN DIRECT+TOT PNL SERPL-MCNC: 0.5 MG/DL
BUN SERPL-MCNC: 11.2 MG/DL (ref 9.8–20.1)
CALCIUM SERPL-MCNC: 8.9 MG/DL (ref 8.4–10.2)
CHLORIDE SERPL-SCNC: 105 MMOL/L (ref 98–107)
CO2 SERPL-SCNC: 26 MMOL/L (ref 22–29)
CREAT SERPL-MCNC: 0.8 MG/DL (ref 0.55–1.02)
EOSINOPHIL # BLD AUTO: 0.27 X10(3)/MCL (ref 0–0.9)
EOSINOPHIL NFR BLD AUTO: 3.3 %
ERYTHROCYTE [DISTWIDTH] IN BLOOD BY AUTOMATED COUNT: 14.6 % (ref 11.5–17)
GLOBULIN SER-MCNC: 2.7 GM/DL (ref 2.4–3.5)
GLUCOSE SERPL-MCNC: 87 MG/DL (ref 74–100)
HCT VFR BLD AUTO: 42.2 % (ref 37–47)
HGB BLD-MCNC: 13.6 GM/DL (ref 12–16)
IMM GRANULOCYTES # BLD AUTO: 0.03 X10(3)/MCL (ref 0–0.02)
IMM GRANULOCYTES NFR BLD AUTO: 0.4 % (ref 0–0.43)
LYMPHOCYTES # BLD AUTO: 2.59 X10(3)/MCL (ref 0.6–4.6)
LYMPHOCYTES NFR BLD AUTO: 31.8 %
MCH RBC QN AUTO: 31.2 PG (ref 27–31)
MCHC RBC AUTO-ENTMCNC: 32.2 MG/DL (ref 33–36)
MCV RBC AUTO: 96.8 FL (ref 80–94)
MONOCYTES # BLD AUTO: 0.64 X10(3)/MCL (ref 0.1–1.3)
MONOCYTES NFR BLD AUTO: 7.9 %
NEUTROPHILS # BLD AUTO: 4.6 X10(3)/MCL (ref 2.1–9.2)
NEUTROPHILS NFR BLD AUTO: 56.1 %
NRBC BLD AUTO-RTO: 0 %
PLATELET # BLD AUTO: 209 X10(3)/MCL (ref 130–400)
PMV BLD AUTO: 11.5 FL (ref 9.4–12.4)
POCT GLUCOSE: 159 MG/DL (ref 70–110)
POCT GLUCOSE: 220 MG/DL (ref 70–110)
POCT GLUCOSE: 97 MG/DL (ref 70–110)
POTASSIUM SERPL-SCNC: 3.2 MMOL/L (ref 3.5–5.1)
PROT SERPL-MCNC: 6 GM/DL (ref 6.4–8.3)
RBC # BLD AUTO: 4.36 X10(6)/MCL (ref 4.2–5.4)
SODIUM SERPL-SCNC: 140 MMOL/L (ref 136–145)
WBC # SPEC AUTO: 8.2 X10(3)/MCL (ref 4.5–11.5)

## 2022-05-22 PROCEDURE — 36415 COLL VENOUS BLD VENIPUNCTURE: CPT | Performed by: STUDENT IN AN ORGANIZED HEALTH CARE EDUCATION/TRAINING PROGRAM

## 2022-05-22 PROCEDURE — 25000003 PHARM REV CODE 250: Performed by: NURSE PRACTITIONER

## 2022-05-22 PROCEDURE — 80053 COMPREHEN METABOLIC PANEL: CPT | Performed by: STUDENT IN AN ORGANIZED HEALTH CARE EDUCATION/TRAINING PROGRAM

## 2022-05-22 PROCEDURE — 63600175 PHARM REV CODE 636 W HCPCS: Performed by: STUDENT IN AN ORGANIZED HEALTH CARE EDUCATION/TRAINING PROGRAM

## 2022-05-22 PROCEDURE — 85025 COMPLETE CBC W/AUTO DIFF WBC: CPT | Performed by: STUDENT IN AN ORGANIZED HEALTH CARE EDUCATION/TRAINING PROGRAM

## 2022-05-22 PROCEDURE — 99232 SBSQ HOSP IP/OBS MODERATE 35: CPT | Mod: ,,, | Performed by: SPECIALIST

## 2022-05-22 PROCEDURE — 25000003 PHARM REV CODE 250: Performed by: INTERNAL MEDICINE

## 2022-05-22 PROCEDURE — S4991 NICOTINE PATCH NONLEGEND: HCPCS | Performed by: NURSE PRACTITIONER

## 2022-05-22 PROCEDURE — 94761 N-INVAS EAR/PLS OXIMETRY MLT: CPT

## 2022-05-22 PROCEDURE — 99232 PR SUBSEQUENT HOSPITAL CARE,LEVL II: ICD-10-PCS | Mod: ,,, | Performed by: SPECIALIST

## 2022-05-22 PROCEDURE — 86235 NUCLEAR ANTIGEN ANTIBODY: CPT | Performed by: STUDENT IN AN ORGANIZED HEALTH CARE EDUCATION/TRAINING PROGRAM

## 2022-05-22 PROCEDURE — 97530 THERAPEUTIC ACTIVITIES: CPT | Mod: CQ

## 2022-05-22 PROCEDURE — 11000001 HC ACUTE MED/SURG PRIVATE ROOM

## 2022-05-22 PROCEDURE — 97110 THERAPEUTIC EXERCISES: CPT | Mod: CQ

## 2022-05-22 RX ORDER — GLUCAGON 1 MG
1 KIT INJECTION
Status: DISCONTINUED | OUTPATIENT
Start: 2022-05-22 | End: 2022-05-28

## 2022-05-22 RX ORDER — IBUPROFEN 200 MG
16 TABLET ORAL
Status: DISCONTINUED | OUTPATIENT
Start: 2022-05-22 | End: 2022-05-28

## 2022-05-22 RX ORDER — IBUPROFEN 200 MG
24 TABLET ORAL
Status: DISCONTINUED | OUTPATIENT
Start: 2022-05-22 | End: 2022-05-28

## 2022-05-22 RX ORDER — INSULIN ASPART 100 [IU]/ML
1-10 INJECTION, SOLUTION INTRAVENOUS; SUBCUTANEOUS
Status: DISCONTINUED | OUTPATIENT
Start: 2022-05-22 | End: 2022-05-28

## 2022-05-22 RX ADMIN — AMLODIPINE BESYLATE 5 MG: 5 TABLET ORAL at 08:05

## 2022-05-22 RX ADMIN — BUPROPION HYDROCHLORIDE 150 MG: 150 TABLET, FILM COATED, EXTENDED RELEASE ORAL at 08:05

## 2022-05-22 RX ADMIN — MULTIPLE VITAMINS W/ MINERALS TAB 1 TABLET: TAB at 08:05

## 2022-05-22 RX ADMIN — CARVEDILOL 12.5 MG: 12.5 TABLET, FILM COATED ORAL at 09:05

## 2022-05-22 RX ADMIN — NICOTINE 1 PATCH: 21 PATCH, EXTENDED RELEASE TRANSDERMAL at 09:05

## 2022-05-22 RX ADMIN — INSULIN ASPART 4 UNITS: 100 INJECTION, SOLUTION INTRAVENOUS; SUBCUTANEOUS at 12:05

## 2022-05-22 RX ADMIN — CARVEDILOL 12.5 MG: 12.5 TABLET, FILM COATED ORAL at 08:05

## 2022-05-22 RX ADMIN — THIAMINE HCL TAB 100 MG 100 MG: 100 TAB at 08:05

## 2022-05-22 RX ADMIN — METRONIDAZOLE 500 MG: 500 TABLET ORAL at 02:05

## 2022-05-22 RX ADMIN — METRONIDAZOLE 500 MG: 500 TABLET ORAL at 05:05

## 2022-05-22 RX ADMIN — ASPIRIN 81 MG: 81 TABLET, COATED ORAL at 08:05

## 2022-05-22 RX ADMIN — CLONIDINE HYDROCHLORIDE 0.2 MG: 0.2 TABLET ORAL at 05:05

## 2022-05-22 RX ADMIN — METRONIDAZOLE 500 MG: 500 TABLET ORAL at 10:05

## 2022-05-22 RX ADMIN — SERTRALINE 25 MG: 25 TABLET, FILM COATED ORAL at 08:05

## 2022-05-22 RX ADMIN — ATORVASTATIN CALCIUM 40 MG: 40 TABLET, FILM COATED ORAL at 08:05

## 2022-05-22 NOTE — SUBJECTIVE & OBJECTIVE
Subjective:     Interval History:   No new issues overnight, she is resting, partner at bedside, LP done with IR yesterday     Current Neurological Medications:     Current Facility-Administered Medications   Medication Dose Route Frequency Provider Last Rate Last Admin    amLODIPine tablet 5 mg  5 mg Oral Daily GARY LiveP   5 mg at 05/22/22 0850    aspirin EC tablet 81 mg  81 mg Oral Daily Veena Chung, FNP   81 mg at 05/22/22 0850    atorvastatin tablet 40 mg  40 mg Oral Daily Veena Chung, FNP   40 mg at 05/22/22 0850    buPROPion TB24 tablet 150 mg  150 mg Oral Daily Veena Chung, GARYP   150 mg at 05/22/22 0850    carvediloL tablet 12.5 mg  12.5 mg Oral BID Brayan Trotter MD   12.5 mg at 05/22/22 0850    cloNIDine tablet 0.2 mg  0.2 mg Oral TID PRN SIRISHA Live   0.2 mg at 05/22/22 0559    enalaprilat injection 1.25 mg  1.25 mg Intravenous Q6H PRN Veena Chung, SIRISHA        hydrALAZINE injection 10 mg  10 mg Intravenous Q4H PRN Brayan Trotter MD   10 mg at 05/21/22 0000    labetaloL injection 10 mg  10 mg Intravenous Q4H PRN SIRISHA Live        metroNIDAZOLE tablet 500 mg  500 mg Oral Q8H Marcelina Nguyen MD   500 mg at 05/22/22 0524    multivitamin tablet  1 tablet Oral Daily Brayan Trotter MD   1 tablet at 05/22/22 0850    nicotine 21 mg/24 hr 1 patch  1 patch Transdermal Daily SIRISHA Live   1 patch at 05/22/22 0903    ondansetron injection 4 mg  4 mg Intravenous QID PRN GARY LiveP   4 mg at 05/21/22 2104    sertraline tablet 25 mg  25 mg Oral Daily GARY LiveP   25 mg at 05/22/22 0850    sodium chloride 0.9% flush 10 mL  10 mL Intravenous PRN SIRISHA Live        sodium chloride 0.9% flush 10 mL  10 mL Intravenous PRN SIRISHA Live        thiamine tablet 100 mg  100 mg Oral Daily Brayan Trotter MD   100 mg at 05/22/22 0850       Review of Systems   Unable to perform ROS: Acuity of condition   Objective:     Vital Signs (Most  Recent):  Temp: 97.8 °F (36.6 °C) (05/22/22 0747)  Pulse: 75 (05/22/22 0747)  Resp: 18 (05/22/22 0747)  BP: 134/89 (05/22/22 0747)  SpO2: 96 % (05/22/22 0747) Vital Signs (24h Range):  Temp:  [97.1 °F (36.2 °C)-98.4 °F (36.9 °C)] 97.8 °F (36.6 °C)  Pulse:  [72-92] 75  Resp:  [16-20] 18  SpO2:  [96 %-99 %] 96 %  BP: (134-192)/() 134/89     Weight: 65 kg (143 lb 4.8 oz)  Body mass index is 18.99 kg/m².    Physical Exam  Vitals reviewed.   Constitutional:       General: She is sleeping.   Eyes:      Extraocular Movements: EOM normal.      Pupils: Pupils are equal, round, and reactive to light.   Neurological:      Mental Status: She is easily aroused.       NEUROLOGICAL EXAMINATION:     MENTAL STATUS        Limited PE      CRANIAL NERVES     CN II   Visual fields full to confrontation.     CN III, IV, VI   Pupils are equal, round, and reactive to light.  Extraocular motions are normal.   Right pupil: Shape: regular. Reactivity: brisk.   Left pupil: Shape: regular. Reactivity: brisk.     CN XII   Tongue deviation: none    MOTOR EXAM   Muscle bulk: normal  Overall muscle tone: normal       3/5 RUE, 4/5 RLE   5/5 LUE, LLE      Significant Labs:   Recent Lab Results  (Last 5 results in the past 24 hours)        05/22/22  0346   05/21/22  1137   05/21/22  1135   05/21/22  1132   05/21/22  1052        Segmented Neutrophils, CSF   35             Lymphs, CSF   65             Albumin/Globulin Ratio 1.2               Albumin 3.3               Alkaline Phosphatase 69               ALT 7               Appear CSF   Slightly Cloudy             AST 13               Baso # 0.04               Basophil % 0.5               BILIRUBIN TOTAL 0.5               BUN 11.2               Calcium 8.9               Chloride 105               CO2 26               COLOR CSF   Pink             Creatinine 0.80               CSF CULTURE       No Growth At 24 Hours  [P]         eGFR if  >60               Eos # 0.27                Eosinophil % 3.3               Globulin, Total 2.7               Glucose 87               Glucose, CSF         57       Gram Stain Result     No WBCs, No bacteria seen            Hematocrit 42.2               Hemoglobin 13.6               Immature Grans (Abs) 0.03               Immature Granulocytes 0.4               Lymph # 2.59               LYMPH % 31.8               MCH 31.2               MCHC 32.2               MCV 96.8               Mono # 0.64               Mono % 7.9               MPV 11.5               Neut # 4.6               Neut % 56.1               nRBC 0.0               Platelets 209               Potassium 3.2               Protein CSF          42.8       PROTEIN TOTAL 6.0               RBC 4.36               RBC, CSF   1,490             RDW 14.6               Sodium 140               WBC, CSF   5             WBC 8.2                                       [P] - Preliminary Result               Significant Imaging: I have reviewed all pertinent imaging results/findings within the past 24 hours.    No new imaging

## 2022-05-22 NOTE — PROGRESS NOTES
[facility] Medicine Progress Note        Chief Complaint: Inpatient Follow-up for     HPI:   Ms. Belcher is a 52-year-old female with past medical history of HTN, anxiety, depression, and hypothyroidism who presents to [facility] ED with complaints of dysarthria and expressive aphasia x3 days.  Patient's family member states that she has been having difficulty speaking for the past 3 days however today was worse so she was brought to the ED for further evaluation. Her sister at bedside states that she is unsure if she has been compliant with her home medications.  She does smoke marijuana daily with her significant other.  And drinks about a six-pack of beer every day.  No history of stroke.  She is not on aspirin.  Sister at bedside reports that she has had abnormal behavior over the past 3 days and has been having episodes of bladder and bowel incontinence and is very restless/fidgety.  No reported history of falls or back pain or any saddle anesthesia.  Patient is oriented x4 but is an extremely poor historian and it is very difficult to get a history from her.  She does report left-sided weakness and paresthesias x2 days     Upon arrival to outside facility the patient was markedly hypertensive but otherwise hemodynamically stable and afebrile. EKG: NSR with LVH. Labs unremarkable except for a reactive RPR, patient denies high risk sexual behavior, states unprotected sexual intercourse with significant other but she is in a monogamous relationships. CT Head showed left frontal 6 cm cortical base hypodensity suggesting an acute to subacute nonhemorrhagic infarct in the left KENISHA vascular territory. MRI Brain was then obtained confirming the left frontal lobe extending into the corpus callosum with an acute nonhemorrhagic infarct in the territory of the anterior cerebral artery, and old lacunar infarcts. She is being transferred to [facility] for further CVA workup.    Interval Hx:    Seen and examined the patient, no new neurological symptoms.   Pending ID evaluation.     Objective/physical exam:  GENERAL: awake, alert, oriented and in no acute distress, non-toxic appearing   HEENT: normocephalic atraumatic   NECK: supple   LUNGS: Clear bilaterally, no wheezing or rales, no accessory muscle use   CVS: Regular rate and rhythm, normal peripheral perfusion  ABD: Soft, non-tender, non-distended, bowel sounds present  EXTREMITIES: no clubbing or cyanosis  SKIN: Warm, dry.   NEURO: Motor strength RUE 4/5, LUE 5/5; RLE 4/5, LLE 5/5, expressive aphasia and slurred speech,+ paresthesia to right face, RUE and RLE. Gait not assessed.   PSYCHIATRIC: Cooperative, restless in room    VITAL SIGNS: 24 HRS MIN & MAX LAST   Temp  Min: 97.1 °F (36.2 °C)  Max: 98.4 °F (36.9 °C) 98 °F (36.7 °C)   BP  Min: 100/63  Max: 192/99 100/63   Pulse  Min: 71  Max: 92  71   Resp  Min: 16  Max: 20 18   SpO2  Min: 96 %  Max: 99 % 98 %       Recent Labs   Lab 05/20/22  0405 05/21/22  0515 05/22/22  0346   WBC 9.1 10.7 8.2   RBC 4.51 4.60 4.36   HGB 13.8 14.4 13.6   HCT 43.1 42.4 42.2   MCV 95.6* 92.2 96.8*   MCH 30.6 31.3* 31.2*   MCHC 32.0* 34.0 32.2*   RDW 14.6 14.5 14.6    237 209   MPV 11.2 11.0 11.5       Recent Labs   Lab 05/19/22  0509 05/20/22  0405 05/21/22  0515 05/22/22  0346    140 140 140   K 3.4* 3.5 3.3* 3.2*   CO2 23 23 21* 26   BUN 10.7 9.0* 8.9* 11.2   CREATININE 0.79 0.80 0.77 0.80   CALCIUM 8.5 9.1 9.0 8.9   MG 2.00  --   --   --    ALBUMIN 3.7 3.6 3.6 3.3*   ALKPHOS 77 71 73 69   ALT 8 7 6 7   AST 13 12 14 13   BILITOT 0.7 0.6 0.5 0.5          Microbiology Results (last 7 days)     Procedure Component Value Units Date/Time    Cerebrospinal Fluid Culture OL [[id number]] Collected: 05/21/22 1132    Order Status: Completed Specimen: CSF (Spinal Fluid) Updated: 05/22/22 0822     CULTURE, CSF ([facility]) No Growth At 24 Hours    Gram Stain [facility] [place] [[id number]] Collected: 05/21/22 1135    Order Status:  Completed Specimen: CSF (Spinal Fluid) from Cerebrospinal Fluid Updated: 05/21/22 1454     GRAM STAIN No WBCs, No bacteria seen     Gram stain [[id number]] Collected: 05/21/22 1052    Order Status: Canceled Specimen: CSF (Spinal Fluid) Updated: 05/21/22 1124    CSF culture [[id number]] Collected: 05/21/22 1052    Order Status: Canceled Specimen: CSF (Spinal Fluid) Updated: 05/21/22 1124    Chlamydia/GC, PCR [[id number]]  (Normal) Collected: 05/19/22 1553    Order Status: Completed Specimen: Urine Updated: 05/19/22 1747     Chlamydia trachomatis PCR Not Detected     N. gonorrhea PCR Not Detected           See below for Radiology    Scheduled Med:   amLODIPine  5 mg Oral Daily    aspirin  81 mg Oral Daily    atorvastatin  40 mg Oral Daily    buPROPion  150 mg Oral Daily    carvediloL  12.5 mg Oral BID    metroNIDAZOLE  500 mg Oral Q8H    multivitamin  1 tablet Oral Daily    nicotine  1 patch Transdermal Daily    sertraline  25 mg Oral Daily    thiamine  100 mg Oral Daily        Continuous Infusions:       PRN Meds:  cloNIDine, enalaprilat, hydrALAZINE, labetalol, ondansetron, sodium chloride 0.9%, sodium chloride 0.9%       Assessment/Plan:  Acute Non-hemorrhagic CVA - left KENISHA vascular territory  Dysarthria and Expressive aphasia 2/2 above  HTN Urgency; Hx Essential HTN (uncontrolled)  HLD, new onset  Abnormal Behavior with reports of bladder and bowel incontinence  Reactive RPR  Trichomonas   Hypothyroidism  THC Use Disorder   Alcohol Use Disorder        PLAN:   - LP with 5 WBC with lymphocytes 60%, pending ID evaluation.   Id was consulted appreciate recommendations on possible neurosyphilis.   MRA showed moderate severe multifocal flow signal abnormality about the bilateral ACAs bilateral distal MCAs and to lesser extent bilateral PCAs,   - MRI showed a frontal lobe extending into the corpus callosum acute nonhemorrhagic infarct  frontal cerebral artery or lacunar infarcts chronic  microangiopathic ischemia and atrophy.   -vascular neurology suggested possibility of syphilis involvement in the stroke.  Discussed with neurology today removal of the plan of LP, if she has lymphocytic pleocytosis and elevated protein level most likely suggest no surface and we will proceed with treatment.  -We will order INR PT PTT as stat.   - patient drinks 6+ beer every day and also some liquor,   Last drink was 2 days ago however she does not show any signs of withdrawal.   - shows normal sinus rhythm she is on continuous telemetry.   - neurology is onboard, appreciate their recommendations.   - syphilis positive, will get STI screen neisseria and chlamydia NAAT.   - was penicillin G2 point 4,000,000 units IM for syphilis.   - echo is EF 74% with severe concentric hypertrophy and hyperdynamic systolic function with negative bubble study.   - aspirin and atorvastatin  - Flagyl 2gm x1 dose. Pt instructed to let significant other know, that he can be treated.  - Banana Bag x1. MVI and Thiamine daily. CIWA Q6H.          Chilo Lea MD   05/22/2022

## 2022-05-22 NOTE — PT/OT/SLP PROGRESS
Physical Therapy Treatment    Patient Name:  Sammie Belcher   MRN:  [id number]    Recommendations:     Discharge Recommendations:  rehabilitation facility   Discharge Equipment Recommendations: walker, rolling   Barriers to discharge: None    Assessment:     Sammie Belcher is a 52 y.o. female admitted with a medical diagnosis of CVA.  She presents with the following impairments/functional limitations:  weakness, decreased lower extremity function     Rehab Prognosis: Good; patient would benefit from acute skilled PT services to address these deficits and reach maximum level of function.    Recent Surgery: * No surgery found *      Plan:     During this hospitalization, patient to be seen daily to address the identified rehab impairments via therapeutic activities, therapeutic exercises and progress toward the following goals:    · Plan of Care Expires:  22    Subjective     Chief Complaint: N/A  Patient/Family Comments/goals:   Pain/Comfort:  ·        Objective:     Communicated with nurse prior to session.  Patient found HOB elevated with SCD, peripheral IV, upon PT entry to room.     General Precautions: Standard, aphasia (BP<220/120)   Orthopedic Precautions:    Braces:    Respiratory Status: Room air     Functional Mobility:  · Bed Mobility:     · Rolling Left:  minimum assistance  · Rolling Right: minimum assistance    Pt rolled L to R for readjustment of pads and positioning.     Therapeutic Activities and Exercises:  Pt performed supine/HOB elevated ther ex due to low BP readin/65, NSG advised bed level activity. BP taken again at end of treatment: 101/64.   LLE ankle pumps, SLR, hip abd/add, hip flex with rest breaks. RLE PROM in all planes.     Patient left HOB elevated with all lines intact and call button in reach..    GOALS:   Multidisciplinary Problems     Physical Therapy Goals        Problem: Physical Therapy    Goal Priority Disciplines Outcome Goal Variances Interventions   Physical  Therapy Goal     PT, PT/OT Ongoing, Progressing     Description: Goals to be met by: 2022     Patient will increase functional independence with mobility by performin. Supine to sit with Stand-by Assistance  2. Sit to supine with Stand-by Assistance  3. Sit to stand transfer with Stand-by Assistance  4. Gait  x 300 feet with Contact Guard Assistance using Rolling Walker.   5. RLE strength to improve to 4/5 MMT.  6. LLE strength to improve to 5/5 MMT.                     Time Tracking:     PT Received On: 22  PT Start Time: 1036     PT Stop Time: 1059  PT Total Time (min): 23 min     Billable Minutes: Therapeutic Activity 10 and Therapeutic Exercise 13    Treatment Type: Treatment  PT/PTA: PTA     PTA Visit Number: 2     2022

## 2022-05-22 NOTE — PROGRESS NOTES
[facility] 9th Floor Med Surg  Neurology  Progress Note    Patient Name: Sammie Belcher  MRN: [id number]  Admission Date: 5/18/2022  Hospital Length of Stay: 4 days  Code Status: Full Code   Attending Provider: Stacia Jovel MD  Primary Care Physician: Primary Doctor No       Subjective:     Interval History:   No new issues overnight, she is resting, partner at bedside, LP done with IR yesterday     Current Neurological Medications:     Current Facility-Administered Medications   Medication Dose Route Frequency Provider Last Rate Last Admin    amLODIPine tablet 5 mg  5 mg Oral Daily GARY LiveP   5 mg at 05/22/22 0850    aspirin EC tablet 81 mg  81 mg Oral Daily SIRISHA Live   81 mg at 05/22/22 0850    atorvastatin tablet 40 mg  40 mg Oral Daily SIRISHA Live   40 mg at 05/22/22 0850    buPROPion TB24 tablet 150 mg  150 mg Oral Daily GARY LiveP   150 mg at 05/22/22 0850    carvediloL tablet 12.5 mg  12.5 mg Oral BID Brayan Trotter MD   12.5 mg at 05/22/22 0850    cloNIDine tablet 0.2 mg  0.2 mg Oral TID PRN SIRISHA Live   0.2 mg at 05/22/22 0559    enalaprilat injection 1.25 mg  1.25 mg Intravenous Q6H PRN SIRISHA Live        hydrALAZINE injection 10 mg  10 mg Intravenous Q4H PRN Brayan Trotter MD   10 mg at 05/21/22 0000    labetaloL injection 10 mg  10 mg Intravenous Q4H PRN SIRISHA Live        metroNIDAZOLE tablet 500 mg  500 mg Oral Q8H Marcelina Nguyen MD   500 mg at 05/22/22 0524    multivitamin tablet  1 tablet Oral Daily Brayan Trotter MD   1 tablet at 05/22/22 0850    nicotine 21 mg/24 hr 1 patch  1 patch Transdermal Daily SIRISHA Live   1 patch at 05/22/22 0903    ondansetron injection 4 mg  4 mg Intravenous QID PRN SIRISHA Live   4 mg at 05/21/22 2104    sertraline tablet 25 mg  25 mg Oral Daily SIRISHA Live   25 mg at 05/22/22 0850    sodium chloride 0.9% flush 10 mL  10 mL  Intravenous PRN SIRISHA Live        sodium chloride 0.9% flush 10 mL  10 mL Intravenous PRN SIRISHA Live        thiamine tablet 100 mg  100 mg Oral Daily Brayan Trotter MD   100 mg at 05/22/22 0850       Review of Systems   Unable to perform ROS: Acuity of condition   Objective:     Vital Signs (Most Recent):  Temp: 97.8 °F (36.6 °C) (05/22/22 0747)  Pulse: 75 (05/22/22 0747)  Resp: 18 (05/22/22 0747)  BP: 134/89 (05/22/22 0747)  SpO2: 96 % (05/22/22 0747) Vital Signs (24h Range):  Temp:  [97.1 °F (36.2 °C)-98.4 °F (36.9 °C)] 97.8 °F (36.6 °C)  Pulse:  [72-92] 75  Resp:  [16-20] 18  SpO2:  [96 %-99 %] 96 %  BP: (134-192)/() 134/89     Weight: 65 kg (143 lb 4.8 oz)  Body mass index is 18.99 kg/m².    Physical Exam  Vitals reviewed.   Constitutional:       General: She is sleeping.   Eyes:      Extraocular Movements: EOM normal.      Pupils: Pupils are equal, round, and reactive to light.   Neurological:      Mental Status: She is easily aroused.       NEUROLOGICAL EXAMINATION:     MENTAL STATUS        Limited PE      CRANIAL NERVES     CN II   Visual fields full to confrontation.     CN III, IV, VI   Pupils are equal, round, and reactive to light.  Extraocular motions are normal.   Right pupil: Shape: regular. Reactivity: brisk.   Left pupil: Shape: regular. Reactivity: brisk.     CN XII   Tongue deviation: none    MOTOR EXAM   Muscle bulk: normal  Overall muscle tone: normal       3/5 RUE, 4/5 RLE   5/5 LUE, LLE      Significant Labs:   Recent Lab Results  (Last 5 results in the past 24 hours)        05/22/22  0346   05/21/22  1137   05/21/22  1135   05/21/22  1132   05/21/22  1052        Segmented Neutrophils, CSF   35             Lymphs, CSF   65             Albumin/Globulin Ratio 1.2               Albumin 3.3               Alkaline Phosphatase 69               ALT 7               Appear CSF   Slightly Cloudy             AST 13               Baso # 0.04               Basophil % 0.5                BILIRUBIN TOTAL 0.5               BUN 11.2               Calcium 8.9               Chloride 105               CO2 26               COLOR CSF   Pink             Creatinine 0.80               CSF CULTURE       No Growth At 24 Hours  [P]         eGFR if  >60               Eos # 0.27               Eosinophil % 3.3               Globulin, Total 2.7               Glucose 87               Glucose, CSF         57       Gram Stain Result     No WBCs, No bacteria seen            Hematocrit 42.2               Hemoglobin 13.6               Immature Grans (Abs) 0.03               Immature Granulocytes 0.4               Lymph # 2.59               LYMPH % 31.8               MCH 31.2               MCHC 32.2               MCV 96.8               Mono # 0.64               Mono % 7.9               MPV 11.5               Neut # 4.6               Neut % 56.1               nRBC 0.0               Platelets 209               Potassium 3.2               Protein CSF          42.8       PROTEIN TOTAL 6.0               RBC 4.36               RBC, CSF   1,490             RDW 14.6               Sodium 140               WBC, CSF   5             WBC 8.2                                       [P] - Preliminary Result               Significant Imaging: I have reviewed all pertinent imaging results/findings within the past 24 hours.    No new imaging     Assessment and Plan:     CVA (cerebral vascular accident)  Stroke workup and Plan  CUS pending  Mri brain: Left KENISHA territory infarct   ECHO: BS negative; EF: 74%  LDL: 144  A1c: 5.2  CTA neck: 1. Moderate narrowing of the left common carotid artery. 2. Moderate to severe narrowing at the origin of the left vertebral artery.    Continue ASA and statin, could consider MAIRO/LINQ if CUS negative   CUS has been completed, but there is not a reading, CUS completed on 5/19...asked nursing to call to look into it   LP performed by IR on 5/21, results reviewed by MD       Antithrombotics  for secondary stroke prevention: Antiplatelets: Aspirin: 81 mg daily    Statins for secondary stroke prevention and hyperlipidemia, if present:   Statins: Atorvastatin- 40 mg daily    Aggressive risk factor modification: HTN, Smoking, DM     Rehab efforts: The patient has been evaluated by a stroke team provider and the therapy needs have been fully considered based off the presenting complaints and exam findings. The following therapy evaluations are needed: PM&R evaluate for appropriate placement    Diagnostics ordered/pending: Carotid ultrasound to assess vasculature, CT scan of head without contrast to asses brain parenchyma, CTA Neck/Arch to assess vasculature, HgbA1C to assess blood glucose levels, Lipid Profile to assess cholesterol levels, MRA head to assess vasculature, MRA neck/arch to assess vasculature, MRI head without contrast to assess brain parenchyma, TTE to assess cardiac function/status     VTE prophylaxis: Mechanical prophylaxis: Place SCDs    BP parameters: SBP less than 140         Further recommendations to follow from MD     VTE Risk Mitigation (From admission, onward)         Ordered     IP VTE LOW RISK PATIENT  Once         05/18/22 1601     Place sequential compression device  Until discontinued         05/18/22 1601                Marleny Robles NP  Neurology  [facility] - 9th Floor Med Surg    Neurologist Addendum/Attestation    I have interviewed and examined the patient, reviewed images and reports, and reviewed nurse practitioner note    Still with inability to speak  Common-law  at bedside  29-year-old son Troy is here today as well and had questions.      Exam:   Right hemiplegia.  Unable to speak.  Shona says this morning she was moving her right arm and  corroborates      Imaging:   Images exercise reviewed with her son    Impression:  Left KENISHA ischemic stroke  Right hemiplegia which fluctuates  Aphasia with history of chronic speech  impediment;  Positive syphilis test.  Internist and ID managing    Plan/Recs:   ..  Aspirin  ..  Will ask our stroke neurologist Dr. CHAPIN to take over tomorrow    Fede Lopez MD

## 2022-05-22 NOTE — ASSESSMENT & PLAN NOTE
Stroke workup and Plan  CUS pending  Mri brain: Left KENISHA territory infarct   ECHO: BS negative; EF: 74%  LDL: 144  A1c: 5.2  CTA neck: 1. Moderate narrowing of the left common carotid artery. 2. Moderate to severe narrowing at the origin of the left vertebral artery.    Continue ASA and statin, could consider MARIO/LINQ if CUS negative   CUS has been completed, but there is not a reading, CUS completed on 5/19...asked nursing to call to look into it   LP performed by IR on 5/21, results reviewed by MD       Antithrombotics for secondary stroke prevention: Antiplatelets: Aspirin: 81 mg daily    Statins for secondary stroke prevention and hyperlipidemia, if present:   Statins: Atorvastatin- 40 mg daily    Aggressive risk factor modification: HTN, Smoking, DM     Rehab efforts: The patient has been evaluated by a stroke team provider and the therapy needs have been fully considered based off the presenting complaints and exam findings. The following therapy evaluations are needed: PM&R evaluate for appropriate placement    Diagnostics ordered/pending: Carotid ultrasound to assess vasculature, CT scan of head without contrast to asses brain parenchyma, CTA Neck/Arch to assess vasculature, HgbA1C to assess blood glucose levels, Lipid Profile to assess cholesterol levels, MRA head to assess vasculature, MRA neck/arch to assess vasculature, MRI head without contrast to assess brain parenchyma, TTE to assess cardiac function/status     VTE prophylaxis: Mechanical prophylaxis: Place SCDs    BP parameters: SBP less than 140

## 2022-05-23 LAB
LEFT CCA DIST DIAS: 27 CM/S
LEFT CCA DIST SYS: 97 CM/S
LEFT CCA PROX DIAS: 22 CM/S
LEFT CCA PROX SYS: 90 CM/S
LEFT ECA SYS: 93 CM/S
LEFT ICA DIST DIAS: 26 CM/S
LEFT ICA DIST SYS: 60 CM/S
LEFT ICA MID DIAS: 19 CM/S
LEFT ICA MID SYS: 46 CM/S
LEFT ICA PROX DIAS: 15 CM/S
LEFT ICA PROX SYS: 38 CM/S
LEFT VERTEBRAL DIAS: 10 CM/S
LEFT VERTEBRAL SYS: 31 CM/S
OHS CV CAROTID RIGHT ICA EDV HIGHEST: 32
OHS CV CAROTID ULTRASOUND LEFT ICA/CCA RATIO: 0.62
OHS CV CAROTID ULTRASOUND RIGHT ICA/CCA RATIO: 1.05
OHS CV PV CAROTID LEFT HIGHEST CCA: 97
OHS CV PV CAROTID LEFT HIGHEST ICA: 60
OHS CV PV CAROTID RIGHT HIGHEST CCA: 78
OHS CV PV CAROTID RIGHT HIGHEST ICA: 65
OHS CV US CAROTID LEFT HIGHEST EDV: 26
POCT GLUCOSE: 105 MG/DL (ref 70–110)
POCT GLUCOSE: 106 MG/DL (ref 70–110)
POCT GLUCOSE: 137 MG/DL (ref 70–110)
POCT GLUCOSE: 155 MG/DL (ref 70–110)
RIGHT CCA DIST DIAS: 15 CM/S
RIGHT CCA DIST SYS: 62 CM/S
RIGHT CCA PROX DIAS: 12 CM/S
RIGHT CCA PROX SYS: 78 CM/S
RIGHT ECA SYS: 71 CM/S
RIGHT ICA DIST DIAS: 32 CM/S
RIGHT ICA DIST SYS: 65 CM/S
RIGHT ICA MID DIAS: 26 CM/S
RIGHT ICA MID SYS: 54 CM/S
RIGHT ICA PROX DIAS: 14 CM/S
RIGHT ICA PROX SYS: 32 CM/S
RIGHT VERTEBRAL DIAS: 9 CM/S
RIGHT VERTEBRAL SYS: 33 CM/S
T PALLIDUM AB SER QL AGGL: POSITIVE

## 2022-05-23 PROCEDURE — 25000003 PHARM REV CODE 250: Performed by: INTERNAL MEDICINE

## 2022-05-23 PROCEDURE — 94761 N-INVAS EAR/PLS OXIMETRY MLT: CPT

## 2022-05-23 PROCEDURE — S4991 NICOTINE PATCH NONLEGEND: HCPCS | Performed by: NURSE PRACTITIONER

## 2022-05-23 PROCEDURE — 25000003 PHARM REV CODE 250: Performed by: NURSE PRACTITIONER

## 2022-05-23 PROCEDURE — 97535 SELF CARE MNGMENT TRAINING: CPT | Mod: CO

## 2022-05-23 PROCEDURE — 99233 SBSQ HOSP IP/OBS HIGH 50: CPT | Mod: ,,, | Performed by: PSYCHIATRY & NEUROLOGY

## 2022-05-23 PROCEDURE — 92507 TX SP LANG VOICE COMM INDIV: CPT

## 2022-05-23 PROCEDURE — 11000001 HC ACUTE MED/SURG PRIVATE ROOM

## 2022-05-23 PROCEDURE — 97530 THERAPEUTIC ACTIVITIES: CPT | Mod: CQ

## 2022-05-23 PROCEDURE — 99233 PR SUBSEQUENT HOSPITAL CARE,LEVL III: ICD-10-PCS | Mod: ,,, | Performed by: PSYCHIATRY & NEUROLOGY

## 2022-05-23 PROCEDURE — 97116 GAIT TRAINING THERAPY: CPT | Mod: CQ

## 2022-05-23 PROCEDURE — 97530 THERAPEUTIC ACTIVITIES: CPT | Mod: CO

## 2022-05-23 RX ADMIN — ATORVASTATIN CALCIUM 40 MG: 40 TABLET, FILM COATED ORAL at 09:05

## 2022-05-23 RX ADMIN — MULTIPLE VITAMINS W/ MINERALS TAB 1 TABLET: TAB at 09:05

## 2022-05-23 RX ADMIN — ASPIRIN 81 MG: 81 TABLET, COATED ORAL at 09:05

## 2022-05-23 RX ADMIN — CLONIDINE HYDROCHLORIDE 0.2 MG: 0.2 TABLET ORAL at 06:05

## 2022-05-23 RX ADMIN — NICOTINE 1 PATCH: 21 PATCH, EXTENDED RELEASE TRANSDERMAL at 09:05

## 2022-05-23 RX ADMIN — AMLODIPINE BESYLATE 5 MG: 5 TABLET ORAL at 09:05

## 2022-05-23 RX ADMIN — SERTRALINE 25 MG: 25 TABLET, FILM COATED ORAL at 09:05

## 2022-05-23 RX ADMIN — THIAMINE HCL TAB 100 MG 100 MG: 100 TAB at 09:05

## 2022-05-23 RX ADMIN — METRONIDAZOLE 500 MG: 500 TABLET ORAL at 06:05

## 2022-05-23 RX ADMIN — METRONIDAZOLE 500 MG: 500 TABLET ORAL at 10:05

## 2022-05-23 RX ADMIN — BUPROPION HYDROCHLORIDE 150 MG: 150 TABLET, FILM COATED, EXTENDED RELEASE ORAL at 09:05

## 2022-05-23 RX ADMIN — CARVEDILOL 12.5 MG: 12.5 TABLET, FILM COATED ORAL at 09:05

## 2022-05-23 RX ADMIN — METRONIDAZOLE 500 MG: 500 TABLET ORAL at 02:05

## 2022-05-23 NOTE — PT/OT/SLP PROGRESS
Speech Language Pathology Treatment    Patient Name:  Sammie Belcher   MRN:  [id number]  Admitting Diagnosis: <principal problem not specified>    Recommendations:                 General Recommendations:  Speech/language therapy  Diet recommendations:  Other (see comments) (Easy to chew), Liquid Diet Level: Thin   Aspiration Precautions: Alternating bites/sips, Assistance with meals, Meds crushed in puree and Small bites/sips   General Precautions: Standard, aspiration  Communication strategies:  yes/no questions, increased time to answer, visual/verbal prompts    Subjective     Patient awake/alert and cooperative. Family at bedside.    Pain/Comfort:  · Pain Rating 1: 0/10    Respiratory Status: Room air    Objective:     Completed automatic speech tasks with 100% accuracy given moderate assistance (1-10, CHONG, ARISTIDES). Named objects with 60% accuracy provided maximum assistance. Able to complete automatic/simple phrases with 75% accuracy independently; improving to 100% accuracy given minimum assistance.    Reliable yes/no observed during session. Able to follow simple one-step commands.    Educated patient and family on communication strategies at the end of session. Family very eager to participate with ST and expressed good understanding of education.    Assessment:     Sammie Belcher is a 52 y.o. female presenting with expressive and receptive language deficits. Skilled SLP intervention is warranted to establish a reliable communication method.    Goals:   Multidisciplinary Problems     SLP Goals        Problem: SLP    Goal Priority Disciplines Outcome   SLP Goal     SLP    Description: LTG:   To increase expressive/receptive language skills to enhance functional communication to express basic wants and needs with 100% effectiveness.   ST. Answer personal yes/no questions with 90% accuracy.   2. Answer simple yes/no questions with 90% accuracy.   3. Complete automatic speech tasks with moderate cueing.    4. Name objects with 70% accuracy.   5. Complete simple phrases with moderate cueing.                      Plan:     · Patient to be seen:  5 x/week   · Plan of Care expires:     · Plan of Care reviewed with:  family, patient   · SLP Follow-Up:  Yes       Discharge recommendations:  rehabilitation facility   Barriers to Discharge: Skilled assistance needed    Time Tracking:     SLP Treatment Date:   05/23/22  Speech Start Time:  1335  Speech Stop Time:  1355     Speech Total Time (min):  20 min    Billable Minutes: Total Time 20 minutes    05/23/2022

## 2022-05-23 NOTE — PROGRESS NOTES
[facility] - 9th Floor Med Surg  Neurology  Progress Note    Patient Name: Sammie Belcher  MRN: [id number]  Admission Date: 5/18/2022  Hospital Length of Stay: 5 days  Code Status: Full Code   Attending Provider: Stacia Jovel MD  Primary Care Physician: Primary Doctor No   Principal Problem:<principal problem not specified>        Subjective:     Interval History:   Partner at bedside, patient is sleeping, he reports no new issues overnight, remains with fluctuating right sided weakness     Current Neurological Medications:     Current Facility-Administered Medications   Medication Dose Route Frequency Provider Last Rate Last Admin    amLODIPine tablet 5 mg  5 mg Oral Daily GARY LiveP   5 mg at 05/22/22 0850    aspirin EC tablet 81 mg  81 mg Oral Daily GARY LiveP   81 mg at 05/22/22 0850    atorvastatin tablet 40 mg  40 mg Oral Daily Veena Chung, FNP   40 mg at 05/22/22 0850    buPROPion TB24 tablet 150 mg  150 mg Oral Daily GARY LiveP   150 mg at 05/22/22 0850    carvediloL tablet 12.5 mg  12.5 mg Oral BID Brayan Trotter MD   12.5 mg at 05/22/22 2100    cloNIDine tablet 0.2 mg  0.2 mg Oral TID PRN SIRISHA Live   0.2 mg at 05/22/22 0559    dextrose 10% bolus 125 mL  12.5 g Intravenous PRN Chilo Lea MD        dextrose 10% bolus 250 mL  25 g Intravenous PRN Chilo Lea MD        enalaprilat injection 1.25 mg  1.25 mg Intravenous Q6H PRN SIRISHA Live        glucagon (human recombinant) injection 1 mg  1 mg Intramuscular PRN Chilo Lea MD        glucose chewable tablet 16 g  16 g Oral PRN Chilo Lea MD        glucose chewable tablet 24 g  24 g Oral PRN Chilo Lea MD        hydrALAZINE injection 10 mg  10 mg Intravenous Q4H PRN Brayan Trotter MD   10 mg at 05/21/22 0000    insulin aspart U-100 injection 1-10 Units  1-10 Units Subcutaneous QID (AC + HS) PRN Chilo Lea MD   4 Units at 05/22/22 1233    labetaloL injection 10 mg  10  mg Intravenous Q4H PRN Veena Chung, FNP        metroNIDAZOLE tablet 500 mg  500 mg Oral Q8H Marcelina Nguyen MD   500 mg at 05/23/22 0600    multivitamin tablet  1 tablet Oral Daily Brayan Trotter MD   1 tablet at 05/22/22 0850    nicotine 21 mg/24 hr 1 patch  1 patch Transdermal Daily Veena Chung, GARYP   1 patch at 05/22/22 0903    ondansetron injection 4 mg  4 mg Intravenous QID PRN Veena Chung, FNP   4 mg at 05/21/22 2104    sertraline tablet 25 mg  25 mg Oral Daily Veena Chung, FNP   25 mg at 05/22/22 0850    sodium chloride 0.9% flush 10 mL  10 mL Intravenous PRN Veena Chung, FNP        sodium chloride 0.9% flush 10 mL  10 mL Intravenous PRN Veena Chung, FNP        thiamine tablet 100 mg  100 mg Oral Daily Brayan Trotter MD   100 mg at 05/22/22 0850       Review of Systems   Unable to perform ROS: Acuity of condition   Objective:     Vital Signs (Most Recent):  Temp: 97.8 °F (36.6 °C) (05/23/22 0810)  Pulse: 72 (05/23/22 0810)  Resp: 18 (05/23/22 0810)  BP: (!) 163/90 (05/23/22 0810)  SpO2: 97 % (05/23/22 0810)   Vital Signs (24h Range):  Temp:  [97.4 °F (36.3 °C)-98.2 °F (36.8 °C)] 97.8 °F (36.6 °C)  Pulse:  [66-77] 72  Resp:  [16-18] 18  SpO2:  [97 %-99 %] 97 %  BP: (100-163)/(63-90) 163/90     Weight: 65 kg (143 lb 4.8 oz)  Body mass index is 18.99 kg/m².    Physical Exam  Vitals reviewed.   Constitutional:       General: She is sleeping. She is not in acute distress.  Eyes:      Extraocular Movements: EOM normal.      Pupils: Pupils are equal, round, and reactive to light.   Neurological:      Comments: Limited PE        NEUROLOGICAL EXAMINATION:     MENTAL STATUS        Remains aphasic      CRANIAL NERVES     CN II   Visual fields full to confrontation.     CN III, IV, VI   Pupils are equal, round, and reactive to light.  Extraocular motions are normal.   Right pupil: Shape: regular.   Left pupil: Shape: regular.     MOTOR EXAM        2/5 RUE, RLE      REFLEXES      Reflexes   Right ankle clonus: present (right ankle clonus)    Significant Labs:   Recent Lab Results         05/23/22  0317   05/22/22  1930   05/22/22  1612   05/22/22  1112        POCT Glucose 106   159   97   220               Significant Imaging:  no new imaging     Assessment and Plan:     CVA (cerebral vascular accident)  Stroke workup and Plan  CUS pending  Mri brain: Left KENISHA territory infarct   ECHO: BS negative; EF: 74%  LDL: 144  A1c: 5.2  CTA neck: 1. Moderate narrowing of the left common carotid artery. 2. Moderate to severe narrowing at the origin of the left vertebral artery.    Continue ASA and statin, could consider MARIO/LINQ if CUS negative   CUS has been completed, but there is not a reading, CUS completed on 5/19...there is still no result  LP performed by IR on 5/21, results reviewed by MD       Antithrombotics for secondary stroke prevention: Antiplatelets: Aspirin: 81 mg daily    Statins for secondary stroke prevention and hyperlipidemia, if present:   Statins: Atorvastatin- 40 mg daily    Aggressive risk factor modification: HTN, Smoking, DM     Rehab efforts: The patient has been evaluated by a stroke team provider and the therapy needs have been fully considered based off the presenting complaints and exam findings. The following therapy evaluations are needed: PM&R evaluate for appropriate placement    Diagnostics ordered/pending: Carotid ultrasound to assess vasculature, CT scan of head without contrast to asses brain parenchyma, CTA Neck/Arch to assess vasculature, HgbA1C to assess blood glucose levels, Lipid Profile to assess cholesterol levels, MRA head to assess vasculature, MRA neck/arch to assess vasculature, MRI head without contrast to assess brain parenchyma, TTE to assess cardiac function/status     VTE prophylaxis: Mechanical prophylaxis: Place SCDs    BP parameters: SBP less than 140         Further recommendations to follow from MD     VTE Risk Mitigation (From admission,  onward)         Ordered     IP VTE LOW RISK PATIENT  Once         05/18/22 1601     Place sequential compression device  Until discontinued         05/18/22 1601                Marleny Robles NP  Neurology  [facility] - 9th Floor Med Surg    --------------------------  Pt seen and examined by me today.   Still with R leg and arm weakness and severe aphasia, although reportedly said few words to family member, to me she is still non verbal, no to minimal receptive aphasia.     - the LP is not reveling, 5 WBC for 1500 RBC , to me there is no signs of CNS infection, VDRL in csf is pending though.   - MRA with Severe intracranial athero is present in multiples large intracranial vessels, MCA, KENISHA and PCA b/l.   - vasculitis is in the DDX as well, but with 5 WBC is less likely, that has been said, I if the CSF is positive the I will get an angio and treat her as vasculitis.     - CTA neck is unrevealing.   - MARIO and LINQ, if CSF VDRL is negative.   - continue asa 81 and atorva.   - BP< 140.   - DERRELL pending.

## 2022-05-23 NOTE — PT/OT/SLP PROGRESS
Physical Therapy         Treatment        Sammie Belcher   MRN: [id number]     PT Received On: 05/23/22  PT Start Time: 1431     PT Stop Time: 1455    PT Total Time (min): 24 min       Billable Minutes:  Gait [id number] and Therapeutic Activity 14  Total Minutes: 24       PT/PTA: PTA     PTA Visit Number: 3       General Precautions: Standard, aphasia (BP<220/120)  Orthopedic Precautions:     Braces:      Spiritual, Cultural Beliefs, Yazidism Practices, Values that Affect Care: no    Objective:  Patient found in bed upon entry.    Functional Mobility:  Bed Mobility:   Supine to sit: Minimal Assistance   Sit to supine: Minimal Assistance   Rolling: Minimal Assistance   Scooting: Minimal Assistance    Gait Training:  Patient gait trained 24 feet on level tile with Rolling Walker with Moderate Assistance. Pt presents with decreased Rory step length and increased R knee flexion in stance phase. Pt required assistance to advance RLE. Decreased R  noted. Impairments contributing to gait deviations include impaired balance, impaired coordination, impaired postural control and decreased strength    Additional Treatment:    Therapeutic Exercises   RLE: AAROM hip flexion, LAQ 2 X 10 reps with 2#    Strengthening/Endurance/Weight bearing   Pt performed sit-to-stand 2 x 5 reps. Pt assisted with push off and reach back with RUE for increased wb.    Gait/Balance   Pt used thayer railing to amb forward X 14ft. RLE assisted by PTA for correct degrees of RLE joints through gait cycle. Pt then assisted amb backwards.    Activity Tolerance:  Patient tolerated treatment well    Patient left HOB elevated with friend present.    Assessment:  Sammie Belcher is a 52 y.o. female with a medical diagnosis of CVA.    Rehab potential is excellent.    Activity tolerance: Excellent    Discharge recommendations: Discharge Facility/Level of Care Needs: rehabilitation facility     Equipment recommendations:       GOALS:   Multidisciplinary  Problems     Physical Therapy Goals        Problem: Physical Therapy    Goal Priority Disciplines Outcome Goal Variances Interventions   Physical Therapy Goal     PT, PT/OT Ongoing, Progressing     Description: Goals to be met by: 2022     Patient will increase functional independence with mobility by performin. Supine to sit with Stand-by Assistance  2. Sit to supine with Stand-by Assistance  3. Sit to stand transfer with Stand-by Assistance  4. Gait  x 300 feet with Contact Guard Assistance using Rolling Walker.   5. RLE strength to improve to 4/5 MMT.  6. LLE strength to improve to 5/5 MMT.                     PLAN:    Patient to be seen BID  to address the above listed problems via gait training, therapeutic activities, therapeutic exercises, neuromuscular re-education  Plan of Care expires: 22  Plan of Care reviewed with: patient         2022

## 2022-05-23 NOTE — PROGRESS NOTES
[facility] Medicine Progress Note        Chief Complaint: Inpatient Follow-up for     HPI:   Ms. Belcher is a 52-year-old female with past medical history of HTN, anxiety, depression, and hypothyroidism who presents to [facility] ED with complaints of dysarthria and expressive aphasia x3 days.  Patient's family member states that she has been having difficulty speaking for the past 3 days however today was worse so she was brought to the ED for further evaluation. Her sister at bedside states that she is unsure if she has been compliant with her home medications.  She does smoke marijuana daily with her significant other.  And drinks about a six-pack of beer every day.  No history of stroke.  She is not on aspirin.  Sister at bedside reports that she has had abnormal behavior over the past 3 days and has been having episodes of bladder and bowel incontinence and is very restless/fidgety.  No reported history of falls or back pain or any saddle anesthesia.  Patient is oriented x4 but is an extremely poor historian and it is very difficult to get a history from her.  She does report left-sided weakness and paresthesias x2 days     Upon arrival to outside facility the patient was markedly hypertensive but otherwise hemodynamically stable and afebrile. EKG: NSR with LVH. Labs unremarkable except for a reactive RPR, patient denies high risk sexual behavior, states unprotected sexual intercourse with significant other but she is in a monogamous relationships. CT Head showed left frontal 6 cm cortical base hypodensity suggesting an acute to subacute nonhemorrhagic infarct in the left KENISHA vascular territory. MRI Brain was then obtained confirming the left frontal lobe extending into the corpus callosum with an acute nonhemorrhagic infarct in the territory of the anterior cerebral artery, and old lacunar infarcts. She is being transferred to [facility] for further CVA workup.    Interval Hx:    Seen and examined the patient afebrile vitals stable hemodynamically stable denies pain or discomfort.      Objective/physical exam:  GENERAL: awake, alert, oriented and in no acute distress, non-toxic appearing   HEENT: normocephalic atraumatic   NECK: supple   LUNGS: Clear bilaterally, no wheezing or rales, no accessory muscle use   CVS: Regular rate and rhythm, normal peripheral perfusion  ABD: Soft, non-tender, non-distended, bowel sounds present  EXTREMITIES: no clubbing or cyanosis  SKIN: Warm, dry.   NEURO: Motor strength RUE 4/5, LUE 5/5; RLE 4/5, LLE 5/5, expressive aphasia and slurred speech,+ paresthesia to right face, RUE and RLE. Gait not assessed.   PSYCHIATRIC: Cooperative, restless in room    VITAL SIGNS: 24 HRS MIN & MAX LAST   Temp  Min: 97.4 °F (36.3 °C)  Max: 98.5 °F (36.9 °C) 98.5 °F (36.9 °C)   BP  Min: 131/84  Max: 163/90 (!) 145/90   Pulse  Min: 66  Max: 77  74   Resp  Min: 16  Max: 19 19   SpO2  Min: 97 %  Max: 99 % 99 %       Recent Labs   Lab 05/20/22  0405 05/21/22  0515 05/22/22  0346   WBC 9.1 10.7 8.2   RBC 4.51 4.60 4.36   HGB 13.8 14.4 13.6   HCT 43.1 42.4 42.2   MCV 95.6* 92.2 96.8*   MCH 30.6 31.3* 31.2*   MCHC 32.0* 34.0 32.2*   RDW 14.6 14.5 14.6    237 209   MPV 11.2 11.0 11.5       Recent Labs   Lab 05/19/22  0509 05/20/22  0405 05/21/22  0515 05/22/22  0346    140 140 140   K 3.4* 3.5 3.3* 3.2*   CO2 23 23 21* 26   BUN 10.7 9.0* 8.9* 11.2   CREATININE 0.79 0.80 0.77 0.80   CALCIUM 8.5 9.1 9.0 8.9   MG 2.00  --   --   --    ALBUMIN 3.7 3.6 3.6 3.3*   ALKPHOS 77 71 73 69   ALT 8 7 6 7   AST 13 12 14 13   BILITOT 0.7 0.6 0.5 0.5          Microbiology Results (last 7 days)     Procedure Component Value Units Date/Time    Cerebrospinal Fluid Culture OL [[id number]] Collected: 05/21/22 1138    Order Status: Completed Specimen: CSF (Spinal Fluid) Updated: 05/23/22 0932     CULTURE, CSF ([facility]) No Growth At 48 Hours    Gram Stain OL [[id number]] Collected: 05/21/22 1138     Order Status: Completed Specimen: CSF (Spinal Fluid) from Cerebrospinal Fluid Updated: 05/21/22 1454     GRAM STAIN No WBCs, No bacteria seen     Gram stain [[id number]] Collected: 05/21/22 1052    Order Status: Canceled Specimen: CSF (Spinal Fluid) Updated: 05/21/22 1124    CSF culture [[id number]] Collected: 05/21/22 1052    Order Status: Canceled Specimen: CSF (Spinal Fluid) Updated: 05/21/22 1124    Chlamydia/GC, PCR [[id number]]  (Normal) Collected: 05/19/22 1553    Order Status: Completed Specimen: Urine Updated: 05/19/22 1747     Chlamydia trachomatis PCR Not Detected     N. gonorrhea PCR Not Detected           See below for Radiology    Scheduled Med:   amLODIPine  5 mg Oral Daily    aspirin  81 mg Oral Daily    atorvastatin  40 mg Oral Daily    buPROPion  150 mg Oral Daily    carvediloL  12.5 mg Oral BID    metroNIDAZOLE  500 mg Oral Q8H    multivitamin  1 tablet Oral Daily    nicotine  1 patch Transdermal Daily    sertraline  25 mg Oral Daily    thiamine  100 mg Oral Daily        Continuous Infusions:       PRN Meds:  cloNIDine, dextrose 10%, dextrose 10%, enalaprilat, glucagon (human recombinant), glucose, glucose, hydrALAZINE, insulin aspart U-100, labetalol, ondansetron, sodium chloride 0.9%, sodium chloride 0.9%       Assessment/Plan:  Acute Non-hemorrhagic CVA - left KENISHA vascular territory  Dysarthria and Expressive aphasia 2/2 above  HTN Urgency; Hx Essential HTN (uncontrolled)  HLD, new onset  Abnormal Behavior with reports of bladder and bowel incontinence  Reactive RPR  Trichomonas   Hypothyroidism  THC Use Disorder   Alcohol Use Disorder        PLAN:   - LP with 5 WBC with lymphocytes 60%,   - discuss with the lab waiting for VDRL results from CSF analysis.    Id was consulted appreciate recommendations on possible neurosyphilis.   MRA showed moderate severe multifocal flow signal abnormality about the bilateral ACAs bilateral distal MCAs and to lesser extent bilateral PCAs,   - MRI  showed a frontal lobe extending into the corpus callosum acute nonhemorrhagic infarct  frontal cerebral artery or lacunar infarcts chronic microangiopathic ischemia and atrophy.   -vascular neurology suggested possibility of syphilis involvement in the stroke.  Discussed with neurology today removal of the plan of LP, if she has lymphocytic pleocytosis and elevated protein level most likely suggest no surface and we will proceed with treatment.  -We will order INR PT PTT as stat.   - patient drinks 6+ beer every day and also some liquor,   - Syphilis ab positive. was penicillin G2 point 4,000,000 units IM for syphilis.   - echo is EF 74% with severe concentric hypertrophy and hyperdynamic systolic function with negative bubble study.   - aspirin and atorvastatin         Chilo Lea MD   05/23/2022

## 2022-05-23 NOTE — ASSESSMENT & PLAN NOTE
Stroke workup and Plan  CUS pending  Mri brain: Left KENISHA territory infarct   ECHO: BS negative; EF: 74%  LDL: 144  A1c: 5.2  CTA neck: 1. Moderate narrowing of the left common carotid artery. 2. Moderate to severe narrowing at the origin of the left vertebral artery.    Continue ASA and statin, could consider MARIO/LINQ if CUS negative   CUS has been completed, but there is not a reading, CUS completed on 5/19...there is still no result  LP performed by IR on 5/21, results reviewed by MD       Antithrombotics for secondary stroke prevention: Antiplatelets: Aspirin: 81 mg daily    Statins for secondary stroke prevention and hyperlipidemia, if present:   Statins: Atorvastatin- 40 mg daily    Aggressive risk factor modification: HTN, Smoking, DM     Rehab efforts: The patient has been evaluated by a stroke team provider and the therapy needs have been fully considered based off the presenting complaints and exam findings. The following therapy evaluations are needed: PM&R evaluate for appropriate placement    Diagnostics ordered/pending: Carotid ultrasound to assess vasculature, CT scan of head without contrast to asses brain parenchyma, CTA Neck/Arch to assess vasculature, HgbA1C to assess blood glucose levels, Lipid Profile to assess cholesterol levels, MRA head to assess vasculature, MRA neck/arch to assess vasculature, MRI head without contrast to assess brain parenchyma, TTE to assess cardiac function/status     VTE prophylaxis: Mechanical prophylaxis: Place SCDs    BP parameters: SBP less than 140

## 2022-05-23 NOTE — PT/OT/SLP PROGRESS
Occupational Therapy  Treatment    Sammie Belcher   MRN: [id number]   Admitting Diagnosis: <principal problem not specified>    OT Date of Treatment: 05/23/22   OT Start Time: 0947  OT Stop Time: 1011  OT Total Time (min): 24 min     Billable Minutes:  Therapeutic Activity 2  Total Minutes: 24     OT/BETH: BETH     BETH Visit Number: 2    General Precautions: Standard, aspiration  Orthopedic Precautions:    Braces:           Subjective:  Communicated with RN prior to session.  Alert  Distracted    Objective: Pt. Requiring SBA-CGA for sitting balance EOB, slight posterior lean noted. Pt. Performing grooming task (washing face) with R UE for excursion to face. Pt. Performing stand step t/f from EOB to BS chair HHA x 2 cueing required for safety.  Patient found with: SCD    Functional Mobility:  Bed Mobility:   Supine to sit: Minimal Assistance   Sit to supine: Minimal Assistance   Rolling: Minimal Assistance   Scooting: Moderate Assistance    Transfer Training:   Sit to stand:Minimal Assistance with HHA .    UE Dressing:  Patient performed UE Dressing with Minimal Assistance with donning dressing through B UE and overhead. at sitting in BS chair.    Balance:   Static Sit: CGA  Dynamic Sit:  CGA  Static Stand: Min A  Dynamic stand: Mod A    Patient left up in chair with all lines intact, call button in reach and chair alarm on    ASSESSMENT:  Sammie Belcher is a 52 y.o. female with a medical diagnosis of <principal problem not specified>. Pt. Tolerated session well overall requiring Mod-Min A for mobility.    Rehab potential is good    Activity tolerance: Good    Discharge recommendations: rehabilitation facility     Equipment recommendations: walker, rolling     GOALS:   Multidisciplinary Problems     Occupational Therapy Goals        Problem: Occupational Therapy    Goal Priority Disciplines Outcome Interventions   Occupational Therapy Goal     OT, PT/OT Ongoing, Progressing    Description: Goals to be met by:  6/9/22    Patient will increase functional independence with ADLs by performing:    UE Dressing with Modified [place].  LE Dressing with Modified [place].  Grooming while seated with Modified [place].  Toileting from toilet with Modified [place] for hygiene and clothing management.   Toilet transfer to toilet with Modified [place].                     Plan:  Patient to be seen 5 x/week, 6 x/week to address the above listed problems via self-care/home management, therapeutic activities, therapeutic exercises  Plan of Care expires: 06/18/22  Plan of Care reviewed with: patient         05/23/2022

## 2022-05-23 NOTE — PLAN OF CARE
Attempted to speak with fmly and pt re: rehab placement - pt was alone so I will go back when fmly available as well.     1345  Went to room and OT working with pt- she has 3 family members at bedside.  Discussed rehab placement as next step.  They would like a list of facilities as pt has children and want to discuss options with them.  List of rehab facilities provided.

## 2022-05-23 NOTE — SUBJECTIVE & OBJECTIVE
Subjective:     Interval History:   Partner at bedside, patient is sleeping, he reports no new issues overnight, remains with fluctuating right sided weakness     Current Neurological Medications:     Current Facility-Administered Medications   Medication Dose Route Frequency Provider Last Rate Last Admin    amLODIPine tablet 5 mg  5 mg Oral Daily GARY LiveP   5 mg at 05/22/22 0850    aspirin EC tablet 81 mg  81 mg Oral Daily GARY LiveP   81 mg at 05/22/22 0850    atorvastatin tablet 40 mg  40 mg Oral Daily GARY LiveP   40 mg at 05/22/22 0850    buPROPion TB24 tablet 150 mg  150 mg Oral Daily GARY LiveP   150 mg at 05/22/22 0850    carvediloL tablet 12.5 mg  12.5 mg Oral BID Brayan Trotter MD   12.5 mg at 05/22/22 2100    cloNIDine tablet 0.2 mg  0.2 mg Oral TID PRN SIRISHA Live   0.2 mg at 05/22/22 0559    dextrose 10% bolus 125 mL  12.5 g Intravenous PRN Chilo Lea MD        dextrose 10% bolus 250 mL  25 g Intravenous PRN Chilo Lea MD        enalaprilat injection 1.25 mg  1.25 mg Intravenous Q6H PRN SIRISHA Live        glucagon (human recombinant) injection 1 mg  1 mg Intramuscular PRN Chilo Lea MD        glucose chewable tablet 16 g  16 g Oral PRN Chilo Lea MD        glucose chewable tablet 24 g  24 g Oral PRN Chilo Lea MD        hydrALAZINE injection 10 mg  10 mg Intravenous Q4H PRN Brayan Trotter MD   10 mg at 05/21/22 0000    insulin aspart U-100 injection 1-10 Units  1-10 Units Subcutaneous QID (AC + HS) PRN Chilo Lea MD   4 Units at 05/22/22 1233    labetaloL injection 10 mg  10 mg Intravenous Q4H PRN SIRISHA Live        metroNIDAZOLE tablet 500 mg  500 mg Oral Q8H Marcelina Nguyen MD   500 mg at 05/23/22 0600    multivitamin tablet  1 tablet Oral Daily Brayan Trotter MD   1 tablet at 05/22/22 0850    nicotine 21 mg/24 hr 1 patch  1 patch Transdermal Daily SIRISHA Live   1 patch at 05/22/22 0903    ondansetron  injection 4 mg  4 mg Intravenous QID PRN Veena Chung, GARYP   4 mg at 05/21/22 2104    sertraline tablet 25 mg  25 mg Oral Daily Veena Chung, FNP   25 mg at 05/22/22 0850    sodium chloride 0.9% flush 10 mL  10 mL Intravenous PRN Veena Chung, FNP        sodium chloride 0.9% flush 10 mL  10 mL Intravenous PRN Veena Chung, FNP        thiamine tablet 100 mg  100 mg Oral Daily Brayan Trotter MD   100 mg at 05/22/22 0850       Review of Systems   Unable to perform ROS: Acuity of condition   Objective:     Vital Signs (Most Recent):  Temp: 97.8 °F (36.6 °C) (05/23/22 0810)  Pulse: 72 (05/23/22 0810)  Resp: 18 (05/23/22 0810)  BP: (!) 163/90 (05/23/22 0810)  SpO2: 97 % (05/23/22 0810)   Vital Signs (24h Range):  Temp:  [97.4 °F (36.3 °C)-98.2 °F (36.8 °C)] 97.8 °F (36.6 °C)  Pulse:  [66-77] 72  Resp:  [16-18] 18  SpO2:  [97 %-99 %] 97 %  BP: (100-163)/(63-90) 163/90     Weight: 65 kg (143 lb 4.8 oz)  Body mass index is 18.99 kg/m².    Physical Exam  Vitals reviewed.   Constitutional:       General: She is sleeping. She is not in acute distress.  Eyes:      Extraocular Movements: EOM normal.      Pupils: Pupils are equal, round, and reactive to light.   Neurological:      Comments: Limited PE        NEUROLOGICAL EXAMINATION:     MENTAL STATUS        Remains aphasic      CRANIAL NERVES     CN II   Visual fields full to confrontation.     CN III, IV, VI   Pupils are equal, round, and reactive to light.  Extraocular motions are normal.   Right pupil: Shape: regular.   Left pupil: Shape: regular.     MOTOR EXAM        2/5 RUE, RLE      REFLEXES     Reflexes   Right ankle clonus: present (right ankle clonus)    Significant Labs:   Recent Lab Results         05/23/22  0317   05/22/22  1930   05/22/22  1612   05/22/22  1112        POCT Glucose 106   159   97   220               Significant Imaging:  no new imaging

## 2022-05-23 NOTE — PLAN OF CARE
Problem: Cerebral Tissue Perfusion (Stroke, Ischemic/Transient Ischemic Attack)  Goal: Optimal Cerebral Tissue Perfusion  Outcome: Ongoing, Progressing     Problem: Cognitive Impairment (Stroke, Ischemic/Transient Ischemic Attack)  Goal: Optimal Cognitive Function  Outcome: Ongoing, Progressing     Problem: Communication Impairment (Stroke, Ischemic/Transient Ischemic Attack)  Goal: Improved Communication Skills  Outcome: Ongoing, Progressing     Problem: Functional Ability Impaired (Stroke, Ischemic/Transient Ischemic Attack)  Goal: Optimal Functional Ability  Outcome: Ongoing, Progressing     Problem: Sensorimotor Impairment (Stroke, Ischemic/Transient Ischemic Attack)  Goal: Improved Sensorimotor Function  Outcome: Ongoing, Progressing     Problem: Functional Ability Impaired (Stroke, Ischemic/Transient Ischemic Attack)  Goal: Optimal Functional Ability  Outcome: Ongoing, Progressing     Problem: Sensorimotor Impairment (Stroke, Ischemic/Transient Ischemic Attack)  Goal: Improved Sensorimotor Function  Outcome: Ongoing, Progressing     Problem: Adult Inpatient Plan of Care  Goal: Patient-Specific Goal (Individualized)  Outcome: Ongoing, Progressing     Problem: Adult Inpatient Plan of Care  Goal: Patient-Specific Goal (Individualized)  Outcome: Ongoing, Progressing  Goal: Optimal Comfort and Wellbeing  Outcome: Ongoing, Progressing  Goal: Readiness for Transition of Care  Outcome: Ongoing, Progressing

## 2022-05-23 NOTE — PLAN OF CARE
Problem: Infection  Goal: Absence of Infection Signs and Symptoms  Outcome: Ongoing, Progressing     Problem: Cerebral Tissue Perfusion (Stroke, Ischemic/Transient Ischemic Attack)  Goal: Optimal Cerebral Tissue Perfusion  5/22/2022 2342 by Idalia Leung RN  Outcome: Ongoing, Progressing  5/22/2022 2234 by Idalia Leung RN  Outcome: Ongoing, Progressing     Problem: Cognitive Impairment (Stroke, Ischemic/Transient Ischemic Attack)  Goal: Optimal Cognitive Function  5/22/2022 2342 by Idalia Leung RN  Outcome: Ongoing, Progressing  5/22/2022 2234 by Idalia Leung RN  Outcome: Ongoing, Progressing     Problem: Communication Impairment (Stroke, Ischemic/Transient Ischemic Attack)  Goal: Improved Communication Skills  5/22/2022 2342 by Idalia Leung RN  Outcome: Ongoing, Progressing  5/22/2022 2234 by Idalia Leung RN  Outcome: Ongoing, Progressing     Problem: Functional Ability Impaired (Stroke, Ischemic/Transient Ischemic Attack)  Goal: Optimal Functional Ability  5/22/2022 2342 by Idalia Leung RN  Outcome: Ongoing, Progressing  5/22/2022 2234 by Idalia Leung RN  Outcome: Ongoing, Progressing

## 2022-05-24 LAB
ALBUMIN SERPL-MCNC: 3.4 GM/DL (ref 3.5–5)
ALBUMIN/GLOB SERPL: 1.2 RATIO (ref 1.1–2)
ALP SERPL-CCNC: 70 UNIT/L (ref 40–150)
ALT SERPL-CCNC: 8 UNIT/L (ref 0–55)
AST SERPL-CCNC: 16 UNIT/L (ref 5–34)
BASOPHILS # BLD AUTO: 0.06 X10(3)/MCL (ref 0–0.2)
BASOPHILS NFR BLD AUTO: 0.7 %
BILIRUBIN DIRECT+TOT PNL SERPL-MCNC: 0.5 MG/DL
BUN SERPL-MCNC: 14.7 MG/DL (ref 9.8–20.1)
CALCIUM SERPL-MCNC: 9.6 MG/DL (ref 8.4–10.2)
CHLORIDE SERPL-SCNC: 106 MMOL/L (ref 98–107)
CO2 SERPL-SCNC: 30 MMOL/L (ref 22–29)
CREAT SERPL-MCNC: 0.77 MG/DL (ref 0.55–1.02)
EOSINOPHIL # BLD AUTO: 0.34 X10(3)/MCL (ref 0–0.9)
EOSINOPHIL NFR BLD AUTO: 4 %
ERYTHROCYTE [DISTWIDTH] IN BLOOD BY AUTOMATED COUNT: 14.3 % (ref 11.5–17)
GLOBULIN SER-MCNC: 2.8 GM/DL (ref 2.4–3.5)
GLUCOSE SERPL-MCNC: 89 MG/DL (ref 74–100)
HBV CORE IGM SERPL QL IA: NONREACTIVE
HBV SURFACE AG SERPL QL IA: NONREACTIVE
HCT VFR BLD AUTO: 44.4 % (ref 37–47)
HGB BLD-MCNC: 14.2 GM/DL (ref 12–16)
HSV1 DNA CSF QL NAA+PROBE: NEGATIVE
HSV2 DNA CSF QL NAA+PROBE: NEGATIVE
IMM GRANULOCYTES # BLD AUTO: 0.03 X10(3)/MCL (ref 0–0.02)
IMM GRANULOCYTES NFR BLD AUTO: 0.4 % (ref 0–0.43)
LYMPHOCYTES # BLD AUTO: 2.69 X10(3)/MCL (ref 0.6–4.6)
LYMPHOCYTES NFR BLD AUTO: 31.9 %
MAYO GENERIC ORDERABLE RESULT: NORMAL
MCH RBC QN AUTO: 30.3 PG (ref 27–31)
MCHC RBC AUTO-ENTMCNC: 32 MG/DL (ref 33–36)
MCV RBC AUTO: 94.9 FL (ref 80–94)
MONOCYTES # BLD AUTO: 0.66 X10(3)/MCL (ref 0.1–1.3)
MONOCYTES NFR BLD AUTO: 7.8 %
NEUTROPHILS # BLD AUTO: 4.7 X10(3)/MCL (ref 2.1–9.2)
NEUTROPHILS NFR BLD AUTO: 55.2 %
NRBC BLD AUTO-RTO: 0 %
PATHOLOGIST REVIEW: NORMAL
PLATELET # BLD AUTO: 223 X10(3)/MCL (ref 130–400)
PMV BLD AUTO: 10.8 FL (ref 9.4–12.4)
POCT GLUCOSE: 120 MG/DL (ref 70–110)
POCT GLUCOSE: 162 MG/DL (ref 70–110)
POCT GLUCOSE: 94 MG/DL (ref 70–110)
POCT GLUCOSE: 98 MG/DL (ref 70–110)
POTASSIUM SERPL-SCNC: 4.2 MMOL/L (ref 3.5–5.1)
PROT SERPL-MCNC: 6.2 GM/DL (ref 6.4–8.3)
RBC # BLD AUTO: 4.68 X10(6)/MCL (ref 4.2–5.4)
SODIUM SERPL-SCNC: 142 MMOL/L (ref 136–145)
WBC # SPEC AUTO: 8.4 X10(3)/MCL (ref 4.5–11.5)

## 2022-05-24 PROCEDURE — 97535 SELF CARE MNGMENT TRAINING: CPT

## 2022-05-24 PROCEDURE — 25000003 PHARM REV CODE 250: Performed by: INTERNAL MEDICINE

## 2022-05-24 PROCEDURE — 85025 COMPLETE CBC W/AUTO DIFF WBC: CPT | Performed by: STUDENT IN AN ORGANIZED HEALTH CARE EDUCATION/TRAINING PROGRAM

## 2022-05-24 PROCEDURE — 36415 COLL VENOUS BLD VENIPUNCTURE: CPT | Performed by: STUDENT IN AN ORGANIZED HEALTH CARE EDUCATION/TRAINING PROGRAM

## 2022-05-24 PROCEDURE — 63600175 PHARM REV CODE 636 W HCPCS: Performed by: STUDENT IN AN ORGANIZED HEALTH CARE EDUCATION/TRAINING PROGRAM

## 2022-05-24 PROCEDURE — 11000001 HC ACUTE MED/SURG PRIVATE ROOM

## 2022-05-24 PROCEDURE — 92507 TX SP LANG VOICE COMM INDIV: CPT

## 2022-05-24 PROCEDURE — 97116 GAIT TRAINING THERAPY: CPT | Mod: CQ

## 2022-05-24 PROCEDURE — 25000003 PHARM REV CODE 250: Performed by: STUDENT IN AN ORGANIZED HEALTH CARE EDUCATION/TRAINING PROGRAM

## 2022-05-24 PROCEDURE — S4991 NICOTINE PATCH NONLEGEND: HCPCS | Performed by: NURSE PRACTITIONER

## 2022-05-24 PROCEDURE — 97530 THERAPEUTIC ACTIVITIES: CPT | Mod: CQ

## 2022-05-24 PROCEDURE — 94761 N-INVAS EAR/PLS OXIMETRY MLT: CPT

## 2022-05-24 PROCEDURE — 25000003 PHARM REV CODE 250: Performed by: NURSE PRACTITIONER

## 2022-05-24 PROCEDURE — 80053 COMPREHEN METABOLIC PANEL: CPT | Performed by: STUDENT IN AN ORGANIZED HEALTH CARE EDUCATION/TRAINING PROGRAM

## 2022-05-24 RX ORDER — ENOXAPARIN SODIUM 100 MG/ML
40 INJECTION SUBCUTANEOUS EVERY 24 HOURS
Status: DISCONTINUED | OUTPATIENT
Start: 2022-05-24 | End: 2022-06-02 | Stop reason: HOSPADM

## 2022-05-24 RX ORDER — AMLODIPINE BESYLATE 5 MG/1
5 TABLET ORAL DAILY
Status: DISCONTINUED | OUTPATIENT
Start: 2022-05-24 | End: 2022-05-24

## 2022-05-24 RX ORDER — ASPIRIN 325 MG
325 TABLET, DELAYED RELEASE (ENTERIC COATED) ORAL DAILY
Status: DISCONTINUED | OUTPATIENT
Start: 2022-05-25 | End: 2022-06-02 | Stop reason: HOSPADM

## 2022-05-24 RX ORDER — AMLODIPINE BESYLATE 5 MG/1
10 TABLET ORAL DAILY
Status: DISCONTINUED | OUTPATIENT
Start: 2022-05-24 | End: 2022-06-02 | Stop reason: HOSPADM

## 2022-05-24 RX ADMIN — ENOXAPARIN SODIUM 40 MG: 40 INJECTION SUBCUTANEOUS at 05:05

## 2022-05-24 RX ADMIN — METRONIDAZOLE 500 MG: 500 TABLET ORAL at 03:05

## 2022-05-24 RX ADMIN — BUPROPION HYDROCHLORIDE 150 MG: 150 TABLET, FILM COATED, EXTENDED RELEASE ORAL at 09:05

## 2022-05-24 RX ADMIN — NICOTINE 1 PATCH: 21 PATCH, EXTENDED RELEASE TRANSDERMAL at 09:05

## 2022-05-24 RX ADMIN — AMLODIPINE BESYLATE 10 MG: 5 TABLET ORAL at 09:05

## 2022-05-24 RX ADMIN — ASPIRIN 81 MG: 81 TABLET, COATED ORAL at 09:05

## 2022-05-24 RX ADMIN — CARVEDILOL 12.5 MG: 12.5 TABLET, FILM COATED ORAL at 08:05

## 2022-05-24 RX ADMIN — MULTIPLE VITAMINS W/ MINERALS TAB 1 TABLET: TAB at 09:05

## 2022-05-24 RX ADMIN — ATORVASTATIN CALCIUM 40 MG: 40 TABLET, FILM COATED ORAL at 09:05

## 2022-05-24 RX ADMIN — METRONIDAZOLE 500 MG: 500 TABLET ORAL at 08:05

## 2022-05-24 RX ADMIN — METRONIDAZOLE 500 MG: 500 TABLET ORAL at 06:05

## 2022-05-24 RX ADMIN — THIAMINE HCL TAB 100 MG 100 MG: 100 TAB at 09:05

## 2022-05-24 RX ADMIN — SERTRALINE 25 MG: 25 TABLET, FILM COATED ORAL at 09:05

## 2022-05-24 RX ADMIN — CARVEDILOL 12.5 MG: 12.5 TABLET, FILM COATED ORAL at 09:05

## 2022-05-24 NOTE — PT/OT/SLP PROGRESS
Speech Language Pathology Treatment    Patient Name:  Sammie Belcher   MRN:  [id number]  Admitting Diagnosis: CVA    Recommendations:                 General Recommendations:  Speech/language therapy  Communication strategies:  yes/no questions only and provide increased time to answer    Subjective     Patient awake, alert and cooperative.    Patient goals: to go home     Pain/Comfort:  · Pain Rating 1: 0/10    Respiratory Status: Room air    Objective:     Has the patient been evaluated by SLP for swallowing?   Yes  Keep patient NPO? No     Yes/No Questions:  Simple: 87%    Phrase completion: 93%    Confrontation Naming  Objects: 90%    Following Directions:  1-Step: 100%    Automatic Speech:  Singing: common melodies with 80% accuracy  Days of the week: 0%  Countin-10 independently      Assessment:     Pt continues to present with receptive and expressive language impairments.    Goals:   Multidisciplinary Problems     SLP Goals        Problem: SLP    Goal Priority Disciplines Outcome   SLP Goal     SLP    Description: LTG:   To increase expressive/receptive language skills to enhance functional communication to express basic wants and needs with 100% effectiveness.   ST. Answer personal yes/no questions with 90% accuracy.   2. Answer simple yes/no questions with 90% accuracy.   3. Complete automatic speech tasks with moderate cueing.   4. Name objects with 70% accuracy.   5. Complete simple phrases with moderate cueing.                      Plan:     · Patient to be seen:  5 x/week   · Plan of Care expires:     · Plan of Care reviewed with:  patient, spouse   · SLP Follow-Up:  Yes       Discharge recommendations:  rehabilitation facility   Barriers to Discharge:  Level of Skilled Assistance Needed      Time Tracking:     SLP Treatment Date:   22  Speech Start Time:  1447  Speech Stop Time:  1502     Speech Total Time (min):  15 min    Billable Minutes: Speech Therapy Individual 15  minutes    05/24/2022

## 2022-05-24 NOTE — PROGRESS NOTES
[facility] Medicine Progress Note        Chief Complaint: Inpatient Follow-up for     HPI:   Ms. Belcher is a 52-year-old female with past medical history of HTN, anxiety, depression, and hypothyroidism who presents to [facility] ED with complaints of dysarthria and expressive aphasia x3 days.  Patient's family member states that she has been having difficulty speaking for the past 3 days however today was worse so she was brought to the ED for further evaluation. Her sister at bedside states that she is unsure if she has been compliant with her home medications.  She does smoke marijuana daily with her significant other.  And drinks about a six-pack of beer every day.  No history of stroke.  She is not on aspirin.  Sister at bedside reports that she has had abnormal behavior over the past 3 days and has been having episodes of bladder and bowel incontinence and is very restless/fidgety.  No reported history of falls or back pain or any saddle anesthesia.  Patient is oriented x4 but is an extremely poor historian and it is very difficult to get a history from her.  She does report left-sided weakness and paresthesias x2 days     Upon arrival to outside facility the patient was markedly hypertensive but otherwise hemodynamically stable and afebrile. EKG: NSR with LVH. Labs unremarkable except for a reactive RPR, patient denies high risk sexual behavior, states unprotected sexual intercourse with significant other but she is in a monogamous relationships. CT Head showed left frontal 6 cm cortical base hypodensity suggesting an acute to subacute nonhemorrhagic infarct in the left KENISHA vascular territory. MRI Brain was then obtained confirming the left frontal lobe extending into the corpus callosum with an acute nonhemorrhagic infarct in the territory of the anterior cerebral artery, and old lacunar infarcts. She is being transferred to [facility] for further CVA workup.    Interval Hx:    Seen and examined the patient afebrile vitals stable hemodynamically stable denies pain or discomfort.  No new neurological symptoms.     Objective/physical exam:  GENERAL: awake, alert, oriented and in no acute distress, non-toxic appearing   HEENT: normocephalic atraumatic   NECK: supple   LUNGS: Clear bilaterally, no wheezing or rales, no accessory muscle use   CVS: Regular rate and rhythm, normal peripheral perfusion  ABD: Soft, non-tender, non-distended, bowel sounds present  EXTREMITIES: no clubbing or cyanosis  SKIN: Warm, dry.   NEURO: Motor strength RUE 4/5, LUE 5/5; RLE 4/5, LLE 5/5, expressive aphasia and slurred speech,+ paresthesia to right face, RUE and RLE. Gait not assessed.   PSYCHIATRIC: Cooperative, restless in room    VITAL SIGNS: 24 HRS MIN & MAX LAST   Temp  Min: 97.4 °F (36.3 °C)  Max: 98.8 °F (37.1 °C) 97.4 °F (36.3 °C)   BP  Min: 143/91  Max: 191/102 (!) 155/80   Pulse  Min: 69  Max: 82  71   Resp  Min: 18  Max: 20 18   SpO2  Min: 97 %  Max: 100 % 100 %       Recent Labs   Lab 05/21/22  0515 05/22/22  0346 05/24/22  0402   WBC 10.7 8.2 8.4   RBC 4.60 4.36 4.68   HGB 14.4 13.6 14.2   HCT 42.4 42.2 44.4   MCV 92.2 96.8* 94.9*   MCH 31.3* 31.2* 30.3   MCHC 34.0 32.2* 32.0*   RDW 14.5 14.6 14.3    209 223   MPV 11.0 11.5 10.8       Recent Labs   Lab 05/19/22  0509 05/20/22  0405 05/21/22  0515 05/22/22  0346 05/24/22  0402      < > 140 140 142   K 3.4*   < > 3.3* 3.2* 4.2   CO2 23   < > 21* 26 30*   BUN 10.7   < > 8.9* 11.2 14.7   CREATININE 0.79   < > 0.77 0.80 0.77   CALCIUM 8.5   < > 9.0 8.9 9.6   MG 2.00  --   --   --   --    ALBUMIN 3.7   < > 3.6 3.3* 3.4*   ALKPHOS 77   < > 73 69 70   ALT 8   < > 6 7 8   AST 13   < > 14 13 16   BILITOT 0.7   < > 0.5 0.5 0.5    < > = values in this interval not displayed.          Microbiology Results (last 7 days)     Procedure Component Value Units Date/Time    Cerebrospinal Fluid Culture [facility] [[id number]] Collected: 05/21/22 1132    Order  Status: Completed Specimen: CSF (Spinal Fluid) Updated: 05/24/22 0823     CULTURE, CSF ([facility]) No Growth At 72 Hours    Gram Stain [facility] [[id number]] Collected: 05/21/22 1135    Order Status: Completed Specimen: CSF (Spinal Fluid) from Cerebrospinal Fluid Updated: 05/21/22 1454     GRAM STAIN No WBCs, No bacteria seen     Gram stain [[id number]] Collected: 05/21/22 1052    Order Status: Canceled Specimen: CSF (Spinal Fluid) Updated: 05/21/22 1124    CSF culture [[id number]] Collected: 05/21/22 1052    Order Status: Canceled Specimen: CSF (Spinal Fluid) Updated: 05/21/22 1124    Chlamydia/GC, PCR [[id number]]  (Normal) Collected: 05/19/22 1553    Order Status: Completed Specimen: Urine Updated: 05/19/22 1747     Chlamydia trachomatis PCR Not Detected     N. gonorrhea PCR Not Detected           See below for Radiology    Scheduled Med:   amLODIPine  10 mg Oral Daily    aspirin  81 mg Oral Daily    atorvastatin  40 mg Oral Daily    buPROPion  150 mg Oral Daily    carvediloL  12.5 mg Oral BID    metroNIDAZOLE  500 mg Oral Q8H    multivitamin  1 tablet Oral Daily    nicotine  1 patch Transdermal Daily    sertraline  25 mg Oral Daily    thiamine  100 mg Oral Daily        Continuous Infusions:       PRN Meds:  cloNIDine, dextrose 10%, dextrose 10%, enalaprilat, glucagon (human recombinant), glucose, glucose, hydrALAZINE, insulin aspart U-100, labetalol, ondansetron, sodium chloride 0.9%, sodium chloride 0.9%       Assessment/Plan:  Acute Non-hemorrhagic CVA - left KENISHA vascular territory  Dysarthria and Expressive aphasia 2/2 above  HTN Urgency; Hx Essential HTN (uncontrolled)  HLD, new onset  Abnormal Behavior with reports of bladder and bowel incontinence  Reactive RPR  Trichomonas   Hypothyroidism  THC Use Disorder   Alcohol Use Disorder        PLAN:       Pending VDRL in CFS, vasculitis is less likely per neurology and pending DERRELL, ANCA analysis.   - LINQ and MARIO if VDRL is negative per neurology.   MRA showed  moderate severe multifocal flow signal abnormality about the bilateral ACAs bilateral distal MCAs and to lesser extent bilateral PCAs,   - MRI showed a frontal lobe extending into the corpus callosum acute nonhemorrhagic infarct  frontal cerebral artery or lacunar infarcts chronic microangiopathic ischemia and atrophy.   -vascular neurology suggested possibility of syphilis involvement in the stroke.    - Syphilis ab positive. was penicillin G2 point 4,000,000 units IM for syphilis.   - echo is EF 74% with severe concentric hypertrophy and hyperdynamic systolic function with negative bubble study.   - aspirin and atorvastatin         Chilo Lea MD   05/24/2022

## 2022-05-24 NOTE — PROGRESS NOTES
Infectious Diseases Progress Note  52-year-old female with past medical history of depression and anxiety, hypothyroidism, HTN, is admitted to [facility] on 05/18/2022, brought in through the ED with complaints of dysarthria and expressive aphasia of about 3 day duration.  She was extensively evaluated and noted to have no fevers but had slight leukocytosis of 12.2.  Urine toxicology was positive for cannabis.  Urinalysis was abnormal with small leukocyte history is few bacteria, few Trichomonas and 0-2 wbc's.  She was noted to have some syphilis antibody reactive and RPR 1:1.  She is nonverbal and unable to provide any significant history and hence history obtained from her  of 4 years.  He denies any knowledge of previous syphilis diagnosis and tells me he has tested himself since finding was told of his wife's test result and was told his test was negative.  She has had extensive neuroimaging studies with MRI of the brain showing left frontal acute CVA in the territory of anterior carotid artery.  MRA of brain showed moderate to severe multifocal flow signal abnormalities.  MRA of the neck showed no large vessel occlusion or critical stenosis.  CTA of neck done today 5/20 showed moderate narrowing of left CCA moderate to severe narrowing of left vertebral artery.   She is currently on Flagyl    Subjective:  Lying in bed, no new complaints voiced. Afebrile.     ROS  Unable to perform ROS: Patient nonverbal     Review of patient's allergies indicates:  No Known Allergies    Past Medical History:   Diagnosis Date    Hypertension     Thyroid disease        Past Surgical History:   Procedure Laterality Date    THYROIDECTOMY      TUBAL LIGATION         Social History     Socioeconomic History    Marital status: Single   Tobacco Use    Smoking status: Current Every Day Smoker     Types: Cigarettes    Smokeless tobacco: Current User   Substance and Sexual Activity    Alcohol  "use: Yes     Alcohol/week: 3.0 standard drinks     Types: 3 Standard drinks or equivalent per week    Drug use: No         Scheduled Meds:   amLODIPine  5 mg Oral Daily    aspirin  81 mg Oral Daily    atorvastatin  40 mg Oral Daily    buPROPion  150 mg Oral Daily    carvediloL  12.5 mg Oral BID    metroNIDAZOLE  500 mg Oral Q8H    multivitamin  1 tablet Oral Daily    nicotine  1 patch Transdermal Daily    sertraline  25 mg Oral Daily    thiamine  100 mg Oral Daily     Continuous Infusions:  PRN Meds:cloNIDine, dextrose 10%, dextrose 10%, enalaprilat, glucagon (human recombinant), glucose, glucose, hydrALAZINE, insulin aspart U-100, labetalol, ondansetron, sodium chloride 0.9%, sodium chloride 0.9%    Objective:  BP (!) 181/97   Pulse 82   Temp 98.1 °F (36.7 °C)   Resp 20   Ht 6' 0.84" (1.85 m)   Wt 65 kg (143 lb 4.8 oz)   LMP  (LMP Unknown)   SpO2 97%   Breastfeeding No   BMI 18.99 kg/m²     Physical Exam:   Physical Exam  Vitals reviewed.   Constitutional:       General: She is not in acute distress.     Comments:  at the bedside   HENT:      Head: Normocephalic and atraumatic.   Eyes:      Pupils: Pupils are equal, round, and reactive to light.   Cardiovascular:      Rate and Rhythm: Normal rate and regular rhythm.   Pulmonary:      Breath sounds: Normal breath sounds.   Abdominal:      General: Bowel sounds are normal. There is no distension.      Palpations: Abdomen is soft.      Tenderness: There is no abdominal tenderness.   Genitourinary:     Comments: No suprapubic tenderness  Musculoskeletal:      Cervical back: Neck supple.      Right lower leg: No edema.      Left lower leg: No edema.   Skin:     Findings: No erythema or rash.   Neurological:      Mental Status: She is alert.      Comments: Noted with right hemiparesis. Awake and alert, expressive aphasia. Nods to answer questions appropriately. Follows commands.   Psychiatric:      Comments: Calm and cooperative "     Imaging      Lab Review   Recent Results (from the past 24 hour(s))   POCT glucose    Collection Time: 05/23/22  3:17 AM   Result Value Ref Range    POCT Glucose 106 70 - 110 mg/dL   POCT glucose    Collection Time: 05/23/22 11:09 AM   Result Value Ref Range    POCT Glucose 155 (H) 70 - 110 mg/dL   POCT glucose    Collection Time: 05/23/22  4:02 PM   Result Value Ref Range    POCT Glucose 137 (H) 70 - 110 mg/dL   POCT glucose    Collection Time: 05/23/22  7:45 PM   Result Value Ref Range    POCT Glucose 105 70 - 110 mg/dL       Assessment/Plan:  1. Syphilis  2. 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 Uncontrolled hypertension  4. Polysubstance abuse  5. Trichomoniasis    -Continue Flagyl #5/7  -No fever and no leukocytosis  -S/P LP on 5/21, bloody tap. Follow CSF VDRL. CSF culture negative thus far  -Syphilis Ab (+) with RPR 1:1  -Neurology on board, inputs noted including plan for MARIO with LINQ if CSF VDRL negative  -Discussed with patient, significant other and nursing

## 2022-05-24 NOTE — PROGRESS NOTES
[facility] - 9th Floor Med Surg  Adult Nutrition  Progress Note    SUMMARY       Recommendations    - Continue current diet per MD/SLP   - Continue MVI + thiamine as feasible  - Added ONS Boost Plus to provide 360 kcals, 14 gm pro per serving.    Goals: Meet >75% of est energy needs by f/u  Nutrition Goal Status: new    Assessment and Plan  Nutrition Problem  Malnutrition    Related to (etiology):   Thyroid disease    Signs and Symptoms (as evidenced by):   <75% est energy needs > 1 month  Physical evidence of mild muscle/fat wasting    Interventions(treatment strategy):  Commercial food, Multivitamin / Mineral supplement therapy and Collaboration with other providers    Nutrition Diagnosis Status:   New      Malnutrition Assessment  Malnutrition Type: chronic illness  Energy Intake: moderate energy intake     Weight Loss (Malnutrition): 7.5% in 3 months  Energy Intake (Malnutrition): less than or equal to 75% for greater than or equal to 1 month  Subcutaneous Fat (Malnutrition): mild depletion  Muscle Mass (Malnutrition): mild depletion   Orbital Region (Subcutaneous Fat Loss): mild depletion  Upper Arm Region (Subcutaneous Fat Loss): mild depletion   Voodoo Region (Muscle Loss): mild depletion  Clavicle Bone Region (Muscle Loss): mild depletion  Dorsal Hand (Muscle Loss): mild depletion             Reason for Assessment    Reason For Assessment: length of stay  Diagnosis:  (CVA\)  Relevant Medical History: Hypertension     Thyroid disease    General Information Comments:   5/24: Pt noted with CVA, family at bedside. Pt on easy to chew diet, appetite fair. Offered ONS, pt requesting strawberry. Per family, pt has had some appetite and wt loss before parathyroid gland removal ~ 2 months ago. States UBW ~ 160lb.    Nutrition Risk Screen    Nutrition Risk Screen: no indicators present    Nutrition/Diet History    Spiritual, Cultural Beliefs, Church Practices, Values that Affect Care:  "no    Anthropometrics    Temp: 97.4 °F (36.3 °C)  Height Method: Stated  Height: 6' 0.84" (185 cm)  Height (inches): 72.83 in  Weight Method: Standard Scale  Weight: 65 kg (143 lb 4.8 oz) (Bed wt : 68kg)  Weight (lb): 143.3 lb  Ideal Body Weight (IBW), Female: 164.15 lb  % Ideal Body Weight, Female (lb): 87.3 %  BMI (Calculated): 19  BMI Grade: 18.5-24.9 - normal  Usual Body Weight (UBW), k.7 kg  % Usual Body Weight: 89.6  % Weight Change From Usual Weight: -10.59 %       Lab/Procedures/Meds    Pertinent Labs Comments: : Protein 6.2, GFR >60  Pertinent Medications Comments: Thiamine, MVI    Physical Findings/Assessment      Estimated/Assessed Needs    Weight Used For Calorie Calculations: 68 kg (149 lb 14.6 oz)  Energy Calorie Requirements (kcal): 1700 - 2040kcals (25-30kcal/kg)  Energy Need Method: Kcal/kg  Protein Requirements: 88gm (1.3gm/kg)  Weight Used For Protein Calculations: 68 kg (149 lb 14.6 oz)  Estimated Fluid Requirement Method: RDA Method  RDA Method (mL): 1700    Nutrition Prescription Ordered         Evaluation of Received Nutrient/Fluid Intake       % Intake of Estimated Energy Needs: 50 - 75 %  % Meal Intake: 50 - 75 %    Nutrition Risk    Level of Risk/Frequency of Follow-up: moderate     Monitor and Evaluation    Food and Nutrient Intake: food and beverage intake  Anthropometric Measurements: weight change     Nutrition Follow-Up    RD Follow-up?: Yes    "

## 2022-05-24 NOTE — PT/OT/SLP PROGRESS
Occupational Therapy   Treatment    Name: Sammie Belcher  MRN: [id number]  Admitting Diagnosis:  <principal problem not specified>       Recommendations:     Discharge Recommendations: rehabilitation facility  Discharge Equipment Recommendations:  walker, rolling  Barriers to discharge:       Assessment:     Sammie Belcher is a 52 y.o. female with a medical diagnosis of L CVA.  She presents with the following performance deficits affecting function are impaired endurance, impaired self care skills, weakness, impaired functional mobilty, impaired balance, impaired cognition, decreased upper extremity function, decreased ROM, impaired coordination, impaired fine motor.     Rehab Prognosis:  Good; patient would benefit from acute skilled OT services to address these deficits and reach maximum level of function.       Plan:     Patient to be seen 6 x/week, 5 x/week to address the above listed problems via self-care/home management, therapeutic activities, therapeutic exercises, neuromuscular re-education  · Plan of Care Expires: 06/23/22  · Plan of Care Reviewed with: patient    Subjective     Pain/Comfort:  · Pain Rating 1: 0/10    Objective:     Communicated with: Patient found HOB elevated with PureWick, peripheral IV upon OT entry to room.    General Precautions: Standard, aspiration   Orthopedic Precautions:    Braces:    Respiratory Status: Room air     Occupational Performance:     Bed Mobility:    · Patient completed Supine to Sit with minimum assistance  · Patient completed Sit to Supine with minimum assistance     Functional Mobility/Transfers:  · Patient completed Sit <> Stand Transfer with minimum assistance  with  rolling walker   · Functional Mobility: attempted to have pt take steps towards HOB, however, pt was unable to advance RLE or complete marches in place at RW; pt was able to stand for about 1min; limited by RLE weakness and difficulty with command following      Activities of Daily  Living:  · Grooming: supervision and stand by assistance requiring assist to open and apply toothpaste 2/2 decreased cognition and RUE strength    Therapeutic Exercise:    - Pt completed RUE AAROM x10 each joint with education to complete 3x/daily     AMPAC 6 Click ADL: 14      Patient left HOB elevated with all lines intact, call button in reach and family  presentEducation:      GOALS:   Multidisciplinary Problems     Occupational Therapy Goals        Problem: Occupational Therapy    Goal Priority Disciplines Outcome Interventions   Occupational Therapy Goal     OT, PT/OT Ongoing, Progressing    Description: Goals to be met by: 6/9/22    Patient will increase functional independence with ADLs by performing:    UE Dressing with Modified [place].  LE Dressing with Modified [place].  Grooming while seated with Modified [place].  Toileting from toilet with Modified [place] for hygiene and clothing management.   Toilet transfer to toilet with Modified [place].                     Time Tracking:     OT Date of Treatment:    OT Start Time: 1113  OT Stop Time: 1133  OT Total Time (min): 20 min    Billable Minutes:Self Care/Home Management 20min    OT/BETH: OT     BETH Visit Number: 2    5/24/2022

## 2022-05-24 NOTE — PT/OT/SLP PROGRESS
Physical Therapy         Treatment        Sammie Belcher   MRN: [id number]     PT Received On: 05/24/22  PT Start Time: 1346     PT Stop Time: 1411    PT Total Time (min): 25 min       Billable Minutes:  Gait [id number] and Therapeutic Activity 15  Total Minutes: 25    Treatment Type: Treatment  PT/PTA: PTA     PTA Visit Number: 4       General Precautions: Standard, aphasia (BP<220/120)  Orthopedic Precautions:     Braces:      Spiritual, Cultural Beliefs, Jain Practices, Values that Affect Care: no    Objective:  Patient found in bed upon entry.    Functional Mobility:  Bed Mobility:   Supine to sit: Minimal Assistance   Sit to supine: Minimal Assistance   Rolling: Minimal Assistance   Scooting: Minimal Assistance    Transfer Training:  Bed <> Chair:  Step Transfer with Minimal Assistance with HHA and gait belt    Gait Training:   Pt assisted stepping forward and back with RLE and instructed on proper degree of hip, knee and ankle flexion. Pt used thayer railing on L for balance and Mod A.     Pt amb 14ft forward with step through and thayer railing on L. Pt assisted with full advancement of RLE. Pt presents with decreased HELADIO step length and increased R knee flexion in stance phase.     Additional Treatment:    Balance/Weight shifting/Weigth bearing   Pt performed L hip flexion and placed L foot on 2 inch box then returned to starting position. Pt performed X 10 reps.     Activity Tolerance:  Patient tolerated treatment well    Patient left HOB elevated with bed alarm on.    Assessment:  Sammie Belcher is a 52 y.o. female with a medical diagnosis of CVA    Rehab potential is excellent.    Activity tolerance: Excellent    Discharge recommendations: Discharge Facility/Level of Care Needs: rehabilitation facility     Equipment recommendations:       GOALS:   Multidisciplinary Problems     Physical Therapy Goals        Problem: Physical Therapy    Goal Priority Disciplines Outcome Goal Variances Interventions    Physical Therapy Goal     PT, PT/OT Ongoing, Progressing     Description: Goals to be met by: 2022     Patient will increase functional independence with mobility by performin. Supine to sit with Stand-by Assistance  2. Sit to supine with Stand-by Assistance  3. Sit to stand transfer with Stand-by Assistance  4. Gait  x 300 feet with Contact Guard Assistance using Rolling Walker.   5. RLE strength to improve to 4/5 MMT.  6. LLE strength to improve to 5/5 MMT.                     PLAN:    Patient to be seen BID  to address the above listed problems via gait training, therapeutic activities, therapeutic exercises  Plan of Care expires: 22  Plan of Care reviewed with: patient         2022

## 2022-05-25 LAB
(HCYS)2 SERPL-MCNC: 9.4 UMOL/L (ref 5.1–15.4)
ALBUMIN SERPL-MCNC: 3.4 GM/DL (ref 3.5–5)
ALBUMIN/GLOB SERPL: 1.4 RATIO (ref 1.1–2)
ALP SERPL-CCNC: 66 UNIT/L (ref 40–150)
ALT SERPL-CCNC: 13 UNIT/L (ref 0–55)
ANCA AB SER QL IF: NEGATIVE
ANTINUCLEAR ANTIBODY SCREEN (OHS): NEGATIVE
AST SERPL-CCNC: 20 UNIT/L (ref 5–34)
BASOPHILS # BLD AUTO: 0.05 X10(3)/MCL (ref 0–0.2)
BASOPHILS NFR BLD AUTO: 0.6 %
BDY SITE: NORMAL
BILIRUBIN DIRECT+TOT PNL SERPL-MCNC: 0.5 MG/DL
BUN SERPL-MCNC: 14.4 MG/DL (ref 9.8–20.1)
CALCIUM SERPL-MCNC: 9.3 MG/DL (ref 8.4–10.2)
CENTROMERE PROTEIN ANTIBODY (OHS): NEGATIVE
CHLORIDE SERPL-SCNC: 106 MMOL/L (ref 98–107)
CO2 SERPL-SCNC: 31 MMOL/L (ref 22–29)
CREAT SERPL-MCNC: 0.76 MG/DL (ref 0.55–1.02)
CRP SERPL-MCNC: 2.8 MG/L
DSDNA ANTIBODY (OHS): NEGATIVE
EOSINOPHIL # BLD AUTO: 0.39 X10(3)/MCL (ref 0–0.9)
EOSINOPHIL NFR BLD AUTO: 4.7 %
ERYTHROCYTE [DISTWIDTH] IN BLOOD BY AUTOMATED COUNT: 14.2 % (ref 11.5–17)
ERYTHROCYTE [SEDIMENTATION RATE] IN BLOOD: 10 MM/HR (ref 0–20)
FOLATE SERPL-MCNC: 8.4 NG/ML (ref 7–31.4)
GLOBULIN SER-MCNC: 2.4 GM/DL (ref 2.4–3.5)
GLUCOSE SERPL-MCNC: 83 MG/DL (ref 74–100)
HCT VFR BLD AUTO: 44.3 % (ref 37–47)
HGB BLD-MCNC: 14 GM/DL (ref 12–16)
IMM GRANULOCYTES # BLD AUTO: 0.01 X10(3)/MCL (ref 0–0.02)
IMM GRANULOCYTES NFR BLD AUTO: 0.1 % (ref 0–0.43)
JO-1 ANTIBODY (OHS): NEGATIVE
LYMPHOCYTES # BLD AUTO: 2.65 X10(3)/MCL (ref 0.6–4.6)
LYMPHOCYTES NFR BLD AUTO: 31.7 %
M VARICELLA-ZOSTER VIRUS PCR: NEGATIVE
MAYO GENERIC ORDERABLE RESULT: NORMAL
MCH RBC QN AUTO: 30.2 PG (ref 27–31)
MCHC RBC AUTO-ENTMCNC: 31.6 MG/DL (ref 33–36)
MCV RBC AUTO: 95.7 FL (ref 80–94)
MONOCYTES # BLD AUTO: 0.62 X10(3)/MCL (ref 0.1–1.3)
MONOCYTES NFR BLD AUTO: 7.4 %
NEUTROPHILS # BLD AUTO: 4.6 X10(3)/MCL (ref 2.1–9.2)
NEUTROPHILS NFR BLD AUTO: 55.5 %
NRBC BLD AUTO-RTO: 0 %
P-ANCA SER QL IF: NEGATIVE
PLATELET # BLD AUTO: 220 X10(3)/MCL (ref 130–400)
PMV BLD AUTO: 10.8 FL (ref 9.4–12.4)
POCT GLUCOSE: 100 MG/DL (ref 70–110)
POCT GLUCOSE: 105 MG/DL (ref 70–110)
POCT GLUCOSE: 119 MG/DL (ref 70–110)
POCT GLUCOSE: 84 MG/DL (ref 70–110)
POTASSIUM SERPL-SCNC: 4.4 MMOL/L (ref 3.5–5.1)
PROT SERPL-MCNC: 5.8 GM/DL (ref 6.4–8.3)
RBC # BLD AUTO: 4.63 X10(6)/MCL (ref 4.2–5.4)
RNP70 ANTIBODY (OHS): NEGATIVE
SCLERODERMA (SCL-70S) ANTIBODY (OHS): NEGATIVE
SMITH DP IGG (OHS): NEGATIVE
SODIUM SERPL-SCNC: 142 MMOL/L (ref 136–145)
SSA(RO) ANTIBODY (OHS): NEGATIVE
SSB(LA) ANTIBODY (OHS): NEGATIVE
TT IMM BOVINE THROMBIN PPP: 17 SECONDS (ref 0–22)
U1RNP ANTIBODY (OHS): NEGATIVE
VDRL CSF QL: NEGATIVE
VIT B12 SERPL-MCNC: 424 PG/ML (ref 213–816)
WBC # SPEC AUTO: 8.4 X10(3)/MCL (ref 4.5–11.5)

## 2022-05-25 PROCEDURE — 86235 NUCLEAR ANTIGEN ANTIBODY: CPT | Performed by: PSYCHIATRY & NEUROLOGY

## 2022-05-25 PROCEDURE — 25000003 PHARM REV CODE 250: Performed by: NURSE PRACTITIONER

## 2022-05-25 PROCEDURE — 94761 N-INVAS EAR/PLS OXIMETRY MLT: CPT

## 2022-05-25 PROCEDURE — 63600175 PHARM REV CODE 636 W HCPCS: Performed by: STUDENT IN AN ORGANIZED HEALTH CARE EDUCATION/TRAINING PROGRAM

## 2022-05-25 PROCEDURE — 36415 COLL VENOUS BLD VENIPUNCTURE: CPT | Performed by: PSYCHIATRY & NEUROLOGY

## 2022-05-25 PROCEDURE — 36415 COLL VENOUS BLD VENIPUNCTURE: CPT | Performed by: STUDENT IN AN ORGANIZED HEALTH CARE EDUCATION/TRAINING PROGRAM

## 2022-05-25 PROCEDURE — 97535 SELF CARE MNGMENT TRAINING: CPT

## 2022-05-25 PROCEDURE — 80053 COMPREHEN METABOLIC PANEL: CPT | Performed by: STUDENT IN AN ORGANIZED HEALTH CARE EDUCATION/TRAINING PROGRAM

## 2022-05-25 PROCEDURE — 85670 THROMBIN TIME PLASMA: CPT | Performed by: PSYCHIATRY & NEUROLOGY

## 2022-05-25 PROCEDURE — 11000001 HC ACUTE MED/SURG PRIVATE ROOM

## 2022-05-25 PROCEDURE — S4991 NICOTINE PATCH NONLEGEND: HCPCS | Performed by: NURSE PRACTITIONER

## 2022-05-25 PROCEDURE — 86376 MICROSOMAL ANTIBODY EACH: CPT | Performed by: PSYCHIATRY & NEUROLOGY

## 2022-05-25 PROCEDURE — 85651 RBC SED RATE NONAUTOMATED: CPT | Performed by: PSYCHIATRY & NEUROLOGY

## 2022-05-25 PROCEDURE — 82607 VITAMIN B-12: CPT | Performed by: PSYCHIATRY & NEUROLOGY

## 2022-05-25 PROCEDURE — 83090 ASSAY OF HOMOCYSTEINE: CPT | Performed by: PSYCHIATRY & NEUROLOGY

## 2022-05-25 PROCEDURE — 85300 ANTITHROMBIN III ACTIVITY: CPT | Performed by: PSYCHIATRY & NEUROLOGY

## 2022-05-25 PROCEDURE — 97530 THERAPEUTIC ACTIVITIES: CPT | Mod: CQ

## 2022-05-25 PROCEDURE — 92507 TX SP LANG VOICE COMM INDIV: CPT

## 2022-05-25 PROCEDURE — 25000003 PHARM REV CODE 250: Performed by: INTERNAL MEDICINE

## 2022-05-25 PROCEDURE — 85025 COMPLETE CBC W/AUTO DIFF WBC: CPT | Performed by: STUDENT IN AN ORGANIZED HEALTH CARE EDUCATION/TRAINING PROGRAM

## 2022-05-25 PROCEDURE — 82746 ASSAY OF FOLIC ACID SERUM: CPT | Performed by: PSYCHIATRY & NEUROLOGY

## 2022-05-25 PROCEDURE — 25000003 PHARM REV CODE 250: Performed by: PSYCHIATRY & NEUROLOGY

## 2022-05-25 PROCEDURE — 97116 GAIT TRAINING THERAPY: CPT | Mod: CQ

## 2022-05-25 PROCEDURE — 83516 IMMUNOASSAY NONANTIBODY: CPT | Performed by: PSYCHIATRY & NEUROLOGY

## 2022-05-25 PROCEDURE — 25000003 PHARM REV CODE 250: Performed by: STUDENT IN AN ORGANIZED HEALTH CARE EDUCATION/TRAINING PROGRAM

## 2022-05-25 PROCEDURE — 86140 C-REACTIVE PROTEIN: CPT | Performed by: PSYCHIATRY & NEUROLOGY

## 2022-05-25 RX ORDER — THIAMINE HCL 100 MG
100 TABLET ORAL 3 TIMES DAILY
Status: DISCONTINUED | OUTPATIENT
Start: 2022-05-25 | End: 2022-06-02 | Stop reason: HOSPADM

## 2022-05-25 RX ADMIN — ASPIRIN 325 MG: 325 TABLET, COATED ORAL at 10:05

## 2022-05-25 RX ADMIN — CARVEDILOL 12.5 MG: 12.5 TABLET, FILM COATED ORAL at 10:05

## 2022-05-25 RX ADMIN — METRONIDAZOLE 500 MG: 500 TABLET ORAL at 09:05

## 2022-05-25 RX ADMIN — ENOXAPARIN SODIUM 40 MG: 40 INJECTION SUBCUTANEOUS at 04:05

## 2022-05-25 RX ADMIN — CLONIDINE HYDROCHLORIDE 0.2 MG: 0.2 TABLET ORAL at 04:05

## 2022-05-25 RX ADMIN — THIAMINE HCL TAB 100 MG 100 MG: 100 TAB at 10:05

## 2022-05-25 RX ADMIN — CARVEDILOL 12.5 MG: 12.5 TABLET, FILM COATED ORAL at 09:05

## 2022-05-25 RX ADMIN — AMLODIPINE BESYLATE 10 MG: 5 TABLET ORAL at 10:05

## 2022-05-25 RX ADMIN — MULTIPLE VITAMINS W/ MINERALS TAB 1 TABLET: TAB at 10:05

## 2022-05-25 RX ADMIN — THIAMINE HCL TAB 100 MG 100 MG: 100 TAB at 09:05

## 2022-05-25 RX ADMIN — METRONIDAZOLE 500 MG: 500 TABLET ORAL at 03:05

## 2022-05-25 RX ADMIN — ATORVASTATIN CALCIUM 40 MG: 40 TABLET, FILM COATED ORAL at 10:05

## 2022-05-25 RX ADMIN — NICOTINE 1 PATCH: 21 PATCH, EXTENDED RELEASE TRANSDERMAL at 10:05

## 2022-05-25 RX ADMIN — METRONIDAZOLE 500 MG: 500 TABLET ORAL at 04:05

## 2022-05-25 RX ADMIN — THIAMINE HCL TAB 100 MG 100 MG: 100 TAB at 03:05

## 2022-05-25 RX ADMIN — BUPROPION HYDROCHLORIDE 150 MG: 150 TABLET, FILM COATED, EXTENDED RELEASE ORAL at 10:05

## 2022-05-25 RX ADMIN — SERTRALINE 25 MG: 25 TABLET, FILM COATED ORAL at 10:05

## 2022-05-25 NOTE — PLAN OF CARE
Follow up with pt and fiance at bedside for choice of rehab facilities.  They chose [facility] and pt nods in agreement.  Referral set via [facility] and [facility] liaison notified of referral    1430  [facility] informed that bed will be available on rehab on Monday 5/30

## 2022-05-25 NOTE — PROGRESS NOTES
[facility] Medicine Progress Note        Chief Complaint: Inpatient Follow-up for     HPI:   Ms. Belcher is a 52-year-old female with past medical history of HTN, anxiety, depression, and hypothyroidism who presents to [facility] ED with complaints of dysarthria and expressive aphasia x3 days.  Patient's family member states that she has been having difficulty speaking for the past 3 days however today was worse so she was brought to the ED for further evaluation. Her sister at bedside states that she is unsure if she has been compliant with her home medications.  She does smoke marijuana daily with her significant other.  And drinks about a six-pack of beer every day.  No history of stroke.  She is not on aspirin.  Sister at bedside reports that she has had abnormal behavior over the past 3 days and has been having episodes of bladder and bowel incontinence and is very restless/fidgety.  No reported history of falls or back pain or any saddle anesthesia.  Patient is oriented x4 but is an extremely poor historian and it is very difficult to get a history from her.  She does report left-sided weakness and paresthesias x2 days     Upon arrival to outside facility the patient was markedly hypertensive but otherwise hemodynamically stable and afebrile. EKG: NSR with LVH. Labs unremarkable except for a reactive RPR, patient denies high risk sexual behavior, states unprotected sexual intercourse with significant other but she is in a monogamous relationships. CT Head showed left frontal 6 cm cortical base hypodensity suggesting an acute to subacute nonhemorrhagic infarct in the left KENISHA vascular territory. MRI Brain was then obtained confirming the left frontal lobe extending into the corpus callosum with an acute nonhemorrhagic infarct in the territory of the anterior cerebral artery, and old lacunar infarcts. She is being transferred to [facility] for further CVA workup.    Interval Hx:    Seen and examined the patient she is making progress.  Afebrile vitals stable hemodynamically stable.    Objective/physical exam:  GENERAL: awake, alert, oriented and in no acute distress, non-toxic appearing   HEENT: normocephalic atraumatic   NECK: supple   LUNGS: Clear bilaterally, no wheezing or rales, no accessory muscle use   CVS: Regular rate and rhythm, normal peripheral perfusion  ABD: Soft, non-tender, non-distended, bowel sounds present  EXTREMITIES: no clubbing or cyanosis  SKIN: Warm, dry.   NEURO: Motor strength RUE 4/5, LUE 5/5; RLE 4/5, LLE 5/5, expressive aphasia and slurred speech,+ paresthesia to right face, RUE and RLE. Gait not assessed.   PSYCHIATRIC: Cooperative, restless in room    VITAL SIGNS: 24 HRS MIN & MAX LAST   Temp  Min: 97.4 °F (36.3 °C)  Max: 98.3 °F (36.8 °C) 97.5 °F (36.4 °C)   BP  Min: 157/91  Max: 179/97 (!) 157/91   Pulse  Min: 71  Max: 81  80   Resp  Min: 19  Max: 20 19   SpO2  Min: 95 %  Max: 98 % 98 %       Recent Labs   Lab 05/22/22  0346 05/24/22  0402 05/25/22  0422   WBC 8.2 8.4 8.4   RBC 4.36 4.68 4.63   HGB 13.6 14.2 14.0   HCT 42.2 44.4 44.3   MCV 96.8* 94.9* 95.7*   MCH 31.2* 30.3 30.2   MCHC 32.2* 32.0* 31.6*   RDW 14.6 14.3 14.2    223 220   MPV 11.5 10.8 10.8       Recent Labs   Lab 05/19/22  0509 05/20/22  0405 05/22/22  0346 05/24/22  0402 05/25/22  0422      < > 140 142 142   K 3.4*   < > 3.2* 4.2 4.4   CO2 23   < > 26 30* 31*   BUN 10.7   < > 11.2 14.7 14.4   CREATININE 0.79   < > 0.80 0.77 0.76   CALCIUM 8.5   < > 8.9 9.6 9.3   MG 2.00  --   --   --   --    ALBUMIN 3.7   < > 3.3* 3.4* 3.4*   ALKPHOS 77   < > 69 70 66   ALT 8   < > 7 8 13   AST 13   < > 13 16 20   BILITOT 0.7   < > 0.5 0.5 0.5    < > = values in this interval not displayed.          Microbiology Results (last 7 days)     Procedure Component Value Units Date/Time    Cerebrospinal Fluid Culture [facility] [[id number]] Collected: 05/21/22 1132    Order Status: Completed Specimen: CSF  (Spinal Fluid) Updated: 05/25/22 0804     CULTURE, CSF ([facility]) No growth at 4 days    Gram Stain [facility] [[id number]] Collected: 05/21/22 1135    Order Status: Completed Specimen: CSF (Spinal Fluid) from Cerebrospinal Fluid Updated: 05/21/22 1454     GRAM STAIN No WBCs, No bacteria seen     Gram stain [[id number]] Collected: 05/21/22 1052    Order Status: Canceled Specimen: CSF (Spinal Fluid) Updated: 05/21/22 1124    CSF culture [[id number]] Collected: 05/21/22 1052    Order Status: Canceled Specimen: CSF (Spinal Fluid) Updated: 05/21/22 1124    Chlamydia/GC, PCR [[id number]]  (Normal) Collected: 05/19/22 1553    Order Status: Completed Specimen: Urine Updated: 05/19/22 1747     Chlamydia trachomatis PCR Not Detected     N. gonorrhea PCR Not Detected           See below for Radiology    Scheduled Med:   amLODIPine  10 mg Oral Daily    aspirin  325 mg Oral Daily    atorvastatin  40 mg Oral Daily    buPROPion  150 mg Oral Daily    carvediloL  12.5 mg Oral BID    enoxaparin  40 mg Subcutaneous Daily    metroNIDAZOLE  500 mg Oral Q8H    multivitamin  1 tablet Oral Daily    nicotine  1 patch Transdermal Daily    sertraline  25 mg Oral Daily    thiamine  100 mg Oral TID        Continuous Infusions:       PRN Meds:  cloNIDine, dextrose 10%, dextrose 10%, enalaprilat, glucagon (human recombinant), glucose, glucose, hydrALAZINE, insulin aspart U-100, labetalol, ondansetron, sodium chloride 0.9%, sodium chloride 0.9%       Assessment/Plan:  Acute Non-hemorrhagic CVA - left KENISHA vascular territory  Dysarthria and Expressive aphasia 2/2 above  HTN Urgency; Hx Essential HTN (uncontrolled)  HLD, new onset  Abnormal Behavior with reports of bladder and bowel incontinence  Reactive RPR  Trichomonas   Hypothyroidism  THC Use Disorder   Alcohol Use Disorder        PLAN:   - aspirin was changed to 325 mg daily per neurology   - Immunological panel was sent by vascular Neurology.  Pending VDRL in CFS, vasculitis is less likely per  neurology and pending DERRELL, ANCA analysis.   - LINQ and MARIO per neurology.   MRA showed moderate severe multifocal flow signal abnormality about the bilateral ACAs bilateral distal MCAs and to lesser extent bilateral PCAs,   - MRI showed a frontal lobe extending into the corpus callosum acute nonhemorrhagic infarct  frontal cerebral artery or lacunar infarcts chronic microangiopathic ischemia and atrophy.   - Syphilis ab positive. was penicillin G2 point 4,000,000 units IM for syphilis.   - ECHO is EF 74% with severe concentric hypertrophy and hyperdynamic systolic function with negative bubble study. Carotid duplex is also negative.          Chilo Lea MD   05/25/2022

## 2022-05-25 NOTE — PT/OT/SLP PROGRESS
Occupational Therapy   Treatment    Name: Sammie Belcher  MRN: [id number]  Admitting Diagnosis:  <principal problem not specified>       Recommendations:     Discharge Recommendations: rehabilitation facility  Discharge Equipment Recommendations:  walker, rolling  Barriers to discharge:       Assessment:     Sammie Belcher is a 52 y.o. female with a medical diagnosis of <principal problem not specified>.   Performance deficits affecting function are weakness, impaired endurance, impaired self care skills, impaired functional mobilty, gait instability, impaired balance, impaired cognition.     Rehab Prognosis:  Good; patient would benefit from acute skilled OT services to address these deficits and reach maximum level of function.       Plan:     Patient to be seen 5 x/week, 6 x/week to address the above listed problems via self-care/home management, therapeutic activities, therapeutic exercises  · Plan of Care Expires: 06/23/22  · Plan of Care Reviewed with: patient    Subjective     Pain/Comfort:  · Pain Rating 1: 0/10    Objective:     Communicated with:  prior to session.  Patient found up in chair with PureWick, peripheral IV upon OT entry to room.    General Precautions: Standard, aspiration   Orthopedic Precautions:    Braces:    Respiratory Status: Room air     Occupational Performance:     ADL: pt t/f to bed from chair via stand pivot and min/mod A with HHA; once in bed pt completed eating activity with assist for RUE shoulder flexion for hand to mouth and scooping    GOALS:   Multidisciplinary Problems     Occupational Therapy Goals        Problem: Occupational Therapy    Goal Priority Disciplines Outcome Interventions   Occupational Therapy Goal     OT, PT/OT Ongoing, Progressing    Description: Goals to be met by: 6/9/22    Patient will increase functional independence with ADLs by performing:    UE Dressing with Modified [place].  LE Dressing with Modified [place].  Grooming while seated  with Modified [place].  Toileting from toilet with Modified [place] for hygiene and clothing management.   Toilet transfer to toilet with Modified [place].                     Time Tracking:     OT Date of Treatment:    OT Start Time: 1240  OT Stop Time: 1300  OT Total Time (min): 20 min    Billable Minutes:Self Care/Home Management 20min     OT/BETH: OT     BETH Visit Number: 3    5/25/2022

## 2022-05-25 NOTE — PT/OT/SLP PROGRESS
Physical Therapy         Treatment        Sammie Belcher   MRN: [id number]     PT Received On: 05/25/22  PT Start Time: 1000     PT Stop Time: 1030    PT Total Time (min): 30 min       Billable Minutes:  Gait [id number] and Therapeutic Activity 15  Total Minutes: 30    Treatment Type: Treatment  PT/PTA: PTA     PTA Visit Number: 5       General Precautions: Standard, aphasia (BP<220/120)  Orthopedic Precautions:     Braces:      Spiritual, Cultural Beliefs, Spiritism Practices, Values that Affect Care: no    Objective:  Patient found in bed upon entry.    Functional Mobility:  Bed Mobility:   Supine to sit: Minimal Assistance   Sit to supine: Minimal Assistance   Rolling: Minimal Assistance   Scooting: Moderate Assistance    Balance:   Transfer Training: Mod A   Pt performed stand step t/f from bed to bedside chair with HHA.    Gait Training:  Patient gait trained  22  feet on level tile with Rolling Walker with Moderate Assistance. Pt presents with increased R knee flexion in stance and required assistance to advance RLE fully. R knee assisted into R knee extension in stance phase. Impairments contributing to gait deviations include impaired balance, impaired coordination, impaired motor control, abnormal muscle tone, impaired postural control and decreased strength    Additional Treatment:    Balance/Weigth shifting   Pt performed side stepping to R X 14ft and L x 14ft. Pt used thayer railing for balance and R LE assisted into abduction/adduction and R knee assisted into extension.     Therapeutic Exercises   RLE: hamstring stretch in sitting 3 X 30 sec reps.    Activity Tolerance:  Patient tolerated treatment well    Patient left up in chair with call button in reach and boyfriend  present.    Assessment:  Sammie Belcher is a 52 y.o. female with a medical diagnosis of cva.     Rehab potential is excellent.    Activity tolerance: Excellent    Discharge recommendations: Discharge Facility/Level of Care Needs:  rehabilitation facility     Equipment recommendations:       GOALS:   Multidisciplinary Problems     Physical Therapy Goals        Problem: Physical Therapy    Goal Priority Disciplines Outcome Goal Variances Interventions   Physical Therapy Goal     PT, PT/OT Ongoing, Progressing     Description: Goals to be met by: 2022     Patient will increase functional independence with mobility by performin. Supine to sit with Stand-by Assistance  2. Sit to supine with Stand-by Assistance  3. Sit to stand transfer with Stand-by Assistance  4. Gait  x 300 feet with Contact Guard Assistance using Rolling Walker.   5. RLE strength to improve to 4/5 MMT.  6. LLE strength to improve to 5/5 MMT.                     PLAN:    Patient to be seen BID  to address the above listed problems via gait training, therapeutic activities, therapeutic exercises, neuromuscular re-education  Plan of Care expires: 22  Plan of Care reviewed with: patient         2022

## 2022-05-25 NOTE — PLAN OF CARE
Problem: Infection  Goal: Absence of Infection Signs and Symptoms  Outcome: Ongoing, Progressing     Problem: Cerebral Tissue Perfusion (Stroke, Ischemic/Transient Ischemic Attack)  Goal: Optimal Cerebral Tissue Perfusion  Outcome: Ongoing, Progressing     Problem: Cognitive Impairment (Stroke, Ischemic/Transient Ischemic Attack)  Goal: Optimal Cognitive Function  Outcome: Ongoing, Progressing     Problem: Communication Impairment (Stroke, Ischemic/Transient Ischemic Attack)  Goal: Improved Communication Skills  Outcome: Ongoing, Progressing     Problem: Functional Ability Impaired (Stroke, Ischemic/Transient Ischemic Attack)  Goal: Optimal Functional Ability  Outcome: Ongoing, Progressing

## 2022-05-25 NOTE — PT/OT/SLP PROGRESS
Speech Language Pathology Department  Progress Note    Patient Name:  Sammie Belcher   MRN:  [id number]  Admitting Diagnosis: CVA    Recommendations:                 General Recommendations:  Speech/language therapy  Communication strategies:  yes/no questions only, provide increased time to answer and go to room if call light pushed    Subjective     Patient awake, alert and cooperative.    Patient goals: to go home     Pain/Comfort:  · Pain Rating 1: 0/10    Respiratory Status: Room air    Objective:     Patient awake, alert and cooperative.    Yes/No Questions:  Simple: 75%     Confrontation Naming  Objects: 80% with mod cues     Automatic Speech:  Singin% common carli  Days of the week: 100%  Months of the year: cues to initiate  Counting: WFL  Alphabet: cues to initiate    Phrase completion: 90%    Assessment:     Pt continues to present with receptive and expressive language impairments warranting continued skilled SLP services.    Goals:   Multidisciplinary Problems     SLP Goals        Problem: SLP    Goal Priority Disciplines Outcome   SLP Goal     SLP    Description: LTG:   To increase expressive/receptive language skills to enhance functional communication to express basic wants and needs with 100% effectiveness.   ST. Answer personal yes/no questions with 90% accuracy.   2. Answer simple yes/no questions with 90% accuracy.   3. Complete automatic speech tasks with moderate cueing.   4. Name objects with 70% accuracy.   5. Complete simple phrases with moderate cueing.                      Plan:     Discharge recommendations:  rehabilitation facility   Barriers to Discharge:  Level of Skilled Assistance Needed      Time Tracking:     SLP Treatment Date:   22  Speech Start Time:  1410  Speech Stop Time:  1435     Speech Total Time (min):  25 min    Billable Minutes: Speech Therapy Individual 25 minutes    2022

## 2022-05-26 LAB
ALBUMIN SERPL-MCNC: 3.2 GM/DL (ref 3.5–5)
ALBUMIN/GLOB SERPL: 1.4 RATIO (ref 1.1–2)
ALP SERPL-CCNC: 65 UNIT/L (ref 40–150)
ALT SERPL-CCNC: 15 UNIT/L (ref 0–55)
ANA SER QL HEP2 SUBST: NORMAL
AST SERPL-CCNC: 23 UNIT/L (ref 5–34)
BACTERIA CSF CULT: NORMAL
BASOPHILS # BLD AUTO: 0.05 X10(3)/MCL (ref 0–0.2)
BASOPHILS NFR BLD AUTO: 0.7 %
BILIRUBIN DIRECT+TOT PNL SERPL-MCNC: 0.4 MG/DL
BUN SERPL-MCNC: 15.2 MG/DL (ref 9.8–20.1)
CALCIUM SERPL-MCNC: 9.2 MG/DL (ref 8.4–10.2)
CHLORIDE SERPL-SCNC: 107 MMOL/L (ref 98–107)
CO2 SERPL-SCNC: 29 MMOL/L (ref 22–29)
CREAT SERPL-MCNC: 0.73 MG/DL (ref 0.55–1.02)
EOSINOPHIL # BLD AUTO: 0.3 X10(3)/MCL (ref 0–0.9)
EOSINOPHIL NFR BLD AUTO: 4.1 %
ERYTHROCYTE [DISTWIDTH] IN BLOOD BY AUTOMATED COUNT: 14.1 % (ref 11.5–17)
GLOBULIN SER-MCNC: 2.3 GM/DL (ref 2.4–3.5)
GLUCOSE SERPL-MCNC: 81 MG/DL (ref 74–100)
HCT VFR BLD AUTO: 41.6 % (ref 37–47)
HGB BLD-MCNC: 13.7 GM/DL (ref 12–16)
IMM GRANULOCYTES # BLD AUTO: 0.02 X10(3)/MCL (ref 0–0.02)
IMM GRANULOCYTES NFR BLD AUTO: 0.3 % (ref 0–0.43)
LYMPHOCYTES # BLD AUTO: 2.56 X10(3)/MCL (ref 0.6–4.6)
LYMPHOCYTES NFR BLD AUTO: 35.3 %
MCH RBC QN AUTO: 31.2 PG (ref 27–31)
MCHC RBC AUTO-ENTMCNC: 32.9 MG/DL (ref 33–36)
MCV RBC AUTO: 94.8 FL (ref 80–94)
MONOCYTES # BLD AUTO: 0.6 X10(3)/MCL (ref 0.1–1.3)
MONOCYTES NFR BLD AUTO: 8.3 %
NEUTROPHILS # BLD AUTO: 3.7 X10(3)/MCL (ref 2.1–9.2)
NEUTROPHILS NFR BLD AUTO: 51.3 %
NRBC BLD AUTO-RTO: 0 %
PLATELET # BLD AUTO: 211 X10(3)/MCL (ref 130–400)
PMV BLD AUTO: 11.1 FL (ref 9.4–12.4)
POCT GLUCOSE: 104 MG/DL (ref 70–110)
POCT GLUCOSE: 93 MG/DL (ref 70–110)
POCT GLUCOSE: 99 MG/DL (ref 70–110)
POTASSIUM SERPL-SCNC: 4.1 MMOL/L (ref 3.5–5.1)
PROT SERPL-MCNC: 5.5 GM/DL (ref 6.4–8.3)
RBC # BLD AUTO: 4.39 X10(6)/MCL (ref 4.2–5.4)
SODIUM SERPL-SCNC: 140 MMOL/L (ref 136–145)
THYROGLOB AB SERPL IA-ACNC: <1.8 IU/ML
THYROPEROXIDASE AB SERPL-ACNC: 0.3 IU/ML
WBC # SPEC AUTO: 7.3 X10(3)/MCL (ref 4.5–11.5)

## 2022-05-26 PROCEDURE — S4991 NICOTINE PATCH NONLEGEND: HCPCS | Performed by: NURSE PRACTITIONER

## 2022-05-26 PROCEDURE — 63600175 PHARM REV CODE 636 W HCPCS: Performed by: STUDENT IN AN ORGANIZED HEALTH CARE EDUCATION/TRAINING PROGRAM

## 2022-05-26 PROCEDURE — 97535 SELF CARE MNGMENT TRAINING: CPT | Mod: CO

## 2022-05-26 PROCEDURE — 94761 N-INVAS EAR/PLS OXIMETRY MLT: CPT

## 2022-05-26 PROCEDURE — 25000003 PHARM REV CODE 250: Performed by: INTERNAL MEDICINE

## 2022-05-26 PROCEDURE — 63600175 PHARM REV CODE 636 W HCPCS: Mod: JG | Performed by: NURSE PRACTITIONER

## 2022-05-26 PROCEDURE — 80053 COMPREHEN METABOLIC PANEL: CPT | Performed by: STUDENT IN AN ORGANIZED HEALTH CARE EDUCATION/TRAINING PROGRAM

## 2022-05-26 PROCEDURE — 92507 TX SP LANG VOICE COMM INDIV: CPT

## 2022-05-26 PROCEDURE — 33285 INSJ SUBQ CAR RHYTHM MNTR: CPT | Performed by: INTERNAL MEDICINE

## 2022-05-26 PROCEDURE — 25000003 PHARM REV CODE 250: Performed by: NURSE PRACTITIONER

## 2022-05-26 PROCEDURE — 36415 COLL VENOUS BLD VENIPUNCTURE: CPT | Performed by: STUDENT IN AN ORGANIZED HEALTH CARE EDUCATION/TRAINING PROGRAM

## 2022-05-26 PROCEDURE — 97164 PT RE-EVAL EST PLAN CARE: CPT

## 2022-05-26 PROCEDURE — 11000001 HC ACUTE MED/SURG PRIVATE ROOM

## 2022-05-26 PROCEDURE — 97530 THERAPEUTIC ACTIVITIES: CPT | Mod: CO

## 2022-05-26 PROCEDURE — C1764 EVENT RECORDER, CARDIAC: HCPCS | Performed by: INTERNAL MEDICINE

## 2022-05-26 PROCEDURE — 25000003 PHARM REV CODE 250: Performed by: PSYCHIATRY & NEUROLOGY

## 2022-05-26 PROCEDURE — 25000003 PHARM REV CODE 250: Performed by: STUDENT IN AN ORGANIZED HEALTH CARE EDUCATION/TRAINING PROGRAM

## 2022-05-26 PROCEDURE — 85025 COMPLETE CBC W/AUTO DIFF WBC: CPT | Performed by: STUDENT IN AN ORGANIZED HEALTH CARE EDUCATION/TRAINING PROGRAM

## 2022-05-26 DEVICE — SYS LINQ II MON INSRTBL CARD: Type: IMPLANTABLE DEVICE | Site: CHEST | Status: FUNCTIONAL

## 2022-05-26 RX ORDER — LIDOCAINE HYDROCHLORIDE 20 MG/ML
INJECTION, SOLUTION EPIDURAL; INFILTRATION; INTRACAUDAL; PERINEURAL
Status: DISCONTINUED | OUTPATIENT
Start: 2022-05-26 | End: 2022-06-02 | Stop reason: HOSPADM

## 2022-05-26 RX ADMIN — CARVEDILOL 12.5 MG: 12.5 TABLET, FILM COATED ORAL at 09:05

## 2022-05-26 RX ADMIN — THIAMINE HCL TAB 100 MG 100 MG: 100 TAB at 08:05

## 2022-05-26 RX ADMIN — ATORVASTATIN CALCIUM 40 MG: 40 TABLET, FILM COATED ORAL at 09:05

## 2022-05-26 RX ADMIN — PENICILLIN G BENZATHINE 2.4 MILLION UNITS: 1200000 INJECTION, SUSPENSION INTRAMUSCULAR at 11:05

## 2022-05-26 RX ADMIN — MULTIPLE VITAMINS W/ MINERALS TAB 1 TABLET: TAB at 09:05

## 2022-05-26 RX ADMIN — CARVEDILOL 12.5 MG: 12.5 TABLET, FILM COATED ORAL at 08:05

## 2022-05-26 RX ADMIN — METRONIDAZOLE 500 MG: 500 TABLET ORAL at 03:05

## 2022-05-26 RX ADMIN — CLONIDINE HYDROCHLORIDE 0.2 MG: 0.2 TABLET ORAL at 05:05

## 2022-05-26 RX ADMIN — ASPIRIN 325 MG: 325 TABLET, COATED ORAL at 09:05

## 2022-05-26 RX ADMIN — THIAMINE HCL TAB 100 MG 100 MG: 100 TAB at 09:05

## 2022-05-26 RX ADMIN — BUPROPION HYDROCHLORIDE 150 MG: 150 TABLET, FILM COATED, EXTENDED RELEASE ORAL at 09:05

## 2022-05-26 RX ADMIN — SERTRALINE 25 MG: 25 TABLET, FILM COATED ORAL at 09:05

## 2022-05-26 RX ADMIN — METRONIDAZOLE 500 MG: 500 TABLET ORAL at 06:05

## 2022-05-26 RX ADMIN — NICOTINE 1 PATCH: 21 PATCH, EXTENDED RELEASE TRANSDERMAL at 09:05

## 2022-05-26 RX ADMIN — THIAMINE HCL TAB 100 MG 100 MG: 100 TAB at 03:05

## 2022-05-26 RX ADMIN — ENOXAPARIN SODIUM 40 MG: 40 INJECTION SUBCUTANEOUS at 05:05

## 2022-05-26 RX ADMIN — AMLODIPINE BESYLATE 10 MG: 5 TABLET ORAL at 09:05

## 2022-05-26 NOTE — PLAN OF CARE
Problem: Physical Therapy  Goal: Physical Therapy Goal  Description: Goals to be met by: 2022     Patient will increase functional independence with mobility by performin. Supine to sit with Stand-by Assistance  2. Sit to supine with Stand-by Assistance  3. Sit to stand transfer with Stand-by Assistance  4. Gait  x 300 feet with Contact Guard Assistance using Rolling Walker.   5. RLE strength to improve to 4/5 MMT.  6. LLE strength to improve to 5/5 MMT.    Outcome: Ongoing, Progressing

## 2022-05-26 NOTE — PT/OT/SLP RE-EVAL
Physical Therapy Re-evaluation    Patient Name:  Sammie Belcher   MRN:  [id number]    Recommendations:     Discharge Recommendations:  rehabilitation facility   Discharge Equipment Recommendations: walker, rolling (TBD)   Barriers to discharge: increased need of assistance & safety awareness    Assessment:     Sammie Belcher is a 52 y.o. female admitted with a medical diagnosis of <principal problem not specified>.  She presents with the following impairments/functional limitations:  weakness, impaired endurance, impaired functional mobilty, impaired self care skills, gait instability, impaired balance, decreased coordination, decreased upper extremity function, decreased lower extremity function, decreased safety awareness, abnormal tone, impaired coordination. Patient progressing with PT. Still requiring significant assistance for mobility. She will need rehab placement.    Rehab Prognosis:  good; patient would benefit from acute skilled PT services to address these deficits and reach maximum level of function.      Recent Surgery: Procedure(s) (LRB):  Insertion, Implantable Loop Recorder (N/A)      Plan:     During this hospitalization, patient to be seen BID to address the above listed problems via gait training, therapeutic activities, therapeutic exercises, neuromuscular re-education  · Plan of Care Expires:  06/09/22   Plan of Care Reviewed with: patient    Subjective     Communicated with nurse prior to session.  Patient found supine with SCD, telemetry upon PT entry to room, agreeable to evaluation.      Chief Complaint: none  Patient comments/goals:   Pain/Comfort:  · Pain Rating 1: 0/10    Patients cultural, spiritual, Episcopal conflicts given the current situation: no      Objective:     Patient found with: SCD, telemetry     General Precautions: Standard, aspiration   Orthopedic Precautions:N/A   Braces: N/A  Respiratory Status: Room air    Exams:  · Cognitive Exam:  Patient is oriented to  Person  · Sensation:    · -       Intact  · RLE ROM: WFL  · RLE Strength: Hip Flex: 2/5; Knee Flex: -3/5; Knee Ext: +3/5; Ankle DF/PF: -3/5  · LLE ROM: WFL  · LLE Strength: 4/5 grossly    Functional Mobility:  · Bed Mobility:     · Supine to Sit: minimum assistance  · Transfers:     · Sit to Stand:  minimum assistance with rolling walker  · Gait: 26 feet with RW & MIN A.  Difficulty with advancing RLE  · Balance: Poor    AM-PAC 6 CLICK MOBILITY  Total Score:17     Patient left up in chair with all lines intact, call button in reach, chair alarm on and educated on calling for assistance when ready to return to bed. .    GOALS:   Multidisciplinary Problems     Physical Therapy Goals        Problem: Physical Therapy    Goal Priority Disciplines Outcome Goal Variances Interventions   Physical Therapy Goal     PT, PT/OT Ongoing, Progressing     Description: Goals to be met by: 2022     Patient will increase functional independence with mobility by performin. Supine to sit with Stand-by Assistance  2. Sit to supine with Stand-by Assistance  3. Sit to stand transfer with Stand-by Assistance  4. Gait  x 300 feet with Contact Guard Assistance using Rolling Walker.   5. RLE strength to improve to 4/5 MMT.  6. LLE strength to improve to 5/5 MMT.                     History:     Past Medical History:   Diagnosis Date    Hypertension     Thyroid disease        Past Surgical History:   Procedure Laterality Date    THYROIDECTOMY      TUBAL LIGATION         Time Tracking:     PT Received On: 22  PT Start Time: 846     PT Stop Time: 902  PT Total Time (min): 16 min     Billable Minutes: Re-eval 16 minutes      2022

## 2022-05-26 NOTE — PT/OT/SLP PROGRESS
Occupational Therapy  Treatment    Sammie Belcher   MRN: [id number]   Admitting Diagnosis: CVA    OT Date of Treatment: 05/26/22   OT Start Time: 1130  OT Stop Time: 1153  OT Total Time (min): 23 min     Billable Minutes:  Self Care/Home Management 10 and Therapeutic Activity 13  Total Minutes: 23     OT/BETH: BETH     BETH Visit Number: 4    General Precautions: Standard, aspiration  Orthopedic Precautions: N/A  Braces: N/A    Spiritual, Cultural Beliefs, Jew Practices, Values that Affect Care: no    Subjective:  Communicated with RN prior to session.    Objective:  Patient found with: SCD, telemetry    Functional Mobility:  Bed Mobility:   Supine to sit: Activity did not occur   Sit to supine: Minimal Assistance   Rolling: Activity did not occur   Scooting: Minimal Assistance    Transfer Training:   Bed <> Chair:  Step Transfer with Minimal Assistance with Rolling Walker with verbal cues and tactile cues for technique and safety    Balance:   Static Sit: FAIR+: Able to take MINIMAL challenges from all directions  Dynamic Sit:  FAIR+: Maintains balance through MINIMAL excursions of active trunk motion  Static Stand: POOR+: Needs MINIMAL assist to maintain  Dynamic stand: POOR+: Needs MIN (minimal ) assist during gait    Additional Treatment:  Worked on safe sit to stand for increased safety with transfers      Patient left HOB elevated with call button in reach    ASSESSMENT:  Sammie Belcher is a 52 y.o. female with a medical diagnosis of CVA.    Rehab potential is good    Activity tolerance: Good    Discharge recommendations: rehabilitation facility     Equipment recommendations: walker, rolling     GOALS:   Multidisciplinary Problems     Occupational Therapy Goals        Problem: Occupational Therapy    Goal Priority Disciplines Outcome Interventions   Occupational Therapy Goal     OT, PT/OT Ongoing, Progressing    Description: Goals to be met by: 6/9/22    Patient will increase functional independence  with ADLs by performing:    UE Dressing with Modified [place].  LE Dressing with Modified [place].  Grooming while seated with Modified [place].  Toileting from toilet with Modified [place] for hygiene and clothing management.   Toilet transfer to toilet with Modified [place].                     Plan:  Patient to be seen 5 x/week, 6 x/week to address the above listed problems via self-care/home management, therapeutic activities, therapeutic exercises  Plan of Care expires: 06/23/22  Plan of Care reviewed with: patient         05/26/2022

## 2022-05-26 NOTE — PROGRESS NOTES
[facility]  Hospital Medicine Progress Note        Chief Complaint: Inpatient follow-up on stroke    HPI:   Ms. Belcher is a 52-year-old AA female with past medical history of HTN, anxiety, depression, and hypothyroidism who presents to [facility] ED with complaints of dysarthria and expressive aphasia x3 days.  Patient's family member states that she has been having difficulty speaking for the past 3 days however today was worse so she was brought to the ED for further evaluation. Her sister at bedside states that she is unsure if she has been compliant with her home medications.  She does smoke marijuana daily with her significant other.  And drinks about a six-pack of beer every day.  No history of stroke.  She is not on aspirin.  Sister at bedside reports that she has had abnormal behavior over the past 3 days and has been having episodes of bladder and bowel incontinence and is very restless/fidgety.  No reported history of falls or back pain or any saddle anesthesia.  Patient is oriented x 4 but is an extremely poor historian and it is very difficult to get a history from her.  She does report left-sided weakness and paresthesias x 2 days.     Upon arrival to outside facility the patient was markedly hypertensive but otherwise hemodynamically stable and afebrile. EKG: NSR with LVH. Labs unremarkable except for a reactive RPR, patient denies high risk sexual behavior, states unprotected sexual intercourse with significant other but she is in a monogamous relationships. CT Head showed left frontal 6 cm cortical base hypodensity suggesting an acute to subacute nonhemorrhagic infarct in the left KENIS[place] vascular territory. MRI Brain was then obtained confirming the left frontal lobe extending into the corpus callosum with an acute nonhemorrhagic infarct in the territory of the anterior cerebral artery, and old lacunar infarcts. She is being transferred to [facility] for further CVA  workup.    Interval Hx:   Patient is sitting up in a chair.  She remains with expressive aphasia.  Her significant other is on the couch and reports no acute issues.  Patient is afebrile, on room air, and hemodynamically stable.    Objective/physical exam:  General: Thin  female in no acute distress  HENT: normocephalic, atraumatic  Eye: PERRL, EOMI, clear conjunctiva  Neck: full ROM, no thyromegaly, no JVD  Respiratory: clear to auscultation bilaterally  Cardiovascular: regular rate and rhythm  Gastrointestinal: non-distended, positive bowel sounds, non-tender  Musculoskeletal: no gross deformity  Integumentary: warm, dry, intact, no rashes  Neurological:  Right hemiparesis, expressive aphasia and dysarthria with paresthesias to right face, RUE and RLE.   Psychiatric: cooperative, flat affect    VITAL SIGNS: 24 HRS MIN & MAX LAST   Temp  Min: 97.3 °F (36.3 °C)  Max: 98.2 °F (36.8 °C) 97.3 °F (36.3 °C)   BP  Min: 122/79  Max: 148/92 122/79   Pulse  Min: 65  Max: 76  65   Resp  Min: 18  Max: 18 18   SpO2  Min: 95 %  Max: 99 % 99 %       Recent Labs   Lab 05/24/22  0402 05/25/22  0422 05/26/22  0358   WBC 8.4 8.4 7.3   RBC 4.68 4.63 4.39   HGB 14.2 14.0 13.7   HCT 44.4 44.3 41.6   MCV 94.9* 95.7* 94.8*   MCH 30.3 30.2 31.2*   MCHC 32.0* 31.6* 32.9*   RDW 14.3 14.2 14.1    220 211   MPV 10.8 10.8 11.1       Recent Labs   Lab 05/24/22  0402 05/25/22  0422 05/26/22  0358    142 140   K 4.2 4.4 4.1   CO2 30* 31* 29   BUN 14.7 14.4 15.2   CREATININE 0.77 0.76 0.73   CALCIUM 9.6 9.3 9.2   ALBUMIN 3.4* 3.4* 3.2*   ALKPHOS 70 66 65   ALT 8 13 15   AST 16 20 23   BILITOT 0.5 0.5 0.4          Microbiology Results (last 7 days)     Procedure Component Value Units Date/Time    Cerebrospinal Fluid Culture [facility] [[id number]] Collected: 05/21/22 1132    Order Status: Completed Specimen: CSF (Spinal Fluid) Updated: 05/26/22 1045     CULTURE, CSF ([facility]) Final Report: At 5 days. No growth    Gram Stain [facility]  [[id number]] Collected: 05/21/22 1135    Order Status: Completed Specimen: CSF (Spinal Fluid) from Cerebrospinal Fluid Updated: 05/21/22 1454     GRAM STAIN No WBCs, No bacteria seen     Gram stain [[id number]] Collected: 05/21/22 1052    Order Status: Canceled Specimen: CSF (Spinal Fluid) Updated: 05/21/22 1124    CSF culture [[id number]] Collected: 05/21/22 1052    Order Status: Canceled Specimen: CSF (Spinal Fluid) Updated: 05/21/22 1124    Chlamydia/GC, PCR [[id number]]  (Normal) Collected: 05/19/22 1553    Order Status: Completed Specimen: Urine Updated: 05/19/22 1747     Chlamydia trachomatis PCR Not Detected     N. gonorrhea PCR Not Detected           See below for Radiology    Scheduled Med:   amLODIPine  10 mg Oral Daily    aspirin  325 mg Oral Daily    atorvastatin  40 mg Oral Daily    buPROPion  150 mg Oral Daily    carvediloL  12.5 mg Oral BID    enoxaparin  40 mg Subcutaneous Daily    metroNIDAZOLE  500 mg Oral Q8H    multivitamin  1 tablet Oral Daily    nicotine  1 patch Transdermal Daily    sertraline  25 mg Oral Daily    thiamine  100 mg Oral TID        Continuous Infusions:  None.       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  - VDRL in CSF is pending  - Neurology recommended LINQ and MARIO if CSF VDRL is negative   - Patient was treated by Infectious Disease for  trichomoniasis and syphilis     Disposition:  Inpatient rehab on Monday    Chaka Calhoun MD   05/26/2022

## 2022-05-26 NOTE — PT/OT/SLP PROGRESS
Speech Language Pathology Department  Progress Note    Patient Name:  Sammie Belcher   MRN:  [id number]  Admitting Diagnosis: CVA    Recommendations:                 General Recommendations:  Speech/language therapy  Communication strategies:  yes/no questions only, provide increased time to answer and go to room if call light pushed    Subjective     Patient awake, alert and cooperative.    Patient goals: to go home     Pain/Comfort:  · Pain Rating 1: 0/10    Respiratory Status: Room air    Objective:     Yes/No Questions:  Simple: 50% (responded yes to most questions  Confrontation Naming  Pictures: 75%  Identification:  Objects: 90%  Automatic Speech:  Singing: occasional cues  Phrase Completion: 70%    Assessment:     Pt continues to present with moderate receptive and expressive language impairments.    Goals:   Multidisciplinary Problems     SLP Goals        Problem: SLP    Goal Priority Disciplines Outcome   SLP Goal     SLP    Description: LTG:   To increase expressive/receptive language skills to enhance functional communication to express basic wants and needs with 100% effectiveness.   ST. Answer personal yes/no questions with 90% accuracy.   2. Answer simple yes/no questions with 90% accuracy.   3. Complete automatic speech tasks with moderate cueing.   4. Name objects with 70% accuracy.   5. Complete simple phrases with moderate cueing.                      Plan:     Will continue to follow and tx as appropriate.    Discharge recommendations:  rehabilitation facility   Barriers to Discharge:  Level of Skilled Assistance Needed      Time Tracking:     SLP Treatment Date:   22  Speech Start Time:  1430  Speech Stop Time:  1455     Speech Total Time (min):  25 min    Billable minutes:  Speech/Language Therapy, 25 minutes       2022

## 2022-05-26 NOTE — CONSULTS
Inpatient consult to Cardiology  Consult performed by: JENIFER Phipps  Consult ordered by: [facility]  Cardiology  Consult Note    Patient Name: Sammie Belcher  MRN: [id number]  Admission Date: 5/18/2022  Hospital Length of Stay: 8 days  Code Status: Full Code   Attending Provider: Stacia Jovel MD   Consulting Provider: JENIFER Phipps  Primary Care Physician: Primary Doctor No  Principal Problem:<principal problem not specified>    Patient information was obtained from patient, past medical records and ER records.     Subjective:     Chief Complaint:  Consulted for MARIO/LINQ     HPI:   This is a 52-year-old female, who is unknown to [facility], with history of HTN, anxiety, depression, hypothyroidism.  She initially presented to [facility] ER with complaints of dysarthria and expressive aphasia x3 days.  Patient's family member stated that she had been having difficulty speaking for 3 days prior to arrival however the day of arrival was worse so they brought her to the ER for further evaluation.  CT head showed left frontal 6 cm cortical based hypodensity suggesting an acute to subacute nonhemorrhagic infarct in the left KENISHA vascular territory.  MRI brain reviewed confirmed left frontal lobe extending in the corpus callosum with acute nonhemorrhagic infarct in the territory of KENISHA, and old lacunar infarcts.  She was transferred to [facility] for further workup.  Cis has been consulted for MARIO and LINQ placement.      PMH: HTN, anxiety, depression, hypothyroidism.  PSH: Thyroid surgery, bilateral tubal ligation  Social History: Current 2ppd tobacco use, daily EtOH use, current marijuana use and history of cocaine use  Family History: Noncontributory    Previous Cardiac Diagnostics:   Carotid US 5.19.22  The right internal carotid artery demonstrated less than 50% stenosis.  The left internal carotid artery demonstrated less than 50%  stenosis.  The bilateral vertebral arteries were patent with antegrade flow.    Echo 5.18.22  Negative Bubble study.  The left ventricle is normal in size with severe concentric hypertrophy and hyperdynamic systolic function.  Grade I left ventricular diastolic dysfunction.  The estimated ejection fraction is 74%.  Normal right ventricular size with normal right ventricular systolic function.      Review of patient's allergies indicates:  No Known Allergies    No current facility-administered medications on file prior to encounter.     Current Outpatient Medications on File Prior to Encounter   Medication Sig    alendronate (FOSAMAX) 70 MG tablet Take 70 mg by mouth every 7 days.    amLODIPine (NORVASC) 5 MG tablet Take 5 mg by mouth once daily.    buPROPion (WELLBUTRIN XL) 150 MG TB24 tablet Take 150 mg by mouth once daily.    carvediloL (COREG) 12.5 MG tablet Take 12.5 mg by mouth 2 (two) times daily.    cholecalciferol, vitamin D3, 1,000 unit capsule Take 1 capsule (1,000 Units total) by mouth once daily.    ergocalciferol (ERGOCALCIFEROL) 50,000 unit Cap Take 50,000 Units by mouth every 7 days.    lisinopril 10 MG tablet Take 1 tablet (10 mg total) by mouth once daily.    NIFEdipine (PROCARDIA-XL) 60 MG (OSM) 24 hr tablet Take 1 tablet (60 mg total) by mouth once daily.    sertraline (ZOLOFT) 25 MG tablet Take 25 mg by mouth once daily.    [DISCONTINUED] losartan (COZAAR) 50 MG tablet Take 50 mg by mouth once daily.       Review of Systems:  Review of Systems   Constitutional: Negative.    HENT: Negative.    Eyes: Negative.    Respiratory: Negative.    Cardiovascular: Negative.    Gastrointestinal: Negative.    Endocrine: Negative.    Genitourinary: Negative.    Musculoskeletal: Negative.    Skin: Negative.    Allergic/Immunologic: Negative.    Neurological:        Expressive aphasia, dysarthria   Hematological: Negative.    Psychiatric/Behavioral: Negative.        Objective:     Vital Signs (Most  Recent):  Temp: 98.2 °F (36.8 °C) (05/26/22 0011)  Pulse: 68 (05/26/22 0700)  Resp: 18 (05/26/22 0011)  BP: (!) 140/87 (05/26/22 0700)  SpO2: 97 % (05/26/22 0700) Vital Signs (24h Range):  Temp:  [97.5 °F (36.4 °C)-98.2 °F (36.8 °C)] 98.2 °F (36.8 °C)  Pulse:  [68-80] 68  Resp:  [18-19] 18  SpO2:  [95 %-98 %] 97 %  BP: (140-162)/() 140/87     Weight: 65 kg (143 lb 4.8 oz) (Bed wt 5/24: 68kg)  Body mass index is 18.99 kg/m².    SpO2: 97 %  O2 Device (Oxygen Therapy): room air      Intake/Output Summary (Last 24 hours) at 5/26/2022 0903  Last data filed at 5/26/2022 0600  Gross per 24 hour   Intake 360 ml   Output 550 ml   Net -190 ml       Lines/Drains/Airways     Peripheral Intravenous Line  Duration                Peripheral IV - Single Lumen 05/19/22 0200 20 G Left;Distal;Lateral Forearm 7 days                  Significant Labs:  Recent Lab Results  (Last 5 results in the past 72 hours)      05/26/22  0518   05/26/22  0358   05/25/22  1848   05/25/22  1529   05/25/22  1124        Albumin/Globulin Ratio   1.4             Albumin   3.2             Alkaline Phosphatase   65             ALT   15             AST   23             Baso #   0.05             Basophil %   0.7             BILIRUBIN TOTAL   0.4             BUN   15.2             Calcium   9.2             Chloride   107             CO2   29             Creatinine   0.73             CRP         2.80       eGFR if    >60             Eos #   0.30             Eosinophil %   4.1             Folate         8.4       Globulin, Total   2.3             Glucose   81             Hematocrit   41.6             Hemoglobin   13.7             Homocysteine         9.4       Immature Grans (Abs)   0.02             Immature Granulocytes   0.3             Lymph #   2.56             LYMPH %   35.3             MCH   31.2             MCHC   32.9             MCV   94.8             Mono #   0.60             Mono %   8.3             MPV   11.1             Neut #    3.7             Neut %   51.3             nRBC   0.0             Platelets   211             POCT Glucose 99     119   100         Potassium   4.1             PROTEIN TOTAL   5.5             RBC   4.39             RDW   14.1             Sodium   140             Vitamin B-12         424       WBC   7.3                                    Significant Imaging:   Imaging Results          MRI Brain Without Contrast (Final result)  Result time 05/18/22 15:42:12    Final result by Raul Campbell MD (05/18/22 15:42:12)                 Impression:      1.  Left frontal lobe extending into the corpus callosum acute nonhemorrhagic infarct in the territory of the anterior cerebral artery.    2.  Old lacunar infarcts, chronic microangiopathic ischemia and atrophy.      Electronically signed by: Raul Campbell  Date:    05/18/2022  Time:    15:42             Narrative:    EXAMINATION:  MRI BRAIN WITHOUT CONTRAST    CLINICAL HISTORY:  Stroke, follow up;    TECHNIQUE:  Multiplanar MRI sequences were performed of the brain without contrast.    COMPARISON:  CT brain without contrast May 18, 2020    FINDINGS:  There is left frontal lobe in the parasagittal location extending to the corpus callosum diffusion weighted restriction with corresponding signal dropout on the ADC map and T2 FLAIR hyperintensity which is consistent with acute hemorrhage infarct and results in effacement of the sulci and local mass effect.  There are old lacunar infarcts which involve bilateral basal ganglia and the left corona radiata.  Bilateral cerebral periventricular and subcortical white matter variable size T2-FLAIR hyperintense signals are consistent with mild chronic microangiopathic ischemia. Gradient echo sequences demonstrate no evidence of de phasing artifact to suggest hemorrhagic byproducts. The sella and suprasellar areas are unremarkable.    The cerebellar tonsils are normally positioned. There is no acute intracranial hemorrhage, or  hydrocephalus.  No acute extra axial fluid collections identified. The mastoid air cells are clear.                               CT Head Without Contrast (Final result)  Result time 05/18/22 12:38:31    Final result by Xiomara Hassan MD (05/18/22 12:38:31)                 Impression:      1. Left frontal 6 cm cortical based hypodensity suggests an acute-subacute nonhemorrhagic infarct in the left KENISHA vascular territory.  This could be confirmed by MRI.  2. Otherwise no acute intracranial abnormalities.  .      Electronically signed by: Xiomara Hassan  Date:    05/18/2022  Time:    12:38             Narrative:    EXAMINATION:  CT HEAD WITHOUT CONTRAST    CLINICAL HISTORY:  Neuro deficit, acute, stroke suspected;    TECHNIQUE:  Axial scans were obtained from skull base to the vertex.    Coronal and sagittal reconstructions obtained from the axial data.    Automatic exposure control was utilized to limit radiation dose.    Contrast: None    Radiation Dose:    Total DLP: 858 mGy*cm    COMPARISON:  Head CT 08/14/2018    FINDINGS:  Scalp/Skull:    No abnormalities.    Brain sulci: Minimally effacing in the paramedian left frontal region.  Otherwise appropriate for patient's age.    Ventricles: Left frontal horn and anterior body are partially effaced.  Otherwise normal in size and configuration. No hydrocephalus.    Extra-axial spaces:    No masses or fluid collections.    Parenchyma:    Left cingulate/superior frontal approximately 6 x 2 cm cortical/subcortical hypodensity (series 8, image 17; sagittal image 20).    Old bilateral basal ganglia lacunar insults.    Discrete and confluent supratentorial white matter hypodensities are mild-moderate nonspecific chronic microangiopathic changes, statistically chronic microvascular ischemia    No mass or hemorrhage.    Dural sinuses: No abnormal densities.    Sellar/Suprasellar region: No abnormalities.    Skull base and Craniocervical junction: No  abnormalities.    Incidental findings:    Carotid siphon and vertebrobasilar atherosclerotic vascular calcifications.                                Last Lipids:  Lab Results   Component Value Date    CHOL 223 (H) 05/18/2022     Lab Results   Component Value Date    HDL 62 (H) 05/18/2022     No results found for: LDLCALC  Lab Results   Component Value Date    TRIG 86 05/18/2022     No results found for: CHOLHDL    EKG:    Results for orders placed or performed during the hospital encounter of 05/18/22   ECG 12 lead    Narrative    Test Reason : I63.9,    Vent. Rate : 093 BPM     Atrial Rate : 093 BPM     P-R Int : 166 ms          QRS Dur : 090 ms      QT Int : 374 ms       P-R-T Axes : 075 065 070 degrees     QTc Int : 465 ms    Normal sinus rhythm  Moderate voltage criteria for LVH, may be normal variant  Anterior infarct ,age undetermined  Abnormal ECG  Confirmed by Joe Clay MD ([id number]) on 5/19/2022 10:10:21 AM    Referred By: [email or web address]   SELF           Confirmed By:Joe Clay MD       Telemetry:  SR    Physical Exam:  Physical Exam  Constitutional:       Appearance: Normal appearance.   HENT:      Head: Atraumatic.   Eyes:      Extraocular Movements: Extraocular movements intact.      Conjunctiva/sclera: Conjunctivae normal.   Cardiovascular:      Rate and Rhythm: Normal rate and regular rhythm.      Pulses: Normal pulses.      Heart sounds: Normal heart sounds.   Pulmonary:      Effort: Pulmonary effort is normal.      Breath sounds: Normal breath sounds.   Musculoskeletal:      Cervical back: Neck supple.   Skin:     General: Skin is warm and dry.   Neurological:      Mental Status: She is oriented to person, place, and time.   Psychiatric:         Mood and Affect: Mood normal.         Behavior: Behavior normal.           Home Medications:   No current facility-administered medications on file prior to encounter.     Current Outpatient Medications on File Prior to Encounter   Medication Sig Dispense  Refill    alendronate (FOSAMAX) 70 MG tablet Take 70 mg by mouth every 7 days.      amLODIPine (NORVASC) 5 MG tablet Take 5 mg by mouth once daily.      buPROPion (WELLBUTRIN XL) 150 MG TB24 tablet Take 150 mg by mouth once daily.      carvediloL (COREG) 12.5 MG tablet Take 12.5 mg by mouth 2 (two) times daily.      cholecalciferol, vitamin D3, 1,000 unit capsule Take 1 capsule (1,000 Units total) by mouth once daily.  0    ergocalciferol (ERGOCALCIFEROL) 50,000 unit Cap Take 50,000 Units by mouth every 7 days.      lisinopril 10 MG tablet Take 1 tablet (10 mg total) by mouth once daily. 90 tablet 3    NIFEdipine (PROCARDIA-XL) 60 MG (OSM) 24 hr tablet Take 1 tablet (60 mg total) by mouth once daily. 90 tablet 3    sertraline (ZOLOFT) 25 MG tablet Take 25 mg by mouth once daily.      [DISCONTINUED] losartan (COZAAR) 50 MG tablet Take 50 mg by mouth once daily.         Current Inpatient Medications:    Current Facility-Administered Medications:     amLODIPine tablet 10 mg, 10 mg, Oral, Daily, Chilo Lea MD, 10 mg at 05/25/22 1003    aspirin EC tablet 325 mg, 325 mg, Oral, Daily, Stephanie Pitt MD, 325 mg at 05/25/22 1003    atorvastatin tablet 40 mg, 40 mg, Oral, Daily, SIRISHA Live, 40 mg at 05/25/22 1003    buPROPion TB24 tablet 150 mg, 150 mg, Oral, Daily, SIRISHA Live, 150 mg at 05/25/22 1003    carvediloL tablet 12.5 mg, 12.5 mg, Oral, BID, Brayan Trotter MD, 12.5 mg at 05/25/22 2142    cloNIDine tablet 0.2 mg, 0.2 mg, Oral, TID PRN, SIRISHA Live, 0.2 mg at 05/26/22 0526    dextrose 10% bolus 125 mL, 12.5 g, Intravenous, PRN, Chilo Lea MD    dextrose 10% bolus 250 mL, 25 g, Intravenous, PRN, Chilo Lea MD    enalaprilat injection 1.25 mg, 1.25 mg, Intravenous, Q6H PRN, SIRISHA Live    enoxaparin injection 40 mg, 40 mg, Subcutaneous, Daily, Chilo Lea MD, 40 mg at 05/25/22 1612    glucagon (human recombinant) injection 1 mg, 1 mg, Intramuscular, PRN, Chilo  MD Leonora    glucose chewable tablet 16 g, 16 g, Oral, PRN, Chilo Lea MD    glucose chewable tablet 24 g, 24 g, Oral, PRN, Chilo Lea MD    hydrALAZINE injection 10 mg, 10 mg, Intravenous, Q4H PRN, Brayan Trotter MD, 10 mg at 05/21/22 0000    insulin aspart U-100 injection 1-10 Units, 1-10 Units, Subcutaneous, QID (AC + HS) PRN, Chilo Lea MD, 4 Units at 05/22/22 1233    labetaloL injection 10 mg, 10 mg, Intravenous, Q4H PRN, SIRISHA Live    metroNIDAZOLE tablet 500 mg, 500 mg, Oral, Q8H, Stacia Jovel MD, 500 mg at 05/26/22 0600    multivitamin tablet, 1 tablet, Oral, Daily, Brayan Trotter MD, 1 tablet at 05/25/22 1003    nicotine 21 mg/24 hr 1 patch, 1 patch, Transdermal, Daily, SIRISHA Live, 1 patch at 05/25/22 1012    ondansetron injection 4 mg, 4 mg, Intravenous, QID PRN, GARY LiveP, 4 mg at 05/21/22 2104    sertraline tablet 25 mg, 25 mg, Oral, Daily, GARY LiveP, 25 mg at 05/25/22 1013    sodium chloride 0.9% flush 10 mL, 10 mL, Intravenous, PRN, Veena Chung, FNP    sodium chloride 0.9% flush 10 mL, 10 mL, Intravenous, PRN, Veena Chung, FNP    thiamine tablet 100 mg, 100 mg, Oral, TID, Stephanie Pitt MD, 100 mg at 05/25/22 2143           Assessment:     IMPRESSION:  Acute nonhemorrhagic CVA- L KENISHA territory  Dysarthria and expressive aphasia secondary to CVA  HTN  HLD  Hypothyroidism  Tobacco, EtOH, and Marijuana use  Reactive RPR  Trichomonas    PLAN:     Plan:  ABX per ID  Plan for LINQ today  No MARIO secondary previous history of difficulty swallowing and currently having some dysphagia.  R/B/A discussed with patient and she wishes to proceed with above procedure  F/U with [facility] cardiology clinic  Will sign off. Thank you for allowing us to participate in the care of this patient. Please call us with any questions.    Thank you for your consult.     Ahsan Dumont [facility]-BC  Cardiology  [facility] - 9th Floor Med  Surg  05/26/2022 9:03 AM

## 2022-05-27 LAB
AT III ACT/NOR PPP CHRO: 95 % (ref 80–130)
GAMMA INTERFERON BACKGROUND BLD IA-ACNC: 0.04 IU/ML
HGB A MFR BLD ELPH: 97.2 % (ref 95.8–98)
HGB A2 MFR BLD: 2.8 % (ref 2–3.3)
HGB F MFR BLD: 0 % (ref 0–0.9)
HGB FRACT BLD ELPH-IMP: NORMAL
M HPLC HB VARIANT, B: NORMAL
M TB IFN-G BLD-IMP: NEGATIVE
M TB IFN-G CD4+ BCKGRND COR BLD-ACNC: -0.02 IU/ML
M TB IFN-G CD4+CD8+ BCKGRND COR BLD-ACNC: 0.04 IU/ML
MITOGEN IGNF BCKGRD COR BLD-ACNC: >10 IU/ML
POCT GLUCOSE: 111 MG/DL (ref 70–110)
POCT GLUCOSE: 120 MG/DL (ref 70–110)
PROT S ACT/NOR PPP: 85 % (ref 65–160)
SSA(RO) ANTIBODY (OHS): NEGATIVE
SSB(LA) ANTIBODY (OHS): NEGATIVE
THYROID PEROXIDASE (OLG): NEGATIVE

## 2022-05-27 PROCEDURE — 97535 SELF CARE MNGMENT TRAINING: CPT

## 2022-05-27 PROCEDURE — 25000003 PHARM REV CODE 250: Performed by: NURSE PRACTITIONER

## 2022-05-27 PROCEDURE — 25000003 PHARM REV CODE 250: Performed by: PSYCHIATRY & NEUROLOGY

## 2022-05-27 PROCEDURE — 11000001 HC ACUTE MED/SURG PRIVATE ROOM

## 2022-05-27 PROCEDURE — 25000003 PHARM REV CODE 250: Performed by: STUDENT IN AN ORGANIZED HEALTH CARE EDUCATION/TRAINING PROGRAM

## 2022-05-27 PROCEDURE — 25000003 PHARM REV CODE 250: Performed by: INTERNAL MEDICINE

## 2022-05-27 PROCEDURE — S4991 NICOTINE PATCH NONLEGEND: HCPCS | Performed by: NURSE PRACTITIONER

## 2022-05-27 PROCEDURE — 63600175 PHARM REV CODE 636 W HCPCS: Performed by: STUDENT IN AN ORGANIZED HEALTH CARE EDUCATION/TRAINING PROGRAM

## 2022-05-27 PROCEDURE — 97116 GAIT TRAINING THERAPY: CPT | Mod: CQ

## 2022-05-27 PROCEDURE — 92507 TX SP LANG VOICE COMM INDIV: CPT

## 2022-05-27 PROCEDURE — 97530 THERAPEUTIC ACTIVITIES: CPT | Mod: CQ

## 2022-05-27 RX ADMIN — ENOXAPARIN SODIUM 40 MG: 40 INJECTION SUBCUTANEOUS at 05:05

## 2022-05-27 RX ADMIN — ASPIRIN 325 MG: 325 TABLET, COATED ORAL at 08:05

## 2022-05-27 RX ADMIN — BUPROPION HYDROCHLORIDE 150 MG: 150 TABLET, FILM COATED, EXTENDED RELEASE ORAL at 08:05

## 2022-05-27 RX ADMIN — CARVEDILOL 12.5 MG: 12.5 TABLET, FILM COATED ORAL at 08:05

## 2022-05-27 RX ADMIN — THIAMINE HCL TAB 100 MG 100 MG: 100 TAB at 05:05

## 2022-05-27 RX ADMIN — MULTIPLE VITAMINS W/ MINERALS TAB 1 TABLET: TAB at 08:05

## 2022-05-27 RX ADMIN — THIAMINE HCL TAB 100 MG 100 MG: 100 TAB at 08:05

## 2022-05-27 RX ADMIN — SERTRALINE 25 MG: 25 TABLET, FILM COATED ORAL at 08:05

## 2022-05-27 RX ADMIN — CARVEDILOL 12.5 MG: 12.5 TABLET, FILM COATED ORAL at 09:05

## 2022-05-27 RX ADMIN — NICOTINE 1 PATCH: 21 PATCH, EXTENDED RELEASE TRANSDERMAL at 08:05

## 2022-05-27 RX ADMIN — ATORVASTATIN CALCIUM 40 MG: 40 TABLET, FILM COATED ORAL at 05:05

## 2022-05-27 RX ADMIN — THIAMINE HCL TAB 100 MG 100 MG: 100 TAB at 09:05

## 2022-05-27 RX ADMIN — AMLODIPINE BESYLATE 10 MG: 5 TABLET ORAL at 08:05

## 2022-05-27 NOTE — PROGRESS NOTES
[facility] - 9th Floor Med Surg  Adult Nutrition  Progress Note    SUMMARY       Recommendations    - Continue current diet per MD/SLP   - Continue MVI + thiamine as feasible  - Added ONS Boost Plus to provide 360 kcals, 14 gm pro per serving.    Goals: Meet >75% of est energy needs by f/u  Nutrition Goal Status: new    Assessment and Plan  Nutrition Problem  Malnutrition    Related to (etiology):   Thyroid disease    Signs and Symptoms (as evidenced by):   <75% est energy needs > 1 month  Physical evidence of mild muscle/fat wasting    Interventions(treatment strategy):  Commercial food, Multivitamin / Mineral supplement therapy and Collaboration with other providers    Nutrition Diagnosis Status:   Continues      Malnutrition Assessment  Malnutrition Type: chronic illness  Energy Intake: moderate energy intake     Weight Loss (Malnutrition): 7.5% in 3 months  Energy Intake (Malnutrition): less than or equal to 75% for greater than or equal to 1 month  Subcutaneous Fat (Malnutrition): mild depletion  Muscle Mass (Malnutrition): mild depletion   Orbital Region (Subcutaneous Fat Loss): mild depletion  Upper Arm Region (Subcutaneous Fat Loss): mild depletion   [place] [employer] Region (Muscle Loss): mild depletion  Clavicle Bone Region (Muscle Loss): mild depletion  Dorsal Hand (Muscle Loss): mild depletion             Reason for Assessment    Reason For Assessment: length of stay  Diagnosis:  (CVA\)  Relevant Medical History: Hypertension     Thyroid disease    General Information Comments:   5/24: Pt noted with CVA, family at bedside. Pt on easy to chew diet, appetite fair. Offered ONS, pt requesting strawberry. Per family, pt has had some appetite and wt loss before parathyroid gland removal ~ 2 months ago. States UBW ~ 160lb.  5/27: fair appetite, tolerating diet, drinking some of the supplements  Nutrition Risk Screen    Nutrition Risk Screen: no indicators present    Nutrition/Diet History    Spiritual,  "Cultural Beliefs, Jain Practices, Values that Affect Care: no    Anthropometrics    Temp: 97.7 °F (36.5 °C)  Height Method: Stated  Height: 6' 0.84" (185 cm)  Height (inches): 72.83 in  Weight Method: Standard Scale  Weight: 65 kg (143 lb 4.8 oz) (Bed wt : 68kg)  Weight (lb): 143.3 lb  Ideal Body Weight (IBW), Female: 164.15 lb  % Ideal Body Weight, Female (lb): 87.3 %  BMI (Calculated): 19  BMI Grade: 18.5-24.9 - normal  Usual Body Weight (UBW), k.7 kg  % Usual Body Weight: 89.6  % Weight Change From Usual Weight: -10.59 %       Lab/Procedures/Meds    Pertinent Labs Comments: :Protein 5.5  Pertinent Medications Comments: Thiamine, MVI    Physical Findings/Assessment      Estimated/Assessed Needs    Weight Used For Calorie Calculations: 68 kg (149 lb 14.6 oz)  Energy Calorie Requirements (kcal): 1700 - 2040kcals (25-30kcal/kg)  Energy Need Method: Kcal/kg  Protein Requirements: 88gm (1.3gm/kg)  Weight Used For Protein Calculations: 68 kg (149 lb 14.6 oz)  Estimated Fluid Requirement Method: RDA Method  RDA Method (mL): 1700    Nutrition Prescription Ordered         Evaluation of Received Nutrient/Fluid Intake       % Intake of Estimated Energy Needs: 50 - 75 %  % Meal Intake: 50 - 75 %    Nutrition Risk    Level of Risk/Frequency of Follow-up: moderate     Monitor and Evaluation    Food and Nutrient Intake: food and beverage intake  Anthropometric Measurements: weight change     Nutrition Follow-Up    RD Follow-up?: Yes    "

## 2022-05-27 NOTE — PT/OT/SLP RE-EVAL
Occupational Therapy   Re-evaluation    Name: Sammie Belcher  MRN: [id number]  Admitting Diagnosis:  <principal problem not specified>  Recent Surgery: Procedure(s) (LRB):  Insertion, Implantable Loop Recorder (N/A) 1 Day Post-Op    Recommendations:     Discharge Recommendations: rehabilitation facility  Discharge Equipment Recommendations:  walker, rolling  Barriers to discharge:       Assessment:     Sammie Belcher is a 52 y.o. female with a medical diagnosis of <principal problem not specified>.  She presents with the following performance deficits affecting function are impaired balance, impaired functional mobilty, impaired self care skills, impaired endurance, weakness, decreased coordination, impaired cognition, decreased upper extremity function.      Rehab Prognosis:  good; patient would benefit from acute skilled OT services to address these deficits and reach maximum level of function.       Plan:     Patient to be seen daily to address the above listed problems via self-care/home management, therapeutic activities, therapeutic exercises  · Plan of Care Expires: 06/23/22  · Plan of Care Reviewed with: patient    Subjective     Chief Complaint: --  Patient/Family stated goals: ---.  Pain/Comfort:  · Pain Rating 1: 0/10    Objective:     Patient found HOB elevated with: PureWick, peripheral IV upon OT entry to room.    General Precautions: Standard, aspiration   Orthopedic Precautions:N/A   Braces:    Respiratory Status: Room air    Occupational Performance:    Bed Mobility:    · Patient completed Supine to Sit with minimum assistance    Functional Mobility/Transfers:  · Patient completed Sit <> Stand Transfer with minimum assistance  with  rolling walker   · Functional Mobility:    Activities of Daily Living:  · Grooming: stand by assistance to open packages and setup task   · Upper Body Dressing: stand by assistance for fasteners 2/2 decreased RUE strength/coordination  · Lower Body Dressing: moderate  assistance for balance and fine motor   · Toileting: moderate assistance for balance and fine motor involved in pericare     Cognitive/Visual Perceptual:  Cognitive/Psychosocial Skills:     -       Follows Commands/attention:Follows two-step commands    Physical Exam:  Upper Extremity Strength:    -       Right Upper Extremity: Deficits: -3/5 shoulder and elbow ; 3/5 digits   -       Left Upper Extremity: WNL    AMPAC 6 Click:  AMPAC Total Score: 18        Patient left HOB elevated with all lines intact and call button in reach    GOALS:   Multidisciplinary Problems     Occupational Therapy Goals        Problem: Occupational Therapy    Goal Priority Disciplines Outcome Interventions   Occupational Therapy Goal     OT, PT/OT Ongoing, Progressing    Description: Goals to be met by: 6/9/22    Patient will increase functional independence with ADLs by performing:    UE Dressing with Modified Haines.  LE Dressing with Modified Haines.  Grooming while seated with Modified Haines.  Toileting from toilet with Modified Haines for hygiene and clothing management.   Toilet transfer to toilet with Modified Haines.                     History:     Past Medical History:   Diagnosis Date    Hypertension     Thyroid disease        Past Surgical History:   Procedure Laterality Date    INSERTION OF IMPLANTABLE LOOP RECORDER N/A 5/26/2022    Procedure: Insertion, Implantable Loop Recorder;  Surgeon: Arias Brown MD;  Location: [facility] CATH LAB;  Service: Cardiology;  Laterality: N/A;    THYROIDECTOMY      TUBAL LIGATION         Time Tracking:     OT Date of Treatment:    OT Start Time: 1027  OT Stop Time: 1045  OT Total Time (min): 18 min    Billable Minutes:Re-eval 18min     5/27/2022

## 2022-05-27 NOTE — PLAN OF CARE
Problem: Infection  Goal: Absence of Infection Signs and Symptoms  Outcome: Ongoing, Progressing     Problem: Cerebral Tissue Perfusion (Stroke, Ischemic/Transient Ischemic Attack)  Goal: Optimal Cerebral Tissue Perfusion  Outcome: Ongoing, Progressing     Problem: Cognitive Impairment (Stroke, Ischemic/Transient Ischemic Attack)  Goal: Optimal Cognitive Function  Outcome: Ongoing, Progressing     Problem: Communication Impairment (Stroke, Ischemic/Transient Ischemic Attack)  Goal: Improved Communication Skills  Outcome: Ongoing, Progressing  Intervention: Optimize Communication Skills  Flowsheets (Taken 5/27/2022 1831)  Communication Enhancement Strategies:   call light answered in person   communication board used   extra time allowed for response     Problem: Functional Ability Impaired (Stroke, Ischemic/Transient Ischemic Attack)  Goal: Optimal Functional Ability  Outcome: Ongoing, Progressing  Intervention: Optimize Functional Ability  Flowsheets (Taken 5/27/2022 1831)  Self-Care Promotion: meal set-up provided

## 2022-05-27 NOTE — PROGRESS NOTES
[facility]  Hospital Medicine Progress Note        Chief Complaint: Inpatient follow-up on stroke    HPI:   Ms. Belcher is a 52-year-old AA female with past medical history of HTN, anxiety, depression, and hypothyroidism who presents to [facility] ED with complaints of dysarthria and expressive aphasia x3 days.  Patient's family member states that she has been having difficulty speaking for the past 3 days however today was worse so she was brought to the ED for further evaluation. Her sister at bedside states that she is unsure if she has been compliant with her home medications.  She does smoke marijuana daily with her significant other.  And drinks about a six-pack of beer every day.  No history of stroke.  She is not on aspirin.  Sister at bedside reports that she has had abnormal behavior over the past 3 days and has been having episodes of bladder and bowel incontinence and is very restless/fidgety.  No reported history of falls or back pain or any saddle anesthesia.  Patient is oriented x 4 but is an extremely poor historian and it is very difficult to get a history from her.  She does report left-sided weakness and paresthesias x 2 days.     Upon arrival to outside facility the patient was markedly hypertensive but otherwise hemodynamically stable and afebrile. EKG: NSR with LVH. Labs unremarkable except for a reactive RPR, patient denies high risk sexual behavior, states unprotected sexual intercourse with significant other but she is in a monogamous relationships. CT Head showed left frontal 6 cm cortical base hypodensity suggesting an acute to subacute nonhemorrhagic infarct in the left KENISHA vascular territory. MRI Brain was then obtained confirming the left frontal lobe extending into the corpus callosum with an acute nonhemorrhagic infarct in the territory of the anterior cerebral artery, and old lacunar infarcts. She is being transferred to [facility] for further CVA  workup.    Interval Hx:   Patient is sitting up in bed.  She remains with expressive aphasia.  No family present.  CNA is at bedside. No acute issues.  Patient is afebrile, on room air, and hemodynamically stable.    Objective/physical exam:  General: Thin  female in no acute distress  HENT: normocephalic, atraumatic  Eye: PERRL, EOMI, clear conjunctiva  Neck: full ROM, no thyromegaly, no JVD  Respiratory: clear to auscultation bilaterally  Cardiovascular: regular rate and rhythm  Gastrointestinal: non-distended, positive bowel sounds, non-tender  Musculoskeletal: no gross deformity  Integumentary: warm, dry, intact, no rashes  Neurological:  Right hemiparesis, expressive aphasia and dysarthria with paresthesias to right face, RUE and RLE.   Psychiatric: cooperative, flat affect    VITAL SIGNS: 24 HRS MIN & MAX LAST   Temp  Min: 97.5 °F (36.4 °C)  Max: 98.1 °F (36.7 °C) 97.9 °F (36.6 °C)   BP  Min: 160/88  Max: 168/96 (!) 160/88   Pulse  Min: 69  Max: 76  74   Resp  Min: 18  Max: 18 18   SpO2  Min: 99 %  Max: 100 % 100 %       Recent Labs   Lab 05/24/22  0402 05/25/22  0422 05/26/22  0358   WBC 8.4 8.4 7.3   RBC 4.68 4.63 4.39   HGB 14.2 14.0 13.7   HCT 44.4 44.3 41.6   MCV 94.9* 95.7* 94.8*   MCH 30.3 30.2 31.2*   MCHC 32.0* 31.6* 32.9*   RDW 14.3 14.2 14.1    220 211   MPV 10.8 10.8 11.1       Recent Labs   Lab 05/24/22  0402 05/25/22  0422 05/26/22  0358    142 140   K 4.2 4.4 4.1   CO2 30* 31* 29   BUN 14.7 14.4 15.2   CREATININE 0.77 0.76 0.73   CALCIUM 9.6 9.3 9.2   ALBUMIN 3.4* 3.4* 3.2*   ALKPHOS 70 66 65   ALT 8 13 15   AST 16 20 23   BILITOT 0.5 0.5 0.4          Microbiology Results (last 7 days)     Procedure Component Value Units Date/Time    Cerebrospinal Fluid Culture [facility] [[id number]] Collected: 05/21/22 1132    Order Status: Completed Specimen: CSF (Spinal Fluid) Updated: 05/26/22 1045     CULTURE, CSF ([facility]) Final Report: At 5 days. No growth    Gram Stain [facility] [[id number]]  Collected: 05/21/22 1135    Order Status: Completed Specimen: CSF (Spinal Fluid) from Cerebrospinal Fluid Updated: 05/21/22 1454     GRAM STAIN No WBCs, No bacteria seen     Gram stain [[id number]] Collected: 05/21/22 1052    Order Status: Canceled Specimen: CSF (Spinal Fluid) Updated: 05/21/22 1124    CSF culture [[id number]] Collected: 05/21/22 1052    Order Status: Canceled Specimen: CSF (Spinal Fluid) Updated: 05/21/22 1124           See below for Radiology    Scheduled Med:   amLODIPine  10 mg Oral Daily    aspirin  325 mg Oral Daily    atorvastatin  40 mg Oral Daily    buPROPion  150 mg Oral Daily    carvediloL  12.5 mg Oral BID    enoxaparin  40 mg Subcutaneous Daily    multivitamin  1 tablet Oral Daily    nicotine  1 patch Transdermal Daily    sertraline  25 mg Oral Daily    thiamine  100 mg Oral TID        Continuous Infusions:  None.       PRN Meds:  cloNIDine, dextrose 10%, dextrose 10%, enalaprilat, glucagon (human recombinant), glucose, glucose, hydrALAZINE, insulin aspart U-100, labetalol, LIDOcaine (PF) 20 mg/ml (2%), ondansetron, sodium chloride 0.9%, sodium chloride 0.9%       Assessment/Plan:  Acute Non-hemorrhagic CVA - left KENISHA vascular territory  Poorly controlled essential HTN  HLD  Syphilis status post Bicillin LA IM x 2 with 3rd dose on June 2nd  Trichomoniasis status post 7 days of Flagyl  Hypothyroidism  Depression  Tobacco and marijuana abuse  Long history of crack cocaine abuse        PLAN:  - Blood pressure control  - Continue aspirin and statin per neurology   - Immunological panel was sent by neurology.  - VDRL in CSF is negative  - Neurology recommended LINQ and MARIO if CSF VDRL is negative  - Patient had LINQ placement on 05/26  - Patient did not have MRAIO due to previous history of, as well as current, dysphagia  - Patient was treated by Infectious Disease for trichomoniasis and syphilis     Disposition:  Inpatient rehab on Monday    Chaka Calhoun MD   05/27/2022

## 2022-05-27 NOTE — PT/OT/SLP PROGRESS
Physical Therapy         Treatment        Sammie Belcher   MRN: [id number]     PT Received On: 09/23/22  PT Start Time: 0857     PT Stop Time: 0925    PT Total Time (min): 28 min       Billable Minutes:  Gait [id number] and Therapeutic Activity 0925  Total Minutes: 28    Treatment Type: Treatment  PT/PTA: PTA     PTA Visit Number: 2       General Precautions: Standard, aspiration  Orthopedic Precautions: Orthopedic Precautions : N/A   Braces:      Spiritual, Cultural Beliefs, Presybeterian Practices, Values that Affect Care: no    Objective:  Patient found in bed upon entry.    Functional Mobility:  Bed Mobility:   Supine to sit: Minimal Assistance   Sit to supine: Minimal Assistance   Rolling: Minimal Assistance   Scooting: Minimal Assistance    Transitional Sit-to-stand: Min/Mod A    Transfer Training: Min A   Pt performed stand step t/f from bed to bedside chair.     Gait Training:  Patient gait trained 34 feet on level tile with Rolling Walker with Moderate Assistance. Pt presents with decreased HELADIO step length and decreased weight shift to LLE. Assistance given to advance RLE and to weight shift. Impairments contributing to gait deviations include impaired balance, impaired coordination, decreased flexibility, impaired motor control, abnormal muscle tone, impaired postural control and decreased strength      Additional Treatment:    Weight shifting/Balance/Strengthening   Pt performed L and R hip flexion and placed foot on 4inch box X 10 reps.      Pt performed forward step ups with RLE X 10 reps on 4 inch box.    Strengthening/Endurance   Pt performed sit-to-stand x 5 reps from bedside chair.     Activity Tolerance:  Patient tolerated treatment well    Patient left up in chair with call button in reach.    Assessment:  Sammie Belcher is a 52 y.o. female with a medical diagnosis of CVA    Rehab potential is excellent.    Activity tolerance: Excellent    Discharge recommendations: Discharge Facility/Level of  Care Needs: rehabilitation facility     Equipment recommendations:       GOALS:   Multidisciplinary Problems     Physical Therapy Goals        Problem: Physical Therapy    Goal Priority Disciplines Outcome Goal Variances Interventions   Physical Therapy Goal     PT, PT/OT Ongoing, Progressing     Description: Goals to be met by: 2022     Patient will increase functional independence with mobility by performin. Supine to sit with Stand-by Assistance  2. Sit to supine with Stand-by Assistance  3. Sit to stand transfer with Stand-by Assistance  4. Gait  x 300 feet with Contact Guard Assistance using Rolling Walker.   5. RLE strength to improve to 4/5 MMT.  6. LLE strength to improve to 5/5 MMT.                     PLAN:    Patient to be seen BID  to address the above listed problems via gait training, therapeutic activities, therapeutic exercises  Plan of Care expires: 22  Plan of Care reviewed with: patient, friend         2022

## 2022-05-27 NOTE — PROGRESS NOTES
The pt was not seen today.   The preliminary autoimmune labs are unrevling. Csf VDRL is negative.   I did not see that the initial hypercoag w/u is back yet.   Looking back at her chart over the last few years, she apparently was doing crack- cocaine, pretty regularly is seems from the notes.     I still do think she needs MARIO and LINQ, but I will not get a cerebral angio on her.   continuous use of cocaine, can cause severe and permanent intracranial vasculopathy, and at this point of time, I think that is what caused her stroke.   - continue asa 325. Daily and atorva 80 daily.   - BP< 140.   PT, OT, SLP and dvt ppx.  - continuous cardiac monitoring.   Abstinence from drugs and etoh.   - will follow the results as the rest of the w/u is coming back.

## 2022-05-27 NOTE — PT/OT/SLP PROGRESS
Speech Language Pathology Department  Progress Note    Patient Name:  Sammie Belcher   MRN:  [id number]  Admitting Diagnosis: CVA    Recommendations:                 General Recommendations:  Speech/language therapy  Communication strategies:  yes/no questions only, provide increased time to answer and go to room if call light pushed    Subjective     Patient awake, alert and cooperative.    Patient goals: to go home     Pain/Comfort:  · Pain Rating 1: 0/10    Respiratory Status: Room air    Objective:     Yes/No Questions:  Simple: 80%  Complex: 40%  Identification:  Pictures: 90%    Confrontation Naming  Pictures: 60%   Phrase Completion: 95%    Speech fluency improved with carli and clapping for pacing.    Assessment:     Pt continues to present with receptive and expressive language impairments.    Goals:   Multidisciplinary Problems     SLP Goals        Problem: SLP    Goal Priority Disciplines Outcome   SLP Goal     SLP    Description: LTG:   To increase expressive/receptive language skills to enhance functional communication to express basic wants and needs with 100% effectiveness.   ST. Answer personal yes/no questions with 90% accuracy.   2. Answer simple yes/no questions with 90% accuracy.   3. Complete automatic speech tasks with moderate cueing.   4. Name objects with 70% accuracy.   5. Complete simple phrases with moderate cueing.                      Plan:     Will continue to follow and tx as appropriate.    Discharge recommendations:  rehabilitation facility   Barriers to Discharge:  Level of Skilled Assistance Needed      Time Tracking:     SLP Treatment Date:   22  Speech Start Time:  1330  Speech Stop Time:  1355     Speech Total Time (min):  25 min    Billable minutes:  Speech/Language Therapy, 25 minutes       2022

## 2022-05-27 NOTE — PLAN OF CARE
Problem: Infection  Goal: Absence of Infection Signs and Symptoms  Outcome: Ongoing, Progressing     Problem: Cerebral Tissue Perfusion (Stroke, Ischemic/Transient Ischemic Attack)  Goal: Optimal Cerebral Tissue Perfusion  Outcome: Ongoing, Progressing     Problem: Cognitive Impairment (Stroke, Ischemic/Transient Ischemic Attack)  Goal: Optimal Cognitive Function  Outcome: Ongoing, Progressing     Problem: Communication Impairment (Stroke, Ischemic/Transient Ischemic Attack)  Goal: Improved Communication Skills  Outcome: Ongoing, Progressing     Problem: Functional Ability Impaired (Stroke, Ischemic/Transient Ischemic Attack)  Goal: Optimal Functional Ability  Outcome: Ongoing, Progressing     Problem: Sensorimotor Impairment (Stroke, Ischemic/Transient Ischemic Attack)  Goal: Improved Sensorimotor Function  Outcome: Ongoing, Progressing     Problem: Adult Inpatient Plan of Care  Goal: Patient-Specific Goal (Individualized)  Outcome: Ongoing, Progressing  Goal: Optimal Comfort and Wellbeing  Outcome: Ongoing, Progressing  Goal: Readiness for Transition of Care  Outcome: Ongoing, Progressing     Problem: Adult Inpatient Plan of Care  Goal: Optimal Comfort and Wellbeing  Outcome: Ongoing, Progressing

## 2022-05-27 NOTE — PROGRESS NOTES
Infectious Diseases Progress Note  52-year-old female with past medical history of depression and anxiety, hypothyroidism, HTN, is admitted to [facility] on 05/18/2022, brought in through the ED with complaints of dysarthria and expressive aphasia of about 3 day duration.  She was extensively evaluated and noted to have no fevers but had slight leukocytosis of 12.2.  Urine toxicology was positive for cannabis.  Urinalysis was abnormal with small leukocyte history is few bacteria, few Trichomonas and 0-2 wbc's.  She was noted to have some syphilis antibody reactive and RPR 1:1.  She is nonverbal and unable to provide any significant history and hence history obtained from her  of 4 years.  He denies any knowledge of previous syphilis diagnosis and tells me he has tested himself since finding was told of his wife's test result and was told his test was negative.  She has had extensive neuroimaging studies with MRI of the brain showing left frontal acute CVA in the territory of anterior carotid artery.  MRA of brain showed moderate to severe multifocal flow signal abnormalities.  MRA of the neck showed no large vessel occlusion or critical stenosis.  CTA of neck done today 5/20 showed moderate narrowing of left CCA moderate to severe narrowing of left vertebral artery.   She is currently on Flagyl    Subjective:  Lying in bed, no new complaints reported. Afebrile.     Review of Systems   Constitutional: Positive for malaise/fatigue.   HENT: Negative.    Eyes: Negative.    Respiratory: Negative.    Cardiovascular: Negative.    Gastrointestinal: Negative.    Genitourinary: Negative.    Musculoskeletal: Negative.    Skin: Negative.    Neurological: Positive for focal weakness and weakness.       Review of patient's allergies indicates:  No Known Allergies    Past Medical History:   Diagnosis Date    Hypertension     Thyroid disease        Past Surgical History:   Procedure Laterality  "Date    THYROIDECTOMY      TUBAL LIGATION         Social History     Socioeconomic History    Marital status: Single   Tobacco Use    Smoking status: Current Every Day Smoker     Types: Cigarettes    Smokeless tobacco: Current User   Substance and Sexual Activity    Alcohol use: Yes     Alcohol/week: 3.0 standard drinks     Types: 3 Standard drinks or equivalent per week    Drug use: No         Scheduled Meds:   amLODIPine  10 mg Oral Daily    aspirin  325 mg Oral Daily    atorvastatin  40 mg Oral Daily    buPROPion  150 mg Oral Daily    carvediloL  12.5 mg Oral BID    enoxaparin  40 mg Subcutaneous Daily    multivitamin  1 tablet Oral Daily    nicotine  1 patch Transdermal Daily    penicillin G benzathine  2.4 Million Units Intramuscular Once    sertraline  25 mg Oral Daily    thiamine  100 mg Oral TID     Continuous Infusions:  PRN Meds:cloNIDine, dextrose 10%, dextrose 10%, enalaprilat, glucagon (human recombinant), glucose, glucose, hydrALAZINE, insulin aspart U-100, labetalol, LIDOcaine (PF) 20 mg/ml (2%), ondansetron, sodium chloride 0.9%, sodium chloride 0.9%    Objective:  BP (!) 168/96   Pulse 73   Temp 97.6 °F (36.4 °C) (Oral)   Resp 18   Ht 6' 0.84" (1.85 m)   Wt 65 kg (143 lb 4.8 oz) Comment: Bed wt 5/24: 68kg  LMP  (LMP Unknown)   SpO2 100%   Breastfeeding No   BMI 18.99 kg/m²     Physical Exam:   Physical Exam  Vitals reviewed.   HENT:      Head: Normocephalic.   Cardiovascular:      Rate and Rhythm: Normal rate and regular rhythm.   Pulmonary:      Effort: Pulmonary effort is normal. No respiratory distress.      Breath sounds: Normal breath sounds. No wheezing.   Abdominal:      General: Bowel sounds are normal. There is no distension.      Palpations: Abdomen is soft.      Tenderness: There is no abdominal tenderness.   Musculoskeletal:         General: Normal range of motion.      Cervical back: Normal range of motion.   Skin:     Findings: No rash.   Neurological:      " Mental Status: She is alert.      Comments: Awake and alert. Aphasic, nods to answer simple questions appropriately. Follows commands   Psychiatric:         Mood and Affect: Mood normal.         Behavior: Behavior normal.     Imaging      Lab Review   Recent Results (from the past 24 hour(s))   Comprehensive Metabolic Panel    Collection Time: 05/26/22  3:58 AM   Result Value Ref Range    Sodium Level 140 136 - 145 mmol/L    Potassium Level 4.1 3.5 - 5.1 mmol/L    Chloride 107 98 - 107 mmol/L    Carbon Dioxide 29 22 - 29 mmol/L    Glucose Level 81 74 - 100 mg/dL    Blood Urea Nitrogen 15.2 9.8 - 20.1 mg/dL    Creatinine 0.73 0.55 - 1.02 mg/dL    Calcium Level Total 9.2 8.4 - 10.2 mg/dL    Protein Total 5.5 (L) 6.4 - 8.3 gm/dL    Albumin Level 3.2 (L) 3.5 - 5.0 gm/dL    Globulin 2.3 (L) 2.4 - 3.5 gm/dL    Albumin/Globulin Ratio 1.4 1.1 - 2.0 ratio    Bilirubin Total 0.4 <=1.5 mg/dL    Alkaline Phosphatase 65 40 - 150 unit/L    Alanine Aminotransferase 15 0 - 55 unit/L    Aspartate Aminotransferase 23 5 - 34 unit/L    Estimated GFR- >60 mls/min/1.73/m2   CBC with Differential    Collection Time: 05/26/22  3:58 AM   Result Value Ref Range    WBC 7.3 4.5 - 11.5 x10(3)/mcL    RBC 4.39 4.20 - 5.40 x10(6)/mcL    Hgb 13.7 12.0 - 16.0 gm/dL    Hct 41.6 37.0 - 47.0 %    MCV 94.8 (H) 80.0 - 94.0 fL    MCH 31.2 (H) 27.0 - 31.0 pg    MCHC 32.9 (L) 33.0 - 36.0 mg/dL    RDW 14.1 11.5 - 17.0 %    Platelet 211 130 - 400 x10(3)/mcL    MPV 11.1 9.4 - 12.4 fL    Neut % 51.3 %    Lymph % 35.3 %    Mono % 8.3 %    Eos % 4.1 %    Basophil % 0.7 %    Lymph # 2.56 0.6 - 4.6 x10(3)/mcL    Neut # 3.7 2.1 - 9.2 x10(3)/mcL    Mono # 0.60 0.1 - 1.3 x10(3)/mcL    Eos # 0.30 0 - 0.9 x10(3)/mcL    Baso # 0.05 0 - 0.2 x10(3)/mcL    IG# 0.02 (H) 0 - 0.0155 x10(3)/mcL    IG% 0.3 0 - 0.43 %    NRBC% 0.0 %   POCT glucose    Collection Time: 05/26/22  5:18 AM   Result Value Ref Range    POCT Glucose 99 70 - 110 mg/dL   POCT glucose     Collection Time: 05/26/22  3:50 PM   Result Value Ref Range    POCT Glucose 93 70 - 110 mg/dL   POCT glucose    Collection Time: 05/26/22  8:02 PM   Result Value Ref Range    POCT Glucose 104 70 - 110 mg/dL     Assessment/Plan:  1. Syphilis  2. 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 Uncontrolled hypertension  4. Polysubstance abuse  5. Trichomoniasis     -Completed course of Flagyl #7. Will give Bicillin LA #2 today with plan to receive the 3rd dose on 6/2  -No fever and no leukocytosis  -S/P LP on 5/21, bloody tap. Follow CSF VDRL. CSF culture negative thus far.   -Syphilis Ab (+) with RPR 1:1, CSF VDRL (-)  -Neurology on board, inputs noted including plan for MARIO with LINQ if CSF VDRL negative  -Discussed with patient and nursing

## 2022-05-28 LAB
T PALLIDUM AB SER QL: REACTIVE
U1 SNRNP IGG SER-ACNC: 15 UNITS

## 2022-05-28 PROCEDURE — 25000003 PHARM REV CODE 250: Performed by: INTERNAL MEDICINE

## 2022-05-28 PROCEDURE — 25000003 PHARM REV CODE 250: Performed by: NURSE PRACTITIONER

## 2022-05-28 PROCEDURE — 25000003 PHARM REV CODE 250: Performed by: PSYCHIATRY & NEUROLOGY

## 2022-05-28 PROCEDURE — S4991 NICOTINE PATCH NONLEGEND: HCPCS | Performed by: NURSE PRACTITIONER

## 2022-05-28 PROCEDURE — 63600175 PHARM REV CODE 636 W HCPCS: Performed by: STUDENT IN AN ORGANIZED HEALTH CARE EDUCATION/TRAINING PROGRAM

## 2022-05-28 PROCEDURE — 25000003 PHARM REV CODE 250: Performed by: STUDENT IN AN ORGANIZED HEALTH CARE EDUCATION/TRAINING PROGRAM

## 2022-05-28 PROCEDURE — 11000001 HC ACUTE MED/SURG PRIVATE ROOM

## 2022-05-28 PROCEDURE — 97116 GAIT TRAINING THERAPY: CPT | Mod: CQ

## 2022-05-28 PROCEDURE — 97530 THERAPEUTIC ACTIVITIES: CPT | Mod: CQ

## 2022-05-28 PROCEDURE — 97535 SELF CARE MNGMENT TRAINING: CPT | Mod: CO

## 2022-05-28 RX ORDER — LABETALOL 200 MG/1
200 TABLET, FILM COATED ORAL EVERY 12 HOURS
Status: DISCONTINUED | OUTPATIENT
Start: 2022-05-28 | End: 2022-06-02

## 2022-05-28 RX ADMIN — ASPIRIN 325 MG: 325 TABLET, COATED ORAL at 09:05

## 2022-05-28 RX ADMIN — THIAMINE HCL TAB 100 MG 100 MG: 100 TAB at 09:05

## 2022-05-28 RX ADMIN — THIAMINE HCL TAB 100 MG 100 MG: 100 TAB at 08:05

## 2022-05-28 RX ADMIN — LABETALOL HYDROCHLORIDE 200 MG: 200 TABLET, FILM COATED ORAL at 08:05

## 2022-05-28 RX ADMIN — ENOXAPARIN SODIUM 40 MG: 40 INJECTION SUBCUTANEOUS at 04:05

## 2022-05-28 RX ADMIN — AMLODIPINE BESYLATE 10 MG: 5 TABLET ORAL at 09:05

## 2022-05-28 RX ADMIN — SERTRALINE 25 MG: 25 TABLET, FILM COATED ORAL at 09:05

## 2022-05-28 RX ADMIN — ATORVASTATIN CALCIUM 40 MG: 40 TABLET, FILM COATED ORAL at 04:05

## 2022-05-28 RX ADMIN — NICOTINE 1 PATCH: 21 PATCH, EXTENDED RELEASE TRANSDERMAL at 09:05

## 2022-05-28 RX ADMIN — THIAMINE HCL TAB 100 MG 100 MG: 100 TAB at 04:05

## 2022-05-28 RX ADMIN — BUPROPION HYDROCHLORIDE 150 MG: 150 TABLET, FILM COATED, EXTENDED RELEASE ORAL at 09:05

## 2022-05-28 RX ADMIN — MULTIPLE VITAMINS W/ MINERALS TAB 1 TABLET: TAB at 09:05

## 2022-05-28 NOTE — PT/OT/SLP PROGRESS
Occupational Therapy   Treatment    Name: Sammie Belcher  MRN: [id number]  Admitting Diagnosis:  CVA (cerebral vascular accident)  2 Days Post-Op    Recommendations:     Discharge Recommendations: rehabilitation facility  Discharge Equipment Recommendations:  walker, rolling  Barriers to discharge:       Assessment:     Sammie Belcher is a 52 y.o. female with a medical diagnosis of CVA (cerebral vascular accident).  She presents with . Performance deficits affecting function are  .     Rehab Prognosis:  Good; patient would benefit from acute skilled OT services to address these deficits and reach maximum level of function.       Plan:     Patient to be seen daily to address the above listed problems via self-care/home management, therapeutic activities, therapeutic exercises  · Plan of Care Expires: 06/23/22  · Plan of Care Reviewed with: patient    Subjective     Pain/Comfort:  · Pain Rating 1: 0/10    Objective:   Patient found up in chair with   upon OT entry to room.    General Precautions: Standard, fall   Orthopedic Precautions:N/A   Braces:    Respiratory Status: Room air     O: Pt found UIC. Pt agreeable to tx. Pt STS c min A and ambulated to bathroom c RW and min A for balance. Pt c 1 LOB req cues for managing RW while avoiding obstacles. Pt completed 1 simulated toilet t/f req mod A for STS from low suface. Pt req cues for integrating RUE in tasks during t/f and functional mobility. Pt tolerate well and left UIC all needs in reach and family present upon completion.     [facility] 6 Click ADL:  Education:  GOALS:   Multidisciplinary Problems     Occupational Therapy Goals        Problem: Occupational Therapy    Goal Priority Disciplines Outcome Interventions   Occupational Therapy Goal     OT, PT/OT Ongoing, Progressing    Description: Goals to be met by: 6/9/22    Patient will increase functional independence with ADLs by performing:    UE Dressing with Modified [place].  LE Dressing with Modified  [place].  Grooming while seated with Modified [place].  Toileting from toilet with Modified [place] for hygiene and clothing management.   Toilet transfer to toilet with Modified [place].                     Time Tracking:     OT Date of Treatment: 05/28/22  OT Start Time: 1150  OT Stop Time: 1159  OT Total Time (min): 9 min    Billable Minutes:Total Time 9    OT/BETH: BETH     BETH Visit Number: 0    5/28/2022

## 2022-05-28 NOTE — PT/OT/SLP PROGRESS
Physical Therapy Treatment    Patient Name:  Sammie Belcher   MRN:  [id number]    Recommendations:     Discharge Recommendations:  rehabilitation facility   Discharge Equipment Recommendations: walker, rolling (TBD)   Barriers to discharge: weakness and unsteadiness    Subjective     Patient awake and alert.     Objective:     General Precautions: Standard, aspiration   Orthopedic Precautions:N/A   Braces:    Respiratory Status: Room air     Functional Mobility:  · Supine to EOB Mod A, Pt tolerated sitting EOB x 8 mins  · Pt amb 35' HHA mod assist with R lateral lean and verbal and tactile cues to correct posture with little to no success.    AM-PAC 6 CLICK MOBILITY        Patient left up in chair with all lines intact and call button in reach..    GOALS:   Multidisciplinary Problems     Physical Therapy Goals        Problem: Physical Therapy    Goal Priority Disciplines Outcome Goal Variances Interventions   Physical Therapy Goal     PT, PT/OT Ongoing, Progressing     Description: Goals to be met by: 2022     Patient will increase functional independence with mobility by performin. Supine to sit with Stand-by Assistance  2. Sit to supine with Stand-by Assistance  3. Sit to stand transfer with Stand-by Assistance  4. Gait  x 300 feet with Contact Guard Assistance using Rolling Walker.   5. RLE strength to improve to 4/5 MMT.  6. LLE strength to improve to 5/5 MMT.                     Assessment:     Sammie Belcher is a 52 y.o. female admitted with a medical diagnosis of CVA (cerebral vascular accident).     Rehab Prognosis: Good; patient would benefit from acute skilled PT services to address these deficits and reach maximum level of function.    Recent Surgery: Procedure(s) (LRB):  Insertion, Implantable Loop Recorder (N/A) 2 Days Post-Op    Plan:     During this hospitalization, patient to be seen BID to address the identified rehab impairments via gait training, therapeutic activities, therapeutic  exercises and progress toward the following goals:    · Plan of Care Expires:  06/09/22    Billable Minutes     Billable Minutes: Gait Training 8 and Therapeutic Activity 15    Treatment Type: Treatment  PT/PTA: PTA     PTA Visit Number: 3     05/28/2022

## 2022-05-28 NOTE — PROGRESS NOTES
Infectious Diseases Progress Note  52-year-old female with past medical history of depression and anxiety, hypothyroidism, HTN, is admitted to [facility] on 05/18/2022, brought in through the ED with complaints of dysarthria and expressive aphasia of about 3 day duration.  She was extensively evaluated and noted to have no fevers but had slight leukocytosis of 12.2.  Urine toxicology was positive for cannabis.  Urinalysis was abnormal with small leukocyte history is few bacteria, few Trichomonas and 0-2 wbc's.  She was noted to have some syphilis antibody reactive and RPR 1:1.  She is nonverbal and unable to provide any significant history and hence history obtained from her  of 4 years.  He denies any knowledge of previous syphilis diagnosis and tells me he has tested himself since finding was told of his wife's test result and was told his test was negative.  She has had extensive neuroimaging studies with MRI of the brain showing left frontal acute CVA in the territory of anterior carotid artery.  MRA of brain showed moderate to severe multifocal flow signal abnormalities.  MRA of the neck showed no large vessel occlusion or critical stenosis.  CTA of neck done today 5/20 showed moderate narrowing of left CCA moderate to severe narrowing of left vertebral artery. Received Bicillin LA on 5/19 and 5/26    Subjective:  Lying in bed, no new complaints reported. Afebrile.     ROS  Constitutional: Positive for malaise/fatigue.   HENT: Negative.    Eyes: Negative.    Respiratory: Negative.    Cardiovascular: Negative.    Gastrointestinal: Negative.    Genitourinary: Negative.    Musculoskeletal: Negative.    Skin: Negative.    Neurological: Positive for focal weakness and weakness.     Review of patient's allergies indicates:  No Known Allergies    Past Medical History:   Diagnosis Date    Hypertension     Thyroid disease        Past Surgical History:   Procedure Laterality Date  "   INSERTION OF IMPLANTABLE LOOP RECORDER N/A 5/26/2022    Procedure: Insertion, Implantable Loop Recorder;  Surgeon: Arias Brown MD;  Location: [facility] CATH LAB;  Service: Cardiology;  Laterality: N/A;    THYROIDECTOMY      TUBAL LIGATION         Social History     Socioeconomic History    Marital status: Single   Tobacco Use    Smoking status: Current Every Day Smoker     Types: Cigarettes    Smokeless tobacco: Current User   Substance and Sexual Activity    Alcohol use: Yes     Alcohol/week: 3.0 standard drinks     Types: 3 Standard drinks or equivalent per week    Drug use: No         Scheduled Meds:   amLODIPine  10 mg Oral Daily    aspirin  325 mg Oral Daily    atorvastatin  40 mg Oral Daily    buPROPion  150 mg Oral Daily    carvediloL  12.5 mg Oral BID    enoxaparin  40 mg Subcutaneous Daily    multivitamin  1 tablet Oral Daily    nicotine  1 patch Transdermal Daily    sertraline  25 mg Oral Daily    thiamine  100 mg Oral TID     Continuous Infusions:  PRN Meds:cloNIDine, dextrose 10%, dextrose 10%, enalaprilat, glucagon (human recombinant), glucose, glucose, hydrALAZINE, insulin aspart U-100, labetalol, LIDOcaine (PF) 20 mg/ml (2%), ondansetron, sodium chloride 0.9%, sodium chloride 0.9%    Objective:  BP (!) 160/88   Pulse 80   Temp 98.2 °F (36.8 °C) (Oral)   Resp 18   Ht 6' 0.84" (1.85 m)   Wt 65 kg (143 lb 4.8 oz) Comment: Bed wt 5/24: 68kg  LMP  (LMP Unknown)   SpO2 100%   Breastfeeding No   BMI 18.99 kg/m²     Physical Exam:   Physical Exam  Vitals reviewed.   HENT:      Head: Normocephalic.   Cardiovascular:      Rate and Rhythm: Normal rate and regular rhythm.   Pulmonary:      Effort: Pulmonary effort is normal. No respiratory distress.      Breath sounds: Normal breath sounds. No wheezing.   Abdominal:      General: Bowel sounds are normal. There is no distension.      Palpations: Abdomen is soft.      Tenderness: There is no abdominal tenderness.   Musculoskeletal: "         General: Normal range of motion.      Cervical back: Normal range of motion.   Skin:     Findings: No rash.   Neurological:      Mental Status: She is alert.      Comments: Awake and alert. Aphasic, nods to answer simple questions appropriately. Follows commands   Psychiatric:         Mood and Affect: Mood normal.         Behavior: Behavior normal    Imaging      Lab Review   Recent Results (from the past 24 hour(s))   POCT glucose    Collection Time: 05/27/22 11:20 AM   Result Value Ref Range    POCT Glucose 120 (H) 70 - 110 mg/dL   POCT glucose    Collection Time: 05/27/22  9:40 PM   Result Value Ref Range    POCT Glucose 111 (H) 70 - 110 mg/dL       Assessment/Plan:  1. Syphilis  2. 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 Uncontrolled hypertension  4. Polysubstance abuse  5. Trichomoniasis     -Received Bicillin LA #2 on 5/26 with plan to receive the 3rd dose on 6/2  -No fever and no leukocytosis  -S/P LP on 5/21, bloody tap. Follow CSF VDRL. CSF culture negative thus far.   -Syphilis Ab (+) with RPR 1:1, CSF VDRL (-)  -Neurology on board, inputs noted  -S/P LINQ placement on 5/26  -Discussed with patient, significant other and nursing

## 2022-05-28 NOTE — PLAN OF CARE
Problem: Infection  Goal: Absence of Infection Signs and Symptoms  Outcome: Ongoing, Progressing     Problem: Cerebral Tissue Perfusion (Stroke, Ischemic/Transient Ischemic Attack)  Goal: Optimal Cerebral Tissue Perfusion  Outcome: Ongoing, Progressing     Problem: Cognitive Impairment (Stroke, Ischemic/Transient Ischemic Attack)  Goal: Optimal Cognitive Function  Outcome: Ongoing, Progressing     Problem: Communication Impairment (Stroke, Ischemic/Transient Ischemic Attack)  Goal: Improved Communication Skills  Outcome: Ongoing, Progressing  Intervention: Optimize Communication Skills  Flowsheets (Taken 5/28/2022 115)  Communication Enhancement Strategies:   call light answered in person   extra time allowed for response   family involved in communication plan   nonverbal strategies used   family/caregiver assisted with communication   one-step directions provided     Problem: Functional Ability Impaired (Stroke, Ischemic/Transient Ischemic Attack)  Goal: Optimal Functional Ability  Outcome: Ongoing, Progressing  Intervention: Optimize Functional Ability  Flowsheets (Taken 5/28/2022 1150)  Self-Care Promotion:   independence encouraged   BADL personal objects within reach   meal set-up provided  Activity Management: Up in chair - L3     Problem: Sensorimotor Impairment (Stroke, Ischemic/Transient Ischemic Attack)  Goal: Improved Sensorimotor Function  Outcome: Ongoing, Progressing  Intervention: Optimize Range of Motion, Motor Control and Function  Flowsheets (Taken 5/28/2022 1153)  Positioning: Shoulder:   positioned on unaffected side   protection of affected arm encouraged  Range of Motion: active ROM (range of motion) encouraged  Intervention: Optimize Sensory and Perceptual Ability  Flowsheets (Taken 5/28/2022 1153)  Pressure Reduction Techniques:   frequent weight shift encouraged   pressure points protected   sit time limited to 2 hours  Sensation Impairment Protection: cues provided for safety

## 2022-05-28 NOTE — PLAN OF CARE
Problem: Infection  Goal: Absence of Infection Signs and Symptoms  Outcome: Ongoing, Progressing     Problem: Cerebral Tissue Perfusion (Stroke, Ischemic/Transient Ischemic Attack)  Goal: Optimal Cerebral Tissue Perfusion  Outcome: Ongoing, Progressing     Problem: Cognitive Impairment (Stroke, Ischemic/Transient Ischemic Attack)  Goal: Optimal Cognitive Function  Outcome: Ongoing, Progressing     Problem: Sensorimotor Impairment (Stroke, Ischemic/Transient Ischemic Attack)  Goal: Improved Sensorimotor Function  Outcome: Ongoing, Progressing     Problem: Adult Inpatient Plan of Care  Goal: Patient-Specific Goal (Individualized)  Outcome: Ongoing, Progressing  Goal: Optimal Comfort and Wellbeing  Outcome: Ongoing, Progressing  Goal: Readiness for Transition of Care  Outcome: Ongoing, Progressing

## 2022-05-28 NOTE — PROGRESS NOTES
[facility]  Hospital Medicine Progress Note        Chief Complaint: Inpatient follow-up on stroke    HPI:   Ms. Belcher is a 52-year-old AA female with past medical history of HTN, anxiety, depression, and hypothyroidism who presents to [facility] ED with complaints of dysarthria and expressive aphasia x3 days.  Patient's family member states that she has been having difficulty speaking for the past 3 days however today was worse so she was brought to the ED for further evaluation. Her sister at bedside states that she is unsure if she has been compliant with her home medications.  She does smoke marijuana daily with her significant other.  And drinks about a six-pack of beer every day.  No history of stroke.  She is not on aspirin.  Sister at bedside reports that she has had abnormal behavior over the past 3 days and has been having episodes of bladder and bowel incontinence and is very restless/fidgety.  No reported history of falls or back pain or any saddle anesthesia.  Patient is oriented x 4 but is an extremely poor historian and it is very difficult to get a history from her.  She does report left-sided weakness and paresthesias x 2 days.     Upon arrival to outside facility the patient was markedly hypertensive but otherwise hemodynamically stable and afebrile. EKG: NSR with LVH. Labs unremarkable except for a reactive RPR, patient denies high risk sexual behavior, states unprotected sexual intercourse with significant other but she is in a monogamous relationships. CT Head showed left frontal 6 cm cortical base hypodensity suggesting an acute to subacute nonhemorrhagic infarct in the left KENISHA vascular territory. MRI Brain was then obtained confirming the left frontal lobe extending into the corpus callosum with an acute nonhemorrhagic infarct in the territory of the anterior cerebral artery, and old lacunar infarcts. She is being transferred to [facility] for further CVA  workup.    Interval Hx:   Patient is sitting up in bed.  She remains with expressive aphasia.  Her significant other is present and reports that she ate well for breakfast.  No acute issues.  Patient is afebrile, on room air, and hemodynamically stable.    Objective/physical exam:  General: Thin  female in no acute distress  HENT: normocephalic, atraumatic  Eye: PERRL, EOMI, clear conjunctiva  Neck: full ROM, no thyromegaly, no JVD  Respiratory: clear to auscultation bilaterally  Cardiovascular: regular rate and rhythm  Gastrointestinal: non-distended, positive bowel sounds, non-tender  Musculoskeletal: no gross deformity  Integumentary: warm, dry, intact, no rashes  Neurological:  Right hemiparesis, expressive aphasia and dysarthria with paresthesias to right face, RUE and RLE.   Psychiatric: cooperative, flat affect    VITAL SIGNS: 24 HRS MIN & MAX LAST   Temp  Min: 97.7 °F (36.5 °C)  Max: 98.7 °F (37.1 °C) 98.5 °F (36.9 °C)   BP  Min: 144/77  Max: 176/89 (!) 149/86   Pulse  Min: 72  Max: 86  77   Resp  Min: 17  Max: 21 18   SpO2  Min: 95 %  Max: 100 % 98 %       Recent Labs   Lab 05/24/22  0402 05/25/22  0422 05/26/22  0358   WBC 8.4 8.4 7.3   RBC 4.68 4.63 4.39   HGB 14.2 14.0 13.7   HCT 44.4 44.3 41.6   MCV 94.9* 95.7* 94.8*   MCH 30.3 30.2 31.2*   MCHC 32.0* 31.6* 32.9*   RDW 14.3 14.2 14.1    220 211   MPV 10.8 10.8 11.1       Recent Labs   Lab 05/24/22  0402 05/25/22  0422 05/26/22  0358    142 140   K 4.2 4.4 4.1   CO2 30* 31* 29   BUN 14.7 14.4 15.2   CREATININE 0.77 0.76 0.73   CALCIUM 9.6 9.3 9.2   ALBUMIN 3.4* 3.4* 3.2*   ALKPHOS 70 66 65   ALT 8 13 15   AST 16 20 23   BILITOT 0.5 0.5 0.4          Microbiology Results (last 7 days)     Procedure Component Value Units Date/Time    Cerebrospinal Fluid Culture [facility] [[id number]] Collected: 05/21/22 1132    Order Status: Completed Specimen: CSF (Spinal Fluid) Updated: 05/26/22 1045     CULTURE, CSF ([facility]) Final Report: At 5 days. No  growth    Gram Stain OL [[id number]] Collected: 05/21/22 4095    Order Status: Completed Specimen: CSF (Spinal Fluid) from Cerebrospinal Fluid Updated: 05/21/22 145     GRAM STAIN No WBCs, No bacteria seen            See below for Radiology    Scheduled Med:   amLODIPine  10 mg Oral Daily    aspirin  325 mg Oral Daily    atorvastatin  40 mg Oral Daily    buPROPion  150 mg Oral Daily    enoxaparin  40 mg Subcutaneous Daily    labetaloL  200 mg Oral Q12H    multivitamin  1 tablet Oral Daily    nicotine  1 patch Transdermal Daily    sertraline  25 mg Oral Daily    thiamine  100 mg Oral TID        Continuous Infusions:  None.       PRN Meds:  cloNIDine, dextrose 10%, dextrose 10%, enalaprilat, hydrALAZINE, labetalol, LIDOcaine (PF) 20 mg/ml (2%), ondansetron, sodium chloride 0.9%, sodium chloride 0.9%       Assessment/Plan:  Acute Non-hemorrhagic CVA - left KENISHA vascular territory  Poorly controlled essential HTN  HLD  Syphilis status post Bicillin LA IM x 2 with 3rd dose slated for June 2nd  Trichomoniasis status post 7 days of Flagyl  Hypothyroidism  Depression  Tobacco and marijuana abuse  Long history of crack cocaine abuse        PLAN:  - Continue titrating medications for blood pressure control.  Change Coreg to labetalol.  - Continue aspirin and statin per neurology   - Immunological panel was sent by neurology.  - VDRL in CSF is negative  - Neurology recommended LINQ and MAIRO if CSF VDRL was negative  - Patient had LINQ placement on 05/26  - Patient did not have MARIO due to previous history of, as well as current, dysphagia  - Patient was treated by Infectious Disease for trichomoniasis and syphilis     Disposition:  Inpatient rehab on Monday    Chaka Calhoun MD   05/28/2022

## 2022-05-28 NOTE — PROGRESS NOTES
Infectious Diseases Progress Note  52-year-old female with past medical history of depression and anxiety, hypothyroidism, HTN, is admitted to [facility] on 05/18/2022, brought in through the ED with complaints of dysarthria and expressive aphasia of about 3 day duration.  She was extensively evaluated and noted to have no fevers but had slight leukocytosis of 12.2.  Urine toxicology was positive for cannabis.  Urinalysis was abnormal with small leukocyte history is few bacteria, few Trichomonas and 0-2 wbc's.  She was noted to have some syphilis antibody reactive and RPR 1:1.  She is nonverbal and unable to provide any significant history and hence history obtained from her  of 4 years.  He denies any knowledge of previous syphilis diagnosis and tells me he has tested himself since finding was told of his wife's test result and was told his test was negative.  She has had extensive neuroimaging studies with MRI of the brain showing left frontal acute CVA in the territory of anterior carotid artery.  MRA of brain showed moderate to severe multifocal flow signal abnormalities.  MRA of the neck showed no large vessel occlusion or critical stenosis.  CTA of neck done today 5/20 showed moderate narrowing of left CCA moderate to severe narrowing of left vertebral artery.   She is currently off antibiotics     Subjective:  Lying in bed, no new complaints reported. Afebrile.     ROS  Constitutional: Positive for malaise/fatigue.   HENT: Negative.    Eyes: Negative.    Respiratory: Negative.    Cardiovascular: Negative.    Gastrointestinal: Negative.    Genitourinary: Negative.    Musculoskeletal: Negative.    Skin: Negative.    Neurological: Positive for focal weakness and weakness.     Review of patient's allergies indicates:  No Known Allergies    Past Medical History:   Diagnosis Date    Hypertension     Thyroid disease        Past Surgical History:   Procedure Laterality Date     "INSERTION OF IMPLANTABLE LOOP RECORDER N/A 5/26/2022    Procedure: Insertion, Implantable Loop Recorder;  Surgeon: Arias Brown MD;  Location: [facility] CATH LAB;  Service: Cardiology;  Laterality: N/A;    THYROIDECTOMY      TUBAL LIGATION         Social History     Socioeconomic History    Marital status: Single   Tobacco Use    Smoking status: Current Every Day Smoker     Types: Cigarettes    Smokeless tobacco: Current User   Substance and Sexual Activity    Alcohol use: Yes     Alcohol/week: 3.0 standard drinks     Types: 3 Standard drinks or equivalent per week    Drug use: No         Scheduled Meds:   amLODIPine  10 mg Oral Daily    aspirin  325 mg Oral Daily    atorvastatin  40 mg Oral Daily    buPROPion  150 mg Oral Daily    carvediloL  12.5 mg Oral BID    enoxaparin  40 mg Subcutaneous Daily    multivitamin  1 tablet Oral Daily    nicotine  1 patch Transdermal Daily    sertraline  25 mg Oral Daily    thiamine  100 mg Oral TID     Continuous Infusions:  PRN Meds:cloNIDine, dextrose 10%, dextrose 10%, enalaprilat, glucagon (human recombinant), glucose, glucose, hydrALAZINE, insulin aspart U-100, labetalol, LIDOcaine (PF) 20 mg/ml (2%), ondansetron, sodium chloride 0.9%, sodium chloride 0.9%    Objective:  BP (!) 176/89   Pulse 76   Temp 98.7 °F (37.1 °C) (Oral)   Resp 18   Ht 6' 0.84" (1.85 m)   Wt 65 kg (143 lb 4.8 oz) Comment: Bed wt 5/24: 68kg  LMP  (LMP Unknown)   SpO2 100%   Breastfeeding No   BMI 18.99 kg/m²     Physical Exam:   Physical Exam  Vitals reviewed.   HENT:      Head: Normocephalic.   Cardiovascular:      Rate and Rhythm: Normal rate and regular rhythm.   Pulmonary:      Effort: Pulmonary effort is normal. No respiratory distress.      Breath sounds: Normal breath sounds. No wheezing.   Abdominal:      General: Bowel sounds are normal. There is no distension.      Palpations: Abdomen is soft.      Tenderness: There is no abdominal tenderness.   Musculoskeletal:         " General: Normal range of motion.      Cervical back: Normal range of motion.   Skin:     Findings: No rash.   Neurological:      Mental Status: She is alert.      Comments: Awake and alert. Aphasic, nods to answer simple questions appropriately. Follows commands   Psychiatric:         Mood and Affect: Mood normal.         Behavior: Behavior normal    Imaging      Lab Review   Recent Results (from the past 24 hour(s))   POCT glucose    Collection Time: 05/26/22  8:02 PM   Result Value Ref Range    POCT Glucose 104 70 - 110 mg/dL       Assessment/Plan:  1. Syphilis  2. 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 Uncontrolled hypertension  4. Polysubstance abuse  5. Trichomoniasis     -Received Bicillin LA #2 yesterday with plan to receive the 3rd dose on 6/2  -No fever and no leukocytosis  -S/P LP on 5/21, bloody tap. Follow CSF VDRL. CSF culture negative thus far.   -Syphilis Ab (+) with RPR 1:1, CSF VDRL (-)  -Neurology on board, inputs noted  -S/P LINQ placement on 5/26  -Discussed with patient and nursing

## 2022-05-29 LAB
POCT GLUCOSE: 85 MG/DL (ref 70–110)
TROPONIN I SERPL-MCNC: <0.01 NG/ML (ref 0–0.04)

## 2022-05-29 PROCEDURE — 93005 ELECTROCARDIOGRAM TRACING: CPT

## 2022-05-29 PROCEDURE — 84484 ASSAY OF TROPONIN QUANT: CPT | Performed by: NURSE PRACTITIONER

## 2022-05-29 PROCEDURE — 25000003 PHARM REV CODE 250: Performed by: NURSE PRACTITIONER

## 2022-05-29 PROCEDURE — 11000001 HC ACUTE MED/SURG PRIVATE ROOM

## 2022-05-29 PROCEDURE — 25000003 PHARM REV CODE 250: Performed by: INTERNAL MEDICINE

## 2022-05-29 PROCEDURE — 63600175 PHARM REV CODE 636 W HCPCS: Performed by: STUDENT IN AN ORGANIZED HEALTH CARE EDUCATION/TRAINING PROGRAM

## 2022-05-29 PROCEDURE — 25000003 PHARM REV CODE 250: Performed by: PSYCHIATRY & NEUROLOGY

## 2022-05-29 PROCEDURE — 36415 COLL VENOUS BLD VENIPUNCTURE: CPT | Performed by: NURSE PRACTITIONER

## 2022-05-29 PROCEDURE — S4991 NICOTINE PATCH NONLEGEND: HCPCS | Performed by: NURSE PRACTITIONER

## 2022-05-29 RX ADMIN — THIAMINE HCL TAB 100 MG 100 MG: 100 TAB at 08:05

## 2022-05-29 RX ADMIN — MULTIPLE VITAMINS W/ MINERALS TAB 1 TABLET: TAB at 08:05

## 2022-05-29 RX ADMIN — SERTRALINE 25 MG: 25 TABLET, FILM COATED ORAL at 08:05

## 2022-05-29 RX ADMIN — LABETALOL HYDROCHLORIDE 200 MG: 200 TABLET, FILM COATED ORAL at 08:05

## 2022-05-29 RX ADMIN — ENOXAPARIN SODIUM 40 MG: 40 INJECTION SUBCUTANEOUS at 04:05

## 2022-05-29 RX ADMIN — ASPIRIN 325 MG: 325 TABLET, COATED ORAL at 08:05

## 2022-05-29 RX ADMIN — THIAMINE HCL TAB 100 MG 100 MG: 100 TAB at 03:05

## 2022-05-29 RX ADMIN — ATORVASTATIN CALCIUM 40 MG: 40 TABLET, FILM COATED ORAL at 08:05

## 2022-05-29 RX ADMIN — CLONIDINE HYDROCHLORIDE 0.2 MG: 0.2 TABLET ORAL at 03:05

## 2022-05-29 RX ADMIN — BUPROPION HYDROCHLORIDE 150 MG: 150 TABLET, FILM COATED, EXTENDED RELEASE ORAL at 08:05

## 2022-05-29 RX ADMIN — NICOTINE 1 PATCH: 21 PATCH, EXTENDED RELEASE TRANSDERMAL at 09:05

## 2022-05-29 NOTE — PLAN OF CARE
Problem: Infection  Goal: Absence of Infection Signs and Symptoms  Outcome: Ongoing, Progressing     Problem: Cerebral Tissue Perfusion (Stroke, Ischemic/Transient Ischemic Attack)  Goal: Optimal Cerebral Tissue Perfusion  Outcome: Ongoing, Progressing     Problem: Cognitive Impairment (Stroke, Ischemic/Transient Ischemic Attack)  Goal: Optimal Cognitive Function  Outcome: Ongoing, Progressing     Problem: Communication Impairment (Stroke, Ischemic/Transient Ischemic Attack)  Goal: Improved Communication Skills  Outcome: Ongoing, Progressing     Problem: Functional Ability Impaired (Stroke, Ischemic/Transient Ischemic Attack)  Goal: Optimal Functional Ability  Outcome: Ongoing, Progressing     Problem: Sensorimotor Impairment (Stroke, Ischemic/Transient Ischemic Attack)  Goal: Improved Sensorimotor Function  Outcome: Ongoing, Progressing     Problem: Adult Inpatient Plan of Care  Goal: Patient-Specific Goal (Individualized)  Outcome: Ongoing, Progressing  Goal: Optimal Comfort and Wellbeing  Outcome: Ongoing, Progressing  Goal: Readiness for Transition of Care  Outcome: Ongoing, Progressing

## 2022-05-29 NOTE — PROGRESS NOTES
[facility]  Hospital Medicine Progress Note        Chief Complaint: Inpatient follow-up on stroke    HPI:   Ms. Belcher is a 52-year-old AA female with past medical history of HTN, anxiety, depression, and hypothyroidism who presents to [facility] ED with complaints of dysarthria and expressive aphasia x3 days.  Patient's family member states that she has been having difficulty speaking for the past 3 days however today was worse so she was brought to the ED for further evaluation. Her sister at bedside states that she is unsure if she has been compliant with her home medications.  She does smoke marijuana daily with her significant other.  And drinks about a six-pack of beer every day.  No history of stroke.  She is not on aspirin.  Sister at bedside reports that she has had abnormal behavior over the past 3 days and has been having episodes of bladder and bowel incontinence and is very restless/fidgety.  No reported history of falls or back pain or any saddle anesthesia.  Patient is oriented x 4 but is an extremely poor historian and it is very difficult to get a history from her.  She does report left-sided weakness and paresthesias x 2 days.     Upon arrival to outside facility the patient was markedly hypertensive but otherwise hemodynamically stable and afebrile. EKG: NSR with LVH. Labs unremarkable except for a reactive RPR, patient denies high risk sexual behavior, states unprotected sexual intercourse with significant other but she is in a monogamous relationships. CT Head showed left frontal 6 cm cortical base hypodensity suggesting an acute to subacute nonhemorrhagic infarct in the left KENISHA vascular territory. MRI Brain was then obtained confirming the left frontal lobe extending into the corpus callosum with an acute nonhemorrhagic infarct in the territory of the anterior cerebral artery, and old lacunar infarcts. She is being transferred to [facility] for further CVA  workup.    Interval Hx:   Patient is sitting up in bed.  She remains with expressive aphasia.  There is a CNA in the room assisting with patient hygiene.  No acute issues.  Patient is afebrile, on room air, and hemodynamically stable.    Objective/physical exam:  General: Thin  female in no acute distress  HENT: normocephalic, atraumatic  Eye: PERRL, EOMI, clear conjunctiva  Neck: full ROM, no thyromegaly, no JVD  Respiratory: clear to auscultation bilaterally  Cardiovascular: regular rate and rhythm  Gastrointestinal: non-distended, positive bowel sounds, non-tender  Musculoskeletal: no gross deformity  Integumentary: warm, dry, intact, no rashes  Neurological:  Right hemiparesis, expressive aphasia and dysarthria with paresthesias to right face, RUE and RLE.   Psychiatric: cooperative, flat affect    VITAL SIGNS: 24 HRS MIN & MAX LAST   Temp  Min: 97.8 °F (36.6 °C)  Max: 99.4 °F (37.4 °C) 97.8 °F (36.6 °C)   BP  Min: 116/79  Max: 171/96 116/79   Pulse  Min: 74  Max: 89  78   Resp  Min: 18  Max: 20 18   SpO2  Min: 96 %  Max: 99 % 96 %       Recent Labs   Lab 05/24/22  0402 05/25/22  0422 05/26/22  0358   WBC 8.4 8.4 7.3   RBC 4.68 4.63 4.39   HGB 14.2 14.0 13.7   HCT 44.4 44.3 41.6   MCV 94.9* 95.7* 94.8*   MCH 30.3 30.2 31.2*   MCHC 32.0* 31.6* 32.9*   RDW 14.3 14.2 14.1    220 211   MPV 10.8 10.8 11.1       Recent Labs   Lab 05/24/22  0402 05/25/22  0422 05/26/22  0358    142 140   K 4.2 4.4 4.1   CO2 30* 31* 29   BUN 14.7 14.4 15.2   CREATININE 0.77 0.76 0.73   CALCIUM 9.6 9.3 9.2   ALBUMIN 3.4* 3.4* 3.2*   ALKPHOS 70 66 65   ALT 8 13 15   AST 16 20 23   BILITOT 0.5 0.5 0.4          Microbiology Results (last 7 days)     Procedure Component Value Units Date/Time    Cerebrospinal Fluid Culture [facility] [[id number]] Collected: 05/21/22 1132    Order Status: Completed Specimen: CSF (Spinal Fluid) Updated: 05/26/22 1045     CULTURE, CSF ([facility]) Final Report: At 5 days. No growth           See  below for Radiology    Scheduled Med:   amLODIPine  10 mg Oral Daily    aspirin  325 mg Oral Daily    atorvastatin  40 mg Oral Daily    buPROPion  150 mg Oral Daily    enoxaparin  40 mg Subcutaneous Daily    labetaloL  200 mg Oral Q12H    multivitamin  1 tablet Oral Daily    nicotine  1 patch Transdermal Daily    sertraline  25 mg Oral Daily    thiamine  100 mg Oral TID        Continuous Infusions:  None.       PRN Meds:  cloNIDine, dextrose 10%, dextrose 10%, enalaprilat, hydrALAZINE, labetalol, LIDOcaine (PF) 20 mg/ml (2%), ondansetron, sodium chloride 0.9%, sodium chloride 0.9%       Assessment/Plan:  Acute Non-hemorrhagic CVA - left KENISHA vascular territory  Poorly controlled essential HTN  HLD  Syphilis status post Bicillin LA IM x 2 with 3rd dose slated for June 2nd  Trichomoniasis status post 7 days of Flagyl  Hypothyroidism  Depression  Tobacco and marijuana abuse  Long history of crack cocaine abuse        PLAN:  - Continue titrating medications for blood pressure control.  Changed Coreg to labetalol yesterday  - Continue aspirin and statin per neurology   - Immunological panel was sent by neurology.  - VDRL in CSF is negative  - Neurology recommended LINQ and MARIO if CSF VDRL was negative  - Patient had LINQ placement on 05/26  - Patient did not have MARIO due to previous history of, as well as current, dysphagia  - Patient was treated by Infectious Disease for trichomoniasis and syphilis     Disposition:  Inpatient rehab tomorrow    Chaka Calhoun MD   05/29/2022

## 2022-05-30 LAB
RF IGA SER-ACNC: <5 UNITS
RF IGG SER-ACNC: <5 UNITS
RF IGM SER-ACNC: <5 UNITS

## 2022-05-30 PROCEDURE — 97535 SELF CARE MNGMENT TRAINING: CPT | Mod: CO

## 2022-05-30 PROCEDURE — 97530 THERAPEUTIC ACTIVITIES: CPT | Mod: CO

## 2022-05-30 PROCEDURE — 25000003 PHARM REV CODE 250: Performed by: PSYCHIATRY & NEUROLOGY

## 2022-05-30 PROCEDURE — 11000001 HC ACUTE MED/SURG PRIVATE ROOM

## 2022-05-30 PROCEDURE — 63600175 PHARM REV CODE 636 W HCPCS: Performed by: STUDENT IN AN ORGANIZED HEALTH CARE EDUCATION/TRAINING PROGRAM

## 2022-05-30 PROCEDURE — 25000003 PHARM REV CODE 250: Performed by: NURSE PRACTITIONER

## 2022-05-30 PROCEDURE — 63600175 PHARM REV CODE 636 W HCPCS: Performed by: NURSE PRACTITIONER

## 2022-05-30 PROCEDURE — 97116 GAIT TRAINING THERAPY: CPT | Mod: CQ

## 2022-05-30 PROCEDURE — 97530 THERAPEUTIC ACTIVITIES: CPT | Mod: CQ

## 2022-05-30 PROCEDURE — S4991 NICOTINE PATCH NONLEGEND: HCPCS | Performed by: NURSE PRACTITIONER

## 2022-05-30 PROCEDURE — 25000003 PHARM REV CODE 250: Performed by: INTERNAL MEDICINE

## 2022-05-30 PROCEDURE — 25000003 PHARM REV CODE 250: Performed by: STUDENT IN AN ORGANIZED HEALTH CARE EDUCATION/TRAINING PROGRAM

## 2022-05-30 RX ADMIN — THIAMINE HCL TAB 100 MG 100 MG: 100 TAB at 09:05

## 2022-05-30 RX ADMIN — LABETALOL HYDROCHLORIDE 200 MG: 200 TABLET, FILM COATED ORAL at 07:05

## 2022-05-30 RX ADMIN — ONDANSETRON 4 MG: 2 INJECTION INTRAMUSCULAR; INTRAVENOUS at 07:05

## 2022-05-30 RX ADMIN — MULTIPLE VITAMINS W/ MINERALS TAB 1 TABLET: TAB at 09:05

## 2022-05-30 RX ADMIN — ASPIRIN 325 MG: 325 TABLET, COATED ORAL at 09:05

## 2022-05-30 RX ADMIN — CLONIDINE HYDROCHLORIDE 0.2 MG: 0.2 TABLET ORAL at 07:05

## 2022-05-30 RX ADMIN — ATORVASTATIN CALCIUM 40 MG: 40 TABLET, FILM COATED ORAL at 09:05

## 2022-05-30 RX ADMIN — THIAMINE HCL TAB 100 MG 100 MG: 100 TAB at 04:05

## 2022-05-30 RX ADMIN — ENOXAPARIN SODIUM 40 MG: 40 INJECTION SUBCUTANEOUS at 04:05

## 2022-05-30 RX ADMIN — LABETALOL HYDROCHLORIDE 200 MG: 200 TABLET, FILM COATED ORAL at 09:05

## 2022-05-30 RX ADMIN — BUPROPION HYDROCHLORIDE 150 MG: 150 TABLET, FILM COATED, EXTENDED RELEASE ORAL at 09:05

## 2022-05-30 RX ADMIN — AMLODIPINE BESYLATE 10 MG: 5 TABLET ORAL at 09:05

## 2022-05-30 RX ADMIN — NICOTINE 1 PATCH: 21 PATCH, EXTENDED RELEASE TRANSDERMAL at 09:05

## 2022-05-30 RX ADMIN — SERTRALINE 25 MG: 25 TABLET, FILM COATED ORAL at 09:05

## 2022-05-30 RX ADMIN — THIAMINE HCL TAB 100 MG 100 MG: 100 TAB at 07:05

## 2022-05-30 NOTE — PROGRESS NOTES
[facility]  Hospital Medicine Progress Note        Chief Complaint: Inpatient follow-up on stroke    HPI:   Ms. Belcher is a 52-year-old AA female with past medical history of HTN, anxiety, depression, and hypothyroidism who presents to [facility] ED with complaints of dysarthria and expressive aphasia x3 days.  Patient's family member states that she has been having difficulty speaking for the past 3 days however today was worse so she was brought to the ED for further evaluation. Her sister at bedside states that she is unsure if she has been compliant with her home medications.  She does smoke marijuana daily with her significant other.  And drinks about a six-pack of beer every day.  No history of stroke.  She is not on aspirin.  Sister at bedside reports that she has had abnormal behavior over the past 3 days and has been having episodes of bladder and bowel incontinence and is very restless/fidgety.  No reported history of falls or back pain or any saddle anesthesia.  Patient is oriented x 4 but is an extremely poor historian and it is very difficult to get a history from her.  She does report left-sided weakness and paresthesias x 2 days.     Upon arrival to outside facility the patient was markedly hypertensive but otherwise hemodynamically stable and afebrile. EKG: NSR with LVH. Labs unremarkable except for a reactive RPR, patient denies high risk sexual behavior, states unprotected sexual intercourse with significant other but she is in a monogamous relationships. CT Head showed left frontal 6 cm cortical base hypodensity suggesting an acute to subacute nonhemorrhagic infarct in the left KENISHA vascular territory. MRI Brain was then obtained confirming the left frontal lobe extending into the corpus callosum with an acute nonhemorrhagic infarct in the territory of the anterior cerebral artery, and old lacunar infarcts. She is being transferred to [facility] for further CVA  workup.    Interval Hx:   Patient is sitting up in bed.   is on the couch  No acute issues.  Patient is afebrile, on room air, and hemodynamically stable.    Objective/physical exam:  General: Thin  female in no acute distress  HENT: normocephalic, atraumatic  Eye: PERRL, EOMI, clear conjunctiva  Neck: full ROM, no thyromegaly, no JVD  Respiratory: clear to auscultation bilaterally  Cardiovascular: regular rate and rhythm  Gastrointestinal: non-distended, positive bowel sounds, non-tender  Musculoskeletal: no gross deformity  Integumentary: warm, dry, intact, no rashes  Neurological:  Right hemiparesis, expressive aphasia and dysarthria with paresthesias to right face, RUE and RLE.   Psychiatric: cooperative, flat affect    VITAL SIGNS: 24 HRS MIN & MAX LAST   Temp  Min: 97.4 °F (36.3 °C)  Max: 99.3 °F (37.4 °C) 98.9 °F (37.2 °C)   BP  Min: 107/74  Max: 155/90 135/88   Pulse  Min: 77  Max: 86  84   Resp  Min: 20  Max: 20 20   SpO2  Min: 97 %  Max: 99 % 98 %       Recent Labs   Lab 05/24/22  0402 05/25/22  0422 05/26/22  0358   WBC 8.4 8.4 7.3   RBC 4.68 4.63 4.39   HGB 14.2 14.0 13.7   HCT 44.4 44.3 41.6   MCV 94.9* 95.7* 94.8*   MCH 30.3 30.2 31.2*   MCHC 32.0* 31.6* 32.9*   RDW 14.3 14.2 14.1    220 211   MPV 10.8 10.8 11.1       Recent Labs   Lab 05/24/22  0402 05/25/22  0422 05/26/22  0358    142 140   K 4.2 4.4 4.1   CO2 30* 31* 29   BUN 14.7 14.4 15.2   CREATININE 0.77 0.76 0.73   CALCIUM 9.6 9.3 9.2   ALBUMIN 3.4* 3.4* 3.2*   ALKPHOS 70 66 65   ALT 8 13 15   AST 16 20 23   BILITOT 0.5 0.5 0.4          Microbiology Results (last 7 days)     Procedure Component Value Units Date/Time    Cerebrospinal Fluid Culture [facility] [[id number]] Collected: 05/21/22 1132    Order Status: Completed Specimen: CSF (Spinal Fluid) Updated: 05/26/22 1045     CULTURE, CSF ([facility]) Final Report: At 5 days. No growth           See below for Radiology    Scheduled Med:   amLODIPine  10 mg Oral Daily     aspirin  325 mg Oral Daily    atorvastatin  40 mg Oral Daily    buPROPion  150 mg Oral Daily    enoxaparin  40 mg Subcutaneous Daily    labetaloL  200 mg Oral Q12H    multivitamin  1 tablet Oral Daily    nicotine  1 patch Transdermal Daily    sertraline  25 mg Oral Daily    thiamine  100 mg Oral TID        Continuous Infusions:  None.       PRN Meds:  cloNIDine, dextrose 10%, dextrose 10%, enalaprilat, hydrALAZINE, labetalol, LIDOcaine (PF) 20 mg/ml (2%), ondansetron, sodium chloride 0.9%, sodium chloride 0.9%       Assessment/Plan:  Acute Non-hemorrhagic CVA - left KENISHA vascular territory  Poorly controlled essential HTN  HLD  Syphilis status post Bicillin LA IM x 2 with 3rd dose slated for June 2nd  Trichomoniasis status post 7 days of Flagyl  Hypothyroidism  Depression  Tobacco and marijuana abuse  Long history of crack cocaine abuse        PLAN:  - Continue titrating medications for blood pressure control.  Changed Coreg to labetalol yesterday  - Continue aspirin and statin per neurology   - Immunological panel was sent by neurology.  - VDRL in CSF is negative  - Neurology recommended LINQ and MARIO if CSF VDRL was negative  - Patient had LINQ placement on 05/26  - Patient did not have MARIO due to previous history of, as well as current, dysphagia  - Patient was treated by Infectious Disease for trichomoniasis and syphilis     Disposition:  O inpatient rehab tomorrow    Chaka Calhoun MD   05/30/2022

## 2022-05-30 NOTE — PT/OT/SLP PROGRESS
Physical Therapy         Treatment        Sammie Belchre   MRN: [id number]     PT Received On: 05/30/22  PT Start Time: 1437     PT Stop Time: 1516    PT Total Time (min): 39 min       Billable Minutes:  Gait Training 25 and Therapeutic Activity 14  Total Minutes: 39    Treatment Type: Treatment  PT/PTA: PTA     PTA Visit Number: 4       General Precautions: Standard, fall  Orthopedic Precautions: Orthopedic Precautions : N/A   Braces:      Spiritual, Cultural Beliefs, Holiness Practices, Values that Affect Care: no    Subjective:  Communicated with nurse prior to session.         Objective:  Patient found supine, with Patient found with: PureWick, peripheral IV    Functional Mobility:  Bed Mobility:   Supine to sit: Minimal Assistance   Sit to supine: Minimal Assistance   Rolling: Activity did not occur   Scooting: Moderate Assistance    Balance:   Static Sit: FAIR: Maintains without assist, but unable to take any challenges   Dynamic Sit:  0: N/A  Static Stand: POOR+: Needs MINIMAL assist to maintain  Dynamic stand: POOR: Needs MOD (moderate) assist during gait    Transfer Training:  Sit to stand:Minimal Assistance with Rolling Walker x5 trials with cues for proper technique and BUE placement.   Bed <> Chair:  Stand Step with Moderate Assistance with Rolling Walker with cues for correct technique and RLE extension      Gait Training:  Patient gait trained   50  feet on level tile with Rolling Walker with Moderate Assistance and 3 seated rest breaks .  Pt with  step to gait pattern with decreased rajiv, increased time in double stance, decreased velocity of limb motion, decreased step length, decreased toe-to-floor clearance, decreased weight-shifting ability.Impairments contributing to gait deviations include impaired balance, decreased strength and decreased step length with RLE.          Activity Tolerance:  Patient tolerated treatment well    Patient left supine with all lines intact, call button in reach  and friend present.    Assessment:  Sammie Belcher is a 52 y.o. female with a medical diagnosis of CVA (cerebral vascular accident). She presents with significant R sided weakness and increased activity tolerance.    Rehab potential is excellent.    Activity tolerance: Excellent    Discharge recommendations: Discharge Facility/Level of Care Needs: rehabilitation facility     Equipment recommendations: Equipment Needed After Discharge: walker, rolling     GOALS:   Multidisciplinary Problems       Physical Therapy Goals          Problem: Physical Therapy    Goal Priority Disciplines Outcome Goal Variances Interventions   Physical Therapy Goal     PT, PT/OT Ongoing, Progressing     Description: Goals to be met by: 2022     Patient will increase functional independence with mobility by performin. Supine to sit with Stand-by Assistance  2. Sit to supine with Stand-by Assistance  3. Sit to stand transfer with Stand-by Assistance  4. Gait  x 300 feet with Contact Guard Assistance using Rolling Walker.   5. RLE strength to improve to 4/5 MMT.  6. LLE strength to improve to 5/5 MMT.                         PLAN:    Patient to be seen BID  to address the above listed problems via gait training, therapeutic activities  Plan of Care expires: 22  Plan of Care reviewed with: patient, friend         2022

## 2022-05-30 NOTE — PLAN OF CARE
05/30/22 0846   Discharge Assessment   Assessment Type Discharge Planning Assessment   Confirmed/corrected address, phone number and insurance Yes   Confirmed Demographics Contacted registration to update   Source of Information other (see comments)  (Gamaliel Hein (autumn) [phone])   Reason For Admission CVA   Lives With significant other  (Amina tapia) [phone])   Do you expect to return to your current living situation? Yes   Do you have help at home or someone to help you manage your care at home? No   Prior to hospitilization cognitive status: Alert/Oriented   Current cognitive status: Alert/Oriented   Walking or Climbing Stairs Difficulty none   Dressing/Bathing Difficulty none   Home Layout Bedroom on 2nd floor;Bathroom on 2nd floor   Equipment Currently Used at Home none   Patient currently being followed by outpatient case management? No   Do you currently have service(s) that help you manage your care at home? No   Do you take prescription medications? Yes   Do you have any problems affording any of your prescribed medications? No   Is the patient taking medications as prescribed? yes   Who is going to help you get home at discharge? Gamailel tapia)   How do you get to doctors appointments? car, drives self   Are you on dialysis? No   Do you take coumadin? No   Discharge Plan A Rehab   Discharge Barriers Identified None   Relationship/Environment   Name(s) of Who Lives With Patient Gamaliel tapia)   Pharmacy: [facility] [address]  Pt was indep prior to admit, babysitting her grandchildren, cooking, cleaning and able to drive.  12 steps to get upstairs to BR/BA with handrail on left.3  Referral was sent to  rehab on 5/25.  rehab not accepting pt today, admission closed for Memorial Day.

## 2022-05-30 NOTE — PT/OT/SLP PROGRESS
SLP attempting to see pt for tx however pt completing ADLs with staff assist.  SLP to continue to follow.

## 2022-05-30 NOTE — PT/OT/SLP PROGRESS
Occupational Therapy  Treatment    Sammie Belcher   MRN: [id number]   Admitting Diagnosis: CVA (cerebral vascular accident)    OT Date of Treatment: 05/30/22   OT Start Time: 0920  OT Stop Time: 0950  OT Total Time (min): 30 min     Billable Minutes:  Self Care/Home Management 15 and Therapeutic Activity 15  Total Minutes: 30     OT/BETH: BETH     BETH Visit Number: 1    General Precautions: Standard, fall  Orthopedic Precautions: N/A    Spiritual, Cultural Beliefs, Yarsanism Practices, Values that Affect Care: no    Subjective:  Communicated with RN prior to session.      Objective:  Patient found with: telemetry, peripheral IV    Functional Mobility:  Bed Mobility:   Supine to sit: Minimal Assistance   Sit to supine: Activity did not occur   Rolling: Minimal Assistance   Scooting: Minimal Assistance    Transfer Training:   Bed <> Chair:  Step Transfer with Minimal Assistance with hand held assist    LE Dressing:  Patient don/doffed socks with Minimal Assistance    Balance:   Static Sit: FAIR-: Maintains without assist but inconsistent   Dynamic Sit:  FAIR+: Maintains balance through MINIMAL excursions of active trunk motion  Static Stand: POOR+: Needs MINIMAL assist to maintain  Dynamic stand: POOR+: Needs MIN (minimal ) assist during gait    Additional Treatment:  Sit to stand x 3 with Min A    Patient left up in chair with call button in reach and  present    ASSESSMENT:  Sammie Belcher is a 52 y.o. female with a medical diagnosis of CVA (cerebral vascular accident) and presents with decreased balance, decreased ADL skills and decreased activity tolerance.    Rehab potential is good    Activity tolerance: Good    Discharge recommendations: rehabilitation facility     Equipment recommendations: walker, rolling     GOALS:   Multidisciplinary Problems     Occupational Therapy Goals        Problem: Occupational Therapy    Goal Priority Disciplines Outcome Interventions   Occupational Therapy Goal     OT,  PT/OT Ongoing, Progressing    Description: Goals to be met by: 6/9/22    Patient will increase functional independence with ADLs by performing:    UE Dressing with Modified [place].  LE Dressing with Modified [place].  Grooming while seated with Modified [place].  Toileting from toilet with Modified [place] for hygiene and clothing management.   Toilet transfer to toilet with Modified [place].                     Plan:  Patient to be seen daily to address the above listed problems via self-care/home management, therapeutic activities, therapeutic exercises, neuromuscular re-education  Plan of Care expires: 06/23/22  Plan of Care reviewed with: patient         05/30/2022

## 2022-05-31 LAB
ANCA AB SER QL IF: NEGATIVE
P-ANCA SER QL IF: NEGATIVE

## 2022-05-31 PROCEDURE — 25000003 PHARM REV CODE 250: Performed by: STUDENT IN AN ORGANIZED HEALTH CARE EDUCATION/TRAINING PROGRAM

## 2022-05-31 PROCEDURE — 25000003 PHARM REV CODE 250: Performed by: PSYCHIATRY & NEUROLOGY

## 2022-05-31 PROCEDURE — 25000003 PHARM REV CODE 250: Performed by: NURSE PRACTITIONER

## 2022-05-31 PROCEDURE — 25000003 PHARM REV CODE 250: Performed by: INTERNAL MEDICINE

## 2022-05-31 PROCEDURE — 92507 TX SP LANG VOICE COMM INDIV: CPT

## 2022-05-31 PROCEDURE — S4991 NICOTINE PATCH NONLEGEND: HCPCS | Performed by: NURSE PRACTITIONER

## 2022-05-31 PROCEDURE — 97535 SELF CARE MNGMENT TRAINING: CPT | Mod: CO

## 2022-05-31 PROCEDURE — 97116 GAIT TRAINING THERAPY: CPT | Mod: CQ

## 2022-05-31 PROCEDURE — 97530 THERAPEUTIC ACTIVITIES: CPT | Mod: CO

## 2022-05-31 PROCEDURE — 63600175 PHARM REV CODE 636 W HCPCS: Performed by: STUDENT IN AN ORGANIZED HEALTH CARE EDUCATION/TRAINING PROGRAM

## 2022-05-31 PROCEDURE — 97530 THERAPEUTIC ACTIVITIES: CPT | Mod: CQ

## 2022-05-31 PROCEDURE — 11000001 HC ACUTE MED/SURG PRIVATE ROOM

## 2022-05-31 PROCEDURE — 94761 N-INVAS EAR/PLS OXIMETRY MLT: CPT

## 2022-05-31 RX ADMIN — THIAMINE HCL TAB 100 MG 100 MG: 100 TAB at 05:05

## 2022-05-31 RX ADMIN — ATORVASTATIN CALCIUM 40 MG: 40 TABLET, FILM COATED ORAL at 09:05

## 2022-05-31 RX ADMIN — ENOXAPARIN SODIUM 40 MG: 40 INJECTION SUBCUTANEOUS at 05:05

## 2022-05-31 RX ADMIN — LABETALOL HYDROCHLORIDE 200 MG: 200 TABLET, FILM COATED ORAL at 09:05

## 2022-05-31 RX ADMIN — MULTIPLE VITAMINS W/ MINERALS TAB 1 TABLET: TAB at 09:05

## 2022-05-31 RX ADMIN — SERTRALINE 25 MG: 25 TABLET, FILM COATED ORAL at 09:05

## 2022-05-31 RX ADMIN — NICOTINE 1 PATCH: 21 PATCH, EXTENDED RELEASE TRANSDERMAL at 09:05

## 2022-05-31 RX ADMIN — THIAMINE HCL TAB 100 MG 100 MG: 100 TAB at 09:05

## 2022-05-31 RX ADMIN — AMLODIPINE BESYLATE 10 MG: 5 TABLET ORAL at 09:05

## 2022-05-31 RX ADMIN — BUPROPION HYDROCHLORIDE 150 MG: 150 TABLET, FILM COATED, EXTENDED RELEASE ORAL at 09:05

## 2022-05-31 RX ADMIN — ASPIRIN 325 MG: 325 TABLET, COATED ORAL at 09:05

## 2022-05-31 NOTE — PT/OT/SLP PROGRESS
Physical Therapy         Treatment        Sammie Belcher   MRN: [id number]     PT Received On: 05/31/22  PT Start Time: 1517     PT Stop Time: 1541    PT Total Time (min): 24 min       Billable Minutes:  Therapeutic Activity 24  Total Minutes: 24    Treatment Type: Treatment  PT/PTA: PTA     PTA Visit Number: 4       General Precautions: Standard, fall  Orthopedic Precautions: Orthopedic Precautions : N/A   Braces:      Spiritual, Cultural Beliefs, Congregational Practices, Values that Affect Care: no    Subjective:  Communicated with nurse prior to session.         Objective:  Patient found supine, with Patient found with: Kamryn    Functional Mobility:  Bed Mobility:   Supine to sit: Minimal Assistance   Sit to supine: Minimal Assistance   Rolling: Minimal Assistance   Scooting: Minimal Assistance    Balance:   Static Sit: GOOD-: Takes MODERATE challenges from all directions but inconsistently  Dynamic Sit:  0: N/A  Static Stand: FAIR: Maintains without assist but unable to take challenges  Dynamic stand: POOR: Needs MOD (moderate) assist during gait    Transfer Training:  Sit to stand:Contact Guard Assistance with Rolling Walker x5 trials with cues for BUE placement      Gait Training:  Pt performed 5 L side steps with RW and ModA. Pt required tc and vc to progress each LE.        Additional Treatment:  Pt performed supine hip flexion x10 trials on RLE with Priscilla for tc and vc to initiate sequencing.    Pt performed dynamic standing activities with RW and modA including step taps with BLE on 4in step with 10 reps on each side x3 trials. Pt required verbal, visual, and tactile cues on the RLE to correct circumduction as compensatory strategy during hip flexion. Pt required cues to increase RLE extension during stance phase on the R.  Pt performed alternating BLE step taps with RW and modA x10 reps to increase weight shifting on each side. Pt had a noted LOB that was Priscilla to correct and noted unsteadiness throughout  session.     Activity Tolerance:  Patient tolerated treatment well    Patient left supine with all lines intact, call button in reach and friend present.    Assessment:  Sammie Belcher is a 52 y.o. female with a medical diagnosis of CVA (cerebral vascular accident). She presents with increased sequencing with RLE.    Rehab potential is excellent.    Activity tolerance: Excellent    Discharge recommendations: Discharge Facility/Level of Care Needs: rehabilitation facility     Equipment recommendations: Equipment Needed After Discharge: walker, rolling     GOALS:   Multidisciplinary Problems       Physical Therapy Goals          Problem: Physical Therapy    Goal Priority Disciplines Outcome Goal Variances Interventions   Physical Therapy Goal     PT, PT/OT Ongoing, Progressing     Description: Goals to be met by: 2022     Patient will increase functional independence with mobility by performin. Supine to sit with Stand-by Assistance  2. Sit to supine with Stand-by Assistance  3. Sit to stand transfer with Stand-by Assistance  4. Gait  x 300 feet with Contact Guard Assistance using Rolling Walker.   5. RLE strength to improve to 4/5 MMT.  6. LLE strength to improve to 5/5 MMT.                         PLAN:    Patient to be seen BID  to address the above listed problems via therapeutic activities, gait training  Plan of Care expires: 22  Plan of Care reviewed with: patient, friend         2022

## 2022-05-31 NOTE — PT/OT/SLP PROGRESS
Occupational Therapy  Treatment    Sammie Belcher   MRN: [id number]   Admitting Diagnosis: CVA (cerebral vascular accident)    OT Date of Treatment: 05/31/22   OT Start Time: 1000  OT Stop Time: 1056  OT Total Time (min): 56 min     Billable Minutes:  Self Care/Home Management 40 and Therapeutic Activity 16  Total Minutes: 56     OT/BETH: BETH     BETH Visit Number: 2    General Precautions: Standard, fall  Orthopedic Precautions: N/A    Spiritual, Cultural Beliefs, Zoroastrianism Practices, Values that Affect Care: no    Subjective:  Communicated with RN prior to session.    Objective:  Pt presented HOB elevated, spouse in room with telemetry    Functional Mobility:  Bed Mobility:   Supine to sit: Minimal Assistance   Sit to supine: Activity did not occur   Rolling: Minimal Assistance   Scooting: Minimal Assistance    Transfer Training:  Transfer bed to chair with Min/Mod A, difficulty with foot placement and advancement.  Verbal cues and tactile cues for safety and technique     Feeding:  Self feeding with foam to build up handle on utensil for easier grasp, completed 70% of meal before rest due to fatigue. Feeding with right UE.    LE Dressing:  Adjusting B socks from edge of bed with CGA for safety    Additional Treatment:  Completed family education with self feeding skills and use of gait belt during mobility and transfers    Patient left up in chair with call button in reach and spouse present    ASSESSMENT:  Sammie Belcher is a 52 y.o. female with a medical diagnosis of CVA (cerebral vascular accident) and presents with decreased strength, decreased activity tolerance and decreased ADL skills.    Rehab potential is excellent    Activity tolerance: Excellent    Discharge recommendations: rehabilitation facility     Equipment recommendations: walker, rolling     GOALS:   Multidisciplinary Problems     Occupational Therapy Goals        Problem: Occupational Therapy    Goal Priority Disciplines Outcome Interventions    Occupational Therapy Goal     OT, PT/OT Ongoing, Progressing    Description: Goals to be met by: 6/9/22    Patient will increase functional independence with ADLs by performing:    UE Dressing with Modified Mono.  LE Dressing with Modified Mono.  Grooming while seated with Modified Mono.  Toileting from toilet with Modified Mono for hygiene and clothing management.   Toilet transfer to toilet with Modified Mono.                     Plan:  Patient to be seen daily to address the above listed problems via self-care/home management, therapeutic activities, therapeutic exercises, neuromuscular re-education  Plan of Care expires: 06/23/22  Plan of Care reviewed with: patient, spouse         05/31/2022

## 2022-05-31 NOTE — PT/OT/SLP PROGRESS
Speech Language Pathology Department  Progress Note    Patient Name:  Sammie Belcher   MRN:  [id number]  Admitting Diagnosis: CVA (cerebral vascular accident)    Recommendations:                 General Recommendations:  Speech/language therapy  Communication strategies:  yes/no questions only, provide increased time to answer and go to room if call light pushed    Subjective     Patient awake, alert and cooperative.    Patient goals: to go home     Pain/Comfort:  · Pain Rating 1: 0/10    Respiratory Status: Room air    Objective:     Identification:  Pictures: 90%    Confrontation Naming  Pictures: 60%     Yes/No Questions:  Simple: 60%    Repetition:  Single syllable words: 90%  Phrases: 70%  Sentences: 50%    Verbal output increased with cues for carli/rhythm/pacing.    Assessment:     Pt continues to present with receptive and expressive language impairments.    Goals:   Multidisciplinary Problems     SLP Goals        Problem: SLP    Goal Priority Disciplines Outcome   SLP Goal     SLP    Description: LTG:   To increase expressive/receptive language skills to enhance functional communication to express basic wants and needs with 100% effectiveness.   ST. Answer personal yes/no questions with 90% accuracy.   2. Answer simple yes/no questions with 90% accuracy.   3. Complete automatic speech tasks with moderate cueing.   4. Name objects with 70% accuracy.   5. Complete simple phrases with moderate cueing.                      Plan:     Will continue to follow and tx as appropriate.    Discharge recommendations:  rehabilitation facility   Barriers to Discharge:  Level of Skilled Assistance Needed      Time Tracking:     SLP Treatment Date:   22  Speech Start Time:  1315  Speech Stop Time:  1340     Speech Total Time (min):  25 min    Billable minutes:  Speech/Language Therapy, 25 minutes       2022

## 2022-05-31 NOTE — PHYSICIAN QUERY
PT Name: Sammie Belcher  MR #: [id number]    DOCUMENTATION CLARIFICATION     CDS/:  Solo Pichardo RN, CDS                   Contact Information:  [email or web address]    This form is a permanent document in the medical record.     Query Date: May 31, 2022    By submitting this query, we are merely seeking further clarification of documentation.. Please utilize your independent clinical judgment when addressing the question(s) below.    The medical record contains the following:   Indicators  Supporting Clinical Findings Location in Medical Record   X % of Estimated Energy Intake over a time frame from p.o., TF, or TPN  < 75% est energy needs > 1 month Nutrition    X Weight Status over a time frame 7.5% in 3 months   Nutrition    X Subcutaneous Fat and/or Muscle Loss Subcutaneous Fat (Malnutrition): mild depletion    Muscle Mass (Malnutrition): mild depletion  Nutrition     Fluid Accumulation or Edema     X Wt/BMI/Usual Body Weight Weight: 65 kg (143 lb 4.8 oz)     Usual Body Weight (UBW), k.7 kg   Nutrition     Delayed Wound Healing/Failure to Thrive     X Acute or Chronic Illness Acute Non-hemorrhagic CVA - left KENISHA vascular territory  Poorly controlled essential HTN  HLD  Syphilis status post Bicillin LA IM x 2 with 3rd dose slated for   Trichomoniasis status post 7 days of Flagyl  Hypothyroidism  Depression  Tobacco and marijuana abuse  Long history of crack cocaine abuse   HM PN     Medication      Treatment     X Other Pt noted with CVA, family at bedside. Pt on easy to chew diet, appetite fair. Offered ONS, pt requesting strawberry. Per family, pt has had some appetite and wt loss before parathyroid gland removal ~ 2 months ago. States UBW ~ 160lb   Nutrition                                               AND / ASPEN Clinical Characteristics (2011)       A minimum of two characteristics is recommended for diagnosing either moderate or severe  malnutrition     Mild Malnutrition Moderate Malnutrition Severe Malnutrition   Energy Intake from p.o., TF or TPN. < 75% intake of estimated energy needs for less than 7 days < 75% intake of estimated energy needs for greater than 7 days < 50% intake of estimated energy needs for > 5 days   Weight Loss 1-2% in 1 month  5% in 3 months  7.5% in 6 months  10% in 1 year 1-2 % in 1 week  5% in 1 month  7.5% in 3 months  10% in 6 months  20% in 1 year > 2% in 1 week  > 5% in 1 month  > 7.5% in 3 months  > 10% in 6 months  > 20% in 1 year   Physical Findings     None *Mild subcutaneous fat and/or muscle loss  *Mild fluid accumulation  *Stage II decubitus  *Surgical wound or non-healing wound *Mod/severe subcutaneous fat and/or muscle loss  *Mod/severe fluid accumulation  *Stage III or IV decubitus  *Non-healing surgical wound     Provider, please specify diagnosis  associated with above clinical findings.    [X] Moderate Protein-Calorie Malnutrition     [  ] Other Nutritional Diagnosis (please specify): _______     [  ] Clinically Undetermined         Please document in your progress notes daily for the duration of treatment until resolved and include in your discharge summary.

## 2022-05-31 NOTE — PLAN OF CARE
Spoke with Loli/[facility] regarding referral status, she submitted to insurance for auth on Thursday 5/19/22, awaiting response.

## 2022-05-31 NOTE — PT/OT/SLP PROGRESS
Physical Therapy         Treatment        Sammie Belcher   MRN: [id number]     PT Received On: 05/31/22  PT Start Time: 1108     PT Stop Time: 1138    PT Total Time (min): 30 min       Billable Minutes:  Gait Training 15 and Therapeutic Activity 15  Total Minutes: 30    Treatment Type: Treatment  PT/PTA: PTA     PTA Visit Number: 4       General Precautions: Standard, fall  Orthopedic Precautions: Orthopedic Precautions : N/A   Braces:      Spiritual, Cultural Beliefs, Confucianism Practices, Values that Affect Care: no    Subjective:  Communicated with nurse prior to session.         Objective:  Patient found up in chair, with Patient found with: PureWick, bed alarm    Functional Mobility:      Balance:   Static Sit: FAIR-: Maintains without assist but inconsistent   Dynamic Sit:  FAIR: Cannot move trunk without losing balance  Static Stand: FAIR+: Takes MINIMAL challenges from all directions  Dynamic stand: POOR: Needs MOD (moderate) assist during gait with maximum cues    Transfer Training:  Sit to stand:Contact Guard Assistance with Rolling Walker x3 trials with cues for UE placement      Gait Training:  Patient gait trained   40  feet on level tile with Rolling Walker with Moderate Assistance with 3 seated rest breaks.  Pt with a  step to gait pattern with decreased rajiv, increased time in double stance, decreased velocity of limb motion, decreased step length, decreased toe-to-floor clearance and decreased weight-shifting ability.Impairments contributing to gait deviations include impaired balance, impaired coordination, impaired motor control and decreased strength.    Pt required maximal verbal, visual, and tactile cues for RLE to progress through the gait cycle and weight shift to the LLE.         Additional Treatment:  Pt performed seated hip flexion on BLE x10 reps with noted RLE weakness.     Activity Tolerance:  Patient tolerated treatment well    Patient left up in chair with all lines intact, call  button in reach and chair alarm on.    Assessment:  Sammie Belcher is a 52 y.o. female with a medical diagnosis of CVA (cerebral vascular accident). She presents with increased t/f training ability compared to previous session.    Rehab potential is excellent.    Activity tolerance: Excellent    Discharge recommendations: Discharge Facility/Level of Care Needs: rehabilitation facility     Equipment recommendations: Equipment Needed After Discharge: walker, rolling     GOALS:   Multidisciplinary Problems       Physical Therapy Goals          Problem: Physical Therapy    Goal Priority Disciplines Outcome Goal Variances Interventions   Physical Therapy Goal     PT, PT/OT Ongoing, Progressing     Description: Goals to be met by: 2022     Patient will increase functional independence with mobility by performin. Supine to sit with Stand-by Assistance  2. Sit to supine with Stand-by Assistance  3. Sit to stand transfer with Stand-by Assistance  4. Gait  x 300 feet with Contact Guard Assistance using Rolling Walker.   5. RLE strength to improve to 4/5 MMT.  6. LLE strength to improve to 5/5 MMT.                         PLAN:    Patient to be seen BID  to address the above listed problems via gait training, therapeutic activities  Plan of Care expires: 22  Plan of Care reviewed with: patient, friend         2022

## 2022-05-31 NOTE — PROGRESS NOTES
[facility]  Hospital Medicine Progress Note        Chief Complaint: Inpatient follow-up on stroke    HPI:   Ms. Belcher is a 52-year-old AA female with past medical history of HTN, anxiety, depression, and hypothyroidism who presents to [facility] ED with complaints of dysarthria and expressive aphasia x3 days.  Patient's family member states that she has been having difficulty speaking for the past 3 days however today was worse so she was brought to the ED for further evaluation. Her sister at bedside states that she is unsure if she has been compliant with her home medications.  She does smoke marijuana daily with her significant other.  And drinks about a six-pack of beer every day.  No history of stroke.  She is not on aspirin.  Sister at bedside reports that she has had abnormal behavior over the past 3 days and has been having episodes of bladder and bowel incontinence and is very restless/fidgety.  No reported history of falls or back pain or any saddle anesthesia.  Patient is oriented x 4 but is an extremely poor historian and it is very difficult to get a history from her.  She does report left-sided weakness and paresthesias x 2 days.     Upon arrival to outside facility the patient was markedly hypertensive but otherwise hemodynamically stable and afebrile. EKG: NSR with LVH. Labs unremarkable except for a reactive RPR, patient denies high risk sexual behavior, states unprotected sexual intercourse with significant other but she is in a monogamous relationships. CT Head showed left frontal 6 cm cortical base hypodensity suggesting an acute to subacute nonhemorrhagic infarct in the left KENISHA vascular territory. MRI Brain was then obtained confirming the left frontal lobe extending into the corpus callosum with an acute nonhemorrhagic infarct in the territory of the anterior cerebral artery, and old lacunar infarcts. She is being transferred to [facility] for further CVA  workup.    Interval Hx:   Patient is sitting up in a chair playing with a spoon.   is on the couch  No acute issues.  Patient is afebrile, on room air, and hemodynamically stable.    Objective/physical exam:  General: Thin  female in no acute distress  HENT: normocephalic, atraumatic  Eye: PERRL, EOMI, clear conjunctiva  Neck: full ROM, no thyromegaly, no JVD  Respiratory: clear to auscultation bilaterally  Cardiovascular: regular rate and rhythm  Gastrointestinal: non-distended, positive bowel sounds, non-tender  Musculoskeletal: no gross deformity  Integumentary: warm, dry, intact, no rashes  Neurological:  Right hemiparesis, expressive aphasia and dysarthria with paresthesias to right face, RUE and RLE.   Psychiatric: cooperative, flat affect    VITAL SIGNS: 24 HRS MIN & MAX LAST   Temp  Min: 97.6 °F (36.4 °C)  Max: 98.9 °F (37.2 °C) 97.6 °F (36.4 °C)   BP  Min: 101/66  Max: 179/99 101/66   Pulse  Min: 71  Max: 91  71   Resp  Min: 18  Max: 18 18   SpO2  Min: 96 %  Max: 100 % 99 %       Recent Labs   Lab 05/25/22  0422 05/26/22  0358   WBC 8.4 7.3   RBC 4.63 4.39   HGB 14.0 13.7   HCT 44.3 41.6   MCV 95.7* 94.8*   MCH 30.2 31.2*   MCHC 31.6* 32.9*   RDW 14.2 14.1    211   MPV 10.8 11.1       Recent Labs   Lab 05/25/22  0422 05/26/22  0358    140   K 4.4 4.1   CO2 31* 29   BUN 14.4 15.2   CREATININE 0.76 0.73   CALCIUM 9.3 9.2   ALBUMIN 3.4* 3.2*   ALKPHOS 66 65   ALT 13 15   AST 20 23   BILITOT 0.5 0.4          Microbiology Results (last 7 days)     Procedure Component Value Units Date/Time    Cerebrospinal Fluid Culture [facility] [[id number]] Collected: 05/21/22 1132    Order Status: Completed Specimen: CSF (Spinal Fluid) Updated: 05/26/22 1045     CULTURE, CSF ([facility]) Final Report: At 5 days. No growth           See below for Radiology    Scheduled Med:   amLODIPine  10 mg Oral Daily    aspirin  325 mg Oral Daily    atorvastatin  40 mg Oral Daily    buPROPion  150 mg Oral Daily     enoxaparin  40 mg Subcutaneous Daily    labetaloL  200 mg Oral Q12H    multivitamin  1 tablet Oral Daily    nicotine  1 patch Transdermal Daily    sertraline  25 mg Oral Daily    thiamine  100 mg Oral TID        Continuous Infusions:  None.       PRN Meds:  cloNIDine, dextrose 10%, dextrose 10%, enalaprilat, hydrALAZINE, labetalol, LIDOcaine (PF) 20 mg/ml (2%), ondansetron, sodium chloride 0.9%, sodium chloride 0.9%       Assessment/Plan:  Acute Non-hemorrhagic CVA - left KENISHA vascular territory  Poorly controlled essential HTN  HLD  Syphilis status post Bicillin LA IM x 2 with 3rd dose slated for June 2nd  Trichomoniasis status post 7 days of Flagyl  Hypothyroidism  Depression  Tobacco and marijuana abuse  Long history of crack cocaine abuse        PLAN:  - Continue titrating medications for blood pressure control.  Changed Coreg to labetalol the day before yesterday  - Continue aspirin and statin per neurology   - Immunological panel was sent by neurology.  - VDRL in CSF is negative  - Neurology recommended LINQ and a MARIO if CSF VDRL was negative  - Patient had LINQ placement on 05/26  - Patient did not have a MARIO due to previous history of, as well as current, dysphagia  - Patient was treated by Infectious Disease for trichomoniasis and syphilis     Disposition:  [facility] inpatient rehab once insurance authorization is received    Chaka Calhoun MD   05/31/2022

## 2022-05-31 NOTE — PT/OT/SLP PROGRESS
Don Biggs PTA   Physical Therapy Assistant   Specialty:  Physical Therapy   PT/OT/SLP Progress       Signed   Creation Time:  5/30/2022 11:39 AM                         []Tip copied text    []Gwendolyn for details    Physical Therapy                                          Treatment                                             Sammie Belcher  MRN: [id number]     PT Received On: 05/30/22  PT Start Time: 1013     PT Stop Time: 1043    PT Total Time (min): 30 min        Billable Minutes:  Gait [id number] and Therapeutic Activity 15  Total Minutes: 30     Treatment Type: Treatment  PT/PTA: PTA     PTA Visit Number: 5        General Precautions: Standard, fall  Orthopedic Precautions: Orthopedic Precautions : N/A   Braces:       Spiritual, Cultural Beliefs, Evangelical Practices, Values that Affect Care: no     Objective:  Patient found in bed upon entry.     Functional Mobility:     Gait Training:  Patient gait trained  84ft and 90  feet on level tile with Rolling Walker with Minimal Assistance. Pt presents with step through gait pattern. Pt presents with decreased HELADIO step length, decreased stance time on LLE and increased R knee flexion in stance..Impairments contributing to gait deviations include impaired balance, impaired coordination, impaired motor control, impaired postural control and decreased strength        Additional Treatment:     Balance/Strengthening              Pt performed forward step ups on 4inch box 2 x 10 reps with RLE and min A. HHA on L     Strengthening/Endurance              Pt performed sit-to-stand from bedside chair 2 x 5 reps. R foot placed further back than L for increased weight bearing.     Strengthening              RLE: guads sets 2 X 10 reps/ SAQ 2 X 10 reps RLE     Activity Tolerance:  Patient tolerated treatment well     Patient left up in chair with boyfriend present.     Assessment:  Charleneac Ott is a 70 y.o. female with a medical diagnosis of Failure to thrive in adult.       Rehab potential is excellent.     Activity tolerance: Excellent     Discharge recommendations: Discharge Facility/Level of Care Needs: rehabilitation facility      Equipment recommendations:        GOALS:       Multidisciplinary Problems           Physical Therapy Goals                 Problem: Physical Therapy      Goal Priority Disciplines Outcome Goal Variances Interventions   Physical Therapy Goal      PT, PT/OT Ongoing, Progressing       Description:   Supine to sit: Mod I  Sit to supine: Mod I  Sit to stand: SBA at RW  Stand-pivot transfer: SBA at RW  Ambulation: 50' at RW with Min A                         PLAN:    Patient to be seen BID  to address the above listed problems via gait training, therapeutic activities, therapeutic exercises  Plan of Care expires: 06/14/22  Plan of Care reviewed with: patient, spouse        5/30/2022

## 2022-06-01 PROCEDURE — 94761 N-INVAS EAR/PLS OXIMETRY MLT: CPT

## 2022-06-01 PROCEDURE — 11000001 HC ACUTE MED/SURG PRIVATE ROOM

## 2022-06-01 PROCEDURE — 97530 THERAPEUTIC ACTIVITIES: CPT | Mod: CQ

## 2022-06-01 PROCEDURE — 25000003 PHARM REV CODE 250: Performed by: INTERNAL MEDICINE

## 2022-06-01 PROCEDURE — 25000003 PHARM REV CODE 250: Performed by: NURSE PRACTITIONER

## 2022-06-01 PROCEDURE — 25000003 PHARM REV CODE 250: Performed by: STUDENT IN AN ORGANIZED HEALTH CARE EDUCATION/TRAINING PROGRAM

## 2022-06-01 PROCEDURE — 63600175 PHARM REV CODE 636 W HCPCS: Performed by: STUDENT IN AN ORGANIZED HEALTH CARE EDUCATION/TRAINING PROGRAM

## 2022-06-01 PROCEDURE — 97116 GAIT TRAINING THERAPY: CPT | Mod: CQ

## 2022-06-01 PROCEDURE — S4991 NICOTINE PATCH NONLEGEND: HCPCS | Performed by: NURSE PRACTITIONER

## 2022-06-01 PROCEDURE — 25000003 PHARM REV CODE 250: Performed by: PSYCHIATRY & NEUROLOGY

## 2022-06-01 PROCEDURE — 97535 SELF CARE MNGMENT TRAINING: CPT

## 2022-06-01 RX ADMIN — LABETALOL HYDROCHLORIDE 200 MG: 200 TABLET, FILM COATED ORAL at 10:06

## 2022-06-01 RX ADMIN — ATORVASTATIN CALCIUM 40 MG: 40 TABLET, FILM COATED ORAL at 10:06

## 2022-06-01 RX ADMIN — LABETALOL HYDROCHLORIDE 200 MG: 200 TABLET, FILM COATED ORAL at 09:06

## 2022-06-01 RX ADMIN — MULTIPLE VITAMINS W/ MINERALS TAB 1 TABLET: TAB at 10:06

## 2022-06-01 RX ADMIN — CLONIDINE HYDROCHLORIDE 0.2 MG: 0.2 TABLET ORAL at 05:06

## 2022-06-01 RX ADMIN — BUPROPION HYDROCHLORIDE 150 MG: 150 TABLET, FILM COATED, EXTENDED RELEASE ORAL at 10:06

## 2022-06-01 RX ADMIN — THIAMINE HCL TAB 100 MG 100 MG: 100 TAB at 09:06

## 2022-06-01 RX ADMIN — THIAMINE HCL TAB 100 MG 100 MG: 100 TAB at 05:06

## 2022-06-01 RX ADMIN — ENOXAPARIN SODIUM 40 MG: 40 INJECTION SUBCUTANEOUS at 05:06

## 2022-06-01 RX ADMIN — NICOTINE 1 PATCH: 21 PATCH, EXTENDED RELEASE TRANSDERMAL at 10:06

## 2022-06-01 RX ADMIN — ASPIRIN 325 MG: 325 TABLET, COATED ORAL at 10:06

## 2022-06-01 RX ADMIN — SERTRALINE 25 MG: 25 TABLET, FILM COATED ORAL at 05:06

## 2022-06-01 RX ADMIN — AMLODIPINE BESYLATE 10 MG: 5 TABLET ORAL at 10:06

## 2022-06-01 RX ADMIN — THIAMINE HCL TAB 100 MG 100 MG: 100 TAB at 10:06

## 2022-06-01 NOTE — PROGRESS NOTES
[facility]  Hospital Medicine Progress Note        Chief Complaint: Inpatient follow-up on stroke    HPI:   Ms. Belcher is a 52-year-old AA female with past medical history of HTN, anxiety, depression, and hypothyroidism who presents to [facility] ED with complaints of dysarthria and expressive aphasia x3 days.  Patient's family member states that she has been having difficulty speaking for the past 3 days however today was worse so she was brought to the ED for further evaluation. Her sister at bedside states that she is unsure if she has been compliant with her home medications.  She does smoke marijuana daily with her significant other.  And drinks about a six-pack of beer every day.  No history of stroke.  She is not on aspirin.  Sister at bedside reports that she has had abnormal behavior over the past 3 days and has been having episodes of bladder and bowel incontinence and is very restless/fidgety.  No reported history of falls or back pain or any saddle anesthesia.  Patient is oriented x 4 but is an extremely poor historian and it is very difficult to get a history from her.  She does report left-sided weakness and paresthesias x 2 days.     Upon arrival to outside facility the patient was markedly hypertensive but otherwise hemodynamically stable and afebrile. EKG: NSR with LVH. Labs unremarkable except for a reactive RPR, patient denies high risk sexual behavior, states unprotected sexual intercourse with significant other but she is in a monogamous relationships. CT Head showed left frontal 6 cm cortical base hypodensity suggesting an acute to subacute nonhemorrhagic infarct in the left KENISHA vascular territory. MRI Brain was then obtained confirming the left frontal lobe extending into the corpus callosum with an acute nonhemorrhagic infarct in the territory of the anterior cerebral artery, and old lacunar infarcts. She is being transferred to [facility] for further CVA workup.   Patient was found to have an acute non-hemorrhagic CVA, poorly controlled HTN, and syphilis as well as trichomoniasis.  The trichomoniasis has been treated with Flagyl and she is receiving her 3rd dose of penicillin for syphilis tomorrow.  Currently awaiting inpatient rehabilitation.      Interval Hx:   Patient is sitting up in a chair.   is on the couch.  No acute issues reported.  Patient is afebrile, on room air, and hemodynamically stable.  Patient continues to participate well with physical therapy and occupational therapy.    Objective/physical exam:  General: Thin  female in no acute distress  HENT: normocephalic, atraumatic  Eye: PERRL, EOMI, clear conjunctiva  Neck: full ROM, no thyromegaly, no JVD  Respiratory: clear to auscultation bilaterally  Cardiovascular: regular rate and rhythm  Gastrointestinal: non-distended, positive bowel sounds, non-tender  Genitourinary:  PureWick  Musculoskeletal: no gross deformity  Integumentary: warm, dry, intact, no rashes  Neurological:  Right hemiparesis, expressive aphasia and dysarthria with paresthesias to right face, RUE and RLE.   Psychiatric: cooperative, flat affect    VITAL SIGNS: 24 HRS MIN & MAX LAST   Temp  Min: 97.4 °F (36.3 °C)  Max: 98.2 °F (36.8 °C) 97.5 °F (36.4 °C)   BP  Min: 102/70  Max: 170/90 110/72   Pulse  Min: 65  Max: 84  65   Resp  Min: 18  Max: 18 18   SpO2  Min: 96 %  Max: 100 % 100 %       Recent Labs   Lab 05/26/22  0358   WBC 7.3   RBC 4.39   HGB 13.7   HCT 41.6   MCV 94.8*   MCH 31.2*   MCHC 32.9*   RDW 14.1      MPV 11.1       Recent Labs   Lab 05/26/22  0358      K 4.1   CO2 29   BUN 15.2   CREATININE 0.73   CALCIUM 9.2   ALBUMIN 3.2*   ALKPHOS 65   ALT 15   AST 23   BILITOT 0.4          Microbiology Results (last 7 days)     Procedure Component Value Units Date/Time    Cerebrospinal Fluid Culture [facility] [[id number]] Collected: 05/21/22 1132    Order Status: Completed Specimen: CSF (Spinal Fluid) Updated:  05/26/22 1045     CULTURE, CSF ([facility]) Final Report: At 5 days. No growth           See below for Radiology    Scheduled Med:   amLODIPine  10 mg Oral Daily    aspirin  325 mg Oral Daily    atorvastatin  40 mg Oral Daily    buPROPion  150 mg Oral Daily    enoxaparin  40 mg Subcutaneous Daily    labetaloL  200 mg Oral Q12H    multivitamin  1 tablet Oral Daily    nicotine  1 patch Transdermal Daily    sertraline  25 mg Oral Daily    thiamine  100 mg Oral TID        Continuous Infusions:  None.       PRN Meds:  cloNIDine, dextrose 10%, dextrose 10%, enalaprilat, hydrALAZINE, labetalol, LIDOcaine (PF) 20 mg/ml (2%), ondansetron, sodium chloride 0.9%, sodium chloride 0.9%       Assessment/Plan:  Acute Non-hemorrhagic CVA - left KENISHA vascular territory  Poorly controlled essential HTN  HLD  Syphilis status post Bicillin LA IM x 2 with 3rd dose slated for June 2nd  Trichomoniasis status post 7 days of Flagyl  Hypothyroidism  Depression  Tobacco and marijuana abuse  Long history of crack cocaine abuse        PLAN:  - Continue titrating medications for blood pressure control.  - Continue aspirin and statin per neurology   - Immunological panel was sent by neurology.  - VDRL in CSF is negative  - Neurology recommended LINQ and a MARIO if CSF VDRL was negative  - Patient had LINQ placement on 05/26  - Patient did not have a MARIO due to previous history of, as well as current, dysphagia  - Patient was treated by Infectious Disease for trichomoniasis and syphilis     Disposition:  [facility] inpatient rehab once insurance authorization is received    Chaka Calhoun MD   06/01/2022

## 2022-06-01 NOTE — PROGRESS NOTES
Infectious Diseases Progress Note  52-year-old female with past medical history of depression and anxiety, hypothyroidism, HTN, is admitted to [facility] on 05/18/2022, brought in through the ED with complaints of dysarthria and expressive aphasia of about 3 day duration.  She was extensively evaluated and noted to have no fevers but had slight leukocytosis of 12.2.  Urine toxicology was positive for cannabis.  Urinalysis was abnormal with small leukocyte history is few bacteria, few Trichomonas and 0-2 wbc's.  She was noted to have some syphilis antibody reactive and RPR 1:1.  She is nonverbal and unable to provide any significant history and hence history obtained from her  of 4 years.  He denies any knowledge of previous syphilis diagnosis and tells me he has tested himself since finding was told of his wife's test result and was told his test was negative.  She has had extensive neuroimaging studies with MRI of the brain showing left frontal acute CVA in the territory of anterior carotid artery.  MRA of brain showed moderate to severe multifocal flow signal abnormalities.  MRA of the neck showed no large vessel occlusion or critical stenosis.  CTA of neck done today 5/20 showed moderate narrowing of left CCA moderate to severe narrowing of left vertebral artery. Received Bicillin LA on 5/19 and 5/26      Subjective:  Lying in bed, no new complaints reported. Afebrile.     ROS  Constitutional: Positive for malaise/fatigue.   HENT: Negative.    Eyes: Negative.    Respiratory: Negative.    Cardiovascular: Negative.    Gastrointestinal: Negative.    Genitourinary: Negative.    Musculoskeletal: Negative.    Skin: Negative.    Neurological: Positive for focal weakness and weakness.     Review of patient's allergies indicates:  No Known Allergies    Past Medical History:   Diagnosis Date    Hypertension     Thyroid disease        Past Surgical History:   Procedure Laterality Date  "   INSERTION OF IMPLANTABLE LOOP RECORDER N/A 5/26/2022    Procedure: Insertion, Implantable Loop Recorder;  Surgeon: Arias Brown MD;  Location: [facility] CATH LAB;  Service: Cardiology;  Laterality: N/A;    THYROIDECTOMY      TUBAL LIGATION         Social History     Socioeconomic History    Marital status: Single   Tobacco Use    Smoking status: Current Every Day Smoker     Types: Cigarettes    Smokeless tobacco: Current User   Substance and Sexual Activity    Alcohol use: Yes     Alcohol/week: 3.0 standard drinks     Types: 3 Standard drinks or equivalent per week    Drug use: No         Scheduled Meds:   amLODIPine  10 mg Oral Daily    aspirin  325 mg Oral Daily    atorvastatin  40 mg Oral Daily    buPROPion  150 mg Oral Daily    enoxaparin  40 mg Subcutaneous Daily    labetaloL  200 mg Oral Q12H    multivitamin  1 tablet Oral Daily    nicotine  1 patch Transdermal Daily    sertraline  25 mg Oral Daily    thiamine  100 mg Oral TID     Continuous Infusions:  PRN Meds:cloNIDine, dextrose 10%, dextrose 10%, enalaprilat, hydrALAZINE, labetalol, LIDOcaine (PF) 20 mg/ml (2%), ondansetron, sodium chloride 0.9%, sodium chloride 0.9%    Objective:  BP (!) 170/90   Pulse 84   Temp 97.7 °F (36.5 °C) (Oral)   Resp 18   Ht 6' 0.84" (1.85 m)   Wt 65 kg (143 lb 4.8 oz) Comment: Bed wt 5/24: 68kg  LMP  (LMP Unknown)   SpO2 99%   Breastfeeding No   BMI 18.99 kg/m²     Physical Exam:   Physical Exam  Vitals reviewed.   HENT:      Head: Normocephalic.   Cardiovascular:      Rate and Rhythm: Normal rate and regular rhythm.   Pulmonary:      Effort: Pulmonary effort is normal. No respiratory distress.      Breath sounds: Normal breath sounds. No wheezing.   Abdominal:      General: Bowel sounds are normal. There is no distension.      Palpations: Abdomen is soft.      Tenderness: There is no abdominal tenderness.   Musculoskeletal:         General: Normal range of motion.      Cervical back: Normal range of " motion.   Skin:     Findings: No rash.   Neurological:      Mental Status: She is alert.      Comments: Awake and alert. Aphasic, nods to answer simple questions appropriately. Follows commands   Psychiatric:         Mood and Affect: Mood normal.         Behavior: Behavior normal    Imaging      Lab Review   No results found for this or any previous visit (from the past 24 hour(s)).    Assessment/Plan:  1. Syphilis  2. 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 Uncontrolled hypertension  4. Polysubstance abuse  5. Trichomoniasis     -Received Bicillin LA #2 on 5/26 with plan to receive the 3rd dose on 6/2  -No fever and no leukocytosis  -S/P LP on 5/21, bloody tap. Follow CSF VDRL. CSF culture negative thus far.   -Syphilis Ab (+) with RPR 1:1, CSF VDRL (-)  -Neurology on board, inputs noted  -S/P LINQ placement on 5/26  -Discussed with patient, significant other and nursing. Awaiting Rehab placement

## 2022-06-01 NOTE — PT/OT/SLP PROGRESS
Physical Therapy         Treatment        Sammie Belcher   MRN: [id number]     PT Received On: 06/01/22  PT Start Time: 1100     PT Stop Time: 1124    PT Total Time (min): 24 min       Billable Minutes:  Gait [id number] and Therapeutic Activity 14  Total Minutes: 24      Treatment Type: Treatment  PT/PTA: PTA     PTA Visit Number: 5       General Precautions: Standard, fall  Orthopedic Precautions: Orthopedic Precautions : N/A   Braces:      Spiritual, Cultural Beliefs, Jehovah's witness Practices, Values that Affect Care: no    Subjective:  Communicated with nurse prior to session.         Objective:  Patient found up in chair, with Patient found with: PureWick      Balance:   Static Stand: FAIR: Maintains without assist but unable to take challenges  Dynamic stand: POOR+: Needs MIN (minimal ) assist during gait    Transfer Training:  Sit to stand:Minimal Assistance with Rolling Walker x4 trials     Gait Training:  Patient gait trained   20  feet on level tile with Rolling Walker with Minimal Assistance.  Pt with demonstarting a  step to gait pattern with decreased rajiv, increased time in double stance and decreased step length.Impairments contributing to gait deviations include impaired balance, impaired coordination and decreased strength      Additional Treatment:  Pt performed standing R hip flexion x10 reps with B HHA and modA to perform lateral weight shifting.     Activity Tolerance:  Patient tolerated treatment well    Patient left up in chair with all lines intact and call button in reach.    Assessment:  Sammie Belcher is a 52 y.o. female with a medical diagnosis of CVA (cerebral vascular accident). She presents with increased ability to perform R step during gait.    Rehab potential is excellent.    Activity tolerance: Excellent    Discharge recommendations: Discharge Facility/Level of Care Needs: rehabilitation facility     Equipment recommendations: Equipment Needed After Discharge: walker, rolling      GOALS:   Multidisciplinary Problems     Physical Therapy Goals        Problem: Physical Therapy    Goal Priority Disciplines Outcome Goal Variances Interventions   Physical Therapy Goal     PT, PT/OT Ongoing, Progressing     Description: Goals to be met by: 2022     Patient will increase functional independence with mobility by performin. Supine to sit with Stand-by Assistance  2. Sit to supine with Stand-by Assistance  3. Sit to stand transfer with Stand-by Assistance  4. Gait  x 300 feet with Contact Guard Assistance using Rolling Walker.   5. RLE strength to improve to 4/5 MMT.  6. LLE strength to improve to 5/5 MMT.                     PLAN:    Patient to be seen BID  to address the above listed problems via gait training, therapeutic activities  Plan of Care expires: 22  Plan of Care reviewed with: patient, family         2022

## 2022-06-01 NOTE — PT/OT/SLP PROGRESS
Occupational Therapy  Treatment    Sammie Belcher   MRN: [id number]   Admitting Diagnosis: CVA (cerebral vascular accident)        OT Start Time: 0934  OT Stop Time: 0958  OT Total Time (min): 24 min     Billable Minutes:  Self Care/Home Management bed mobility min A to get to EOB; t/f bed > chair with min/mod A via stand pivot with RW- pt only able to progress RLE x1 step, eating activity with built up handle and min A for elbow flexion for hand to mouth, pt was able to scoop with little assist   Total Minutes: 24 min     OT/BETH: OT     BETH Visit Number: 4    General Precautions: Standard, fall  Orthopedic Precautions:    Braces:      Spiritual, Cultural Beliefs, Mandaen Practices, Values that Affect Care: no           Objective:         Additional Treatment:      Patient left up in chair with all lines intact, call button in reach, chair alarm on and  present    ASSESSMENT:  Sammie Belcher is a 52 y.o. female with a medical diagnosis of CVA (cerebral vascular accident).    Rehab potential is excellent    Activity tolerance: Excellent    Discharge recommendations: rehabilitation facility     Equipment recommendations: walker, rolling     GOALS:   Multidisciplinary Problems     Occupational Therapy Goals        Problem: Occupational Therapy    Goal Priority Disciplines Outcome Interventions   Occupational Therapy Goal     OT, PT/OT Ongoing, Progressing    Description: Goals to be met by: 6/9/22    Patient will increase functional independence with ADLs by performing:    UE Dressing with Modified [place].  LE Dressing with Modified [place].  Grooming while seated with Modified [place].  Toileting from toilet with Modified [place] for hygiene and clothing management.   Toilet transfer to toilet with Modified [place].                     Plan:  Patient to be seen 6 x/week to address the above listed problems via self-care/home management, therapeutic activities, therapeutic  exercises  Plan of Care expires: 06/23/22  Plan of Care reviewed with: patient, spouse         06/01/2022

## 2022-06-01 NOTE — PROGRESS NOTES
[facility] - 9th Floor Med Surg  Adult Nutrition  Progress Note    SUMMARY       Recommendations    - Continue current diet per MD/SLP   - Continue MVI + thiamine as feasible  - Added ONS Boost Plus to provide 360 kcals, 14 gm pro per serving.    Goals: Meet >75% of est energy needs by f/u  Nutrition Goal Status: progressing    Assessment and Plan  Nutrition Problem  Malnutrition    Related to (etiology):   Thyroid disease    Signs and Symptoms (as evidenced by):   <75% est energy needs > 1 month  Physical evidence of mild muscle/fat wasting    Interventions(treatment strategy):  Commercial food, Multivitamin / Mineral supplement therapy and Collaboration with other providers    Nutrition Diagnosis Status:   Continues      Malnutrition Assessment  Malnutrition Type: chronic illness  Energy Intake: moderate energy intake     Weight Loss (Malnutrition): 7.5% in 3 months  Energy Intake (Malnutrition): less than or equal to 75% for greater than or equal to 1 month  Subcutaneous Fat (Malnutrition): mild depletion  Muscle Mass (Malnutrition): mild depletion   Orbital Region (Subcutaneous Fat Loss): mild depletion  Upper Arm Region (Subcutaneous Fat Loss): mild depletion   [place]ustler Region (Muscle Loss): mild depletion  Clavicle Bone Region (Muscle Loss): mild depletion  Dorsal Hand (Muscle Loss): mild depletion             Reason for Assessment    Reason For Assessment: length of stay  Diagnosis:  (CVA\)  Relevant Medical History: Hypertension     Thyroid disease    General Information Comments:   5/24: Pt noted with CVA, family at bedside. Pt on easy to chew diet, appetite fair. Offered ONS, pt requesting strawberry. Per family, pt has had some appetite and wt loss before parathyroid gland removal ~ 2 months ago. States UBW ~ 160lb.  5/27: fair appetite, tolerating diet, drinking some of the supplements  6/1: pt working with therapy, tolerating diet, drinks about half of the boost  Nutrition Risk  "Screen    Nutrition Risk Screen: no indicators present    Nutrition/Diet History    Spiritual, Cultural Beliefs, Spiritism Practices, Values that Affect Care: no    Anthropometrics    Temp: 97.5 °F (36.4 °C)  Height Method: Stated  Height: 6' 0.84" (185 cm)  Height (inches): 72.83 in  Weight Method: Standard Scale  Weight: 65 kg (143 lb 4.8 oz) (Bed wt : 68kg)  Weight (lb): 143.3 lb  Ideal Body Weight (IBW), Female: 164.15 lb  % Ideal Body Weight, Female (lb): 87.3 %  BMI (Calculated): 19  BMI Grade: 18.5-24.9 - normal  Usual Body Weight (UBW), k.7 kg  % Usual Body Weight: 89.6  % Weight Change From Usual Weight: -10.59 %       Lab/Procedures/Meds    Pertinent Labs Comments: :Protein 5.5  Pertinent Medications Comments: Thiamine, MVI    Physical Findings/Assessment      Estimated/Assessed Needs    Weight Used For Calorie Calculations: 68 kg (149 lb 14.6 oz)  Energy Calorie Requirements (kcal): 1700 - 2040kcals (25-30kcal/kg)  Energy Need Method: Kcal/kg  Protein Requirements: 88gm (1.3gm/kg)  Weight Used For Protein Calculations: 68 kg (149 lb 14.6 oz)  Estimated Fluid Requirement Method: RDA Method  RDA Method (mL): 1700    Nutrition Prescription Ordered         Evaluation of Received Nutrient/Fluid Intake       % Intake of Estimated Energy Needs: 50 - 75 %  % Meal Intake: 50 - 75 %    Nutrition Risk    Level of Risk/Frequency of Follow-up: moderate     Monitor and Evaluation    Food and Nutrient Intake: food and beverage intake  Anthropometric Measurements: weight change     Nutrition Follow-Up    RD Follow-up?: Yes    "

## 2022-06-01 NOTE — PT/OT/SLP PROGRESS
Physical Therapy         Treatment        Sammie Belcher   MRN: [id number]     PT Received On: 22  PT Start Time: 1429     PT Stop Time: 1448    PT Total Time (min): 19 min       Billable Minutes:  Gait [id number]  Total Minutes: 19    Treatment Type: Treatment  PT/PTA: PTA     PTA Visit Number: 5       General Precautions: Standard, fall  Orthopedic Precautions: Orthopedic Precautions : N/A   Braces:      Spiritual, Cultural Beliefs, Buddhist Practices, Values that Affect Care: no    Subjective:  Communicated with nurse prior to session.         Objective:  Patient found up in chair, with Patient found with: [place]    Functional Mobility:    Balance:   Static Stand: FAIR: Maintains without assist but unable to take challenges  Dynamic stand: POOR: Needs MOD (moderate) assist during gait    Transfer Training:  Sit to stand:Minimal Assistance with Rolling Walker x2 trials    Gait Training:  Pt amb x80ft with modA and RW with emphasis and assistance for R lateral weight shift and R step length.      Activity Tolerance:  Patient tolerated treatment well    Patient left up in chair with all lines intact and call button in reach.    Assessment:  Sammie Belcher is a 52 y.o. female with a medical diagnosis of CVA (cerebral vascular accident). She presents with increased endurance.    Rehab potential is excellent.    Activity tolerance: Excellent    Discharge recommendations: Discharge Facility/Level of Care Needs: rehabilitation facility     Equipment recommendations: Equipment Needed After Discharge: walker, rolling     GOALS:   Multidisciplinary Problems     Physical Therapy Goals        Problem: Physical Therapy    Goal Priority Disciplines Outcome Goal Variances Interventions   Physical Therapy Goal     PT, PT/OT Ongoing, Progressing     Description: Goals to be met by: 2022     Patient will increase functional independence with mobility by performin. Supine to sit with Stand-by Assistance  2.  Sit to supine with Stand-by Assistance  3. Sit to stand transfer with Stand-by Assistance  4. Gait  x 300 feet with Contact Guard Assistance using Rolling Walker.   5. RLE strength to improve to 4/5 MMT.  6. LLE strength to improve to 5/5 MMT.                     PLAN:    Patient to be seen BID  to address the above listed problems via gait training, therapeutic activities  Plan of Care expires: 06/09/22  Plan of Care reviewed with: patient, family         6/1/2022

## 2022-06-02 ENCOUNTER — HOSPITAL ENCOUNTER (INPATIENT)
Facility: HOSPITAL | Age: 52
LOS: 21 days | Discharge: HOME HEALTH | DRG: 057 | End: 2022-06-23
Attending: INTERNAL MEDICINE | Admitting: INTERNAL MEDICINE
Payer: MEDICAID

## 2022-06-02 VITALS
BODY MASS INDEX: 19.41 KG/M2 | HEART RATE: 76 BPM | WEIGHT: 143.31 LBS | HEIGHT: 72 IN | TEMPERATURE: 99 F | OXYGEN SATURATION: 100 % | SYSTOLIC BLOOD PRESSURE: 163 MMHG | RESPIRATION RATE: 18 BRPM | DIASTOLIC BLOOD PRESSURE: 93 MMHG

## 2022-06-02 DIAGNOSIS — I63.9 CVA (CEREBRAL VASCULAR ACCIDENT): ICD-10-CM

## 2022-06-02 LAB — SARS-COV-2 RDRP RESP QL NAA+PROBE: NEGATIVE

## 2022-06-02 PROCEDURE — 25000003 PHARM REV CODE 250: Performed by: PSYCHIATRY & NEUROLOGY

## 2022-06-02 PROCEDURE — 97164 PT RE-EVAL EST PLAN CARE: CPT

## 2022-06-02 PROCEDURE — 94761 N-INVAS EAR/PLS OXIMETRY MLT: CPT

## 2022-06-02 PROCEDURE — 25000003 PHARM REV CODE 250: Performed by: NURSE PRACTITIONER

## 2022-06-02 PROCEDURE — 11800000 HC REHAB PRIVATE ROOM

## 2022-06-02 PROCEDURE — 94799 UNLISTED PULMONARY SVC/PX: CPT

## 2022-06-02 PROCEDURE — 25000003 PHARM REV CODE 250: Performed by: STUDENT IN AN ORGANIZED HEALTH CARE EDUCATION/TRAINING PROGRAM

## 2022-06-02 PROCEDURE — 63600175 PHARM REV CODE 636 W HCPCS: Performed by: NURSE PRACTITIONER

## 2022-06-02 PROCEDURE — 97535 SELF CARE MNGMENT TRAINING: CPT

## 2022-06-02 PROCEDURE — S4991 NICOTINE PATCH NONLEGEND: HCPCS | Performed by: NURSE PRACTITIONER

## 2022-06-02 PROCEDURE — 25000003 PHARM REV CODE 250: Performed by: INTERNAL MEDICINE

## 2022-06-02 PROCEDURE — 63600175 PHARM REV CODE 636 W HCPCS: Mod: JG | Performed by: INTERNAL MEDICINE

## 2022-06-02 PROCEDURE — 99223 1ST HOSP IP/OBS HIGH 75: CPT | Mod: ,,, | Performed by: PHYSICAL MEDICINE & REHABILITATION

## 2022-06-02 PROCEDURE — 87635 SARS-COV-2 COVID-19 AMP PRB: CPT | Performed by: INTERNAL MEDICINE

## 2022-06-02 PROCEDURE — 99223 PR INITIAL HOSPITAL CARE,LEVL III: ICD-10-PCS | Mod: ,,, | Performed by: PHYSICAL MEDICINE & REHABILITATION

## 2022-06-02 RX ORDER — DOCUSATE SODIUM 100 MG/1
100 CAPSULE, LIQUID FILLED ORAL 2 TIMES DAILY
Status: DISCONTINUED | OUTPATIENT
Start: 2022-06-02 | End: 2022-06-03

## 2022-06-02 RX ORDER — BENZONATATE 100 MG/1
100 CAPSULE ORAL 3 TIMES DAILY PRN
Status: DISCONTINUED | OUTPATIENT
Start: 2022-06-02 | End: 2022-06-23 | Stop reason: HOSPADM

## 2022-06-02 RX ORDER — ASPIRIN 325 MG
325 TABLET, DELAYED RELEASE (ENTERIC COATED) ORAL DAILY
Qty: 30 TABLET | Refills: 11 | Status: SHIPPED | OUTPATIENT
Start: 2022-06-03 | End: 2023-06-03

## 2022-06-02 RX ORDER — POLYETHYLENE GLYCOL 3350 17 G/17G
17 POWDER, FOR SOLUTION ORAL EVERY 12 HOURS PRN
Status: DISCONTINUED | OUTPATIENT
Start: 2022-06-02 | End: 2022-06-23 | Stop reason: HOSPADM

## 2022-06-02 RX ORDER — ENOXAPARIN SODIUM 100 MG/ML
40 INJECTION SUBCUTANEOUS EVERY 24 HOURS
Status: DISCONTINUED | OUTPATIENT
Start: 2022-06-02 | End: 2022-06-23 | Stop reason: HOSPADM

## 2022-06-02 RX ORDER — METOPROLOL TARTRATE 1 MG/ML
10 INJECTION, SOLUTION INTRAVENOUS
Status: DISCONTINUED | OUTPATIENT
Start: 2022-06-02 | End: 2022-06-23 | Stop reason: HOSPADM

## 2022-06-02 RX ORDER — LABETALOL 200 MG/1
200 TABLET, FILM COATED ORAL 3 TIMES DAILY
Status: DISCONTINUED | OUTPATIENT
Start: 2022-06-02 | End: 2022-06-02 | Stop reason: HOSPADM

## 2022-06-02 RX ORDER — PROMETHAZINE HYDROCHLORIDE 25 MG/1
25 TABLET ORAL EVERY 6 HOURS PRN
Status: DISCONTINUED | OUTPATIENT
Start: 2022-06-02 | End: 2022-06-23 | Stop reason: HOSPADM

## 2022-06-02 RX ORDER — ACETAMINOPHEN 325 MG/1
650 TABLET ORAL EVERY 4 HOURS PRN
Status: DISCONTINUED | OUTPATIENT
Start: 2022-06-02 | End: 2022-06-23 | Stop reason: HOSPADM

## 2022-06-02 RX ORDER — BUPROPION HYDROCHLORIDE 150 MG/1
150 TABLET ORAL DAILY
Qty: 30 TABLET | Refills: 11 | Status: ON HOLD | OUTPATIENT
Start: 2022-06-02 | End: 2022-06-23 | Stop reason: HOSPADM

## 2022-06-02 RX ORDER — HYDRALAZINE HYDROCHLORIDE 20 MG/ML
10 INJECTION INTRAMUSCULAR; INTRAVENOUS EVERY 4 HOURS PRN
Status: DISCONTINUED | OUTPATIENT
Start: 2022-06-02 | End: 2022-06-15

## 2022-06-02 RX ORDER — AMLODIPINE BESYLATE 5 MG/1
10 TABLET ORAL DAILY
Status: DISCONTINUED | OUTPATIENT
Start: 2022-06-03 | End: 2022-06-06

## 2022-06-02 RX ORDER — SERTRALINE HYDROCHLORIDE 25 MG/1
25 TABLET, FILM COATED ORAL DAILY
Qty: 30 TABLET | Refills: 11 | Status: ON HOLD | OUTPATIENT
Start: 2022-06-02 | End: 2022-06-23 | Stop reason: HOSPADM

## 2022-06-02 RX ORDER — IBUPROFEN 200 MG
1 TABLET ORAL DAILY
Status: DISCONTINUED | OUTPATIENT
Start: 2022-06-03 | End: 2022-06-23 | Stop reason: HOSPADM

## 2022-06-02 RX ORDER — LANOLIN ALCOHOL/MO/W.PET/CERES
100 CREAM (GRAM) TOPICAL 3 TIMES DAILY
Qty: 90 TABLET | Refills: 0 | Status: ON HOLD | OUTPATIENT
Start: 2022-06-02 | End: 2022-06-23 | Stop reason: HOSPADM

## 2022-06-02 RX ORDER — AMLODIPINE BESYLATE 10 MG/1
10 TABLET ORAL DAILY
Qty: 30 TABLET | Refills: 11 | Status: SHIPPED | OUTPATIENT
Start: 2022-06-03 | End: 2023-06-03

## 2022-06-02 RX ORDER — ASPIRIN 325 MG
325 TABLET, DELAYED RELEASE (ENTERIC COATED) ORAL DAILY
Status: DISCONTINUED | OUTPATIENT
Start: 2022-06-03 | End: 2022-06-23 | Stop reason: HOSPADM

## 2022-06-02 RX ORDER — BUPROPION HYDROCHLORIDE 150 MG/1
150 TABLET ORAL DAILY
Status: DISCONTINUED | OUTPATIENT
Start: 2022-06-03 | End: 2022-06-10

## 2022-06-02 RX ORDER — LABETALOL 200 MG/1
200 TABLET, FILM COATED ORAL 3 TIMES DAILY
Status: DISCONTINUED | OUTPATIENT
Start: 2022-06-02 | End: 2022-06-23 | Stop reason: HOSPADM

## 2022-06-02 RX ORDER — ONDANSETRON 4 MG/1
4 TABLET, ORALLY DISINTEGRATING ORAL EVERY 6 HOURS PRN
Status: DISCONTINUED | OUTPATIENT
Start: 2022-06-02 | End: 2022-06-23 | Stop reason: HOSPADM

## 2022-06-02 RX ORDER — ONDANSETRON 4 MG/1
8 TABLET, ORALLY DISINTEGRATING ORAL EVERY 6 HOURS PRN
Status: DISCONTINUED | OUTPATIENT
Start: 2022-06-02 | End: 2022-06-23 | Stop reason: HOSPADM

## 2022-06-02 RX ORDER — THIAMINE HCL 100 MG
100 TABLET ORAL 3 TIMES DAILY
Status: DISCONTINUED | OUTPATIENT
Start: 2022-06-02 | End: 2022-06-22

## 2022-06-02 RX ORDER — ALPRAZOLAM 0.25 MG/1
0.25 TABLET ORAL 3 TIMES DAILY PRN
Status: DISCONTINUED | OUTPATIENT
Start: 2022-06-02 | End: 2022-06-23 | Stop reason: HOSPADM

## 2022-06-02 RX ORDER — LABETALOL HCL 20 MG/4 ML
10 SYRINGE (ML) INTRAVENOUS EVERY 4 HOURS PRN
Status: DISCONTINUED | OUTPATIENT
Start: 2022-06-02 | End: 2022-06-15

## 2022-06-02 RX ORDER — ACETAMINOPHEN 325 MG/1
650 TABLET ORAL ONCE
Status: COMPLETED | OUTPATIENT
Start: 2022-06-02 | End: 2022-06-02

## 2022-06-02 RX ORDER — ALENDRONATE SODIUM 70 MG/1
70 TABLET ORAL
Status: DISCONTINUED | OUTPATIENT
Start: 2022-06-03 | End: 2022-06-23 | Stop reason: HOSPADM

## 2022-06-02 RX ORDER — NITROGLYCERIN 0.4 MG/1
0.4 TABLET SUBLINGUAL EVERY 5 MIN PRN
Status: DISCONTINUED | OUTPATIENT
Start: 2022-06-02 | End: 2022-06-23 | Stop reason: HOSPADM

## 2022-06-02 RX ORDER — ATORVASTATIN CALCIUM 40 MG/1
40 TABLET, FILM COATED ORAL DAILY
Qty: 90 TABLET | Refills: 3 | Status: SHIPPED | OUTPATIENT
Start: 2022-06-03 | End: 2023-06-03

## 2022-06-02 RX ORDER — HYDROCODONE BITARTRATE AND ACETAMINOPHEN 5; 325 MG/1; MG/1
1 TABLET ORAL EVERY 4 HOURS PRN
Status: DISCONTINUED | OUTPATIENT
Start: 2022-06-02 | End: 2022-06-23 | Stop reason: HOSPADM

## 2022-06-02 RX ORDER — HYDROXYZINE PAMOATE 50 MG/1
50 CAPSULE ORAL NIGHTLY PRN
Status: DISCONTINUED | OUTPATIENT
Start: 2022-06-02 | End: 2022-06-23 | Stop reason: HOSPADM

## 2022-06-02 RX ORDER — SERTRALINE HYDROCHLORIDE 25 MG/1
25 TABLET, FILM COATED ORAL DAILY
Status: DISCONTINUED | OUTPATIENT
Start: 2022-06-03 | End: 2022-06-22

## 2022-06-02 RX ORDER — BISACODYL 10 MG
10 SUPPOSITORY, RECTAL RECTAL DAILY PRN
Status: DISCONTINUED | OUTPATIENT
Start: 2022-06-02 | End: 2022-06-23 | Stop reason: HOSPADM

## 2022-06-02 RX ORDER — LABETALOL 200 MG/1
200 TABLET, FILM COATED ORAL 3 TIMES DAILY
Qty: 90 TABLET | Refills: 11 | Status: SHIPPED | OUTPATIENT
Start: 2022-06-02 | End: 2023-06-02

## 2022-06-02 RX ORDER — ATORVASTATIN CALCIUM 40 MG/1
40 TABLET, FILM COATED ORAL DAILY
Status: DISCONTINUED | OUTPATIENT
Start: 2022-06-03 | End: 2022-06-23 | Stop reason: HOSPADM

## 2022-06-02 RX ADMIN — PENICILLIN G BENZATHINE 2.4 MILLION UNITS: 1200000 INJECTION, SUSPENSION INTRAMUSCULAR at 11:06

## 2022-06-02 RX ADMIN — THIAMINE HCL TAB 100 MG 100 MG: 100 TAB at 10:06

## 2022-06-02 RX ADMIN — LABETALOL HYDROCHLORIDE 200 MG: 200 TABLET, FILM COATED ORAL at 05:06

## 2022-06-02 RX ADMIN — ASPIRIN 325 MG: 325 TABLET, COATED ORAL at 10:06

## 2022-06-02 RX ADMIN — THIAMINE HCL TAB 100 MG 100 MG: 100 TAB at 09:06

## 2022-06-02 RX ADMIN — ENOXAPARIN SODIUM 40 MG: 40 INJECTION SUBCUTANEOUS at 05:06

## 2022-06-02 RX ADMIN — AMLODIPINE BESYLATE 10 MG: 5 TABLET ORAL at 10:06

## 2022-06-02 RX ADMIN — SERTRALINE 25 MG: 25 TABLET, FILM COATED ORAL at 10:06

## 2022-06-02 RX ADMIN — DOCUSATE SODIUM 100 MG: 100 CAPSULE, LIQUID FILLED ORAL at 09:06

## 2022-06-02 RX ADMIN — MULTIPLE VITAMINS W/ MINERALS TAB 1 TABLET: TAB at 10:06

## 2022-06-02 RX ADMIN — BUPROPION HYDROCHLORIDE 150 MG: 150 TABLET, FILM COATED, EXTENDED RELEASE ORAL at 10:06

## 2022-06-02 RX ADMIN — ATORVASTATIN CALCIUM 40 MG: 40 TABLET, FILM COATED ORAL at 10:06

## 2022-06-02 RX ADMIN — THIAMINE HCL TAB 100 MG 100 MG: 100 TAB at 05:06

## 2022-06-02 RX ADMIN — LABETALOL HYDROCHLORIDE 200 MG: 200 TABLET, FILM COATED ORAL at 09:06

## 2022-06-02 RX ADMIN — NICOTINE 1 PATCH: 21 PATCH, EXTENDED RELEASE TRANSDERMAL at 10:06

## 2022-06-02 RX ADMIN — ACETAMINOPHEN 650 MG: 325 TABLET ORAL at 11:06

## 2022-06-02 NOTE — PT/OT/SLP RE-EVAL
Physical Therapy Re-evaluation    Patient Name:  Sammie Belcher   MRN:  [id number]    Recommendations:     Discharge Recommendations:  rehabilitation facility   Discharge Equipment Recommendations: walker, rolling   Barriers to discharge: Increased need of assistance    Assessment:     Sammie Belcher is a 52 y.o. female admitted with a medical diagnosis of CVA (cerebral vascular accident).  She presents with the following impairments/functional limitations:  weakness, impaired endurance, impaired functional mobilty, gait instability, impaired balance, decreased safety awareness, pain. Patient requiring MIN A for mobility. C/o right hip & knee pain today. Antalgic gait pattern.  Unable to obtain terminal knee extension while ambulating. She will need rehab placement.     Rehab Prognosis:  Fair/good; patient would benefit from acute skilled PT services to address these deficits and reach maximum level of function.      Recent Surgery: Procedure(s) (LRB):  Insertion, Implantable Loop Recorder (N/A) 7 Days Post-Op    Plan:     During this hospitalization, patient to be seen daily to address the above listed problems via gait training, therapeutic activities, therapeutic exercises, neuromuscular re-education  · Plan of Care Expires:  06/09/22   Plan of Care Reviewed with: patient, family    Subjective     Communicated with nurse prior to session.  Patient found HOB elevated with PureWick, telemetry upon PT entry to room, agreeable to evaluation.      Chief Complaint: pain  Patient comments/goals:   Pain/Comfort:  · Pain Rating 1:  (Unable to rate)  · Location - Side 1: Right  · Location 1:  (knee and hip)  · Pain Addressed 1: Distraction, Reposition, Cessation of Activity    Patients cultural, spiritual, Buddhism conflicts given the current situation: no      Objective:     Patient found with: PureWick, telemetry     General Precautions: Standard, fall   Orthopedic Precautions:N/A   Braces: N/A  Respiratory  Status: Room air    Exams:  · Cognitive Exam:  Patient is oriented to Person, Place, Time and Situation  · Sensation:    · -       Intact  · RLE ROM: WFL  · RLE Strength: 2/5 grossly  · LLE ROM: WFL  · LLE Strength: WFL    Functional Mobility:  · Bed Mobility:     · Supine to Sit: minimum assistance  · Transfers:     · Sit to Stand:  minimum assistance with rolling walker  · Gait: 25 ft with RW & MIN A. Antaglic gait. Unable to obtain terminal knee extension  · Balance: poor    AM-PAC 6 CLICK MOBILITY  Total Score:17       Patient left up in chair with all lines intact, call button in reach and family present. Educated on calling for assistance when ready to return to bed.     GOALS:   Multidisciplinary Problems     Physical Therapy Goals        Problem: Physical Therapy    Goal Priority Disciplines Outcome Goal Variances Interventions   Physical Therapy Goal     PT, PT/OT Ongoing, Progressing     Description: Goals to be met by: 2022     Patient will increase functional independence with mobility by performin. Supine to sit with Stand-by Assistance  2. Sit to supine with Stand-by Assistance  3. Sit to stand transfer with Stand-by Assistance  4. Gait  x 300 feet with Contact Guard Assistance using Rolling Walker.   5. RLE strength to improve to 4/5 MMT.  6. LLE strength to improve to 5/5 MMT.                     History:     Past Medical History:   Diagnosis Date    Hypertension     Thyroid disease        Past Surgical History:   Procedure Laterality Date    INSERTION OF IMPLANTABLE LOOP RECORDER N/A 2022    Procedure: Insertion, Implantable Loop Recorder;  Surgeon: Arias Brown MD;  Location: [facility] CATH LAB;  Service: Cardiology;  Laterality: N/A;    THYROIDECTOMY      TUBAL LIGATION         Time Tracking:     PT Received On: 22  PT Start Time: 942     PT Stop Time: 955  PT Total Time (min): 13 min     Billable Minutes: Re-eval 13 minutes      2022

## 2022-06-02 NOTE — DISCHARGE SUMMARY
[facility] Hospital Medicine Discharge Summary    Admit Date: 5/18/2022  Discharge Date and Time: 6/2/20221:53 PM  Admitting Physician: katia ayala  Discharging Physician: Cheri Simmons MD.  Primary Care Physician: Primary Doctor No  Consults: neurology, infectious disease     Discharge Diagnoses:  Acute Non-hemorrhagic CVA - left KENISHA vascular territory  Poorly controlled essential HTN  HLD  Syphilis status post Bicillin LA IM x 3 , completed treatment on 6/2/22  Trichomoniasis status post 7 days of Flagyl  Hypothyroidism  Depression  Tobacco and marijuana abuse  Long history of crack cocaine abuse     Hospital Course:   Ms. Belcher is a 52-year-old AA female with past medical history of HTN, anxiety, depression, and hypothyroidism who presents to [facility] ED with complaints of dysarthria and expressive aphasia x3 days.  Patient's family member states that she has been having difficulty speaking for the past 3 days however today was worse so she was brought to the ED for further evaluation. Her sister at bedside states that she is unsure if she has been compliant with her home medications.  She does smoke marijuana daily with her significant other.  And drinks about a six-pack of beer every day.  No history of stroke.  She is not on aspirin.  Sister at bedside reports that she has had abnormal behavior over the past 3 days and has been having episodes of bladder and bowel incontinence and is very restless/fidgety.  No reported history of falls or back pain or any saddle anesthesia.  Patient is oriented x 4 but is an extremely poor historian and it is very difficult to get a history from her.  She does report left-sided weakness and paresthesias x 2 days.     Upon arrival to outside facility the patient was markedly hypertensive but otherwise hemodynamically stable and afebrile. EKG: NSR with LVH. Labs unremarkable except for a reactive RPR, patient denies high risk sexual  behavior, states unprotected sexual intercourse with significant other but she is in a monogamous relationships. CT Head showed left frontal 6 cm cortical base hypodensity suggesting an acute to subacute nonhemorrhagic infarct in the left KENISHA vascular territory. MRI Brain was then obtained confirming the left frontal lobe extending into the corpus callosum with an acute nonhemorrhagic infarct in the territory of the anterior cerebral artery, and old lacunar infarcts. She is being transferred to [facility] for further CVA workup.  Patient was found to have an acute non-hemorrhagic CVA, poorly controlled HTN, and syphilis as well as trichomoniasis.  The trichomoniasis has been treated with Flagyl and she is receiving her 3rd dose of penicillin for syphilis tomorrow.  Currently awaiting inpatient rehabilitation.        PLAN:  - Continue titrating medications for blood pressure control. Labetalol increased to 200 tid  - Continue aspirin and statin per neurology   - VDRL in CSF is negative  - Patient had LINQ placement on 05/26  - Patient was treated by Infectious Disease for trichomoniasis and syphilis- completed treatment    - continues to participate well with physical therapy and occupational therapy.     Pt was seen and examined on the day of discharge      Vitals:  VITAL SIGNS: 24 HRS MIN & MAX LAST   Temp  Min: 97.5 °F (36.4 °C)  Max: 98.9 °F (37.2 °C) 98.9 °F (37.2 °C)   BP  Min: 110/72  Max: 163/93 (!) 163/93   Pulse  Min: 65  Max: 83  76   Resp  Min: 18  Max: 20 18   SpO2  Min: 98 %  Max: 100 % 100 %       Physical Exam:  General: Thin  female in no acute distress  HENT: normocephalic, atraumatic  Eye: PERRL, EOMI, clear conjunctiva  Neck: full ROM, no thyromegaly, no JVD  Respiratory: clear to auscultation bilaterally  Cardiovascular: regular rate and rhythm  Gastrointestinal: non-distended, positive bowel sounds, non-tender  Genitourinary:  PureWick  Musculoskeletal: no gross  deformity  Integumentary: warm, dry, intact, no rashes  Neurological:  Right hemiparesis, expressive aphasia and dysarthria with paresthesias to right face, RUE and RLE.   Psychiatric: cooperative, flat affect    Procedures Performed: No admission procedures for hospital encounter.     Significant Diagnostic Studies: See Full reports for all details    No results for input(s): WBC, RBC, HGB, HCT, MCV, MCH, MCHC, RDW, PLT, MPV, GRAN, LYMPH, MONO, BASO, NRBC in the last 168 hours.    No results for input(s): NA, K, CL, CO2, ANIONGAP, BUN, CREATININE, GLU, CALCIUM, PH, MG, ALBUMIN, PROT, ALKPHOS, ALT, AST, BILITOT in the last 168 hours.     Microbiology Results (last 7 days)     ** No results found for the last 168 hours. **           CV Ultrasound Bilateral Doppler Carotid  The right internal carotid artery demonstrated less than 50% stenosis.  The left internal carotid artery demonstrated less than 50% stenosis.  The bilateral vertebral arteries were patent with antegrade flow.         Medication List      START taking these medications    aspirin 325 MG EC tablet  Commonly known as: ECOTRIN  Take 1 tablet (325 mg total) by mouth once daily.  Start taking on: Lashay 3, 2022     atorvastatin 40 MG tablet  Commonly known as: LIPITOR  Take 1 tablet (40 mg total) by mouth once daily.  Start taking on: Lashay 3, 2022     labetaloL 200 MG tablet  Commonly known as: NORMODYNE  Take 1 tablet (200 mg total) by mouth 3 (three) times daily.     multivitamin Tab  Take 1 tablet by mouth once daily.  Start taking on: Lashay 3, 2022     thiamine 100 MG tablet  Take 1 tablet (100 mg total) by mouth 3 (three) times daily.        CHANGE how you take these medications    amLODIPine 10 MG tablet  Commonly known as: NORVASC  Take 1 tablet (10 mg total) by mouth once daily.  Start taking on: Lashay 3, 2022  What changed:   · medication strength  · how much to take        CONTINUE taking these medications    alendronate 70 MG tablet  Commonly  known as: FOSAMAX     buPROPion 150 MG TB24 tablet  Commonly known as: WELLBUTRIN XL  Take 1 tablet (150 mg total) by mouth once daily.     cholecalciferol (vitamin D3) 25 mcg (1,000 unit) capsule  Commonly known as: VITAMIN D3  Take 1 capsule (1,000 Units total) by mouth once daily.     sertraline 25 MG tablet  Commonly known as: ZOLOFT  Take 1 tablet (25 mg total) by mouth once daily.        STOP taking these medications    carvediloL 12.5 MG tablet  Commonly known as: COREG     ergocalciferol 50,000 unit Cap  Commonly known as: ERGOCALCIFEROL     lisinopriL 10 MG tablet     losartan 50 MG tablet  Commonly known as: COZAAR     NIFEdipine 60 MG (OSM) 24 hr tablet  Commonly known as: PROCARDIA-XL           Where to Get Your Medications      These medications were sent to [id number] - [place] [address] [facility] [facility]  [address] [address]    Phone: [phone]   · amLODIPine 10 MG tablet  · aspirin 325 MG EC tablet  · atorvastatin 40 MG tablet  · labetaloL 200 MG tablet  · multivitamin Tab  · thiamine 100 MG tablet     You can get these medications from any pharmacy    Bring a paper prescription for each of these medications  · buPROPion 150 MG TB24 tablet  · sertraline 25 MG tablet          Explained in detail to the patient about the discharge plan, medications, and follow-up visits. Pt understands and agrees with the treatment plan  Discharge Disposition: Home or Self Care   Discharged Condition: stable  Diet-   Dietary Orders (From admission, onward)     Start     Ordered    05/24/22 1800  Dietary nutrition supplements Boost Plus Strawberry  2 times daily      Question:  Select PO Supplement:  Answer:  Boost Plus Strawberry    05/24/22 1039    05/21/22 1318  Diet Easy to Chew  Diet effective now         05/21/22 1317               Medications Per DC med rec  Activities as tolerated   Follow-up Information     [facility]  Cardiology Clinic. Schedule an appointment as soon as possible for a visit in 1 week(s).           Primary Doctor No Follow up.           [facility] Follow up in 2 week(s).    Contact information:  [address] [facility]  [place] [id number] [phone]             SIRISHA Stone Follow up in 1 month(s).    Specialty: Pediatrics  Contact information:  [address] [place] [address] Dr Nikhil MUSA [id number] [phone]             Marcelina Nguyen MD Follow up in 2 week(s).    Specialty: Infectious Diseases  Contact information:  [address] [place] [address]  [phone]                       For further questions contact hospitalist office    Discharge time 33 minutes    For worsening symptoms, chest pain, shortness of breath, increased abdominal pain, high grade fever, stroke or stroke like symptoms, immediately go to the nearest Emergency Room or call 911 as soon as possible.      Cheri Cook M.D on 6/2/2022. at 1:53 PM.

## 2022-06-02 NOTE — PLAN OF CARE
Problem: Infection  Goal: Absence of Infection Signs and Symptoms  Outcome: Ongoing, Progressing  Intervention: Prevent or Manage Infection  Flowsheets (Taken 6/2/2022 1216)  Isolation Precautions: precautions maintained     Problem: Cognitive Impairment (Stroke, Ischemic/Transient Ischemic Attack)  Goal: Optimal Cognitive Function  Outcome: Ongoing, Progressing  Intervention: Optimize Cognitive Function  Flowsheets (Taken 6/2/2022 1216)  Environment Familiarity/Consistency: daily routine followed  Reorientation Measures: familiar social contact encouraged     Problem: Communication Impairment (Stroke, Ischemic/Transient Ischemic Attack)  Goal: Improved Communication Skills  Outcome: Ongoing, Progressing  Intervention: Optimize Communication Skills  Flowsheets (Taken 6/2/2022 1216)  Communication Enhancement Strategies:   call light answered in person   family/caregiver assisted with communication   extra time allowed for response     Problem: Functional Ability Impaired (Stroke, Ischemic/Transient Ischemic Attack)  Goal: Optimal Functional Ability  Outcome: Ongoing, Progressing  Intervention: Optimize Functional Ability  Flowsheets (Taken 6/2/2022 1216)  Self-Care Promotion:   independence encouraged   BADL personal objects within reach   BADL personal routines maintained   meal set-up provided  Activity Management: Up in chair - L3     Problem: Sensorimotor Impairment (Stroke, Ischemic/Transient Ischemic Attack)  Goal: Improved Sensorimotor Function  Outcome: Ongoing, Progressing  Intervention: Optimize Range of Motion, Motor Control and Function  Flowsheets (Taken 6/2/2022 1216)  Spasticity Management: weight-bearing facilitated  Range of Motion: active ROM (range of motion) encouraged  Intervention: Optimize Sensory and Perceptual Ability  Flowsheets (Taken 6/2/2022 1216)  Pressure Reduction Techniques: frequent weight shift encouraged  Sensation Impairment Protection: cues provided for safety     Problem:  Adult Inpatient Plan of Care  Goal: Patient-Specific Goal (Individualized)  Outcome: Ongoing, Progressing  Goal: Optimal Comfort and Wellbeing  Outcome: Ongoing, Progressing  Intervention: Monitor Pain and Promote Comfort  Flowsheets (Taken 6/2/2022 1216)  Pain Management Interventions:   awakened for pain meds per patient request   pain management plan reviewed with patient/caregiver  Intervention: Provide Person-Centered Care  Flowsheets (Taken 6/2/2022 1216)  Trust Relationship/Rapport:   care explained   choices provided   emotional support provided   empathic listening provided   questions encouraged  Goal: Readiness for Transition of Care  Outcome: Ongoing, Progressing

## 2022-06-02 NOTE — PT/OT/SLP PROGRESS
Occupational Therapy  Treatment    Sammie Belcher   MRN: [id number]   Admitting Diagnosis: CVA (cerebral vascular accident)        OT Start Time: 1000  OT Stop Time: 1015  OT Total Time (min): 15 min     Billable Minutes:  Self Care/Home Management : setup for oral care task as well as min A for hand to mouth exursion of RUE for toothbrush; no mobility 2/2 increased pain; nrsg in room to provide pain meds    TA: pt completed shoulder flex/elbow/wrist/digit flex/ext x10 with slight assist for shoulder/elbow flex   Total Minutes: 15min      OT/BETH: OT     BETH Visit Number: 5    General Precautions: Standard, fall  Orthopedic Precautions:    Braces:      Spiritual, Cultural Beliefs, Bahai Practices, Values that Affect Care: no    Subjective:Pain/Comfort  Location - Side 1: Right  Location 1: knee  Pain Addressed 1: Nurse notified, Cessation of Activity  Pain Rating Post-Intervention 1: 5/10    Objective:       Functional Mobility:   Unable to mobilize today 2/2 increased R knee       Additional Treatment:      Patient left up in chair with all lines intact, call button in reach and chair alarm on    ASSESSMENT:  Sammie Belcher is a 52 y.o. female with a medical diagnosis of CVA (cerebral vascular accident).    Rehab potential is excellent    Activity tolerance: Excellent    Discharge recommendations: rehabilitation facility     Equipment recommendations: walker, rolling     GOALS:   Multidisciplinary Problems     Occupational Therapy Goals        Problem: Occupational Therapy    Goal Priority Disciplines Outcome Interventions   Occupational Therapy Goal     OT, PT/OT Ongoing, Progressing    Description: Goals to be met by: 6/9/22    Patient will increase functional independence with ADLs by performing:    UE Dressing with Modified [place].  LE Dressing with Modified [place].  Grooming while seated with Modified [place].  Toileting from toilet with Modified [place] for hygiene and clothing  management.   Toilet transfer to toilet with Modified [place].                     Plan:  Patient to be seen 5 x/week, 6 x/week to address the above listed problems via self-care/home management, therapeutic activities, therapeutic exercises  Plan of Care expires: 06/23/22  Plan of Care reviewed with: patient         06/02/2022

## 2022-06-02 NOTE — CONSULTS
[facility] - Rehab Inpatient Services  Inpatient Rehab Prescreen    PATIENT INFORMATION     Assessment date/time:     Name: Sammie Belcher Phone: [phone]   Address:   [address] [place] [address] SSN:    YOB: 1970 Age: 52 y.o. Gender: female   Race: Black or    Marital Status: Single   Advance Directives: Full code     COVERAGE INFORMATION     Patient Medicare #:      Primary Insurance Type:  /  Secondary Insurance Type:  /    Policy #:   Policy #:     Insurance contact name/number:  Insurance contact name/number:    Authorization #:  Authorization #:    Verified Coverage: Insurance Carrier   Pending Coverage:    Prescription Coverage:  Insurance details/comments:      PHYSICIAN/REFERRAL INFORMATION     Primary Care Physician: Primary Doctor No Attending Physician: José Miguel Cesar MD   Consulting physician/specialist:  Referring Physician:    Referring facility:  Referring contact name/phone:    Physician details/comments:      CONTACT INFORMATION   Extended Emergency Contact Information  Primary Emergency Contact: Maria Luz Poe  Mobile Phone: [phone]  Relation: Relative   needed? No  Secondary Emergency Contact: Nika Alston  Mobile Phone: [phone]  Relation: Relative  Preferred language: English   needed? No    PRIOR LIVING SITUATION         Bedroom location/setup: 2nd story    Bathroom location/setup: 2nd story    Equipment at home: n/a   Prior device use: n/a      PRIOR LEVEL OF FUNCTION     Did a helper need to assist with the following activities prior to the current illness, exacerbation, or injury?no    Self-care: independent    Indoor mobility: independent    Stairs:independent    Functional Cognition:[id number]   Comments:    Caregivers providing assistance: n/a    Pre-hospital home care service:  Name of Agency:    Home/personal responsibilities: independent with all adls and ambulation    Pre-hospital  vocational category: Not working   Pre-hospital vocational effort: Not working   Occupation/profession:  Return to work/school plan:    Educational history:    Hobbies/leisure activities:  Resources used prior to admission:    Available resources:  Resource information comments:      REHABILITATION DIAGNOSIS     History of present illness: 52-year-old AA female with past medical history of HTN, anxiety, depression, and hypothyroidism who presents to [facility] ED with complaints of dysarthria and expressive aphasia x3 days.  Patient's family member states that she has been having difficulty speaking for the past 3 days however today was worse so she was brought to the ED for further evaluation. Her sister at bedside states that she is unsure if she has been compliant with her home medications.  She does smoke marijuana daily with her significant other.  And drinks about a six-pack of beer every day.  No history of stroke.  She is not on aspirin.  Sister at bedside reports that she has had abnormal behavior over the past 3 days and has been having episodes of bladder and bowel incontinence and is very restless/fidgety.  No reported history of falls or back pain or any saddle anesthesia.  Patient is oriented x 4 but is an extremely poor historian and it is very difficult to get a history from her.  She does report left-sided weakness and paresthesias x 2 days.     Upon arrival to outside facility the patient was markedly hypertensive but otherwise hemodynamically stable and afebrile. EKG: NSR with LVH. Labs unremarkable except for a reactive RPR, patient denies high risk sexual behavior, states unprotected sexual intercourse with significant other but she is in a monogamous relationships. CT Head showed left frontal 6 cm cortical base hypodensity suggesting an acute to subacute nonhemorrhagic infarct in the left KENISHA vascular territory. MRI Brain was then obtained confirming the left frontal lobe extending into the corpus  callosum with an acute nonhemorrhagic infarct in the territory of the anterior cerebral artery, and old lacunar infarcts. She is being transferred to [facility] for further CVA workup.  Patient was found to have an acute non-hemorrhagic CVA, poorly controlled HTN, and syphilis as well as trichomoniasis.  The trichomoniasis has been treated with Flagyl and she is receiving her 3rd dose of penicillin for syphilis tomorrow.  Currently awaiting inpatient rehabilitation.        Interval Hx:   Patient is sitting up in a chair.   is on the couch.  No acute issues reported.  Patient is afebrile, on room air, and hemodynamically stable.  Patient continues to participate well with physical therapy and occupational therapy.      Patient requires acute inpatient rehab admission with 24-hour nursing and active physician oversight to monitor and manage acute medical comorbid conditions, labs, pain, and functional deficits. Patient/family will also require teaching to maintain surgical precautions, and integration of improving functional skills into daily living. She will also require individualized, interdisciplinary approach to her care, receiving PT, OT, and ST services 3 hours of therapy, at least 5 days per week. Required care cannot be provided at lower level of care. Patient is anticipated to require approximately 10-12-day LOS with expected DC home with spouse with HH or OP therapy services.    Impairment group (IGC):   Right Body Involvement (Left Brain) 01.2 Etiologic diagnosis/description:    Date of onset:  5/18/2022 Date of surgery:    Allergies: Patient has no known allergies.   Comorbid condition: Hypertension   Medical/functional conditions requiring inpatient rehabilitation: This patient requires medical management/24-hour nursing of complex comorbidities ( htn), labs, medications (see medications list), pain, sleep, hygiene, anticoagulation, nutrition, hydration, neurological, pulmonary, and cardiac status, and  preventive healthcare.    This patient requires intense therapy and an integrated, interdisciplinary approach to address safety, impaired mobility, impaired ADLs (dressing, toileting, grooming, showering), impaired cognition, judgment, and memory, communication, pulmonary insufficiency, bowel/bladder problems, preventive healthcare, medication management, integration of improving functional skills into daily living, and home caregiver support and training.         Risk for medical/clinical complications: This patient is at risk for the following complications: DVT/PE, pneumonia, malnutrition, neurological decline, respiratory insufficiency, worsening activity intolerance, complications from anticoagulation, skin breakdown, inadequate sleep, and constipation.     SPECIAL REHABILITATION NEEDS     IV: left hand piv  Preferred Language: english        Peripheral IV - Single Lumen 05/19/22 0200 20 G Left;Distal;Lateral Forearm (Active)   Site Assessment Clean;Dry;Intact;No redness;No swelling 06/02/22 0400   Extremity Assessment Distal to IV No abnormal discoloration 06/01/22 2000   Line Status Saline locked 06/01/22 2000   Dressing Status Clean;Dry;Intact 06/01/22 2000   Dressing Intervention Integrity maintained 06/01/22 2000          PRECAUTIONS     Safety/fall precautions: Decreased balance     PAST MEDICAL, SOCIAL, FAMILY HISTORY     Pertinent past medical history:   Past Medical History:   Diagnosis Date    Hypertension     Thyroid disease       Has the patient had two or more falls in the past year or any fall with injury in the past year: no    Has the patient had major surgery during the 100 days prior to admission: no    Family Medical History: No family history on file.   Substance use history:   Social History     Substance and Sexual Activity   Alcohol Use Yes    Alcohol/week: 3.0 standard drinks    Types: 3 Standard drinks or equivalent per week     Social History     Tobacco Use   Smoking Status Current  "Every Day Smoker    Types: Cigarettes   Smokeless Tobacco Current User     Social History     Substance and Sexual Activity   Drug Use No      VITALS   BP:  (!) 162/97     Temp: 98.3 °F (36.8 °C)     HR: 78     Resp: 18     SpO2: 99 %     Height: 6' 0.84" (1.85 m)     Weight: 65 kg (143 lb 4.8 oz) (Bed wt 5/24: 68kg)     BMI: 19          MED/LABS/DIAGNOSITICS   No current facility-administered medications for this encounter.     Current Outpatient Medications   Medication Sig Dispense Refill    [START ON 6/3/2022] amLODIPine (NORVASC) 10 MG tablet Take 1 tablet (10 mg total) by mouth once daily. 30 tablet 11    [START ON 6/3/2022] aspirin (ECOTRIN) 325 MG EC tablet Take 1 tablet (325 mg total) by mouth once daily. 30 tablet 11    [START ON 6/3/2022] atorvastatin (LIPITOR) 40 MG tablet Take 1 tablet (40 mg total) by mouth once daily. 90 tablet 3    buPROPion (WELLBUTRIN XL) 150 MG TB24 tablet Take 1 tablet (150 mg total) by mouth once daily. 30 tablet 11    labetaloL (NORMODYNE) 200 MG tablet Take 1 tablet (200 mg total) by mouth 3 (three) times daily. 90 tablet 11    [START ON 6/3/2022] multivitamin Tab Take 1 tablet by mouth once daily. 30 tablet 11    sertraline (ZOLOFT) 25 MG tablet Take 1 tablet (25 mg total) by mouth once daily. 30 tablet 11    thiamine 100 MG tablet Take 1 tablet (100 mg total) by mouth 3 (three) times daily. 90 tablet 0     Facility-Administered Medications Ordered in Other Encounters   Medication Dose Route Frequency Provider Last Rate Last Admin    acetaminophen tablet 650 mg  650 mg Oral Once Cheri Simmons MD        amLODIPine tablet 10 mg  10 mg Oral Daily Chilo Lea MD   10 mg at 06/02/22 1004    aspirin EC tablet 325 mg  325 mg Oral Daily Stephanie Pitt MD   325 mg at 06/02/22 1005    atorvastatin tablet 40 mg  40 mg Oral Daily SIRISHA Live   40 mg at 06/02/22 1004    buPROPion TB24 tablet 150 mg  150 mg Oral Daily SIRISHA Live   150 mg at 06/02/22 1005    cloNIDine " tablet 0.2 mg  0.2 mg Oral TID PRN GARY LiveP   0.2 mg at 06/01/22 0535    dextrose 10% bolus 125 mL  12.5 g Intravenous PRN Chilo Lea MD        dextrose 10% bolus 250 mL  25 g Intravenous PRN Chilo Lea MD        enalaprilat injection 1.25 mg  1.25 mg Intravenous Q6H PRN SIRISHA Live        enoxaparin injection 40 mg  40 mg Subcutaneous Daily Chilo Lea MD   40 mg at 06/01/22 1711    hydrALAZINE injection 10 mg  10 mg Intravenous Q4H PRN Brayan Trotter MD   10 mg at 05/21/22 0000    labetaloL injection 10 mg  10 mg Intravenous Q4H PRN SIRISHA Live        labetaloL tablet 200 mg  200 mg Oral TID Cheri Simmons MD        LIDOcaine (PF) 20 mg/ml (2%) injection    PRN Arias Brown MD   15 mL at 05/26/22 1531    multivitamin tablet  1 tablet Oral Daily Brayan Trotter MD   1 tablet at 06/02/22 1005    nicotine 21 mg/24 hr 1 patch  1 patch Transdermal Daily SIRISHA Live   1 patch at 06/02/22 1004    ondansetron injection 4 mg  4 mg Intravenous QID PRN SIRISHA Live   4 mg at 05/30/22 1958    penicillin G benzathine (BICILLIN LA) injection 2.4 Million Units  2.4 Million Units Intramuscular Once Cheri Simmons MD        sertraline tablet 25 mg  25 mg Oral Daily SIRISHA Live   25 mg at 06/02/22 1004    sodium chloride 0.9% flush 10 mL  10 mL Intravenous PRN SIRISHA Live        sodium chloride 0.9% flush 10 mL  10 mL Intravenous PRN SIRISHA Live        thiamine tablet 100 mg  100 mg Oral TID Stephanie Pitt MD   100 mg at 06/02/22 1005      Pertinent lab results:   Lab Results   Component Value Date    WBC 7.3 05/26/2022    HGB 13.7 05/26/2022    HCT 41.6 05/26/2022     05/26/2022     05/26/2022    K 4.1 05/26/2022    BUN 15.2 05/26/2022    CO2 29 05/26/2022     08/15/2018      Pertinent diagnostic studies:    Additional labs and diagnostic studies required prior to admission:      QI: GG Care Tool     QI: GG Care Tool  Therapy Evaluation    Swallowing/  Nutritional Status   Diet/Feeding/  Swallowing Min assist    Eating       Oral Hygiene   Grooming    Toileting Hygiene       Shower/Bathe   Bathing    Upper Body Dressing   Dressing Upper    Lower Body Dressing   Dressing Lower    Putting On/Taking Off Footwear       Toilet Transfer   Toileting    Bladder Continence Status   Bladder     Bowel Continence Status   Bowel     Roll Left and Right   Bed Mobility Min assist    Sit to Lying       Lying to Sitting on Side of Bed       Sit to Stand       Chair/Bed-to- Chair   Transfers   Min assist    Car Transfer       Walk 10 Feet   Equipment      Walk 50 Feet with Two Turns   Balance    Walk 150 Feet   Endurance    Walk 10 Feet on Uneven Surfaces   Gait 80 ft with rw    Picking up an Object       Wheel 50 Feet with Two Turns   Wheelchair    Wheel 150 Feet       1 Step (Curb)   Stairs    4 Steps       12 Steps       Expression of Ideas and Wants   Communication    Understanding  Verbal and Non-Verbal Content       Cognitive Patterns   Cognition       Safety Precautions       Lower Extremity      Strength       ROM       Upper Extremity      Strength       ROM      Care Score Value Definitions  6: Independent. Florence provides no assistance with tasks. A device may or may not have been used.  5: Set-up or clean-up assistance. Florence sets up or cleans up, but does not assist with tasks. Florence may have assisted prior to or following the activity.  4: Supervision or touching assistance. Florence provides verbal cues or touching/steadying or contact guard assistance. Assistance may be provided throughout the activity or intermittently.  3: Partial/moderate assistance. Florence does less than half the effort. Florence lifts, holds, or supports trunk or limbs, but provides less than half the effort.  2: Substantial/maximal assistance. Florence does more than half the effort. Florence lifts or holds trunk or limbs, and provides more than half the effort.  1: Dependent. Florence  does all of the effort, or the assistance of two or more helpers is required for the patient to complete the activity.  Care Score Activity Not Attempted Value Definitions  7: Patient refused.  9: Not applicable. Not attempted and the patient did not perform this activity prior to the current illness, exacerbation, or injury.  10: Not attempted due to environmental limitations (e.g., lack of equipment, weather constraints).  88: Not attempted due to medical condition or safety concerns.    DISCHARGE GOALS/ANTICPATED INTERVENTIONS/SERVICES     Expected Level of Improvement for Safe Discharge: modified independent    Patient/Family/Caregiver Goals: d/c home at supervision    Required Treatments/Services: Rehabilitation Nursing, Respiratory Therapy, Social  and Case Management   Required Therapy Therapy Type Min/Day Days/Week Duration of Therapy   Physical Therapy 60 5 10 days   Occupational Therapy 60 5 10 days   Speech and Language Pathology 60 5 10 days    Prosthetic/Orthotics      Recreational Therapy      Anticipated Services upon Discharge: home health pt/ot/st    Additional rehabilitation needs:  n/a    Expected Discharge Destination: Home with Fiance   Barriers to Discharge: None   Discharge Support: Patient has a caregiver available, Discharge plan has been verified with patient's caregiver and Caregiver is in agreement with the discharge plan   Patient/Family/Caregiver Orientation: Patient/family/caregiver oriented and agreeable to inpatient rehabilitation plan   Estimated Length of Stay: 10- 12 days    Projected Admission Date: 6/2/2022    Medical Prognosis: good   Physicians Review and Admission Determination:   I have discussed patient with Nurse Liaison. I have reviewed the prescreen. Patient is in need of, appropriate for and should tolerate and benefit from an aggressive IRF stay.

## 2022-06-02 NOTE — H&P
[facility] ([facility])  Rehab Admission H&P    Patient Name: Sammie Belcher  MRN: [id number]  Age: 52 y.o. Sex: female  : 1970  Hospital Length of Stay: 0 days  Date of Service:  6/3/2022  Chief Complaint: Left frontal lobe 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    Subjective:   History of Present Illness   52-year-old  female presented to [facility] ED on 2022 complaining of dysarthria and expressive aphasia x3 days.  PMH significant hyperparathyroidism s/p parathyroidectomy 2022, anxiety/depression, and HTN.  Workup significant for reactive RPR and CT head significant for left frontal 6 cm cortical base hypodensity suggesting acute to subacute nonhemorrhagic infarct of the left KENISHA vascular territory.  MRA brain significant for left frontal lobe infarct extending into the corpus callosum with acute nonhemorrhagic infarct in the territory of the KENISHA, and old lacunar infarcts. Transferred to [facility] for neurology services.  Bicillin initiated on  for syphilis with plan for 3 doses with end date on .  Also received Flagyl x 7 days due to Trichomoniasis.  TTE significant for EF 74% with severe concentric hypertrophy and hyperdynamic systolic function with negative bubble study.  ASA and statin initiated.  LP completed on  due to suspicion of vasculitic CVA  neuro syphilis.  LP not revealing with no evidence of CNS infection.  Tolerating LINQ placement on .  CVA likely caused by long history cocaine abuse.  Continue to participate in progress therapy.  Tolerated transferred to [facility] inpatient rehab unit on  without incident.  Sitting comfortably in bed.  Reports good sleep.  Appetite fair.  Last BM .  Vital signs at goal.  CBC unremarkable.  CMP unremarkable.  Recent imaging reported below.    Past Medical History: Left frontal lobe infarct, History of alcohol dependence, HTN, Nicotine dependence,  Anxiety/depression, Hyperparathyroidism  Procedure history:  Parathyroidectomy 02/23/2022  Family History: Unable to obtain  Social History:   (+) TOB 1PPD  (+) ETOH 6 beers daily  (+) Illicit drug use-history of cocaine and marijuana abuse  Completed 11th grade.  Currently unemployed.  Took care of her mother after she had surgery and then took care of her grandchildren so their mother could work.  Currently engaged to Gamaliel.  Gamaliel is retired.  He drives, does most of the cooking, cleaning, laundry, grocery shopping, and managing finances.  He can physically assist.  Prior level of function:  Independent ADLs, cooks, does chores, does laundry, manages her own medications  Residence:  Lives with her fiance Gamaliel in [place], [place] in an apartment.  Her bedroom and bathroom are on the 2nd floor with approximately 12 steps to get to the 2nd floor.  There is a wall with railing on the left side going up.  She uses tub/shower combination with a grab bar.  She also has a hand-held shower, but it is not working properly.  DME: None  Anticipated discharge destination:  Home with home health    Review of patient's allergies indicates:  No Known Allergies   No current facility-administered medications for this encounter.    Current Outpatient Medications:     alendronate (FOSAMAX) 70 MG tablet, Take 70 mg by mouth every 7 days., Disp: , Rfl:     [START ON 6/3/2022] amLODIPine (NORVASC) 10 MG tablet, Take 1 tablet (10 mg total) by mouth once daily., Disp: 30 tablet, Rfl: 11    [START ON 6/3/2022] aspirin (ECOTRIN) 325 MG EC tablet, Take 1 tablet (325 mg total) by mouth once daily., Disp: 30 tablet, Rfl: 11    [START ON 6/3/2022] atorvastatin (LIPITOR) 40 MG tablet, Take 1 tablet (40 mg total) by mouth once daily., Disp: 90 tablet, Rfl: 3    buPROPion (WELLBUTRIN XL) 150 MG TB24 tablet, Take 1 tablet (150 mg total) by mouth once daily., Disp: 30 tablet, Rfl: 11    cholecalciferol, vitamin D3, 1,000 unit capsule, Take 1  capsule (1,000 Units total) by mouth once daily., Disp: , Rfl: 0    labetaloL (NORMODYNE) 200 MG tablet, Take 1 tablet (200 mg total) by mouth 3 (three) times daily., Disp: 90 tablet, Rfl: 11    [START ON 6/3/2022] multivitamin Tab, Take 1 tablet by mouth once daily., Disp: 30 tablet, Rfl: 11    sertraline (ZOLOFT) 25 MG tablet, Take 1 tablet (25 mg total) by mouth once daily., Disp: 30 tablet, Rfl: 11    thiamine 100 MG tablet, Take 1 tablet (100 mg total) by mouth 3 (three) times daily., Disp: 90 tablet, Rfl: 0    Facility-Administered Medications Ordered in Other Encounters:     amLODIPine tablet 10 mg, 10 mg, Oral, Daily, Chilo Lea MD, 10 mg at 06/02/22 1004    aspirin EC tablet 325 mg, 325 mg, Oral, Daily, Stephanie Pitt MD, 325 mg at 06/02/22 1005    atorvastatin tablet 40 mg, 40 mg, Oral, Daily, SIRISHA Live, 40 mg at 06/02/22 1004    buPROPion TB24 tablet 150 mg, 150 mg, Oral, Daily, SIRISHA Live, 150 mg at 06/02/22 1005    cloNIDine tablet 0.2 mg, 0.2 mg, Oral, TID PRN, SIRISHA Live, 0.2 mg at 06/01/22 0535    dextrose 10% bolus 125 mL, 12.5 g, Intravenous, PRN, Chilo Lea MD    dextrose 10% bolus 250 mL, 25 g, Intravenous, PRN, Chilo Lea MD    enalaprilat injection 1.25 mg, 1.25 mg, Intravenous, Q6H PRN, SIRISHA Live    enoxaparin injection 40 mg, 40 mg, Subcutaneous, Daily, Chilo Lea MD, 40 mg at 06/01/22 1711    hydrALAZINE injection 10 mg, 10 mg, Intravenous, Q4H PRN, Brayan Trotter MD, 10 mg at 05/21/22 0000    labetaloL injection 10 mg, 10 mg, Intravenous, Q4H PRN, Veena D Sheldon, FNP    labetaloL tablet 200 mg, 200 mg, Oral, TID, Cheri Simmons MD    LIDOcaine (PF) 20 mg/ml (2%) injection, , , PRN, Arias Brown MD, 15 mL at 05/26/22 1531    multivitamin tablet, 1 tablet, Oral, Daily, Brayan Trotter MD, 1 tablet at 06/02/22 1005    nicotine 21 mg/24 hr 1 patch, 1 patch, Transdermal, Daily, SIRISHA Live, 1 patch at 06/02/22 1004     ondansetron injection 4 mg, 4 mg, Intravenous, QID PRN, SIRISHA Live, 4 mg at 05/30/22 1958    sertraline tablet 25 mg, 25 mg, Oral, Daily, SIRISHA Live, 25 mg at 06/02/22 1004    sodium chloride 0.9% flush 10 mL, 10 mL, Intravenous, PRN, Veena Chung, FNP    sodium chloride 0.9% flush 10 mL, 10 mL, Intravenous, PRN, Veena Chung, FNP    thiamine tablet 100 mg, 100 mg, Oral, TID, Stephanie Pitt MD, 100 mg at 06/02/22 1005     Review of Systems   Complete 12-point review of symptoms negative except for what's mentioned in HPI     Objective:     LMP  (LMP Unknown)     No intake or output data in the 24 hours ending 06/02/22 7114    Physical Exam  Constitutional:       Appearance: Normal appearance.   HENT:      Head: Normocephalic.      Mouth/Throat:      Mouth: Mucous membranes are moist.   Eyes:      Pupils: Pupils are equal, round, and reactive to light.   Cardiovascular:      Rate and Rhythm: Normal rate and regular rhythm.      Heart sounds: Normal heart sounds.   Pulmonary:      Effort: Pulmonary effort is normal.      Breath sounds: Normal breath sounds.   Abdominal:      General: Bowel sounds are normal.      Palpations: Abdomen is soft.   Musculoskeletal:      Cervical back: Neck supple.      Comments: Slight weakness to right side.  Weakness to left lower extremity.   Skin:     General: Skin is warm and dry.   Neurological:      General: No focal deficit present.      Mental Status: She is alert.      Comments: Dysarthria/expressive and receptive aphasia   Psychiatric:         Mood and Affect: Mood normal.     *MD performed and documented physical examination       Labs:  Admission on 06/02/2022   Component Date Value Ref Range Status    Sodium Level 06/03/2022 143  136 - 145 mmol/L Final    Potassium Level 06/03/2022 4.3  3.5 - 5.1 mmol/L Final    Chloride 06/03/2022 105  98 - 107 mmol/L Final    Carbon Dioxide 06/03/2022 28  22 - 29 mmol/L Final    Glucose Level 06/03/2022 88  74 -  100 mg/dL Final    Blood Urea Nitrogen 06/03/2022 16.2  9.8 - 20.1 mg/dL Final    Creatinine 06/03/2022 0.78  0.55 - 1.02 mg/dL Final    Calcium Level Total 06/03/2022 9.7  8.4 - 10.2 mg/dL Final    Protein Total 06/03/2022 6.6  6.4 - 8.3 gm/dL Final    Albumin Level 06/03/2022 3.3 (A) 3.5 - 5.0 gm/dL Final    Globulin 06/03/2022 3.3  2.4 - 3.5 gm/dL Final    Albumin/Globulin Ratio 06/03/2022 1.0 (A) 1.1 - 2.0 ratio Final    Bilirubin Total 06/03/2022 0.4  <=1.5 mg/dL Final    Alkaline Phosphatase 06/03/2022 71  40 - 150 unit/L Final    Alanine Aminotransferase 06/03/2022 38  0 - 55 unit/L Final    Aspartate Aminotransferase 06/03/2022 34  5 - 34 unit/L Final    Estimated GFR- 06/03/2022 >60  mls/min/1.73/m2 Final    Magnesium Level 06/03/2022 2.00  1.60 - 2.60 mg/dL Final    Phosphorus Level 06/03/2022 3.8  2.3 - 4.7 mg/dL Final    Iron Binding Capacity Unsaturated 06/03/2022 129  70 - 310 ug/dL Final    Iron Level 06/03/2022 95  50 - 170 ug/dL Final    Transferrin 06/03/2022 185  180 - 382 mg/dL Final    Iron Binding Capacity Total 06/03/2022 224 (A) 250 - 450 ug/dL Final    Iron Saturation 06/03/2022 42  20 - 50 % Final    WBC 06/03/2022 8.6  4.5 - 11.5 x10(3)/mcL Final    RBC 06/03/2022 4.29  4.20 - 5.40 x10(6)/mcL Final    Hgb 06/03/2022 13.2  12.0 - 16.0 gm/dL Final    Hct 06/03/2022 40.5  37.0 - 47.0 % Final    MCV 06/03/2022 94.4 (A) 80.0 - 94.0 fL Final    MCH 06/03/2022 30.8  27.0 - 31.0 pg Final    MCHC 06/03/2022 32.6 (A) 33.0 - 36.0 mg/dL Final    RDW 06/03/2022 14.0  11.5 - 17.0 % Final    Platelet 06/03/2022 209  130 - 400 x10(3)/mcL Final    MPV 06/03/2022 10.2  9.4 - 12.4 fL Final    Neut % 06/03/2022 50.9  % Final    Lymph % 06/03/2022 38.5  % Final    Mono % 06/03/2022 6.3  % Final    Eos % 06/03/2022 3.6  % Final    Basophil % 06/03/2022 0.5  % Final    Lymph # 06/03/2022 3.31  0.6 - 4.6 x10(3)/mcL Final    Neut # 06/03/2022 4.4  2.1 - 9.2  x10(3)/mcL Final    Mono # 06/03/2022 0.54  0.1 - 1.3 x10(3)/mcL Final    Eos # 06/03/2022 0.31  0 - 0.9 x10(3)/mcL Final    Baso # 06/03/2022 0.04  0 - 0.2 x10(3)/mcL Final    IG# 06/03/2022 0.02 (A) 0 - 0.0155 x10(3)/mcL Final    IG% 06/03/2022 0.2  0 - 0.43 % Final    NRBC% 06/03/2022 0.0  % Final     Radiology:  MRI brain without contrast 05/19/2002 at 5:38 p.m., IMPRESSION: Motion degraded exam. In spite of this limitation: Moderate to severe multifocal flow signal abnormality about the bilateral ACAs, bilateral distal MCAs and to lesser extent bilateral PCAs.  Radiology  Transthoracic echo 05/22/2022 at 7:50 a.m., IMPRESSION:  LV is normal in size with hyperdynamic systolic function.  Estimated EF 75%.  There is grade 1 diastolic dysfunction consistent with impaired relaxation.  Left atrial pressure is normal.  RV normal size and function.  RA normal size.  AV structurally normal.  No regurgitation.  No stenosis.  There is nonspecific leaflet thickening 2 mitral valve.  There is trace mitral valve regurgitation.  There is no stenosis.  Tricuspid valve with trace regurgitation.  No valve stenosis.  PA P could not be obtained.  There is no prior care effusion.  There is no evidence 10 benign.  Negative bubble study.  Assessment/Plan:     52 y.o. AAF admitted on 6/2/2022    Left frontal lobe infarct   - with dysarthria/expressive and receptive aphasia and right-sided weakness  - continue   Aspirin 325 mg daily   Lipitor 40 mg daily   Multivitamin daily  - Consult physiatry for rehab and pain management  - PT/OT/RT/ST to eval and treat     POSITIVE RPR  - s/p Bicillin x 3  - LP on 5/22 negative for CNS infection    History of alcohol dependence  - stable  - no withdrawals  - continue   Thiamine 100 mg t.i.d.    HTN  - at goal!!  - continue   Norvasc 10 mg daily   Labetalol 200 mg t.i.d.   Hydralazine 10 mg every 2 hours as needed for BP > 160/90   Labetalol 10 mg every 2 hours as needed for BP >  160/90    Nicotine dependence  - stable  - continue   Nicotine patch 21 mg daily    Anxiety/depression  - in remission  - continue    Wellbutrin 150 mg XR daily    Zoloft 25 mg daily    Hyperparathyroidism  - stable  - s/p parathyroidectomy 02/23/2022  - continue    Vitamin-D 50122 units Q weekly    Fosamax 70 mg Q weekly    MEDICAL PLAN:  - Admit to [facility]  - Medical reconciliation completed and included in the electronic health record  - Draw admit labs including CBC, CMP, and Prealbumin  - Maintain weightbearing status as ordered  - Monitor postoperative surgical incision changing dressing daily  - Teach skin protection and wound prevention techniques  - Initiate fall precaution  - Initiate bowel training program  - Initiate bladder training as indicated  - Pain management  - IS q2 hours while awake    THERAPEUTIC PLAN:  - Physical therapy 60-90 minutes 5 to week to increase functional mobility, maintain weightbearing precautions, increase lower extremity restrengthening, increase overall activity tolerance, teach balance and safety measures as well as fall precautions, make equipment and orthotic recommendations, be involved in family training and discharge planning, monitor pain levels and vital signs during therapy adjusting treatment accordingly  - Occupational therapy 60-90 minutes 5 times a week to increase functional independence with activities of daily living, maintain weightbearing precautions, teach use of adaptive equipment, increase upper extremity restrengthening, increase overall activity tolerance, teach balance and safety measures as well as fall precautions, make equipment and orthotic recommendations, be involved in family training and discharge planning, monitor pain levels and vital signs during therapy adjusting treatment accordingly  - Speech and language pathology will do an in-depth evaluation of patient's cognition, phonation, and  swallowing. They will initiate therapy 30- 60 minutes 5 times a week as indicated  - Recreational therapy will incorporate all patient's functional abilities  into recreational activities that will require visual, spatial, and perceptual abilities; gross and fine motor coordination skills; the cognition to follow multistep commands; dynamic and static balance and reaching abilities. They will also incorporate animal assisted therapy into their weekly program  - Rehabilitation nursing will act as patient family physician liaison. They will integrate patient's functional abilities, after discussions with therapist, into everyday activities with the CNA's,  LPN's and RNs that will include but are not limited to dressing, bathing, feeding, grooming, toileting, transfers, hygiene etc. They will administer medications watching for wanted as well as unwanted effects. They will initiate DVT/VTE prophylaxis as ordered. Will monitor vital signs, lab values, and pain levels, making appropriate physician notification. They will monitor skin integrity including postop incision, making daily dressing changes. They will teach skin protection and wound prevention techniques. They will initiate bowel training They will initiate bladder training as indicated. They will initiate fall precautions  - Rehabilitation counseling/case management will act as patient and family liaison. They will set up family conferences, family training, aid in discharge planning, and aid in the procurement of assistive devices  - Patient will have 24 hour availability to physiatric care  - Patient will have nutritional services involved, monitoring oral input and visceral protein stores. They will make dietary and supplement recommendations and follow-up as necessary  - Patient will have weekly interdisciplinary team conferences  - Patient will have initial family conference and follow up conferences as indicated  - Patient will have family training prior  to discharge     Estimated length of stay - 14-17 days  Anticipated discharge destination - Home with   Medical status - stabilizing postoperatively; will need to monitor pain levels, postoperative skin integrity, watch for evidence of deep vein thrombosis, monitor postoperative H&H, monitor vital signs closely.  Medical prognosis - Good for post-op healing and functional improvement  Previous functional Status:  Independent ADLs, cooks, does chores, does laundry, manages her own medications  Present Functional Status:  Minimum assistance    AB therapy  Bicillin x3 doses (5/19-6/2)    VTE Prophylaxis:  Lovenox 40 mg daily  COVID-19 testing:  Negative on 6/02/2022  COVID-19 vaccination status:  Vaccinated (Pfizer):  08/25/2021 and 08/04/2021    POA: No  Living will: No  Contacts:  Gamaliel Hein ([facility]) [phone]    CODE STATUS: Full Code  Internal Medicine (attending): José Miguel Cesar MD    OUTPATIENT PROVIDERS  PCP: Wanda Bell MD  Neurology:  Fede Lopez MD  Infectious disease: Marcelina Nguyen MD    DISPOSITION: Condition stable. Tolerated transfer.  Recent labs and imaging reviewed.  Rehab admission orders initiated.  Med reconciliation completed.  Consult physiatry for rehab and pain management.  PT/OT/RT/ST to eval and treat.  Sleep hygiene and bowel maintenance at goal.  Appetite fair.  Encourage nutrition.  Vital signs at goal with no recorded fevers.  Lab work unremarkable.  Continue current POC.  Monitor closely.  Notify of acute changes.    PHYSICIAN ATTESTATION: I have been involved in the patient's rehabilitation prescreening process and I have now completed the post admission physician evaluation. Patient is in need of, appropriate for, and should tolerate the above outlined inpatient rehabilitation plan. I believe this is necessary to reach maximal postoperative healing and maximal functional abilities. I do not believe this would be possible in a timely fashion in a less intensive  setting      [facility] NP conducted independent physical examination and assisted with medical documentation.    Total time spent on this encounter including chart review and direct MD + NP 1-on-1 patient interaction: 111 minutes   Over 50% of this time was spent in counseling and coordination of care

## 2022-06-02 NOTE — PLAN OF CARE
Received call from [facility], patient is approved for rehab admission today. Dr Simmons notified for dc orders, covid orders obtained, nurse Kidd notified for collection. Van transport scheduled for 1400 per [place].

## 2022-06-02 NOTE — PLAN OF CARE
Sammie Belcher -age 52 *right sided weakness, mild dysphagia, and aphasia     Dx: CVA left KENISHA vascular territory.  Pt. Has a history of HTN, anxiety, depression, hypothyroidism, and HLD. Gamaliel Mitchell states that pt. Had right sided weakness prior to cva and that it was due to some problems after thyroid surgery and low calcium. *See chart for full medical history     Hx mental health/substance abuse: Pt. Has a history of anxiety and depression. Per chart, pt. Has a history of cocaine use which she quit a year ago. She does smoke cigarettes and marijuana.      Insurance: [employer]     Education//Work Hx: Pt. Completed 10th or 11th grade. She has no  history. She was not working. She took care of her mother after she had surgery and then took care of her grandchildren so their mother could work.     The patient has a fikathleen, Gamaliel Hein ([phone]). Gamaliel states that he is retired. He drives, does most of the cooking, cleaning, laundry, grocery shopping, and manages finances. He states that he can physically assist patient. He also states that her children will be able to help as much as they can.     Children (3) Friends/Family: 1) tate Markham ([phone]) 2) Jenny Adan, daughter ([phone])     Power of  (no)/Living Will (no)     Prior Level of Fx: Independent ADLs, cooks, does chores, does laundry, and manages her own meds. She does not have a medic alert.     Residence: Pt. Lives with her Gamaliel leyva, in [place] in an apartment. Her bedroom and bathroom are on the 2nd floor with approximately 12 steps to get to the 2nd floor. There is a wall then a railing on the left going up. She uses a tub/shower combination with a grab bar. She also has a HHS but it is not working properly.      DME: Grab bar. She will use DME company approved per insurance.     HH/OP tx: No history of HH. She will use HH company approved per insurance.     Pharmacy:  [facility] on [address]/ (has prescription drug coverage)      FOC obtained for DME company and HH company approved per insurance     MD(s): PCP: Gamaliel forgot the name of the PCP but it is located at the [facility]. He states that he will get the name and let us know as soon as possible.

## 2022-06-02 NOTE — CONSULTS
Chief Complaint  CVA    Reason for Consultation  Physiatry    History of Present Illness  Admit MD: José Miguel Cesar MD  Consult Physiatry: Saeed Villarreal MD  HPI: 51yo BF with PMH of HTN, anxiety, depression, and hypothyroidism  presented to [facility] ED on 5/18 with complaints of dysarthria and expressive aphasia x3 days.  Patient's family member states that she has been having difficulty speaking for the past 3 days however today was worse so she was brought to the ED for further evaluation. Her sister at bedside states that she is unsure if she has been compliant with her home medications.  She does smoke marijuana daily with her significant other, as well as drinks about a six-pack of beer every day.  No history of stroke.  She is not on aspirin.  Sister reports that she has been having episodes of bladder and bowel incontinence and is very restless/fidgety over the past few days.  No reported history of falls or back pain or any saddle anesthesia.  Patient is oriented x 4 but is an extremely poor historian.  She does report left-sided weakness and paresthesias x 2 days. Upon arrival to outside facility the patient was markedly hypertensive but otherwise hemodynamically stable and afebrile. EKG: NSR with LVH. Labs unremarkable except for a reactive RPR. Patient denies high risk sexual behavior, states unprotected sexual intercourse with significant other but she is in a monogamous relationships. Positive for syphilis as well as trichomoniasis.  CT Head showed left frontal 6 cm cortical base hypodensity suggesting an acute to subacute nonhemorrhagic infarct in the left KENISHA vascular territory. MRI Brain was then obtained confirming the left frontal lobe extending into the corpus callosum with an acute nonhemorrhagic infarct in the territory of the anterior cerebral artery, and old lacunar infarcts. Transferred to [facility] for further CVA workup and management.  The trichomoniasis has been treated with Flagyl and she is  "receiving her 3rd dose of penicillin for syphilis.  Participating with therapy. Functional status includes bed mobility and transfers requiring minimal assistance. Amb w/RW for 80ft. Patient was evaluated, accepted, and admitted to inpatient rehab to improve functional status. Transferred to [facility] on 6/2 without incident.    6/3: Seen in patient room, seated in recliner with BLE elevated. Eating breakfast which she nearly completed. Agrees that she has a good appetite, but she most often responds with yes. With her Aphasia, it is difficult to know what answers are accurate. Reports good sleep and bowels moving. Also says "yes" to being in pain but unable to point to area of pain. Continues to try and find words with noted frustration. Therapy evaluating. VSSAF.     Review of Systems  Barriers to Discharge:  Social:Pt. Completed 10th or 11th grade. She has no  history. She was not working. She took care of her mother after she had surgery and then took care of her grandchildren so their mother could work. The patient has a fiance, who states that he is retired. He drives, does most of the cooking, cleaning, laundry, grocery shopping, and manages finances. He states that he can physically assist patient. He also states that her children will be able to help as much as they can. Children (3)  Medical: Left frontal lobe infarct, History of alcohol dependence, HTN, Nicotine dependence, Anxiety/depression, Hyperparathyroidism  Functional:  Prior Level of Fx: Independent ADLs, cooks, does chores, does laundry, and manages her own meds. She does not have a medic alert.   Residence: Pt. Lives with her fiance in [place] in an apartment. Her bedroom and bathroom are on the 2nd floor with approximately 12 steps to get to the 2nd floor. There is a wall then a railing on the left going up. She uses a tub/shower combination with a grab bar. She also has a HHS but it is not working properly.    DME: Grab " "bar.  Psychiatric:Hx mental health/substance abuse: Pt. Has a history of anxiety and depression. Per chart, pt. Has a history of cocaine use which she quit a year ago. She does smoke cigarettes and marijuana.    Depression/Anxiety:   buPROPion TB24 tablet 150 mg qd  sertraline tablet 25 mg qd  ALPRAZolam tablet 0.25 mg qd  Pain: "yes" but unable to point to location. No signs or symptoms of pain  acetaminophen tablet 650 mg q4hr PRN mild pain  HYDROcodone-acetaminophen 5-325 mg, 1 tablet q4h PRN moderate pain  Bowels/Bladder: last documented BM 6/3 (6/2 x 4) DC Colace  Appetite: good  Sleep: good      Physical Exam  General: well-developed, well-nourished, in no acute distress  Eye: EOMI, clear conjunctiva, eyelids normal  HENT: normocephalic, oropharynx and nasal mucosal surfaces moist  Neck: full range of motion, supple  Respiratory: equal chest rise, no SOB, no audible wheeze  Cardiovascular: regular rate and rhythm, no edema  Gastrointestinal: soft, non-tender, non-distended   Musculoskeletal: right sided weakness  Integumentary: no rashes or skin lesions present, LCW neutvtss-senemasvhxe-x/d/i  Neurologic: AAO, right sided weakness, aphasia, dysarthria  *MD performed and documented physical examination     Labs:   Latest Reference Range & Units 06/03/22 05:39   WBC 4.5 - 11.5 x10(3)/mcL 8.6   RBC 4.20 - 5.40 x10(6)/mcL 4.29   Hemoglobin 12.0 - 16.0 gm/dL 13.2   Hematocrit 37.0 - 47.0 % 40.5   MCV 80.0 - 94.0 fL 94.4 (H)   MCH 27.0 - 31.0 pg 30.8   MCHC 33.0 - 36.0 mg/dL 32.6 (L)   RDW 11.5 - 17.0 % 14.0   Platelets 130 - 400 x10(3)/mcL 209   MPV 9.4 - 12.4 fL 10.2   Neut % % 50.9   LYMPH % % 38.5   Mono % % 6.3   Eosinophil % % 3.6   Basophil % % 0.5   Immature Granulocytes 0 - 0.43 % 0.2   Neut # 2.1 - 9.2 x10(3)/mcL 4.4   Lymph # 0.6 - 4.6 x10(3)/mcL 3.31   Mono # 0.1 - 1.3 x10(3)/mcL 0.54   Eos # 0 - 0.9 x10(3)/mcL 0.31   Baso # 0 - 0.2 x10(3)/mcL 0.04   Immature Grans (Abs) 0 - 0.0155 x10(3)/mcL 0.02 (H) "   nRBC % 0.0   Sodium 136 - 145 mmol/L 143   Potassium 3.5 - 5.1 mmol/L 4.3   Chloride 98 - 107 mmol/L 105   CO2 22 - 29 mmol/L 28   BUN 9.8 - 20.1 mg/dL 16.2   Creatinine 0.55 - 1.02 mg/dL 0.78   eGFR if African American mls/min/1.73/m2 >60   Glucose 74 - 100 mg/dL 88   Calcium 8.4 - 10.2 mg/dL 9.7   Phosphorus 2.3 - 4.7 mg/dL 3.8   Magnesium 1.60 - 2.60 mg/dL 2.00   Alkaline Phosphatase 40 - 150 unit/L 71   PROTEIN TOTAL 6.4 - 8.3 gm/dL 6.6   Albumin 3.5 - 5.0 gm/dL 3.3 (L)   Albumin/Globulin Ratio 1.1 - 2.0 ratio 1.0 (L)   BILIRUBIN TOTAL <=1.5 mg/dL 0.4   AST 5 - 34 unit/L 34   ALT 0 - 55 unit/L 38   Globulin, Total 2.4 - 3.5 gm/dL 3.3   (H): Data is abnormally high  (L): Data is abnormally low     Latest Reference Range & Units 05/21/22 10:52 05/21/22 11:37   COLOR CSF Colorless   Pink !   RBC, CSF 0 - 0 /mcL or /mm3  1,490 (H)   WBC, CSF 0 - 5 /mm3  5   Segmented Neutrophils, CSF <=10 %  35 (H)   Lymphs, CSF %  65   Glucose, CSF 40 - 70 mg/dL 57    VDRL, CSF Negative  Negative    !: Data is abnormal  (H): Data is abnormally high    Diagnostics:  CT HEAD WITHOUT CONTRAST  Impression:  1. Left frontal 6 cm cortical based hypodensity suggests an acute-subacute nonhemorrhagic infarct in the left KENISHA vascular territory.  This could be confirmed by MRI.  2. Otherwise no acute intracranial abnormalities.  Date:                                            05/18/2022  Time:                                           12:38    MRI BRAIN WITHOUT CONTRAST  Impression:  1.  Left frontal lobe extending into the corpus callosum acute nonhemorrhagic infarct in the territory of the anterior cerebral artery.  2.  Old lacunar infarcts, chronic microangiopathic ischemia and atrophy.  Date:                                            05/18/2022  Time:                                           15:42    Echo 5.18.22  Negative Bubble study.  The left ventricle is normal in size with severe concentric hypertrophy and hyperdynamic systolic  "function.  Grade I left ventricular diastolic dysfunction.  The estimated ejection fraction is 74%.  Normal right ventricular size with normal right ventricular systolic function.    MRA BRAIN WITHOUT CONTRAST  Impression:  Motion degraded exam.  In spite of this limitation:  Moderate to severe multifocal flow signal abnormality about the bilateral ACAs, bilateral distal MCAs and to lesser extent bilateral PCAs.  Date:                                            05/19/2022  Time:                                           17:38    MRA NECK WITHOUT CONTRAST  Impression:  No large vessel occlusion or critical stenosis.  Date:                                            05/19/2022  Time:                                           17:42    Carotid US 5.19.22  The right internal carotid artery demonstrated less than 50% stenosis.  The left internal carotid artery demonstrated less than 50% stenosis.  The bilateral vertebral arteries were patent with antegrade flow.    CTA NECK  Impression:  1. Moderate narrowing of the left common carotid artery.  2. Moderate to severe narrowing at the origin of the left vertebral artery.  Date:                                            05/20/2022  Time:                                           11:27    FL LUMBAR PUNCTURE DIAGNOSTIC WITH IMAGING  Impression:  1.  Technically successful lumbar puncture.  2.  Approximately 7 mL CSF obtained and sent for further evaluation.  3.  Opening pressure: 20 cm H2O  //  Closing pressure: 18.5 cm H2O.  PATIENT STATUS  The patient tolerated the procedure well, with no immediate complications appreciated. The patient was monitored for a short period following procedure completion to ensure no change from baseline condition noted upon arrival to the Radiology Department, with subsequent return to inpatient bed for further care by primary team.  Exposure information  Lowest-rate pulsed fluoroscopy and "last image capture" technique were utilized in order " to minimize radiation exposure.  Fluoro time: 4 seconds  Dose: 0.4 mGy  Number of images: 1  Date:                                            05/21/2022  Time:                                           14:53      Urine culture  Status: Final result    Visible to patient: No (inaccessible in Patient Portal)    Next appt: 06/22/2022 at 08:30 AM in Endocrinology (Romi Mcmahan NP)    Specimen Information: Urine       FINAL CULTURE No significant growth.    Resulting Agency [employer]              Specimen Collected: 05/27/21 14:18               Assessment/Plan  Acute nonhemorrhagic CVA- L KENISHA territory  Dysarthria and expressive aphasia secondary to CVA  HTN  HLD  Hypothyroidism  Tobacco, EtOH, and Marijuana use  Reactive RPR  Trichomonas    Wounds: LCW hjsmsoqt-xmrbtwkoria-q/d/i  S/p LINQ placement on 5/26  Precautions: fall risk  Bracing/AD: RW  Swallowing: Easy to Chew Diet  Function: Tolerating therapy. Continue PT/OT  VTE Prophylaxis:   enoxaparin injection 40 mg SubQ q24hr  Code Status: FULL CODE   Discharge:Pt. Lives with her fiance in [place] in an apartment. Her bedroom and bathroom are on the 2nd floor with approximately 12 steps to get to the 2nd floor. There is a wall then a railing on the left going up. Pt. Completed 10th or 11th grade. She has no  history. She was not working. She took care of her mother after she had surgery and then took care of her grandchildren so their mother could work. The patient has a fiance, who states that he is retired. He drives, does most of the cooking, cleaning, laundry, grocery shopping, and manages finances. He states that he can physically assist patient. He also states that her children will be able to help as much as they can. Children (3). Date pending.     I have evaluated the patient on initial IRF admit. I have been involved in rehab prescreen. I have reviewed records.I have reviewed admit orders.I have discussed case with IM team.  I find the  patient appropriate for, in need of and should tolerate an aggressive IRF program with good potential for functional improvement.  I am in agreement with initial Plan of Care  I have been involved in the creation of this initial PM&R consult with Nisha Wright NP, conducted additional independent physical examination and assisted with medical documentation.

## 2022-06-03 PROBLEM — M85.80 OSTEOPENIA: Status: ACTIVE | Noted: 2021-09-14

## 2022-06-03 LAB
ALBUMIN SERPL-MCNC: 3.3 GM/DL (ref 3.5–5)
ALBUMIN/GLOB SERPL: 1 RATIO (ref 1.1–2)
ALP SERPL-CCNC: 71 UNIT/L (ref 40–150)
ALT SERPL-CCNC: 38 UNIT/L (ref 0–55)
AST SERPL-CCNC: 34 UNIT/L (ref 5–34)
BASOPHILS # BLD AUTO: 0.04 X10(3)/MCL (ref 0–0.2)
BASOPHILS NFR BLD AUTO: 0.5 %
BILIRUBIN DIRECT+TOT PNL SERPL-MCNC: 0.4 MG/DL
BUN SERPL-MCNC: 16.2 MG/DL (ref 9.8–20.1)
CALCIUM SERPL-MCNC: 9.7 MG/DL (ref 8.4–10.2)
CHLORIDE SERPL-SCNC: 105 MMOL/L (ref 98–107)
CO2 SERPL-SCNC: 28 MMOL/L (ref 22–29)
CREAT SERPL-MCNC: 0.78 MG/DL (ref 0.55–1.02)
EOSINOPHIL # BLD AUTO: 0.31 X10(3)/MCL (ref 0–0.9)
EOSINOPHIL NFR BLD AUTO: 3.6 %
ERYTHROCYTE [DISTWIDTH] IN BLOOD BY AUTOMATED COUNT: 14 % (ref 11.5–17)
FERRITIN SERPL-MCNC: 107.71 NG/ML (ref 4.63–204)
GLOBULIN SER-MCNC: 3.3 GM/DL (ref 2.4–3.5)
GLUCOSE SERPL-MCNC: 88 MG/DL (ref 74–100)
HCT VFR BLD AUTO: 40.5 % (ref 37–47)
HGB BLD-MCNC: 13.2 GM/DL (ref 12–16)
IMM GRANULOCYTES # BLD AUTO: 0.02 X10(3)/MCL (ref 0–0.02)
IMM GRANULOCYTES NFR BLD AUTO: 0.2 % (ref 0–0.43)
IRON SATN MFR SERPL: 42 % (ref 20–50)
IRON SERPL-MCNC: 95 UG/DL (ref 50–170)
LYMPHOCYTES # BLD AUTO: 3.31 X10(3)/MCL (ref 0.6–4.6)
LYMPHOCYTES NFR BLD AUTO: 38.5 %
MAGNESIUM SERPL-MCNC: 2 MG/DL (ref 1.6–2.6)
MCH RBC QN AUTO: 30.8 PG (ref 27–31)
MCHC RBC AUTO-ENTMCNC: 32.6 MG/DL (ref 33–36)
MCV RBC AUTO: 94.4 FL (ref 80–94)
MONOCYTES # BLD AUTO: 0.54 X10(3)/MCL (ref 0.1–1.3)
MONOCYTES NFR BLD AUTO: 6.3 %
NEUTROPHILS # BLD AUTO: 4.4 X10(3)/MCL (ref 2.1–9.2)
NEUTROPHILS NFR BLD AUTO: 50.9 %
NRBC BLD AUTO-RTO: 0 %
PHOSPHATE SERPL-MCNC: 3.8 MG/DL (ref 2.3–4.7)
PLATELET # BLD AUTO: 209 X10(3)/MCL (ref 130–400)
PMV BLD AUTO: 10.2 FL (ref 9.4–12.4)
POTASSIUM SERPL-SCNC: 4.3 MMOL/L (ref 3.5–5.1)
PROT SERPL-MCNC: 6.6 GM/DL (ref 6.4–8.3)
RBC # BLD AUTO: 4.29 X10(6)/MCL (ref 4.2–5.4)
SODIUM SERPL-SCNC: 143 MMOL/L (ref 136–145)
TIBC SERPL-MCNC: 129 UG/DL (ref 70–310)
TIBC SERPL-MCNC: 224 UG/DL (ref 250–450)
TRANSFERRIN SERPL-MCNC: 185 MG/DL (ref 180–382)
WBC # SPEC AUTO: 8.6 X10(3)/MCL (ref 4.5–11.5)

## 2022-06-03 PROCEDURE — 94761 N-INVAS EAR/PLS OXIMETRY MLT: CPT

## 2022-06-03 PROCEDURE — 36415 COLL VENOUS BLD VENIPUNCTURE: CPT | Performed by: NURSE PRACTITIONER

## 2022-06-03 PROCEDURE — 97530 THERAPEUTIC ACTIVITIES: CPT

## 2022-06-03 PROCEDURE — 63600175 PHARM REV CODE 636 W HCPCS: Performed by: NURSE PRACTITIONER

## 2022-06-03 PROCEDURE — 97162 PT EVAL MOD COMPLEX 30 MIN: CPT

## 2022-06-03 PROCEDURE — 85025 COMPLETE CBC W/AUTO DIFF WBC: CPT | Performed by: NURSE PRACTITIONER

## 2022-06-03 PROCEDURE — 80053 COMPREHEN METABOLIC PANEL: CPT | Performed by: NURSE PRACTITIONER

## 2022-06-03 PROCEDURE — 97535 SELF CARE MNGMENT TRAINING: CPT

## 2022-06-03 PROCEDURE — 94799 UNLISTED PULMONARY SVC/PX: CPT

## 2022-06-03 PROCEDURE — 82728 ASSAY OF FERRITIN: CPT | Performed by: NURSE PRACTITIONER

## 2022-06-03 PROCEDURE — 97166 OT EVAL MOD COMPLEX 45 MIN: CPT

## 2022-06-03 PROCEDURE — 84100 ASSAY OF PHOSPHORUS: CPT | Performed by: NURSE PRACTITIONER

## 2022-06-03 PROCEDURE — 92523 SPEECH SOUND LANG COMPREHEN: CPT

## 2022-06-03 PROCEDURE — 25000003 PHARM REV CODE 250: Performed by: NURSE PRACTITIONER

## 2022-06-03 PROCEDURE — 83540 ASSAY OF IRON: CPT | Performed by: NURSE PRACTITIONER

## 2022-06-03 PROCEDURE — 83735 ASSAY OF MAGNESIUM: CPT | Performed by: NURSE PRACTITIONER

## 2022-06-03 PROCEDURE — 11800000 HC REHAB PRIVATE ROOM

## 2022-06-03 PROCEDURE — S4991 NICOTINE PATCH NONLEGEND: HCPCS | Performed by: NURSE PRACTITIONER

## 2022-06-03 RX ADMIN — ENOXAPARIN SODIUM 40 MG: 40 INJECTION SUBCUTANEOUS at 05:06

## 2022-06-03 RX ADMIN — NICOTINE 1 PATCH: 21 PATCH, EXTENDED RELEASE TRANSDERMAL at 09:06

## 2022-06-03 RX ADMIN — ASPIRIN 325 MG: 325 TABLET, COATED ORAL at 09:06

## 2022-06-03 RX ADMIN — MULTIPLE VITAMINS W/ MINERALS TAB 1 TABLET: TAB at 09:06

## 2022-06-03 RX ADMIN — THIAMINE HCL TAB 100 MG 100 MG: 100 TAB at 09:06

## 2022-06-03 RX ADMIN — AMLODIPINE BESYLATE 10 MG: 5 TABLET ORAL at 09:06

## 2022-06-03 RX ADMIN — LABETALOL HYDROCHLORIDE 200 MG: 200 TABLET, FILM COATED ORAL at 03:06

## 2022-06-03 RX ADMIN — BUPROPION HYDROCHLORIDE 150 MG: 150 TABLET, FILM COATED, EXTENDED RELEASE ORAL at 09:06

## 2022-06-03 RX ADMIN — SERTRALINE HYDROCHLORIDE 25 MG: 25 TABLET ORAL at 09:06

## 2022-06-03 RX ADMIN — ATORVASTATIN CALCIUM 40 MG: 40 TABLET, FILM COATED ORAL at 09:06

## 2022-06-03 RX ADMIN — LABETALOL HYDROCHLORIDE 200 MG: 200 TABLET, FILM COATED ORAL at 09:06

## 2022-06-03 RX ADMIN — ALENDRONATE SODIUM 70 MG: 70 TABLET ORAL at 06:06

## 2022-06-03 RX ADMIN — THIAMINE HCL TAB 100 MG 100 MG: 100 TAB at 03:06

## 2022-06-03 NOTE — PT/OT/SLP EVAL
Physical Therapy        Inpatient Rehab Evaluation    Sammie Belcher   MRN: [id number]     Therapy Minutes  PT Received On: 06/03/22  PT Start Time: 1320  PT Stop Time: 1420  PT Total Time (min): 60 min  PT Individual: 60    Billable Minutes:   Evaluation 30 and Therapeutic Activity 30    Diagnosis: CVA (cerebral vascular accident)  Past Medical History:   Diagnosis Date    Hypertension     Thyroid disease       Past Surgical History:   Procedure Laterality Date    INSERTION OF IMPLANTABLE LOOP RECORDER N/A 5/26/2022    Procedure: Insertion, Implantable Loop Recorder;  Surgeon: Arias Brown MD;  Location: [facility] CATH LAB;  Service: Cardiology;  Laterality: N/A;    THYROIDECTOMY      TUBAL LIGATION             General Precautions: Standard, aphasia, fall  Orthopedic Precautions: N/A   Braces: N/A     Spiritual, Cultural Beliefs, Church Practices, Values that Affect Care: no    Patient History:  Lives With: significant other  Living Arrangements: apartment  Home Accessibility: stairs to enter home (12 with L rail)  Home Layout: Bathroom on 2nd floor, Bedroom on 2nd floor  Living Environment Comment: All Prior level of function from CM notes. Pt unable to expressive 2/2 speech deficits  Equipment Currently Used at Home: none    DME owned (not currently used): none    Previous Level of Function:  Ambulation Skills: independent  Transfer Skills: independent  ADL Skills: independent  Work/Leisure Activity: independent    Subjective:  Communicated with RN prior to session.    Chief Complaint: N/A  Patient goals: Unable to formally assess  Family goals: not present upon evaluation    Pain/Comfort  Pain Rating 1: 0/10    Objective:    BP:     114/75 HR 84       In sitting     126/78 HR 78       Patient found with: peripheral IV    Cognitive Exam:  Oriented to: Person and Place  Follows Commands/attention: Follows two-step commands  Communication: receptive aphasia and dysfluencies  Safety awareness/insight to  disability: impaired    Physical Exam:  Postural examination/scapula alignment:    -       No postural abnormalities identified    Skin integrity: Visible skin intact  Edema: None noted .    Sensation:      -       Intact    Upper Extremity Range of Motion:  Refer to OT evaluation for UE ROM.    Upper Extremity Strength:  Refer to OT evaluation for UE strength.    Lower Extremity Range of Motion:  Right Lower Extremity: Deficits: Limited 2/2 ataxia and poor motor planning   Left Lower Extremity: WFL    Lower Extremity Strength:  Right Lower Extremity: unable to formally assess 2/2 ataxia. Pt appears to have 3/5 DF, however unable to functional perform DF with ambulation   Left Lower Extremity: WFL      Functional Mobility:    Bed Mobility :   Supine to sit: Moderate Assistance   Sit to supine: Moderate Assistance   Rolling: Contact Guard Assistance   Scooting: Contact Guard Assistance    Transfers:  Sit to stand:Moderate Assistance with Rolling Walker VC for proper tech and assist with RUE and RLE.   Bed <> Chair:  Step Transfer with Moderate Assistance with Rolling Walker Assist with Advancement of RLE and RW for safety   Car Transfer:  Step Transfer with Moderate Assistance with Rolling Walker .    Wheelchair:  Patient propels wheelchair 150 feet using BUE; assist with RUE 2/2 poor motor planning with  Moderate Assistance and min verbal cues.    Gait:  Patient ambulates 35 feet using rolling walker with Moderate Assistance and min verbal cues. Pt demonstrates decreased rajiv, decreased step length, decreased stride length, increased stride width and decreased toe-to-floor clearance.Impairments contributing to gait deviations include impaired balance, impaired coordination, decreased flexibility, impaired motor control, abnormal muscle tone, decreased ROM and decreased strength. ACE wrap donned to R foot for increased DF. Pt demos increased ataxia and requires mod a to advance RLE and VC for increased step  length.     Picking up object:    From a standing position, patient picks up an object from the floor using RW and reacher with Moderate Assistance and minimal verbal cues.          Education Provided: roles and goals of PT/PTA, transfer training, bed mob, gait training, safety awareness, body mechanics, assistive device and fall prevention    Expected compliance: Moderate compliance        Patient left up in chair with all lines intact, call button in reach and chair alarm on.    Assessment:  Sammie Belcher is a 52 y.o. female with a medical diagnosis of CVA (cerebral vascular accident). She presents with cognitive deficits, impaired coordination, impaired balance, gait deviations, difficulty with functional transfers, difficulty with bed mobility, ataxia, R hemiparesis, impaired proprioception, decreased motor control and decreased safety   limiting tolerance and safety during functional mobility tasks.  Pt to benefit from skilled PT services to address impairments with progression towards functional independence as tolerated.      IMPAIRMENTS  coordination, endurance, R/L UE weakness, trunk weakness and functional strength    FUNCTIONAL LIMITATIONS  Bed mob, dynamic sitting balance, dynamic standing balance and gait impairments    ACTIVITY LIMITATIONS  Cognitive function, endurance and functional mobility    ACTIVITY CAPABILITIES  Independent premorbid      Patient agrees with need for treatment: yes      QI Scores:   Admission Assessment   Functional Area: Care Score:    Roll Left and Right 4   Sit to Lying 3   Lying to Sitting on Side of Bed 3   Sit to Stand 3   Chair/Bed-to-Chair Transfer 3   Walk 10 Feet 3   Walk 50 Feet with Two Turns 88   Walk 150 Feet 88   1 Step (Curb) 88   4 Steps 88   12 Steps 88   Wheel 50 Feet with Two Turns 3   Wheel 150 Feet 3   Car Transfer 3   Walk 10 Feet on Uneven Surfaces 88   Picking up Object 3     Rehab potential is excellent.    Activity tolerance: Excellent    Plan:  plan care intiated, bed mobility initiated, transfer training iniated, gait training, balance training, strengthening, neuromuscular re-education and motor coordination training    Discharge recommendations:   Pending     Equipment recommendations:      GOALS:   Multidisciplinary Problems     Physical Therapy Goals        Problem: Physical Therapy    Goal Priority Disciplines Outcome Goal Variances Interventions   Physical Therapy Goal     PT, PT/OT Ongoing, Progressing     Description: Short Term goals      Bed Mobility   Roll Right and Left Setup or Clean-up Assistance.  Lying to sitting Setup or Clean-up Assistance.  Sitting to lying Setup or Clean-up Assistance.    Transfers    Pt will be able to perform Stand step chair/bed to chair transfer With LRAD Setup or Clean-up Assistance.  Pt will be able to perform Sit to stand with LRAD   Setup or Clean-up Assistance.  Pt will be able to perform Car transfer with LRAD Setup or Clean-up Assistance.    Ambulation    Pt will ambulate 100 Feet with LRAD Setup or Clean-up Assistance.      Wheelchair Mobility   Pt will be able to propel 150 feet in paola wheelchair Setup or Clean-up Assistance.      Timeframe: By Discharge                      PLAN:    Patient to be seen 5 x/week to address the above listed problems via gait training, therapeutic activities, therapeutic exercises, neuromuscular re-education  Plan of Care expires: 06/09/22  Plan of Care reviewed with: patient    6/3/2022

## 2022-06-03 NOTE — PLAN OF CARE
Problem: Adult Inpatient Plan of Care  Goal: Plan of Care Review  Outcome: Ongoing, Progressing  Goal: Patient-Specific Goal (Individualized)  Outcome: Ongoing, Progressing  Goal: Absence of Hospital-Acquired Illness or Injury  Outcome: Ongoing, Progressing  Goal: Optimal Comfort and Wellbeing  Outcome: Ongoing, Progressing  Goal: Readiness for Transition of Care  Outcome: Ongoing, Progressing     Problem: Bowel Elimination Impaired (Stroke, Ischemic/Transient Ischemic Attack)  Goal: Effective Bowel Elimination  Outcome: Ongoing, Progressing     Problem: Cerebral Tissue Perfusion (Stroke, Ischemic/Transient Ischemic Attack)  Goal: Optimal Cerebral Tissue Perfusion  Outcome: Ongoing, Progressing     Problem: Cognitive Impairment (Stroke, Ischemic/Transient Ischemic Attack)  Goal: Optimal Cognitive Function  Outcome: Ongoing, Progressing     Problem: Communication Impairment (Stroke, Ischemic/Transient Ischemic Attack)  Goal: Improved Communication Skills  Outcome: Ongoing, Progressing     Problem: Functional Ability Impaired (Stroke, Ischemic/Transient Ischemic Attack)  Goal: Optimal Functional Ability  Outcome: Ongoing, Progressing     Problem: Respiratory Compromise (Stroke, Ischemic/Transient Ischemic Attack)  Goal: Effective Oxygenation and Ventilation  Outcome: Ongoing, Progressing

## 2022-06-03 NOTE — CONSULTS
[facility] - Rehab Inpatient Services  Adult Nutrition  Consult Note    SUMMARY     Recommendations    1. Continue current diet per MD/SLP    2. Continue MVI + thiamine as feasible     3.  Added ONS Boost Plus to provide 360 kcals, 14 gm pro per serving.     Goals: Meet >75% of est energy needs by f/u    Nutrition Goal Status: New    Assessment and Plan    6/3: Pt with good appetite. Observed eating >75% lunch in dining room. Per EMR, pt averaging ~85% po intake x last 2 meals. Attempted to interview pt. Pt having difficulty communicating d/t expressive aphasia. Noted pt with significant wt loss per RD note at [facility] on (5/25). Continued Strawberry Boost Plus pt was receiving from [facility] prior to admit.      Malnutrition Assessment    Malnutrition Type: chronic illness  Energy Intake: moderate energy intake  Weight Loss (Malnutrition): 7.5% in 3 months  Energy Intake (Malnutrition): less than 75% for greater than or equal to 1 month  Subcutaneous Fat (Malnutrition): mild depletion  Muscle Mass (Malnutrition): mild depletion   Orbital Region (Subcutaneous Fat Loss): mild depletion  Upper Arm Region (Subcutaneous Fat Loss): mild depletion   Crystal Region (Muscle Loss): mild depletion  Clavicle Bone Region (Muscle Loss): mild depletion  Dorsal Hand (Muscle Loss): mild depletion     Reason for Assessment    Reason For Assessment: consult (rehab)  Diagnosis: other (see comments) (Left frontal lobe 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,POSITIVE RPR  History of alcohol dependence  HTN  Nicotine dependence  Anxiety/depression   Hyperparathyroidism)  Relevant Medical History: Essential HTN  HLD  Hypothyroidism  Anxiety  Depression  Hx Cocaine Use Disorder (sober over 1 yr)  THC Use, ETOH abuse    Nutrition Risk Screen    Nutrition Risk Screen: no indicators present    Nutrition/Diet History    Spiritual, Cultural Beliefs, Mormon Practices, Values that Affect  "Care: no    Anthropometrics    Temp: 98 °F (36.7 °C)  Height: 6' 0.99" (185.4 cm)  Height (inches): 72.99 in  Weight Method: Estimated  Weight: 64.5 kg (142 lb 3.2 oz)  Weight (lb): 142.2 lb  Ideal Body Weight (IBW), Female: 164.95 lb  % Ideal Body Weight, Female (lb): 86.21 %  BMI (Calculated): 18.8  BMI Grade: 18.5-24.9 - normal  Usual Body Weight (UBW), k.73 kg  % Usual Body Weight: 88.87  % Weight Change From Usual Weight: -11.32 %    UBW [facility] on () "Per family, pt has had some appetite and wt loss before parathyroid gland removal ~ 2 months ago. States UBW ~ 160lb."     Lab/Procedures/Meds    Pertinent Labs Comments: (L), Alb 3.3(L), GFR > 60  Pertinent Medications Comments: statin, MVI, Mirlax, thiamine    Estimated/Assessed Needs    Weight Used For Calorie Calculations: 64.5 kg (142 lb 3.2 oz)  Energy Calorie Requirements (kcal):   Energy Need Method: Conway-St Jeor (x 1.4 SF)  Protein Requirements: 97 (1.5 g/kg)  Weight Used For Protein Calculations: 64.5 kg (142 lb 3.2 oz)  RDA Method (mL):        Nutrition Prescription Ordered    Current Diet Order: Easy to Chew    Evaluation of Received Nutrient/Fluid Intake    % Intake of Estimated Energy Needs: 75 - 100 %  % Meal Intake: 75 - 100 %     Nutrition Problem  Malnutrition     Related to (etiology):   Thyroid disease     Signs and Symptoms (as evidenced by):   <75% est energy needs > 1 month  Physical evidence of mild muscle/fat wasting     Interventions(treatment strategy):  Commercial food, Multivitamin / Mineral supplement therapy and Collaboration with other providers     Nutrition Diagnosis Status:   New       Nutrition Risk    Level of Risk/Frequency of Follow-up: moderate       Monitor and Evaluation      wt, po intake, and nutrition related labs       Nutrition Follow-Up    RD Follow-up?: Yes  "

## 2022-06-03 NOTE — PROGRESS NOTES
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 Standing tolerance was 1 minute. RUE coordination/dexteriety was mod. Hand over hand assist was needed for motor planning of RUE. Comprehension and problem solving skills were min. Attempted to say words with cueing and sentence completion. Gave staff a hug at end of treatment and with cues she completed "have a blessed day."   Goals   Additional Documentation yes   Goal 1 STG: Will increase sit to stand to to contact guard. Initial   Goal 2 STG: Will improve dynamic standing balance/reaching to contact guard. Initial   Goal 3 STG: Will increase RUE coordination/dexteriety contact guard. Initial   Goal 4 LTG: Will increase standing tolerance to 5 minutes. Initial   Goal 5 LTG: Will improve dynamic standing balance/reaching to supervision. Initial  (LTG: Will increase RUE coordination/dexteriety to supervision. Initial)   Plan   Planned Therapy Intervention   (Continue with Skilled TR Services to         facilitate functional independence and address impairments/limitations)   Expected Length of Stay 3 weeks   PT Frequency Minimu of 5 " visits per week;Other (see comments)  (Daily)   Time   Treatment time 2 units

## 2022-06-03 NOTE — PLAN OF CARE
Problem: Adult Inpatient Plan of Care  Goal: Plan of Care Review  6/3/2022 0204 by Aysha Bernard LPN  Outcome: Ongoing, Progressing  6/3/2022 0139 by Aysha Bernard LPN  Outcome: Ongoing, Progressing  Goal: Patient-Specific Goal (Individualized)  6/3/2022 0204 by Aysha Bernard LPN  Outcome: Ongoing, Progressing  6/3/2022 0139 by Aysha Bernard LPN  Outcome: Ongoing, Progressing  Goal: Absence of Hospital-Acquired Illness or Injury  6/3/2022 0204 by Aysha Bernard LPN  Outcome: Ongoing, Progressing  6/3/2022 0139 by Aysha Bernard LPN  Outcome: Ongoing, Progressing  Goal: Optimal Comfort and Wellbeing  6/3/2022 0204 by Aysha Bernard LPN  Outcome: Ongoing, Progressing  6/3/2022 0139 by Aysha Bernard LPN  Outcome: Ongoing, Progressing  Goal: Readiness for Transition of Care  6/3/2022 0204 by Aysha Bernard LPN  Outcome: Ongoing, Progressing  6/3/2022 0139 by Aysha Bernard LPN  Outcome: Ongoing, Progressing     Problem: Adjustment to Illness (Stroke, Ischemic/Transient Ischemic Attack)  Goal: Optimal Coping  Outcome: Ongoing, Progressing     Problem: Bowel Elimination Impaired (Stroke, Ischemic/Transient Ischemic Attack)  Goal: Effective Bowel Elimination  6/3/2022 0204 by Aysha Bernard LPN  Outcome: Ongoing, Progressing  6/3/2022 0139 by Aysha Bernard LPN  Outcome: Ongoing, Progressing     Problem: Cerebral Tissue Perfusion (Stroke, Ischemic/Transient Ischemic Attack)  Goal: Optimal Cerebral Tissue Perfusion  6/3/2022 0204 by Aysha Bernard LPN  Outcome: Ongoing, Progressing  6/3/2022 0139 by Aysha Bernard LPN  Outcome: Ongoing, Progressing     Problem: Cognitive Impairment (Stroke, Ischemic/Transient Ischemic Attack)  Goal: Optimal Cognitive Function  6/3/2022 0204 by Aysha Bernard LPN  Outcome: Ongoing, Progressing  6/3/2022 0139 by Aysha Bernard LPN  Outcome: Ongoing, Progressing     Problem: Communication Impairment (Stroke, Ischemic/Transient Ischemic Attack)  Goal: Improved Communication  Skills  6/3/2022 0204 by Aysha Bernard LPN  Outcome: Ongoing, Progressing  6/3/2022 0139 by Aysha Bernard LPN  Outcome: Ongoing, Progressing     Problem: Functional Ability Impaired (Stroke, Ischemic/Transient Ischemic Attack)  Goal: Optimal Functional Ability  6/3/2022 0204 by Aysha Bernard LPN  Outcome: Ongoing, Progressing  6/3/2022 0139 by Aysha Bernard LPN  Outcome: Ongoing, Progressing     Problem: Respiratory Compromise (Stroke, Ischemic/Transient Ischemic Attack)  Goal: Effective Oxygenation and Ventilation  6/3/2022 0204 by Aysha Bernard LPN  Outcome: Ongoing, Progressing  6/3/2022 0139 by Aysha Bernard LPN  Outcome: Ongoing, Progressing     Problem: Sensorimotor Impairment (Stroke, Ischemic/Transient Ischemic Attack)  Goal: Improved Sensorimotor Function  Outcome: Ongoing, Progressing     Problem: Swallowing Impairment (Stroke, Ischemic/Transient Ischemic Attack)  Goal: Optimal Eating and Swallowing without Aspiration  Outcome: Ongoing, Progressing     Problem: Urinary Elimination Impaired (Stroke, Ischemic/Transient Ischemic Attack)  Goal: Effective Urinary Elimination  Outcome: Ongoing, Progressing     Problem: Skin Injury Risk Increased  Goal: Skin Health and Integrity  Outcome: Ongoing, Progressing

## 2022-06-03 NOTE — PT/OT/SLP EVAL
"Speech Language Pathology  Evaluation    Sammie Belcher   MRN: [id number]   Admitting Diagnosis: CVA (cerebral vascular accident)    Diet recommendations: Solid Diet Level: Easy to Chew Diet - IDDSI Level 7  Liquid Diet Level: Thin     Billable Minutes:  Eval 60 minutes     Diagnosis: CVA (cerebral vascular accident)    Past Medical History:   Diagnosis Date    Hypertension     Thyroid disease      Past Surgical History:   Procedure Laterality Date    INSERTION OF IMPLANTABLE LOOP RECORDER N/A 2022    Procedure: Insertion, Implantable Loop Recorder;  Surgeon: Arias Brown MD;  Location: [facility] CATH LAB;  Service: Cardiology;  Laterality: N/A;    THYROIDECTOMY      TUBAL LIGATION         Has the patient been evaluated by SLP for swallowing? : Yes  Keep patient NPO?: No   General Precautions: Standard, aphasia, aspiration          SUBJECTIVE:  General patient information:    Dominant hand: Right    Primary Language: English    Primary Communication used : Verbal    Behavior: alert, appropriate and cooperative    OBJECTIVE:  Pain Level   Pain/Comfort  Pain Rating 1: 0/10     SPEECH PRODUCTION   Phoneme Production: adequate   Voice Quality: adequate   Voice Production: adequate   Speech Rate: adequate   Loudness: adequate   Respiration: adequate   Resonance: adequate   Prosody: adequate   Fluency: dysfluent; characterized by pwr, sswr, prolongations, and blocks   Speech Intelligibility  Known Context: Greater that 90%  Unknown Context: 75%-90%     AUDITORY COMPREHENSION  Following Directions:   1-Step: 100%   2-Step: 100%    Yes/No Questions:   Biographical: perseverating on "yeah"   Environmental: perseverating on "yeah"   Simple: perseverating on "yeah"      VERBAL EXPRESSION  Automatic Speech:   Days of the week: 100%   Months of the year: 100%   Countin%   Alphabet: 100%    Phrase Completion: 100%    Confrontation Naming   Body Parts: 90% moderate phonemic cues   Objects: " "80%    Wh- Questions:   Object name: 80% moderate cues   Object function: 100%    COGNITION  Orientation:   Person: WFL   Place: [facility]   Time: inconistent; 2002 for year   Situation: WFL with binary choice    Attention:   WFL    Pragmatics:   WFL    Memory:   Immediate: WFL   Delayed: mild-moderate impairment   Long Term: WFL    Problem Solving   Functional simple: 100% minimal cues    ASSESSMENT:  ST prognosis: Good    Impressions/interpretations: Person served presents with Mildly impaired expressive language deficits, Moderately impaired receptive language deficits, Mild/moderately impaired memory deficits, and Mildly impaired problem solving deficits. Deficits affect ability to communicate functional wants and needs as well as safety awareness and inde    PLAN:  Recommendations: Speech therapy Is warranted at this time.   ST recommended to improve speech, language and cognition .    Speech therapy is recommended 5 times a week, 60 minutes a day for 1 weeks pending progress    Treatment rationale: maximize functional communication    Estimated length of stay: pending progress    Projected Living environment post discharge: Home    Patient goal of therapy: "my goal is to communicate better"   Patient assisted with forming goals of therapy     Educated patient on: role of SLP, results of evaluation including deficits of speech, cognition and memory and goals of therapy    Patient report understanding of the information provided: Regarding deficits, Need for therapy and Demonstrates understanding    Assessment:  Sammie Belcher is a 52 y.o. female with a medical diagnosis of CVA (cerebral vascular accident) and presents with receptive and expressive aphasia, dysfluencies, and impaired cognitive linguistic skills. Skilled SLP intervention is warranted at this time.    Spiritual, Cultural Beliefs, Jewish Practices, Values that Affect Care: no    Discharge recommendations: Discharge Facility/Level of " Care Needs: home health speech therapy     Goals:   Multidisciplinary Problems     SLP Goals        Problem: SLP    Goal Priority Disciplines Outcome   SLP Goal     SLP    Description: LTG: To increase expressive/receptive language skills to enhance functional communication to express basic wants and needs with 100% effectiveness.     ST. Pt will answer simple, environmental, and personal yes/no questions with 90% accuracy and minimal cues.  2. Pt will complete confrontational naming tasks with 90% accuracy and minimal cues.  3. Pt will use fluency shaping/stuttering modification techniques to decrease syllables stuttered to less than 5%SS.                    Plan:   Patient to be seen Therapy Frequency: 5 x/week  Planned Interventions: Language Therapy (Fluency Therapy)  Plan of Care expires: 06/10/22  Plan of Care reviewed with: patient  SLP Follow-up?: Yes  SLP - Next Visit Date: 06/10/22      2022

## 2022-06-03 NOTE — PT/OT/SLP EVAL
Occupational Therapy         Evaluation    Sammie Belcher   MRN: [id number]   Admitting Diagnosis: CVA (cerebral vascular accident)         Billable Minutes:   Evaluation 30 and Self Care/Home Management 30    Diagnosis: CVA (cerebral vascular accident)      Past Medical History:   Diagnosis Date    Hypertension     Thyroid disease       Past Surgical History:   Procedure Laterality Date    INSERTION OF IMPLANTABLE LOOP RECORDER N/A 5/26/2022    Procedure: Insertion, Implantable Loop Recorder;  Surgeon: Arias Brown MD;  Location: [facility] CATH LAB;  Service: Cardiology;  Laterality: N/A;    THYROIDECTOMY      TUBAL LIGATION           General Precautions: Standard, fall, aphasia        Patient History:       Prior level of function:    Bed Mobility/Transfers: independent  Grooming: independent  Bathing: independent  Upper Body Dressing: independent  Lower Body Dressing: independent  Toileting: independent  Homemaking Responsibilities: No, autumn takes care of all home management tasks, but she helps out with cleaning chores  Driving License: Yes  Mode of Transportation: Car  Occupation: Unemployed  IADL Comments: her fiance does everything, but she normally helps out, she has a daughter that she helps care for     Environment:  Pt lives in an apt with her fikathleen and daughter. The apartment is on the second floor but there is railing on both sides.   Bathroom: bath/shower combo, grab bars, shower bench  Pt did not use any DME prior to stroke other than the shower bench (no walker, or w/c)    Pt will need RW and TTB after d/c.      Dominant hand: right    Subjective:  Patient communicated she wants to get back to doing the things she was doing before, regain her strength, and get back to taking care of her daughter.    Pain/Comfort  Pain Rating 1: 0/10    Objective:     Beginning of session: 154/90, HR: 80  End of session: 142/88, HR: 80    Cognitive Exam:  Oriented to: Person, Place, Time and Situation  Follows  Commands/attention: Follows one-step commands  Communication: expressive aphasia, refer to SLP eval  Memory:  Poor immediate recall  Safety awareness/insight to disability: intact  Coping skills/emotional control: Appropriate to situation    Visual/perceptual:  Impaired  tracking, pt wasn't able to follow the pen on the R side, possibly due to R sided visual neglect but more testing needs to be done    Physical Exam:  Postural examination/scapula alignment:    -       No postural abnormalities identified      Sensation:      -       Intact  temperature      Upper Extremity Range of Motion:  Right Upper Extremity: WFL  Left Upper Extremity: WFL    Upper Extremity Strength:  Right Upper Extremity: WFL except when performing a formal assement, pt score a 3+/5 for shoulder flexors/extensors and elbow flexors/extensors  (WFL when performing functional tasks)  Left Upper Extremity: WFL   Strength: WFL, R hand  strength was stronger during functional tasks vs formal assesment    Fine motor coordination:   To be further assessed, appears to be impaired with functional tasks.    Gross motor coordination: impaired (diadokinesia)     ADLs:   Current Status   Functional Area: Care Score:   Eating 5   Oral Hygiene 4   Toileting Hygiene 3   Shower/Bathe Self 3   Upper Body Dressing 3   Lower Body Dressing 3   Putting On/Taking Off Footwear 4   Toilet Transfer 3   Pt needs mod-max vc and tactile cues to perform ADLs due to poor motor planning skills. Pt's ROM and strength are WFL during functional tasks, but pt needs constant cues to initiate movements. Pt couldn't follow more than one step commands, therefore OT student had to break down each step when pt was performing ADLs. Pt needed physical assistance to initiate enough movement to perform sit to stands when performing t/fs. Pt also needed physical assistance for dressing because patient had poor problem solving skills.        Assessment:  Sammie Belcher is a good  rehab candidate and would benefit from skilled OT treatment to increase ADL independence and safety.  Sammie Belcher is a 52 y.o. female with a medical diagnosis of CVA (cerebral vascular accident) and presents with the following impairments, limitations, and capabilities.     Impairments: Cognitive limits, coordination, R/L UE weakness, decreased safety and functional strength  Functional Limitations: ADLs, home mgmt, functional t/fs and community integration  Activity capabilities: Independent premorbid, family support and endurance  Activity limitations: Cognitive function, functional mobility, safety awareness and impulsivity    Recommended length of stay: 10-14 days        Rehab potential is excellent    Activity tolerance: Excellent      GOALS:   Multidisciplinary Problems       Occupational Therapy Goals          Problem: Occupational Therapy    Goal Priority Disciplines Outcome Interventions   Occupational Therapy Goal     OT, PT/OT     Description: ADLs:  Pt to perform grooming tasks with supervision and RW and min vc  Pt to perform feeding tasks with independence   Pt to perform UB dressing with set up assist and min vc  Pt to perform LB dressing with CGA and min vc   Pt to perform putting on/off footwear task with supervision and min vc  Pt to perform toileting with supervision and min vc    Functional Transfers:  Pt to perform toilet transfers with supervision and min vc and RW  Pt to perform a tub transfer with supervision, RW, min vc    IADLs:  Pt to perform simple care taking/ home management tasks with min A and min vc    Balance, Strengthening, Endurance, Balance:  Pt to demonstrate dynamic standing balance as required to perform ADL's from standing level with RW and min vc.  Pt to demonstrate BUE strength during functional task and increased use of R UE.                       PLAN: Patient to be seen daily (BID) to address the above listed problems via self-care/home management, community/work  re-entry, therapeutic activities, therapeutic exercises, neuromuscular re-education, cognitive retraining  Plan of Care expires:    Plan of Care reviewed with: patient    06/03/2022

## 2022-06-03 NOTE — PLAN OF CARE
Problem: Swallowing Impairment (Stroke, Ischemic/Transient Ischemic Attack)  Goal: Optimal Eating and Swallowing without Aspiration  Outcome: Ongoing, Progressing     Problem: Urinary Elimination Impaired (Stroke, Ischemic/Transient Ischemic Attack)  Goal: Effective Urinary Elimination  Outcome: Ongoing, Progressing     Problem: Skin Injury Risk Increased  Goal: Skin Health and Integrity  Outcome: Ongoing, Progressing

## 2022-06-03 NOTE — PLAN OF CARE
Will plan to do an aphasia depression screening 7 days post-rehab admission, per protocol in lieu of regular screening, secondary to patient's receptive/expressive issues.

## 2022-06-04 LAB
DRVV CONF RATIO (OHS): 1
DRVV NORM RATIO (OHS): 1 (ref 0–1.19)
DRVV SCR RATIO (OHS): 1
L.A. PATH INTERP (OHS): NORMAL
LA ST CALC (OHS): 0 SECONDS (ref 0–7.9)

## 2022-06-04 PROCEDURE — 94799 UNLISTED PULMONARY SVC/PX: CPT

## 2022-06-04 PROCEDURE — S4991 NICOTINE PATCH NONLEGEND: HCPCS | Performed by: NURSE PRACTITIONER

## 2022-06-04 PROCEDURE — 63600175 PHARM REV CODE 636 W HCPCS: Performed by: NURSE PRACTITIONER

## 2022-06-04 PROCEDURE — 94761 N-INVAS EAR/PLS OXIMETRY MLT: CPT

## 2022-06-04 PROCEDURE — 11800000 HC REHAB PRIVATE ROOM

## 2022-06-04 PROCEDURE — 92507 TX SP LANG VOICE COMM INDIV: CPT

## 2022-06-04 PROCEDURE — 25000003 PHARM REV CODE 250: Performed by: NURSE PRACTITIONER

## 2022-06-04 RX ADMIN — ASPIRIN 325 MG: 325 TABLET, COATED ORAL at 08:06

## 2022-06-04 RX ADMIN — SERTRALINE HYDROCHLORIDE 25 MG: 25 TABLET ORAL at 08:06

## 2022-06-04 RX ADMIN — ACETAMINOPHEN 650 MG: 325 TABLET ORAL at 11:06

## 2022-06-04 RX ADMIN — AMLODIPINE BESYLATE 10 MG: 5 TABLET ORAL at 08:06

## 2022-06-04 RX ADMIN — THIAMINE HCL TAB 100 MG 100 MG: 100 TAB at 08:06

## 2022-06-04 RX ADMIN — LABETALOL HYDROCHLORIDE 200 MG: 200 TABLET, FILM COATED ORAL at 04:06

## 2022-06-04 RX ADMIN — NICOTINE 1 PATCH: 21 PATCH, EXTENDED RELEASE TRANSDERMAL at 08:06

## 2022-06-04 RX ADMIN — ENOXAPARIN SODIUM 40 MG: 40 INJECTION SUBCUTANEOUS at 04:06

## 2022-06-04 RX ADMIN — BUPROPION HYDROCHLORIDE 150 MG: 150 TABLET, FILM COATED, EXTENDED RELEASE ORAL at 08:06

## 2022-06-04 RX ADMIN — LABETALOL HYDROCHLORIDE 200 MG: 200 TABLET, FILM COATED ORAL at 08:06

## 2022-06-04 RX ADMIN — THIAMINE HCL TAB 100 MG 100 MG: 100 TAB at 04:06

## 2022-06-04 RX ADMIN — MULTIPLE VITAMINS W/ MINERALS TAB 1 TABLET: TAB at 08:06

## 2022-06-04 RX ADMIN — ATORVASTATIN CALCIUM 40 MG: 40 TABLET, FILM COATED ORAL at 08:06

## 2022-06-04 NOTE — PLAN OF CARE
Problem: Adult Inpatient Plan of Care  Goal: Plan of Care Review  Outcome: Ongoing, Progressing  Goal: Patient-Specific Goal (Individualized)  Outcome: Ongoing, Progressing  Goal: Absence of Hospital-Acquired Illness or Injury  Outcome: Ongoing, Progressing  Goal: Optimal Comfort and Wellbeing  Outcome: Ongoing, Progressing  Goal: Readiness for Transition of Care  Outcome: Ongoing, Progressing     Problem: Adjustment to Illness (Stroke, Ischemic/Transient Ischemic Attack)  Goal: Optimal Coping  Outcome: Ongoing, Progressing     Problem: Bowel Elimination Impaired (Stroke, Ischemic/Transient Ischemic Attack)  Goal: Effective Bowel Elimination  Outcome: Ongoing, Progressing     Problem: Cerebral Tissue Perfusion (Stroke, Ischemic/Transient Ischemic Attack)  Goal: Optimal Cerebral Tissue Perfusion  Outcome: Ongoing, Progressing     Problem: Cognitive Impairment (Stroke, Ischemic/Transient Ischemic Attack)  Goal: Optimal Cognitive Function  Outcome: Ongoing, Progressing     Problem: Communication Impairment (Stroke, Ischemic/Transient Ischemic Attack)  Goal: Improved Communication Skills  Outcome: Ongoing, Progressing     Problem: Functional Ability Impaired (Stroke, Ischemic/Transient Ischemic Attack)  Goal: Optimal Functional Ability  Outcome: Ongoing, Progressing     Problem: Respiratory Compromise (Stroke, Ischemic/Transient Ischemic Attack)  Goal: Effective Oxygenation and Ventilation  Outcome: Ongoing, Progressing     Problem: Sensorimotor Impairment (Stroke, Ischemic/Transient Ischemic Attack)  Goal: Improved Sensorimotor Function  Outcome: Ongoing, Progressing     Problem: Swallowing Impairment (Stroke, Ischemic/Transient Ischemic Attack)  Goal: Optimal Eating and Swallowing without Aspiration  Outcome: Ongoing, Progressing     Problem: Urinary Elimination Impaired (Stroke, Ischemic/Transient Ischemic Attack)  Goal: Effective Urinary Elimination  Outcome: Ongoing, Progressing     Problem: Skin Injury Risk  Increased  Goal: Skin Health and Integrity  Outcome: Ongoing, Progressing

## 2022-06-04 NOTE — PT/OT/SLP PROGRESS
Speech Language Pathology Treatment          Sammie Belcher   MRN: [id number]     Diet recommendations: Solid Diet Level: Easy to Chew Diet - IDDSI Level 7  Liquid Diet Level: Thin          Billable Minutes:  Speech Therapy Individual 30    General Precautions: Standard, aphasia, aspiration        Vital Signs:  Heart Rate:159/85  Blood Pressure:94  Nursing aware of BP measures and in room administering medication. Nsg ok'd session.    Subjective:  Patient with excellent participation and motivation.    Objective:   Patient alert, appropriate and cooperative;  SLP trained patient in expressive language tasks incorporating RET to facilitate word-finding skills. Masking inconsistently productive in decreasing disfluencies. SLP incorporated TENZIN and written stimuli which were somewhat successful in reducing dislfuencies at the word and short phrase level. Close procedures facilitated patient success with naming object function and object name with 5/5 and 4/5 accuracy, respectively.         Assessment:  Sammie Belcher is a 52 y.o. female with a SLP diagnosis of disfluencies and Aphasia. She present with functional impairments in expressive/recpetive communication adversely affecting functional communication with staff/family/peers.    Discharge recommendations: Discharge Facility/Level of Care Needs: home health speech therapy     Goals:   Multidisciplinary Problems     SLP Goals        Problem: SLP    Goal Priority Disciplines Outcome   SLP Goal     SLP    Description: LTG: To increase expressive/receptive language skills to enhance functional communication to express basic wants and needs with 100% effectiveness.     ST. Pt will answer simple, environmental, and personal yes/no questions with 90% accuracy and minimal cues.  2. Pt will complete confrontational naming tasks with 90% accuracy and minimal cues.  3. Pt will use fluency shaping/stuttering modification techniques to decrease syllables stuttered to  less than 5%SS.                    Plan:   Patient to be seen Therapy Frequency: 5 x/week   Planned Interventions: Language Therapy (Fluency Therapy)  Plan of Care Expires: 06/10/22  Plan of Care reviewed with: patient  SLP Follow-up?: Yes  SLP - Next Visit Date: 06/10/22        6/4/2022

## 2022-06-05 PROCEDURE — 94799 UNLISTED PULMONARY SVC/PX: CPT

## 2022-06-05 PROCEDURE — 25000003 PHARM REV CODE 250: Performed by: NURSE PRACTITIONER

## 2022-06-05 PROCEDURE — 97530 THERAPEUTIC ACTIVITIES: CPT

## 2022-06-05 PROCEDURE — 97112 NEUROMUSCULAR REEDUCATION: CPT

## 2022-06-05 PROCEDURE — 11800000 HC REHAB PRIVATE ROOM

## 2022-06-05 PROCEDURE — 97110 THERAPEUTIC EXERCISES: CPT

## 2022-06-05 PROCEDURE — 97110 THERAPEUTIC EXERCISES: CPT | Mod: CQ

## 2022-06-05 PROCEDURE — 97535 SELF CARE MNGMENT TRAINING: CPT

## 2022-06-05 PROCEDURE — 63600175 PHARM REV CODE 636 W HCPCS: Performed by: NURSE PRACTITIONER

## 2022-06-05 PROCEDURE — S4991 NICOTINE PATCH NONLEGEND: HCPCS | Performed by: NURSE PRACTITIONER

## 2022-06-05 PROCEDURE — 97530 THERAPEUTIC ACTIVITIES: CPT | Mod: CQ

## 2022-06-05 RX ORDER — LISINOPRIL 10 MG/1
10 TABLET ORAL NIGHTLY
Status: DISCONTINUED | OUTPATIENT
Start: 2022-06-05 | End: 2022-06-23 | Stop reason: HOSPADM

## 2022-06-05 RX ADMIN — THIAMINE HCL TAB 100 MG 100 MG: 100 TAB at 02:06

## 2022-06-05 RX ADMIN — BUPROPION HYDROCHLORIDE 150 MG: 150 TABLET, FILM COATED, EXTENDED RELEASE ORAL at 08:06

## 2022-06-05 RX ADMIN — NICOTINE 1 PATCH: 21 PATCH, EXTENDED RELEASE TRANSDERMAL at 08:06

## 2022-06-05 RX ADMIN — ENOXAPARIN SODIUM 40 MG: 40 INJECTION SUBCUTANEOUS at 05:06

## 2022-06-05 RX ADMIN — LABETALOL HYDROCHLORIDE 200 MG: 200 TABLET, FILM COATED ORAL at 06:06

## 2022-06-05 RX ADMIN — THIAMINE HCL TAB 100 MG 100 MG: 100 TAB at 08:06

## 2022-06-05 RX ADMIN — LABETALOL HYDROCHLORIDE 200 MG: 200 TABLET, FILM COATED ORAL at 08:06

## 2022-06-05 RX ADMIN — LABETALOL HYDROCHLORIDE 200 MG: 200 TABLET, FILM COATED ORAL at 02:06

## 2022-06-05 RX ADMIN — AMLODIPINE BESYLATE 10 MG: 5 TABLET ORAL at 06:06

## 2022-06-05 RX ADMIN — SERTRALINE HYDROCHLORIDE 25 MG: 25 TABLET ORAL at 08:06

## 2022-06-05 RX ADMIN — THIAMINE HCL TAB 100 MG 100 MG: 100 TAB at 09:06

## 2022-06-05 RX ADMIN — LISINOPRIL 10 MG: 10 TABLET ORAL at 08:06

## 2022-06-05 RX ADMIN — ATORVASTATIN CALCIUM 40 MG: 40 TABLET, FILM COATED ORAL at 08:06

## 2022-06-05 RX ADMIN — ASPIRIN 325 MG: 325 TABLET, COATED ORAL at 08:06

## 2022-06-05 RX ADMIN — MULTIPLE VITAMINS W/ MINERALS TAB 1 TABLET: TAB at 08:06

## 2022-06-05 NOTE — PT/OT/SLP PROGRESS
Occupational Therapy  Treatment      Sammie Belcher   MRN: [id number]   Admitting Diagnosis: CVA (cerebral vascular accident)         Billable Minutes:  Self Care/Home Management 45, Therapeutic Activity 15, Therapeutic Exercise 15 and Neuromuscular Re-education 15    OT/BETH: OT          General Precautions: Standard, fall, aphasia  Orthopedic Precautions: N/A         Subjective:    Patient's communication limited due to expressive aphasia.   Patient's 1-step command following intact, but multi-step command following was impaired. Patient's safety awareness was also impaired.     Pain/Comfort  Pain Rating 1: 0/10     Vitals @ 0900 - BP: 141/89, HR: 78  Vitals @ 1030 - BP: 113/76, HR: 79    Objective:       Functional Mobility:  Transfer Training:   Sit to stand:Minimal Assistance with Rolling Walker    Bed <> Chair:  Step Transfer with Moderate Assistance with Rolling Walker    Chair <> Mat: Step Transfer with Moderate Assistance with  Rolling Walker    Toilet Transfer:  Pt Step Transfer with Moderate Assistance with Rolling Walker and and BSC over toilet        Patient performed bed mobility (semi-supine>sitting EOB) with Min A at beginning of session and (sitting EOB>semi-supine) with Mod A at end of session.       ADLs:      Current Status   Functional Area: Care Score:       Oral Hygiene 4   Toileting Hygiene 3       Upper Body Dressing 4   Lower Body Dressing 3   Putting On/Taking Off Footwear 4   Toilet Transfer 3     Patient performed UE dressing to doff/don pull-over shirt with utilization of nga-techniques with Supervision required. Patient performed LE dressing to doff/don brief & shorts with utilization of RW & nga-techniques with Min A required. Patient also doffed/donned B  socks with Supervision required. Patient performed grooming tasks & oral care sitting in w/c at sink with Supervision required. Patient performed toileting (+void urine & BM) with Mod A required.     Increased time for task  completion required & mod-max verbal cues given for initiation of task, motor planning, use of R UE in task, & safety but with increased time & verbal cues provided, patient performed ADL tasks with increased independence.       Endurance and Strengthening    R UE A/AAROM performed to all joints in all planes of motion x 10 reps. Patient tolerated well, but frequent rest breaks required due to muscle fatigue.     Fine/Gross motor coordination     Patient performed R UE GMC activity with R UE contalateral & ipsilateral reach in various planes to grasp & release cone with incorporation of dynamic sitting balance with rest breaks required & verbal/tactile cues provided for motor planning & R UE initiation of movement. Supervision required.     Neuromuscular Re-education    Patient performed R UE weightbearing with L UE contralateral reach to remove/place graded clips on graded clip tree with assistance provided to support R UE in weightbearing position, Min A for dynamic sitting balance, & SBA for static sitting balance. Patient tolerated well with positive response to treatment.       Education Provided: Roles and goals of OT, ADLs, transfer training, bed mobility and body mechanics    Expected compliance: High compliance    ASSESSMENT:  Sammie Belcher is a 52 y.o. female with a medical diagnosis of CVA (cerebral vascular accident) performed R UE A/AAROM, neuromuscular re-education, ADL training, functional t/f training, & R UE GMC activities to increase ADL independence and safety. Pt is progressing well towards OT goals.    Patient left semi-supine in bed with alarm on & all needs in reach.       GOALS:   Multidisciplinary Problems     Occupational Therapy Goals        Problem: Occupational Therapy    Goal Priority Disciplines Outcome Interventions   Occupational Therapy Goal     OT, PT/OT Ongoing, Progressing    Description: ADLs:  Pt to perform grooming tasks with supervision and RW and min vc  Pt to perform  feeding tasks with independence   Pt to perform UB dressing with set up assist and min vc  Pt to perform LB dressing with CGA and min vc   Pt to perform putting on/off footwear task with supervision and min vc  Pt to perform toileting with supervision and min vc    Functional Transfers:  Pt to perform toilet transfers with supervision and min vc and RW  Pt to perform a tub transfer with supervision, RW, min vc    IADLs:  Pt to perform simple care taking/ home management tasks with min A and min vc    Balance, Strengthening, Endurance, Balance:  Pt to demonstrate dynamic standing balance as required to perform ADL's from standing level with RW and min vc.  Pt to demonstrate BUE strength during functional task and increased use of R UE.                   Plan:  Patient to be seen daily (BID) to address the above listed problems via self-care/home management, community/work re-entry, therapeutic activities, therapeutic exercises, neuromuscular re-education, cognitive retraining  Plan of Care expires:    Plan of Care reviewed with: patient      06/05/2022

## 2022-06-05 NOTE — PROGRESS NOTES
[facility] ([facility])  Rehab Progress Note    Patient Name: Sammie Belcher  MRN: [id number]  Age: 52 y.o. Sex: female  : 1970  Hospital Length of Stay: 3 days  Date of Service:  2022  Chief Complaint: Left frontal lobe 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    Subjective:     Basic Information  Admit Information: 52-year-old  female presented to [facility] ED on 2022 complaining of dysarthria and expressive aphasia x3 days.  PMH significant hyperparathyroidism s/p parathyroidectomy 2022, anxiety/depression, and HTN.  Workup significant for reactive RPR and CT head significant for left frontal 6 cm cortical base hypodensity suggesting acute to subacute nonhemorrhagic infarct of the left KENISHA vascular territory.  MRA brain significant for left frontal lobe infarct extending into the corpus callosum with acute nonhemorrhagic infarct in the territory of the KENISHA, and old lacunar infarcts. Transferred to [facility] for neurology services.  Bicillin initiated on  for syphilis with plan for 3 doses with end date on .  Also received Flagyl x 7 days due to Trichomoniasis.  TTE significant for EF 74% with severe concentric hypertrophy and hyperdynamic systolic function with negative bubble study.  ASA and statin initiated.  LP completed on  due to suspicion of vasculitic CVA  neuro syphilis.  LP not revealing with no evidence of CNS infection.  Tolerating LINQ placement on .  CVA likely caused by long history cocaine abuse.  Continue to participate in progress therapy.  Tolerated transferred to [facility] inpatient rehab unit on  without incident.  Today's Information:  Staff report fall overnight with no injury.  Fall was unwitnessed after patient got up to go to the bathroom without calling.  Sleep hygiene good.  Appetite good.  Last BM .  BP elevated.  No labs or imaging today.    Review of patient's allergies  indicates:  No Known Allergies     Current Facility-Administered Medications:     acetaminophen tablet 650 mg, 650 mg, Oral, Q4H PRN, Nish Lymanehart, FNP, 650 mg at 06/04/22 2339    alendronate tablet 70 mg, 70 mg, Oral, Q7 Days, Nish Lymanehart, FNP, 70 mg at 06/03/22 0623    ALPRAZolam tablet 0.25 mg, 0.25 mg, Oral, TID PRN, Nish A Destiny, FNP    amLODIPine tablet 10 mg, 10 mg, Oral, Daily, Nish HARMON Destiny, FNP, 10 mg at 06/04/22 0833    aspirin EC tablet 325 mg, 325 mg, Oral, Daily, Nish HARMON Destiny, FNP, 325 mg at 06/04/22 0833    atorvastatin tablet 40 mg, 40 mg, Oral, Daily, Nish HARMON Destiny, FNP, 40 mg at 06/04/22 0833    benzonatate capsule 100 mg, 100 mg, Oral, TID PRN, Nish A Destiny, FNP    bisacodyL suppository 10 mg, 10 mg, Rectal, Daily PRN, Nsih A Destiny, FNP    buPROPion TB24 tablet 150 mg, 150 mg, Oral, Daily, Nish HARMON Destiny, FNP, 150 mg at 06/04/22 0833    enoxaparin injection 40 mg, 40 mg, Subcutaneous, Daily, Nish Lymanehart, FNP, 40 mg at 06/04/22 1637    hydrALAZINE injection 10 mg, 10 mg, Intravenous, Q4H PRN, Nish FAUSTINO Destiny, FNP    HYDROcodone-acetaminophen 5-325 mg per tablet 1 tablet, 1 tablet, Oral, Q4H PRN, Nish FAUSTINO Destiny, FNP    hydrOXYzine pamoate capsule 50 mg, 50 mg, Oral, Nightly PRN, Nish FAUSTINO Destiny, FNP    labetalol 20 mg/4 mL (5 mg/mL) IV syring, 10 mg, Intravenous, Q4H PRN, Nish A Destiny, FNP    labetaloL tablet 200 mg, 200 mg, Oral, TID, Nish FAUSTINO Destiny, FNP, 200 mg at 06/04/22 2031    metoprolol injection 10 mg, 10 mg, Intravenous, Q2H PRN, SIRISHA Fam    multivitamin tablet, 1 tablet, Oral, Daily, SIRISHA Fam, 1 tablet at 06/04/22 0833    nicotine 21 mg/24 hr 1 patch, 1 patch, Transdermal, Daily, SIRISHA Fam, 1 patch at 06/04/22 0833    nitroGLYCERIN SL tablet 0.4 mg, 0.4 mg, Sublingual, Q5 Min PRN, SIRISHA Fam    ondansetron  "disintegrating tablet 4 mg, 4 mg, Oral, Q6H PRN, Nish Woodart, FNP    ondansetron disintegrating tablet 8 mg, 8 mg, Oral, Q6H PRN, Nish HARMON Destiny, FNP    polyethylene glycol packet 17 g, 17 g, Oral, Q12H PRN, Nish HARMON Destiny, FNP    promethazine tablet 25 mg, 25 mg, Oral, Q6H PRN, Nish HARMON Destiny, FNP    sertraline tablet 25 mg, 25 mg, Oral, Daily, Nish HARMON Destiny, FNP, 25 mg at 06/04/22 0833    thiamine tablet 100 mg, 100 mg, Oral, TID, Nish HARMON Destiny, FNP, 100 mg at 06/04/22 2030     Review of Systems   Complete 12-point review of symptoms negative except for what's mentioned in HPI     Objective:     /76   Pulse 81   Temp 97.6 °F (36.4 °C) (Oral)   Resp 20   Ht 6' 0.99" (1.854 m)   Wt 65.5 kg (144 lb 6.4 oz)   LMP  (LMP Unknown)   SpO2 (!) 94%   BMI 19.06 kg/m²        Intake/Output Summary (Last 24 hours) at 6/5/2022 0600  Last data filed at 6/4/2022 1700  Gross per 24 hour   Intake 720 ml   Output --   Net 720 ml       Physical Exam  Constitutional:       Appearance: Normal appearance.   HENT:      Head: Normocephalic.      Mouth/Throat:      Mouth: Mucous membranes are moist.   Eyes:      Pupils: Pupils are equal, round, and reactive to light.   Cardiovascular:      Rate and Rhythm: Normal rate and regular rhythm.      Heart sounds: Normal heart sounds.   Pulmonary:      Effort: Pulmonary effort is normal.      Breath sounds: Normal breath sounds.   Abdominal:      General: Bowel sounds are normal.      Palpations: Abdomen is soft.   Musculoskeletal:      Cervical back: Neck supple.      Comments: Slight weakness to right side.  Weakness to left lower extremity.   Skin:     General: Skin is warm and dry.   Neurological:      Mental Status: She is alert and oriented to person, place, and time.      Comments: Dysarthria/expressive and receptive aphasia    Psychiatric:         Mood and Affect: Mood normal.       Lines/Drains/Airways     None               Labs:  NO " LABS TODAY    Radiology:  MRI brain without contrast 05/19/2002 at 5:38 p.m., IMPRESSION: Motion degraded exam. In spite of this limitation: Moderate to severe multifocal flow signal abnormality about the bilateral ACAs, bilateral distal MCAs and to lesser extent bilateral PCAs.  Radiology  Transthoracic echo 05/22/2022 at 7:50 a.m., IMPRESSION:  LV is normal in size with hyperdynamic systolic function.  Estimated EF 75%.  There is grade 1 diastolic dysfunction consistent with impaired relaxation.  Left atrial pressure is normal.  RV normal size and function.  RA normal size.  AV structurally normal.  No regurgitation.  No stenosis.  There is nonspecific leaflet thickening 2 mitral valve.  There is trace mitral valve regurgitation.  There is no stenosis.  Tricuspid valve with trace regurgitation.  No valve stenosis.  PA P could not be obtained.  There is no prior care effusion.  There is no evidence 10 benign.  Negative bubble study.    Assessment/Plan:     52 y.o. AAF admitted on 6/2/2022    Left frontal lobe infarct   - with dysarthria/expressive and receptive aphasia and right-sided weakness  - continue                Aspirin 325 mg daily                Lipitor 40 mg daily                Multivitamin daily  - defer to physiatry for rehab and pain management  - PT/OT/RT/ST following     POSITIVE RPR  - s/p Bicillin x 3  - LP on 5/22 negative for CNS infection     History of alcohol dependence  - stable  - no withdrawals  - continue                Thiamine 100 mg t.i.d.     HTN  - BP elevated!!  - initiate   Lisinopril 10 mg at bedtime (initiated 6/5)  - continue                Norvasc 10 mg daily                Labetalol 200 mg t.i.d.                Hydralazine 10 mg every 2 hours as needed for BP > 160/90                Labetalol 10 mg every 2 hours as needed for BP > 160/90     Nicotine dependence  - stable  - continue                Nicotine patch 21 mg daily     Anxiety/depression  - in remission  - continue                 Wellbutrin 150 mg XR daily                Zoloft 25 mg daily     Hyperparathyroidism  - stable  - s/p parathyroidectomy 02/23/2022  - continue                Vitamin-D 65779 units Q weekly                Fosamax 70 mg Q weekly     AB therapy  Bicillin x3 doses (5/19-6/2)     VTE Prophylaxis:  Lovenox 40 mg daily  COVID-19 testing:  Negative on 6/02/2022  COVID-19 vaccination status:  Vaccinated (Pfizer):  08/25/2021 and 08/04/2021     POA: No  Living will: No  Contacts:  Gamaliel Hein ([facility]) [phone]     CODE STATUS: Full Code  Internal Medicine (attending): José Miguel Cesar MD     OUTPATIENT PROVIDERS  PCP: Wanda Bell MD  Neurology:  Fede Lopez MD  Infectious disease: Marcelina Nguyen MD     DISPOSITION: Condition stable.  Sleep hygiene, bowel maintenance, and appetite at goal.  Vital signs at goal with no recorded fevers.  BP moderately elevated.  Initiate lisinopril 10 mg at bedtime.  Bed alarm placed 2/2 fall.  Continue current POC.  Monitor closely.  Notify of acute changes.    Total time spent on this encounter including chart review and direct 1-on-1 patient interaction: 37 minutes   Over 50% of this time was spent in counseling and coordination of care

## 2022-06-05 NOTE — PROGRESS NOTES
[facility] ([facility])  Rehab Progress Note    Patient Name: Sammie Belcher  MRN: [id number]  Age: 52 y.o. Sex: female  : 1970  Hospital Length of Stay: 3 days  Date of Service:  2022  Chief Complaint: Left frontal lobe 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    Subjective:     Basic Information  Admit Information: 52-year-old  female presented to [facility] ED on 2022 complaining of dysarthria and expressive aphasia x3 days.  PMH significant hyperparathyroidism s/p parathyroidectomy 2022, anxiety/depression, and HTN.  Workup significant for reactive RPR and CT head significant for left frontal 6 cm cortical base hypodensity suggesting acute to subacute nonhemorrhagic infarct of the left KENISHA vascular territory.  MRA brain significant for left frontal lobe infarct extending into the corpus callosum with acute nonhemorrhagic infarct in the territory of the KENISHA, and old lacunar infarcts. Transferred to [facility] for neurology services.  Bicillin initiated on  for syphilis with plan for 3 doses with end date on .  Also received Flagyl x 7 days due to Trichomoniasis.  TTE significant for EF 74% with severe concentric hypertrophy and hyperdynamic systolic function with negative bubble study.  ASA and statin initiated.  LP completed on  due to suspicion of vasculitic CVA  neuro syphilis.  LP not revealing with no evidence of CNS infection.  Tolerating LINQ placement on .  CVA likely caused by long history cocaine abuse.  Continue to participate in progress therapy.  Tolerated transferred to [facility] inpatient rehab unit on  without incident.  Today's Information: No acute events overnight.  Sitting comfortably in bed.  Reports good sleep.  Appetite fair.  Last BM .  Vital signs at goal with no recorded fevers.  No labs or imaging today.    Review of patient's allergies indicates:  No Known Allergies     Current  Facility-Administered Medications:     acetaminophen tablet 650 mg, 650 mg, Oral, Q4H PRN, Nish Lymanehart, FNP, 650 mg at 06/04/22 2339    alendronate tablet 70 mg, 70 mg, Oral, Q7 Days, Nish Lymanehart, FNP, 70 mg at 06/03/22 0623    ALPRAZolam tablet 0.25 mg, 0.25 mg, Oral, TID PRN, Nish FAUSTINO Destiny, FNP    amLODIPine tablet 10 mg, 10 mg, Oral, Daily, Nish Lymanehart, FNP, 10 mg at 06/04/22 0833    aspirin EC tablet 325 mg, 325 mg, Oral, Daily, Nish FAUSTINO Destiny, FNP, 325 mg at 06/04/22 0833    atorvastatin tablet 40 mg, 40 mg, Oral, Daily, Nish HARMON Destiny, FNP, 40 mg at 06/04/22 0833    benzonatate capsule 100 mg, 100 mg, Oral, TID PRN, Nish FAUSTINO Destiny, FNP    bisacodyL suppository 10 mg, 10 mg, Rectal, Daily PRN, Nish A Destiny, FNP    buPROPion TB24 tablet 150 mg, 150 mg, Oral, Daily, Nish A Destiny, FNP, 150 mg at 06/04/22 0833    enoxaparin injection 40 mg, 40 mg, Subcutaneous, Daily, Nish A Destiny, FNP, 40 mg at 06/04/22 1637    hydrALAZINE injection 10 mg, 10 mg, Intravenous, Q4H PRN, Nish FAUSTINO Destiny, FNP    HYDROcodone-acetaminophen 5-325 mg per tablet 1 tablet, 1 tablet, Oral, Q4H PRN, Nish FAUSTINO Destiny, FNP    hydrOXYzine pamoate capsule 50 mg, 50 mg, Oral, Nightly PRN, Nish FAUSTINO Destiny, FNP    labetalol 20 mg/4 mL (5 mg/mL) IV syring, 10 mg, Intravenous, Q4H PRN, Nish A Destiny, FNP    labetaloL tablet 200 mg, 200 mg, Oral, TID, Nish FAUSTINO Destiny, FNP, 200 mg at 06/04/22 2031    metoprolol injection 10 mg, 10 mg, Intravenous, Q2H PRN, GARY FamP    multivitamin tablet, 1 tablet, Oral, Daily, GARY FamP, 1 tablet at 06/04/22 0833    nicotine 21 mg/24 hr 1 patch, 1 patch, Transdermal, Daily, GARY FamP, 1 patch at 06/04/22 0833    nitroGLYCERIN SL tablet 0.4 mg, 0.4 mg, Sublingual, Q5 Min PRN, GARY FamP    ondansetron disintegrating tablet 4 mg, 4 mg, Oral, Q6H PRN,  "Nish Dixon, FNP    ondansetron disintegrating tablet 8 mg, 8 mg, Oral, Q6H PRN, Nish Woodart, FNP    polyethylene glycol packet 17 g, 17 g, Oral, Q12H PRN, Nish Lymanehart, FNP    promethazine tablet 25 mg, 25 mg, Oral, Q6H PRN, Nish Lymanehart, FNP    sertraline tablet 25 mg, 25 mg, Oral, Daily, Nish Dixon, FNP, 25 mg at 06/04/22 0833    thiamine tablet 100 mg, 100 mg, Oral, TID, Nish Lymanehart, FNP, 100 mg at 06/04/22 2030     Review of Systems   Complete 12-point review of symptoms negative except for what's mentioned in HPI     Objective:     /76   Pulse 81   Temp 97.6 °F (36.4 °C) (Oral)   Resp 20   Ht 6' 0.99" (1.854 m)   Wt 65.5 kg (144 lb 6.4 oz)   LMP  (LMP Unknown)   SpO2 (!) 94%   BMI 19.06 kg/m²        Intake/Output Summary (Last 24 hours) at 6/5/2022 0549  Last data filed at 6/4/2022 1700  Gross per 24 hour   Intake 720 ml   Output --   Net 720 ml       Physical Exam  Constitutional:       Appearance: Normal appearance.   HENT:      Head: Normocephalic.      Mouth/Throat:      Mouth: Mucous membranes are moist.   Eyes:      Pupils: Pupils are equal, round, and reactive to light.   Cardiovascular:      Rate and Rhythm: Normal rate and regular rhythm.      Heart sounds: Normal heart sounds.   Pulmonary:      Effort: Pulmonary effort is normal.      Breath sounds: Normal breath sounds.   Abdominal:      General: Bowel sounds are normal.      Palpations: Abdomen is soft.   Musculoskeletal:      Cervical back: Neck supple.      Comments: Slight weakness to right side.  Weakness to left lower extremity.   Skin:     General: Skin is warm and dry.   Neurological:      Mental Status: She is alert and oriented to person, place, and time.      Comments: Dysarthria/expressive and receptive aphasia    Psychiatric:         Mood and Affect: Mood normal.         Lines/Drains/Airways     None                 Labs:  NO LABS TODAY    Radiology:  MRI brain without " contrast 05/19/2002 at 5:38 p.m., IMPRESSION: Motion degraded exam. In spite of this limitation: Moderate to severe multifocal flow signal abnormality about the bilateral ACAs, bilateral distal MCAs and to lesser extent bilateral PCAs.  Radiology  Transthoracic echo 05/22/2022 at 7:50 a.m., IMPRESSION:  LV is normal in size with hyperdynamic systolic function.  Estimated EF 75%.  There is grade 1 diastolic dysfunction consistent with impaired relaxation.  Left atrial pressure is normal.  RV normal size and function.  RA normal size.  AV structurally normal.  No regurgitation.  No stenosis.  There is nonspecific leaflet thickening 2 mitral valve.  There is trace mitral valve regurgitation.  There is no stenosis.  Tricuspid valve with trace regurgitation.  No valve stenosis.  PA P could not be obtained.  There is no prior care effusion.  There is no evidence 10 benign.  Negative bubble study.    Assessment/Plan:     52 y.o. AAF admitted on 6/2/2022    Left frontal lobe infarct   - with dysarthria/expressive and receptive aphasia and right-sided weakness  - continue                Aspirin 325 mg daily                Lipitor 40 mg daily                Multivitamin daily  - defer to physiatry for rehab and pain management  - PT/OT/RT/ST following     POSITIVE RPR  - s/p Bicillin x 3  - LP on 5/22 negative for CNS infection     History of alcohol dependence  - stable  - no withdrawals  - continue                Thiamine 100 mg t.i.d.     HTN  - at goal!!  - continue                Norvasc 10 mg daily                Labetalol 200 mg t.i.d.                Hydralazine 10 mg every 2 hours as needed for BP > 160/90                Labetalol 10 mg every 2 hours as needed for BP > 160/90     Nicotine dependence  - stable  - continue                Nicotine patch 21 mg daily     Anxiety/depression  - in remission  - continue                Wellbutrin 150 mg XR daily                Zoloft 25 mg daily     Hyperparathyroidism  -  stable  - s/p parathyroidectomy 02/23/2022  - continue                Vitamin-D 02136 units Q weekly                Fosamax 70 mg Q weekly     AB therapy  Bicillin x3 doses (5/19-6/2)     VTE Prophylaxis:  Lovenox 40 mg daily  COVID-19 testing:  Negative on 6/02/2022  COVID-19 vaccination status:  Vaccinated (Pfizer):  08/25/2021 and 08/04/2021     POA: No  Living will: No  Contacts:  Gamaliel Hein ([facility]) [phone]     CODE STATUS: Full Code  Internal Medicine (attending): José Miguel Cesar MD     OUTPATIENT PROVIDERS  PCP: Wanda Bell MD  Neurology:  Fede Lopez MD  Infectious disease: Marcelina Nguyen MD     DISPOSITION: Condition stable.   sleep hygiene and bowel maintenance at goal.  Appetite fair.  Encourage oral nutrition hydration.  Vital signs at goal with no recorded fevers.  No labs or imaging today.  Continue current POC.  Monitor closely.  Notify of acute changes.    Total time spent on this encounter including chart review and direct 1-on-1 patient interaction: 46 minutes   Over 50% of this time was spent in counseling and coordination of care

## 2022-06-05 NOTE — PT/OT/SLP PROGRESS
Physical Therapy         Treatment        Sammie Belcher   MRN: [id number]     Therapy Minutes  PT Start Time: 1215  PT Stop Time: 1345  PT Total Time (min): 90 min  PT Individual: 90     Billable Minutes:  Therapeutic Activity 55 and Therapeutic Exercise 35      Treatment Type: Treatment  PT/PTA: PTA             General Precautions: Standard, fall, aphasia  Orthopedic Precautions: Orthopedic Precautions : N/A   Braces:      Subjective:    Pain/Comfort  Pain Rating 1: 0/10    Objective:  Therapeutic activities, therapeutic exercise performed to increase muscle strength and motor recruitment so that patient may improve quality of functionional mobility and increase functional independence.    VITALS:  BP:  149/89  HR 78          In sitting          146/85  HR 83    Functional Mobility:  Bed Mobility:   Supine to sit: Minimal Assistance    Sit to supine: Minimal Assistance        Transfer Training:  Sit to stand:Contact Guard Assistance with Rolling Walker    Bed <> Chair:  Step Transfer with Moderate Assistance with Rolling Walker    Toilet Transfer:  Pt Step Transfer with Moderate Assistance with Rolling Walker positive void    Wheelchair Training:  Patient propels wheelchair 40 feet outside in community enviroment using LUE only, pt unable to engage RUE despite max VC with  Moderate Assistance and max verbal cues.      Additional Treatment:  Pt performed dynamic sitting/standing for changing soiled brief and shorts.    In // pt instructed to step fwd/bwd onto target with RLE to improve motor planning skills. Pt able to successfully complete multiple reps at time, pt requiring max VC/TC to complete at other times.    Seated in WC, mirror for visual feedback. Pt performed 2x5 hip flexion with RLE. VC repired.    2x10 HELADIO LAQ, PF/DF.    Activity Tolerance:  Patient tolerated treatment well    Patient left supine with call button in reach and bed alarm on.    Education Provided: transfer training, safety awareness,  wheelchair management and strengthening exercises    Expected compliance:  High compliance        Assessment:  Sammie Belcher is a 52 y.o. female with a medical diagnosis of CVA (cerebral vascular accident). She presents with  . Pt to benefit from skilled PT services to address impairments with progression towards functional independence as tolerated.    Rehab potential is excellent.    Activity tolerance: Excellent    Discharge recommendations:       Equipment recommendations:       GOALS:   Multidisciplinary Problems     Physical Therapy Goals        Problem: Physical Therapy    Goal Priority Disciplines Outcome Goal Variances Interventions   Physical Therapy Goal     PT, PT/OT Ongoing, Progressing     Description: Short Term goals      Bed Mobility   Roll Right and Left Setup or Clean-up Assistance.  Lying to sitting Setup or Clean-up Assistance.  Sitting to lying Setup or Clean-up Assistance.    Transfers    Pt will be able to perform Stand step chair/bed to chair transfer With LRAD Setup or Clean-up Assistance.  Pt will be able to perform Sit to stand with LRAD   Setup or Clean-up Assistance.  Pt will be able to perform Car transfer with LRAD Setup or Clean-up Assistance.    Ambulation    Pt will ambulate 100 Feet with LRAD Setup or Clean-up Assistance.      Wheelchair Mobility   Pt will be able to propel 150 feet in paola wheelchair Setup or Clean-up Assistance.      Timeframe: By Discharge                      PLAN:    Patient to be seen 5 x/week  to address the above listed problems via gait training, therapeutic activities, therapeutic exercises, neuromuscular re-education  Plan of Care expires: 06/09/22  Plan of Care reviewed with: patient    6/5/2022

## 2022-06-06 ENCOUNTER — PATIENT OUTREACH (OUTPATIENT)
Dept: ADMINISTRATIVE | Facility: CLINIC | Age: 52
End: 2022-06-06
Payer: MEDICAID

## 2022-06-06 LAB
ALBUMIN SERPL-MCNC: 3.5 GM/DL (ref 3.5–5)
ALBUMIN/GLOB SERPL: 1.1 RATIO (ref 1.1–2)
ALP SERPL-CCNC: 72 UNIT/L (ref 40–150)
ALT SERPL-CCNC: 38 UNIT/L (ref 0–55)
AST SERPL-CCNC: 30 UNIT/L (ref 5–34)
BASOPHILS # BLD AUTO: 0.05 X10(3)/MCL (ref 0–0.2)
BASOPHILS NFR BLD AUTO: 0.6 %
BILIRUBIN DIRECT+TOT PNL SERPL-MCNC: 0.5 MG/DL
BUN SERPL-MCNC: 12.6 MG/DL (ref 9.8–20.1)
CALCIUM SERPL-MCNC: 9.8 MG/DL (ref 8.4–10.2)
CHLORIDE SERPL-SCNC: 108 MMOL/L (ref 98–107)
CO2 SERPL-SCNC: 28 MMOL/L (ref 22–29)
CREAT SERPL-MCNC: 0.75 MG/DL (ref 0.55–1.02)
EOSINOPHIL # BLD AUTO: 0.35 X10(3)/MCL (ref 0–0.9)
EOSINOPHIL NFR BLD AUTO: 4.4 %
ERYTHROCYTE [DISTWIDTH] IN BLOOD BY AUTOMATED COUNT: 13.9 % (ref 11.5–17)
GLOBULIN SER-MCNC: 3.2 GM/DL (ref 2.4–3.5)
GLUCOSE SERPL-MCNC: 83 MG/DL (ref 74–100)
HCT VFR BLD AUTO: 40.1 % (ref 37–47)
HGB BLD-MCNC: 13.2 GM/DL (ref 12–16)
IMM GRANULOCYTES # BLD AUTO: 0.02 X10(3)/MCL (ref 0–0.02)
IMM GRANULOCYTES NFR BLD AUTO: 0.3 % (ref 0–0.43)
LYMPHOCYTES # BLD AUTO: 2.73 X10(3)/MCL (ref 0.6–4.6)
LYMPHOCYTES NFR BLD AUTO: 34.3 %
MAGNESIUM SERPL-MCNC: 2.1 MG/DL (ref 1.6–2.6)
MCH RBC QN AUTO: 30 PG (ref 27–31)
MCHC RBC AUTO-ENTMCNC: 32.9 MG/DL (ref 33–36)
MCV RBC AUTO: 91.1 FL (ref 80–94)
MONOCYTES # BLD AUTO: 0.49 X10(3)/MCL (ref 0.1–1.3)
MONOCYTES NFR BLD AUTO: 6.2 %
NEUTROPHILS # BLD AUTO: 4.3 X10(3)/MCL (ref 2.1–9.2)
NEUTROPHILS NFR BLD AUTO: 54.2 %
NRBC BLD AUTO-RTO: 0 %
PHOSPHATE SERPL-MCNC: 3.7 MG/DL (ref 2.3–4.7)
PLATELET # BLD AUTO: 230 X10(3)/MCL (ref 130–400)
PMV BLD AUTO: 10 FL (ref 9.4–12.4)
POTASSIUM SERPL-SCNC: 3.9 MMOL/L (ref 3.5–5.1)
PROT SERPL-MCNC: 6.7 GM/DL (ref 6.4–8.3)
RBC # BLD AUTO: 4.4 X10(6)/MCL (ref 4.2–5.4)
SODIUM SERPL-SCNC: 144 MMOL/L (ref 136–145)
WBC # SPEC AUTO: 8 X10(3)/MCL (ref 4.5–11.5)

## 2022-06-06 PROCEDURE — 84100 ASSAY OF PHOSPHORUS: CPT | Performed by: NURSE PRACTITIONER

## 2022-06-06 PROCEDURE — S4991 NICOTINE PATCH NONLEGEND: HCPCS | Performed by: NURSE PRACTITIONER

## 2022-06-06 PROCEDURE — 85025 COMPLETE CBC W/AUTO DIFF WBC: CPT | Performed by: NURSE PRACTITIONER

## 2022-06-06 PROCEDURE — 25000003 PHARM REV CODE 250: Performed by: NURSE PRACTITIONER

## 2022-06-06 PROCEDURE — 97535 SELF CARE MNGMENT TRAINING: CPT

## 2022-06-06 PROCEDURE — 99233 SBSQ HOSP IP/OBS HIGH 50: CPT | Mod: ,,, | Performed by: PHYSICAL MEDICINE & REHABILITATION

## 2022-06-06 PROCEDURE — 92507 TX SP LANG VOICE COMM INDIV: CPT

## 2022-06-06 PROCEDURE — 99233 PR SUBSEQUENT HOSPITAL CARE,LEVL III: ICD-10-PCS | Mod: ,,, | Performed by: PHYSICAL MEDICINE & REHABILITATION

## 2022-06-06 PROCEDURE — 80053 COMPREHEN METABOLIC PANEL: CPT | Performed by: NURSE PRACTITIONER

## 2022-06-06 PROCEDURE — 11800000 HC REHAB PRIVATE ROOM

## 2022-06-06 PROCEDURE — 83735 ASSAY OF MAGNESIUM: CPT | Performed by: NURSE PRACTITIONER

## 2022-06-06 PROCEDURE — 36415 COLL VENOUS BLD VENIPUNCTURE: CPT | Performed by: NURSE PRACTITIONER

## 2022-06-06 PROCEDURE — 97116 GAIT TRAINING THERAPY: CPT | Mod: CQ

## 2022-06-06 PROCEDURE — 63600175 PHARM REV CODE 636 W HCPCS: Performed by: NURSE PRACTITIONER

## 2022-06-06 PROCEDURE — 97530 THERAPEUTIC ACTIVITIES: CPT | Mod: CQ

## 2022-06-06 PROCEDURE — 99900035 HC TECH TIME PER 15 MIN (STAT)

## 2022-06-06 PROCEDURE — 94799 UNLISTED PULMONARY SVC/PX: CPT

## 2022-06-06 RX ORDER — AMLODIPINE BESYLATE 5 MG/1
5 TABLET ORAL DAILY
Status: DISCONTINUED | OUTPATIENT
Start: 2022-06-07 | End: 2022-06-23 | Stop reason: HOSPADM

## 2022-06-06 RX ADMIN — THIAMINE HCL TAB 100 MG 100 MG: 100 TAB at 08:06

## 2022-06-06 RX ADMIN — THIAMINE HCL TAB 100 MG 100 MG: 100 TAB at 04:06

## 2022-06-06 RX ADMIN — ASPIRIN 325 MG: 325 TABLET, COATED ORAL at 08:06

## 2022-06-06 RX ADMIN — LABETALOL HYDROCHLORIDE 200 MG: 200 TABLET, FILM COATED ORAL at 04:06

## 2022-06-06 RX ADMIN — SERTRALINE HYDROCHLORIDE 25 MG: 25 TABLET ORAL at 08:06

## 2022-06-06 RX ADMIN — LISINOPRIL 10 MG: 10 TABLET ORAL at 08:06

## 2022-06-06 RX ADMIN — ENOXAPARIN SODIUM 40 MG: 40 INJECTION SUBCUTANEOUS at 04:06

## 2022-06-06 RX ADMIN — LABETALOL HYDROCHLORIDE 200 MG: 200 TABLET, FILM COATED ORAL at 08:06

## 2022-06-06 RX ADMIN — NICOTINE 1 PATCH: 21 PATCH, EXTENDED RELEASE TRANSDERMAL at 08:06

## 2022-06-06 RX ADMIN — AMLODIPINE BESYLATE 10 MG: 5 TABLET ORAL at 08:06

## 2022-06-06 RX ADMIN — BUPROPION HYDROCHLORIDE 150 MG: 150 TABLET, FILM COATED, EXTENDED RELEASE ORAL at 08:06

## 2022-06-06 RX ADMIN — MULTIPLE VITAMINS W/ MINERALS TAB 1 TABLET: TAB at 08:06

## 2022-06-06 RX ADMIN — ATORVASTATIN CALCIUM 40 MG: 40 TABLET, FILM COATED ORAL at 08:06

## 2022-06-06 NOTE — PT/OT/SLP PROGRESS
Occupational Therapy  Treatment      Sammie Belcher   MRN: [id number]   Admitting Diagnosis: CVA (cerebral vascular accident)         Billable Minutes:  Self Care/Home Management 60    OT/BETH: OT          General Precautions: Standard, fall  Orthopedic Precautions:           Subjective:  Patient communicated her name with cues but mostly answered in Yes/No.    Pain/Comfort  Pain Rating 1: 0/10  Objective:  Patient found with: peripheral IV seated in W/C with chair alarm and seat belted.    Functional Mobility:  Transfer Training:   Sit to stand:Supervision or Set-up Assistance with Rolling Walker to pull up LB garments and to brush teeth at sink.  Balance:  Patient performed standing balance with RW and SPV/CGA assist for occasional facilitation to activate R quad for 5 mins total  while performing dressing and oral care task    ADLs:   Bathing:  Patient performed bathing with Supervision or Set-up Assistance with no assistive device at Wheelchair.and standing for sponge bath.    UE Dressing:  Patient performed UE Dressing with Supervision or Set-up Assistance with no AD at Wheelchair.    LE Dressing:  Patient performed don/doffed pants with Supervision or Set-up Assistance    Pt required VCs for ADLs d/t decreased problem solving, initiation and apraxia.      Endurance and Strengthening  Pt fatigued in standing with RLE and required tactile facilitation to R quad to engage.      Fine/Gross motor coordination   Patient performed: oral care in standing with Supervision or Set-up Assistance using BUE with some lack of FM control/coordination with tooth paste top taking off/on    Education Provided: Roles and goals of OT, ADLs and transfer training neuromuscular re-eduation    Expected compliance: Moderate compliance    ASSESSMENT:  Sammie Belcher is a 52 y.o. female with a medical diagnosis of CVA (cerebral vascular accident) performed BUE/LE self care to increase ADL independence and safety. Pt good progressing  towards OT goals      GOALS:   Multidisciplinary Problems     Occupational Therapy Goals        Problem: Occupational Therapy    Goal Priority Disciplines Outcome Interventions   Occupational Therapy Goal     OT, PT/OT Ongoing, Progressing    Description: ADLs:  Pt to perform grooming tasks with supervision and RW and min vc  Pt to perform feeding tasks with independence   Pt to perform UB dressing with set up assist and min vc  Pt to perform LB dressing with CGA and min vc   Pt to perform putting on/off footwear task with supervision and min vc  Pt to perform toileting with supervision and min vc    Functional Transfers:  Pt to perform toilet transfers with supervision and min vc and RW  Pt to perform a tub transfer with supervision, RW, min vc    IADLs:  Pt to perform simple care taking/ home management tasks with min A and min vc    Balance, Strengthening, Endurance, Balance:  Pt to demonstrate dynamic standing balance as required to perform ADL's from standing level with RW and min vc.  Pt to demonstrate BUE strength during functional task and increased use of R UE.                   Plan:  Patient to be seen daily (BID) to address the above listed problems via self-care/home management, community/work re-entry, therapeutic activities, therapeutic exercises, neuromuscular re-education, cognitive retraining  Plan of Care expires: 06/09/22  Plan of Care reviewed with: patient      06/06/2022

## 2022-06-06 NOTE — PROGRESS NOTES
[facility] ([facility])  Rehab Progress Note    Patient Name: Sammie Belcher  MRN: [id number]  Age: 52 y.o. Sex: female  : 1970  Hospital Length of Stay: 4 days  Date of Service:  2022  Chief Complaint: Left frontal lobe 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    Subjective:     Basic Information  Admit Information: 52-year-old  female presented to [facility] ED on 2022 complaining of dysarthria and expressive aphasia x3 days.  PMH significant hyperparathyroidism s/p parathyroidectomy 2022, anxiety/depression, and HTN.  Workup significant for reactive RPR and CT head significant for left frontal 6 cm cortical base hypodensity suggesting acute to subacute nonhemorrhagic infarct of the left KENISHA vascular territory.  MRA brain significant for left frontal lobe infarct extending into the corpus callosum with acute nonhemorrhagic infarct in the territory of the KENISHA, and old lacunar infarcts. Transferred to [facility] for neurology services.  Bicillin initiated on  for syphilis with plan for 3 doses with end date on .  Also received Flagyl x 7 days due to Trichomoniasis.  TTE significant for EF 74% with severe concentric hypertrophy and hyperdynamic systolic function with negative bubble study.  ASA and statin initiated.  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  LP not revealing with no evidence of CNS infection.  Tolerating LINQ placement on .  CVA likely caused by long history cocaine abuse.  Continue to participate in progress therapy.  Tolerated transferred to [facility] inpatient rehab unit on  without incident.  Today's Information:  No labs or imaging today.  Reports good sleep and appetite.  Last BM .  Vital signs at goal.  Lab work unremarkable.  OT reports swallowing and dressing apraxia.  No imaging today.    Review of patient's allergies indicates:  No Known Allergies      Current Facility-Administered Medications:     acetaminophen tablet 650 mg, 650 mg, Oral, Q4H PRN, Nish Dixon, FNP, 650 mg at 06/04/22 2339    alendronate tablet 70 mg, 70 mg, Oral, Q7 Days, Nish Dixon, FNP, 70 mg at 06/03/22 0623    ALPRAZolam tablet 0.25 mg, 0.25 mg, Oral, TID PRN, Nish Dixon, FNP    [START ON 6/7/2022] amLODIPine tablet 5 mg, 5 mg, Oral, Daily, Nish Dixon, FNP    aspirin EC tablet 325 mg, 325 mg, Oral, Daily, Nish Dixon, FNP, 325 mg at 06/06/22 0806    atorvastatin tablet 40 mg, 40 mg, Oral, Daily, Nish Dixon, FNP, 40 mg at 06/06/22 0806    benzonatate capsule 100 mg, 100 mg, Oral, TID PRN, Nish Dixon, FNP    bisacodyL suppository 10 mg, 10 mg, Rectal, Daily PRN, Nish Dixon, FNP    buPROPion TB24 tablet 150 mg, 150 mg, Oral, Daily, Nish Dixon, FNP, 150 mg at 06/06/22 0806    enoxaparin injection 40 mg, 40 mg, Subcutaneous, Daily, Nish Dixon, FNP, 40 mg at 06/05/22 1728    hydrALAZINE injection 10 mg, 10 mg, Intravenous, Q4H PRN, Nish Dixon, FNP    HYDROcodone-acetaminophen 5-325 mg per tablet 1 tablet, 1 tablet, Oral, Q4H PRN, Nish Dixon, FNP    hydrOXYzine pamoate capsule 50 mg, 50 mg, Oral, Nightly PRN, Nish Dixon, FNP    labetalol 20 mg/4 mL (5 mg/mL) IV syring, 10 mg, Intravenous, Q4H PRN, Nish Dixon, FNP    labetaloL tablet 200 mg, 200 mg, Oral, TID, Nish Dixon, FNP, 200 mg at 06/06/22 0806    lisinopriL tablet 10 mg, 10 mg, Oral, QHS, Nish Dixon, FNP, 10 mg at 06/05/22 2039    metoprolol injection 10 mg, 10 mg, Intravenous, Q2H PRN, SIRISHA Fam    multivitamin tablet, 1 tablet, Oral, Daily, Nish Dixon FNP, 1 tablet at 06/06/22 0806    nicotine 21 mg/24 hr 1 patch, 1 patch, Transdermal, Daily, GARY FamP, 1 patch at 06/06/22 0806    nitroGLYCERIN SL tablet 0.4 mg, 0.4 mg, Sublingual, Q5 Min  "PRN, Nish Woodart, FNP    ondansetron disintegrating tablet 4 mg, 4 mg, Oral, Q6H PRN, Nish Lymanehart, FNP    ondansetron disintegrating tablet 8 mg, 8 mg, Oral, Q6H PRN, Nish A Destiny, FNP    polyethylene glycol packet 17 g, 17 g, Oral, Q12H PRN, Nish HARMON Destiny, FNP    promethazine tablet 25 mg, 25 mg, Oral, Q6H PRN, Nish Lymanehart, FNP    sertraline tablet 25 mg, 25 mg, Oral, Daily, Nish HARMON Destiny, FNP, 25 mg at 06/06/22 0806    thiamine tablet 100 mg, 100 mg, Oral, TID, Nish Lymanehart, FNP, 100 mg at 06/06/22 0807     Review of Systems   Complete 12-point review of symptoms negative except for what's mentioned in HPI     Objective:     /72 (BP Location: Left arm, Patient Position: Lying)   Pulse 75   Temp 98.1 °F (36.7 °C) (Oral)   Resp 18   Ht 6' 0.99" (1.854 m)   Wt 65.5 kg (144 lb 6.4 oz)   LMP  (LMP Unknown)   SpO2 96%   BMI 19.06 kg/m²        Intake/Output Summary (Last 24 hours) at 6/6/2022 1107  Last data filed at 6/6/2022 0702  Gross per 24 hour   Intake 1440 ml   Output --   Net 1440 ml       Physical Exam  Constitutional:       Appearance: Normal appearance.   HENT:      Head: Normocephalic.      Mouth/Throat:      Mouth: Mucous membranes are moist.   Eyes:      Pupils: Pupils are equal, round, and reactive to light.   Cardiovascular:      Rate and Rhythm: Normal rate and regular rhythm.      Heart sounds: Normal heart sounds.   Pulmonary:      Effort: Pulmonary effort is normal.      Breath sounds: Normal breath sounds.   Abdominal:      General: Bowel sounds are normal.      Palpations: Abdomen is soft.   Musculoskeletal:      Cervical back: Neck supple.      Comments: Slight weakness to right side.  Weakness to left lower extremity.   Skin:     General: Skin is warm and dry.   Neurological:      Mental Status: She is alert and oriented to person, place, and time.      Comments: Dysarthria/expressive and receptive aphasia    Psychiatric:        "  Mood and Affect: Mood normal.     *MD performed and documented physical examination       Lines/Drains/Airways     None               Labs:  Recent Results (from the past 24 hour(s))   Comprehensive Metabolic Panel    Collection Time: 06/06/22  5:27 AM   Result Value Ref Range    Sodium Level 144 136 - 145 mmol/L    Potassium Level 3.9 3.5 - 5.1 mmol/L    Chloride 108 (H) 98 - 107 mmol/L    Carbon Dioxide 28 22 - 29 mmol/L    Glucose Level 83 74 - 100 mg/dL    Blood Urea Nitrogen 12.6 9.8 - 20.1 mg/dL    Creatinine 0.75 0.55 - 1.02 mg/dL    Calcium Level Total 9.8 8.4 - 10.2 mg/dL    Protein Total 6.7 6.4 - 8.3 gm/dL    Albumin Level 3.5 3.5 - 5.0 gm/dL    Globulin 3.2 2.4 - 3.5 gm/dL    Albumin/Globulin Ratio 1.1 1.1 - 2.0 ratio    Bilirubin Total 0.5 <=1.5 mg/dL    Alkaline Phosphatase 72 40 - 150 unit/L    Alanine Aminotransferase 38 0 - 55 unit/L    Aspartate Aminotransferase 30 5 - 34 unit/L    Estimated GFR- >60 mls/min/1.73/m2   Magnesium    Collection Time: 06/06/22  5:27 AM   Result Value Ref Range    Magnesium Level 2.10 1.60 - 2.60 mg/dL   Phosphorus    Collection Time: 06/06/22  5:27 AM   Result Value Ref Range    Phosphorus Level 3.7 2.3 - 4.7 mg/dL   CBC with Differential    Collection Time: 06/06/22  5:27 AM   Result Value Ref Range    WBC 8.0 4.5 - 11.5 x10(3)/mcL    RBC 4.40 4.20 - 5.40 x10(6)/mcL    Hgb 13.2 12.0 - 16.0 gm/dL    Hct 40.1 37.0 - 47.0 %    MCV 91.1 80.0 - 94.0 fL    MCH 30.0 27.0 - 31.0 pg    MCHC 32.9 (L) 33.0 - 36.0 mg/dL    RDW 13.9 11.5 - 17.0 %    Platelet 230 130 - 400 x10(3)/mcL    MPV 10.0 9.4 - 12.4 fL    Neut % 54.2 %    Lymph % 34.3 %    Mono % 6.2 %    Eos % 4.4 %    Basophil % 0.6 %    Lymph # 2.73 0.6 - 4.6 x10(3)/mcL    Neut # 4.3 2.1 - 9.2 x10(3)/mcL    Mono # 0.49 0.1 - 1.3 x10(3)/mcL    Eos # 0.35 0 - 0.9 x10(3)/mcL    Baso # 0.05 0 - 0.2 x10(3)/mcL    IG# 0.02 (H) 0 - 0.0155 x10(3)/mcL    IG% 0.3 0 - 0.43 %    NRBC% 0.0 %     Radiology:  MRI brain  without contrast 05/19/2002 at 5:38 p.m., IMPRESSION: Motion degraded exam. In spite of this limitation: Moderate to severe multifocal flow signal abnormality about the bilateral ACAs, bilateral distal MCAs and to lesser extent bilateral PCAs.  Radiology  Transthoracic echo 05/22/2022 at 7:50 a.m., IMPRESSION:  LV is normal in size with hyperdynamic systolic function.  Estimated EF 75%.  There is grade 1 diastolic dysfunction consistent with impaired relaxation.  Left atrial pressure is normal.  RV normal size and function.  RA normal size.  AV structurally normal.  No regurgitation.  No stenosis.  There is nonspecific leaflet thickening 2 mitral valve.  There is trace mitral valve regurgitation.  There is no stenosis.  Tricuspid valve with trace regurgitation.  No valve stenosis.  PA P could not be obtained.  There is no prior care effusion.  There is no evidence 10 benign.  Negative bubble study.    Assessment/Plan:     52 y.o. AAF admitted on 6/2/2022    Left frontal lobe infarct   - with dysarthria/expressive and receptive aphasia and right-sided weakness  - continue                Aspirin 325 mg daily                Lipitor 40 mg daily                Multivitamin daily  - defer to physiatry for rehab and pain management  - PT/OT/RT/ST following     POSITIVE RPR  - s/p Bicillin x 3  - LP on 5/22 negative for CNS infection     History of alcohol dependence  - stable  - no withdrawals  - continue                Thiamine 100 mg t.i.d.     HTN  - BP at goal!!  - continue     Lisinopril 10 mg at bedtime (initiated 6/5)                Norvasc 10 mg daily                Labetalol 200 mg t.i.d.                Hydralazine 10 mg every 2 hours as needed for BP > 160/90                Labetalol 10 mg every 2 hours as needed for BP > 160/90     Nicotine dependence  - stable  - continue                Nicotine patch 21 mg daily     Anxiety/depression  - in remission  - continue                Wellbutrin 150 mg XR daily                 Zoloft 25 mg daily     Hyperparathyroidism  - stable  - s/p parathyroidectomy 02/23/2022  - continue                Vitamin-D 71189 units Q weekly                Fosamax 70 mg Q weekly     AB therapy  Bicillin x3 doses (5/19-6/2)     VTE Prophylaxis:  Lovenox 40 mg daily  COVID-19 testing:  Negative on 6/02/2022  COVID-19 vaccination status:  Vaccinated (Pfizer):  08/25/2021 and 08/04/2021     POA: No  Living will: No  Contacts:  Gamaliel Hein ([facility]) [phone]     CODE STATUS: Full Code  Internal Medicine (attending): José Miguel Cesar MD     OUTPATIENT PROVIDERS  PCP: Wanda Bell MD  Neurology:  Fede Lopez MD  Infectious disease: Marcelina Nguyen MD     DISPOSITION: Condition stable.  Sleep hygiene, bowel maintenance, and appetite at goal.  Vital signs at goal with no recorded fevers.  No imaging today.  Lab work unremarkable.   Continue current POC.  Monitor closely.  Notify of acute changes.    Dejan Dixon NP conducted independent physical examination and assisted with medical documentation.    Total time spent on this encounter including chart review and direct MD + NP 1-on-1 patient interaction: 38 minutes   Over 50% of this time was spent in counseling and coordination of care

## 2022-06-06 NOTE — PT/OT/SLP PROGRESS
Speech Language Pathology Treatment          Sammie Belcher   MRN: [id number]     Diet recommendations: Solid Diet Level: Easy to Chew Diet - IDDSI Level 7  Liquid Diet Level: Thin      Therapy Minutes  SLP Treatment Date: 22  Speech Start Time: 1430  Speech Stop Time: 1500  Speech Total (min): 30 min  SLP Individual: 30    Billable Minutes:  Speech Therapy Individual 30 minutes    General Precautions: Standard, aphasia, aspiration          Subjective:  Pt awake, alert, and cooperative throughout session.    Objective:   Pain/Comfort  Pain Rating 1: 0/10    Pt able to count 1-20 independently during today's session, however required mod-max cues to count 21-30. Additionally, pt able to answer yes/no questions with 83% accuracy overall. Pt able to answer simple yes/no questions with 80% accuracy with minimal to moderate cues; biographical yes/no questions with 60% accuracy with moderate cues; and environmental yes/no questions with 90% accuracy and minimal cues.    Assessment:  Sammie Belcher is a 52 y.o. female with a SLP diagnosis of Aphasia. Skilled SLP intervention continues to be warranted to increase communicative effectiveness and ability to have needs/wants met.    Discharge recommendations: Discharge Facility/Level of Care Needs: home health speech therapy     Goals:   Multidisciplinary Problems     SLP Goals        Problem: SLP    Goal Priority Disciplines Outcome   SLP Goal     SLP    Description: LTG: To increase expressive/receptive language skills to enhance functional communication to express basic wants and needs with 100% effectiveness.     ST. Pt will answer simple, environmental, and personal yes/no questions with 90% accuracy and minimal cues.  2. Pt will complete confrontational naming tasks with 90% accuracy and minimal cues.  3. Pt will use fluency shaping/stuttering modification techniques to decrease syllables stuttered to less than 5%SS.                    Plan:   Patient to  be seen Therapy Frequency: 5 x/week   Planned Interventions: Language Therapy  Plan of Care Expires: 06/10/22  Plan of Care reviewed with: patient  SLP Follow-up?: Yes  SLP - Next Visit Date: 06/10/22        6/6/2022

## 2022-06-06 NOTE — PLAN OF CARE
Called autumn Alston ([phone]) to bring more clothing for therapy (per therapist's recommendation).  He will bring some today.  Asked that he not bring gowns and that he bring loose fitting pants/shirts instead.  He agreed.

## 2022-06-06 NOTE — PT/OT/SLP PROGRESS
"Physical Therapy         Treatment        Sammie Belcher   MRN: [id number]     Start time 1030  Stop time 1130   Billable Minutes:  Gait [id number] and Therapeutic Activity 45min      Treatment Type: Treatment  PT/PTA: PTA             General Precautions: Standard, fall, aphasia  Orthopedic Precautions: Orthopedic Precautions : N/A   Braces:      Patient found with: Other (comments) (up in wc)         Subjective:  "no c/o     Pain/Comfort  Pain Rating 1: 0/10    Objective:  Therapeutic activities, therapeutic exercise performed to increase muscle strength and motor recruitment so that patient may improve quality of functionional mobility and increase functional independence. and Gait training performed to improve functional strength, to increase functional mobility, safety and independence.    VITALS:  BP:      Hr 79 104/75 ending hr 79 107/70      In sitting      Balance Training  Static Sitting/Standing:  Patient performed dynamic standing on level surface  using rolling walker with Contact Guard Assistance and moderate verbal cues for standing bal/safety activity bean bag toss alternating ue toss with seated rest breaks pt having difficulty with release of bean bag rtue. Increased time required focus on standing bal/robin and cognition with pt counting out loud with fe mini squats when pt counting   Dynamic Sitting with toileting task pt self cleaning required increased time after bm and assist with managing clothing     Transfer Training:  Sit to stand:Contact Guard Assistance with Rolling Walker vc for hand placement for safety   Bed <> Chair:  Step Transfer with Contact Guard Assistance with Rolling Walker vc for safety   Toilet Transfer:  Pt Step Transfer with Contact Guard Assistance with Rolling Walker vc for safety with post void/bm assist with cleaning pt increased time required for self toileting task pt amb into/out of restroom       Gait Training:  Patient ambulates 70 feet using rolling walker with " Contact Guard Assistance and many  verbal cues for rtle step height/length and to ws for improved rajiv . Pt demonstrating a  step to/through gt pattern  with decreased rajiv, decreased step length and decreased stride length.Impairments contributing to gait deviations include impaired balance, impaired motor control, impaired postural control, decreased sensation and decreased strength      Activity Tolerance:  Patient tolerated treatment well    Patient left up in chair with call button in reach, chair alarm on and wc seat belt on.    Education Provided: transfer training, gait training, balance training and assistive device    Expected compliance:  Moderate compliance        Assessment:  Sammie Belcher is a 52 y.o. female with a medical diagnosis of CVA (cerebral vascular accident). She presents with weakness rtue/rtle with poor safety awareness and bal def. . Pt to benefit from skilled PT services to address impairments with progression towards functional independence as tolerated.    Rehab potential is good.    Activity tolerance: Good    GOALS:   Multidisciplinary Problems     Physical Therapy Goals        Problem: Physical Therapy    Goal Priority Disciplines Outcome Goal Variances Interventions   Physical Therapy Goal     PT, PT/OT Ongoing, Progressing     Description: Short Term goals      Bed Mobility   Roll Right and Left Setup or Clean-up Assistance.  Lying to sitting Setup or Clean-up Assistance.  Sitting to lying Setup or Clean-up Assistance.    Transfers    Pt will be able to perform Stand step chair/bed to chair transfer With LRAD Setup or Clean-up Assistance.  Pt will be able to perform Sit to stand with LRAD   Setup or Clean-up Assistance.  Pt will be able to perform Car transfer with LRAD Setup or Clean-up Assistance.    Ambulation    Pt will ambulate 100 Feet with LRAD Setup or Clean-up Assistance.      Wheelchair Mobility   Pt will be able to propel 150 feet in paola wheelchair Setup or  Clean-up Assistance.      Timeframe: By Discharge                      PLAN:    Patient to be seen 5 x/week  to address the above listed problems via gait training, therapeutic activities, therapeutic exercises, neuromuscular re-education  Plan of Care expires: 06/09/22  Plan of Care reviewed with: patient    6/6/2022

## 2022-06-06 NOTE — PROGRESS NOTES
Subjective:  HPI: 53yo BF with PMH of HTN, anxiety, depression, and hypothyroidism  presented to [facility] ED on 5/18 with complaints of dysarthria and expressive aphasia x3 days.  Patient's family member states that she has been having difficulty speaking for the past 3 days however today was worse so she was brought to the ED for further evaluation. Her sister at bedside states that she is unsure if she has been compliant with her home medications.  She does smoke marijuana daily with her significant other, as well as drinks about a six-pack of beer every day.  No history of stroke.  She is not on aspirin.  Sister reports that she has been having episodes of bladder and bowel incontinence and is very restless/fidgety over the past few days.  No reported history of falls or back pain or any saddle anesthesia.  Patient is oriented x 4 but is an extremely poor historian.  She does report left-sided weakness and paresthesias x 2 days. Upon arrival to outside facility the patient was markedly hypertensive but otherwise hemodynamically stable and afebrile. EKG: NSR with LVH. Labs unremarkable except for a reactive RPR. Patient denies high risk sexual behavior, states unprotected sexual intercourse with significant other but she is in a monogamous relationships. Positive for syphilis as well as trichomoniasis.  CT Head showed left frontal 6 cm cortical base hypodensity suggesting an acute to subacute nonhemorrhagic infarct in the left KENISHA vascular territory. MRI Brain was then obtained confirming the left frontal lobe extending into the corpus callosum with an acute nonhemorrhagic infarct in the territory of the anterior cerebral artery, and old lacunar infarcts. Transferred to [facility] for further CVA workup and management.  The trichomoniasis has been treated with Flagyl and she is receiving her 3rd dose of penicillin for syphilis.  Participating with therapy. Functional status includes bed mobility and transfers requiring  "minimal assistance. Amb w/RW for 80ft. Patient was evaluated, accepted, and admitted to inpatient rehab to improve functional status. Transferred to [facility] on 6/2 without incident.    6/6: Seen with PT/RT, standing with RW and playing bean bag toss. Counting to 3, but having difficulty with releasing bean bag. RT using some hand over hand and it improves. VC to stand up tall and straighten RLE. PT reports walking without noted hyperextension. States that she may not need the knee brace. Tolerating therapy without complaint. VSSAF.     Review of Systems  Psychiatric:Hx mental health/substance abuse: Pt. Has a history of anxiety and depression. Per chart, pt. Has a history of cocaine use which she quit a year ago. She does smoke cigarettes and marijuana.    Depression/Anxiety:   buPROPion TB24 tablet 150 mg qd  sertraline tablet 25 mg qd  ALPRAZolam tablet 0.25 mg qd  Pain: "yes" but unable to point to location. No signs or symptoms of pain  acetaminophen tablet 650 mg q4hr PRN mild pain  HYDROcodone-acetaminophen 5-325 mg, 1 tablet q4h PRN moderate pain  Bowels/Bladder: last documented BM 6/6 (6/2 x 4- DC Colace)  Appetite: good  Sleep: good      Physical Exam  General: well-developed, well-nourished, in no acute distress  Respiratory: equal chest rise, no SOB, no audible wheeze  Cardiovascular: regular rate and rhythm, no edema  Gastrointestinal: soft, non-tender, non-distended   Musculoskeletal: right sided weakness  Integumentary: no rashes or skin lesions present, LCW [id number]-f/d/i  Neurologic: AAO, right sided weakness, aphasia, dysarthria  *MD performed and documented physical examination     Labs:   Latest Reference Range & Units 06/06/22 05:27   WBC 4.5 - 11.5 x10(3)/mcL 8.0   RBC 4.20 - 5.40 x10(6)/mcL 4.40   Hemoglobin 12.0 - 16.0 gm/dL 13.2   Hematocrit 37.0 - 47.0 % 40.1   MCV 80.0 - 94.0 fL 91.1   MCH 27.0 - 31.0 pg 30.0   MCHC 33.0 - 36.0 mg/dL 32.9 (L)   RDW 11.5 - 17.0 % 13.9   Platelets " 130 - 400 x10(3)/mcL 230   MPV 9.4 - 12.4 fL 10.0   Neut % % 54.2   LYMPH % % 34.3   Mono % % 6.2   Eosinophil % % 4.4   Basophil % % 0.6   Immature Granulocytes 0 - 0.43 % 0.3   Neut # 2.1 - 9.2 x10(3)/mcL 4.3   Lymph # 0.6 - 4.6 x10(3)/mcL 2.73   Mono # 0.1 - 1.3 x10(3)/mcL 0.49   Eos # 0 - 0.9 x10(3)/mcL 0.35   Baso # 0 - 0.2 x10(3)/mcL 0.05   Immature Grans (Abs) 0 - 0.0155 x10(3)/mcL 0.02 (H)   nRBC % 0.0   Sodium 136 - 145 mmol/L 144   Potassium 3.5 - 5.1 mmol/L 3.9   Chloride 98 - 107 mmol/L 108 (H)   CO2 22 - 29 mmol/L 28   BUN 9.8 - 20.1 mg/dL 12.6   Creatinine 0.55 - 1.02 mg/dL 0.75   eGFR if African American mls/min/1.73/m2 >60   Glucose 74 - 100 mg/dL 83   Calcium 8.4 - 10.2 mg/dL 9.8   Phosphorus 2.3 - 4.7 mg/dL 3.7   Magnesium 1.60 - 2.60 mg/dL 2.10   Alkaline Phosphatase 40 - 150 unit/L 72   PROTEIN TOTAL 6.4 - 8.3 gm/dL 6.7   Albumin 3.5 - 5.0 gm/dL 3.5   Albumin/Globulin Ratio 1.1 - 2.0 ratio 1.1   BILIRUBIN TOTAL <=1.5 mg/dL 0.5   AST 5 - 34 unit/L 30   ALT 0 - 55 unit/L 38   Globulin, Total 2.4 - 3.5 gm/dL 3.2   (L): Data is abnormally low  (H): Data is abnormally high      Assessment/Plan  Acute nonhemorrhagic CVA- L KENISHA territory  Dysarthria and expressive aphasia secondary to CVA  HTN  HLD  Hypothyroidism  Tobacco, EtOH, and Marijuana use  Reactive RPR  Trichomonas    Wounds: LCW [id number]-p/d/i  S/p LINQ placement on 5/26  Precautions: fall risk  Bracing/AD: RW  Swallowing: Easy to Chew Diet  Function: Tolerating therapy. Continue PT/OT  VTE Prophylaxis:   enoxaparin injection 40 mg SubQ q24hr  Code Status: FULL CODE   Discharge:Pt. Lives with her fiance in [place] in an apartment. Her bedroom and bathroom are on the 2nd floor with approximately 12 steps to get to the 2nd floor. There is a wall then a railing on the left going up. Pt. Completed 10th or 11th grade. She has no  history. She was not working. She took care of her mother after she had surgery and then took care  of her grandchildren so their mother could work. The patient has a fiance, who states that he is retired. He drives, does most of the cooking, cleaning, laundry, grocery shopping, and manages finances. He states that he can physically assist patient. He also states that her children will be able to help as much as they can. Children (3). Date pending.   Dos 6/6/22  I have personally evaluated the patient during therapy today. Have discussed progress and problems with patient. Have reviewed barriers to progress as well as response to therapies with therapists. I have reviewed medical issues and plan of care with IM team. I met with  to review d/c plans and barriers. I have discussed skin integrity and B/B status with nursing. I am in agreement with present IRF plan of care.  I have been involved in  formation of this note with Nisha Wright.  Roel Wright NP, conducted additional independent physical examination and assisted with medical documentation.

## 2022-06-06 NOTE — PT/OT/SLP PROGRESS
"Physical Therapy         Treatment        Sammie Belcher   MRN: [id number]     Therapy Minutes  PT Received On: 06/06/22  PT Start Time: 1300  PT Stop Time: 1330  PT Total Time (min): 30 min  PT Individual: 30     Billable Minutes:  Gait [id number] and Therapeutic Activity 20min      Treatment Type: Treatment  PT/PTA: PTA             General Precautions: Standard, fall, aphasia  Orthopedic Precautions: Orthopedic Precautions : N/A   Braces:      Patient found with: Other (comments) (up in wc)         Subjective:  "pt in wc with no c/o     Pain/Comfort  Pain Rating 1: 0/10    Objective:  Therapeutic activities, therapeutic exercise performed to increase muscle strength and motor recruitment so that patient may improve quality of functionional mobility and increase functional independence. and Gait training performed to improve functional strength, to increase functional mobility, safety and independence.    VITALS:  BP:   Hr 81 111/70 ending bp 115/75 hr 82         In sitting    Functional Mobility:  Bed Mobility:   Sit to supine: Supervision or Set-up Assistance with few  Cues rtle fully into bed increased time required    Scooting: Supervision or Set-up Assistance with few  cues    Balance Training  Static Sitting/Standing:  Patient performed dynamic sittingsitting on toilet   using rolling walker with Contact Guard Assistance and moderate verbal cues for safety while removing shoes and clothing after accidental void with saturated diaper and clothing   Dynamic standing for pt to self manage diaper/clothing use of rw touching assist with vc for safety       Transfer Training:  Sit to stand:Stand-by Assistance and with vc for hand placement  with Rolling Walker    Bed <> Chair:  Step Transfer with Contact Guard Assistance with Rolling Walker vc for safety especially with turning to return to seated position   Toilet Transfer:  Pt Step Transfer with Contact Guard Assistance with Rolling Walker cv for safety post " accidental void assist with changing       Gait Training:  Patient ambulates 150 feet using rolling walker with Contact Guard Assistance and many vc for rtle clearance, step length/height . Pt demonstrating a  step too/through gt pattern  with decreased rajiv, decreased step length, decreased stride length, decreased weight-shifting ability and poor motor planning .Impairments contributing to gait deviations include impaired balance, impaired coordination, impaired postural control, decreased sensation and decreased strength    Additional Treatment:  Pt performed supine bridging in bed 15times with focus on control and wt bearing on rtle pt improve bed mob.     Activity Tolerance:  Patient tolerated treatment well and limited by poor safety awareness     Patient left supine with bed alarm  with bed alarm on and nursing aware pt in bed .    Education Provided: transfer training, bed mob, gait training, safety awareness, assistive device and strengthening exercises    Expected compliance:  Moderate compliance        Assessment:  Sammie Belcher is a 52 y.o. female with a medical diagnosis of CVA (cerebral vascular accident). She presents with poor safety awareness and bal with weakness rtle/rtue . Pt to benefit from skilled PT services to address impairments with progression towards functional independence as tolerated.    Rehab potential is excellent.    Activity tolerance: Good      GOALS:   Multidisciplinary Problems     Physical Therapy Goals        Problem: Physical Therapy    Goal Priority Disciplines Outcome Goal Variances Interventions   Physical Therapy Goal     PT, PT/OT Ongoing, Progressing     Description: Short Term goals      Bed Mobility   Roll Right and Left Setup or Clean-up Assistance.  Lying to sitting Setup or Clean-up Assistance.  Sitting to lying Setup or Clean-up Assistance.    Transfers    Pt will be able to perform Stand step chair/bed to chair transfer With LRAD Setup or Clean-up  Assistance.  Pt will be able to perform Sit to stand with LRAD   Setup or Clean-up Assistance.  Pt will be able to perform Car transfer with LRAD Setup or Clean-up Assistance.    Ambulation    Pt will ambulate 100 Feet with LRAD Setup or Clean-up Assistance.      Wheelchair Mobility   Pt will be able to propel 150 feet in paola wheelchair Setup or Clean-up Assistance.      Timeframe: By Discharge                      PLAN:    Patient to be seen 5 x/week  to address the above listed problems via gait training, therapeutic activities, therapeutic exercises, neuromuscular re-education  Plan of Care expires: 06/09/22  Plan of Care reviewed with: patient    6/6/2022

## 2022-06-07 LAB
POCT GLUCOSE: 104 MG/DL (ref 70–110)
POCT GLUCOSE: 87 MG/DL (ref 70–110)
POCT GLUCOSE: 98 MG/DL (ref 70–110)

## 2022-06-07 PROCEDURE — 97116 GAIT TRAINING THERAPY: CPT | Mod: CQ

## 2022-06-07 PROCEDURE — 94799 UNLISTED PULMONARY SVC/PX: CPT

## 2022-06-07 PROCEDURE — 99233 SBSQ HOSP IP/OBS HIGH 50: CPT | Mod: ,,, | Performed by: PHYSICAL MEDICINE & REHABILITATION

## 2022-06-07 PROCEDURE — 63600175 PHARM REV CODE 636 W HCPCS: Performed by: NURSE PRACTITIONER

## 2022-06-07 PROCEDURE — 99233 PR SUBSEQUENT HOSPITAL CARE,LEVL III: ICD-10-PCS | Mod: ,,, | Performed by: PHYSICAL MEDICINE & REHABILITATION

## 2022-06-07 PROCEDURE — S4991 NICOTINE PATCH NONLEGEND: HCPCS | Performed by: NURSE PRACTITIONER

## 2022-06-07 PROCEDURE — 97530 THERAPEUTIC ACTIVITIES: CPT | Mod: CQ

## 2022-06-07 PROCEDURE — 92507 TX SP LANG VOICE COMM INDIV: CPT

## 2022-06-07 PROCEDURE — 94761 N-INVAS EAR/PLS OXIMETRY MLT: CPT

## 2022-06-07 PROCEDURE — 97535 SELF CARE MNGMENT TRAINING: CPT

## 2022-06-07 PROCEDURE — 11800000 HC REHAB PRIVATE ROOM

## 2022-06-07 PROCEDURE — 25000003 PHARM REV CODE 250: Performed by: NURSE PRACTITIONER

## 2022-06-07 RX ADMIN — THIAMINE HCL TAB 100 MG 100 MG: 100 TAB at 08:06

## 2022-06-07 RX ADMIN — BUPROPION HYDROCHLORIDE 150 MG: 150 TABLET, FILM COATED, EXTENDED RELEASE ORAL at 08:06

## 2022-06-07 RX ADMIN — ATORVASTATIN CALCIUM 40 MG: 40 TABLET, FILM COATED ORAL at 08:06

## 2022-06-07 RX ADMIN — LISINOPRIL 10 MG: 10 TABLET ORAL at 08:06

## 2022-06-07 RX ADMIN — ASPIRIN 325 MG: 325 TABLET, COATED ORAL at 08:06

## 2022-06-07 RX ADMIN — MULTIPLE VITAMINS W/ MINERALS TAB 1 TABLET: TAB at 08:06

## 2022-06-07 RX ADMIN — NICOTINE 1 PATCH: 21 PATCH, EXTENDED RELEASE TRANSDERMAL at 09:06

## 2022-06-07 RX ADMIN — THIAMINE HCL TAB 100 MG 100 MG: 100 TAB at 03:06

## 2022-06-07 RX ADMIN — AMLODIPINE BESYLATE 5 MG: 5 TABLET ORAL at 09:06

## 2022-06-07 RX ADMIN — SERTRALINE HYDROCHLORIDE 25 MG: 25 TABLET ORAL at 08:06

## 2022-06-07 RX ADMIN — LABETALOL HYDROCHLORIDE 200 MG: 200 TABLET, FILM COATED ORAL at 03:06

## 2022-06-07 RX ADMIN — LABETALOL HYDROCHLORIDE 200 MG: 200 TABLET, FILM COATED ORAL at 08:06

## 2022-06-07 RX ADMIN — ENOXAPARIN SODIUM 40 MG: 40 INJECTION SUBCUTANEOUS at 05:06

## 2022-06-07 NOTE — PROGRESS NOTES
06/07/22 1100   Rec Therapy Time Calculation   Rec Start Time 1100   Rec Stop Time 1130   Rec Total Time (min) 30 min   Subjective   Patient states Pt did not make any verbal complaints   Therapeutic Recreation   Problem Solving Activity Assistance verbal cues;tactile cues;Contact Guard Assistance   Assessment   Assessment Sit to stand was supervision as was dynamic standing balance/reaching. Standing tolerance was 7 minutes. RUE coordination was contact guard.  Verbal cues and tactile cues needed for motor planning of RUE. Verbal cues needed to count 1,2,3  Remains determined   Plan   Planned Therapy Intervention Continue with current plan  (Washer Toss)   Expected Length of Stay Pending   PT Frequency Minimu of 5 visits per week;Other (see comments)  (Daily)   Time   Treatment time 2 units

## 2022-06-07 NOTE — PT/OT/SLP PROGRESS
Occupational Therapy  Treatment      Sammie Belcher   MRN: [id number]   Admitting Diagnosis: CVA (cerebral vascular accident)         Billable Minutes:  Self Care/Home Management 60    OT/BETH: OT          General Precautions: Standard, fall  Orthopedic Precautions:           Subjective:      Pain/Comfort  Pain Rating 1: 0/10  Objective:       Functional Mobility:  Transfer Training:   Sit to stand:Supervision or Set-up Assistance with Rolling Walker    Balance:  Patient performed standing balance with RW and supervision assist for 2 minutes while performing brushing teeth at the sink     ADLs:    Current Status   Functional Area: Care Score:   Eating 5   Oral Hygiene 5   Toileting Hygiene 2   Shower/Bathe Self 4   Upper Body Dressing 3   Lower Body Dressing 4   Putting On/Taking Off Footwear 4   Toilet Transfer 3     LE Dressing: Pt showed decreased proprioception and motor planning in RLE while threading pant leg; pt able to correct only with     Education Provided: Roles and goals of OT, ADLs and transfer training    Expected compliance: Moderate compliance    ASSESSMENT:  Sammie Belcher is a 52 y.o. female with a medical diagnosis of CVA (cerebral vascular accident) performed self care ADLs independence and safety. Pt is progressing towards OT goals      GOALS:   Multidisciplinary Problems     Occupational Therapy Goals        Problem: Occupational Therapy    Goal Priority Disciplines Outcome Interventions   Occupational Therapy Goal     OT, PT/OT Ongoing, Progressing    Description: ADLs:  Pt to perform grooming tasks with supervision and RW and min vc  Pt to perform feeding tasks with independence   Pt to perform UB dressing with set up assist and min vc  Pt to perform LB dressing with CGA and min vc   Pt to perform putting on/off footwear task with supervision and min vc  Pt to perform toileting with supervision and min vc    Functional Transfers:  Pt to perform toilet transfers with supervision and min vc  and RW  Pt to perform a tub transfer with supervision, RW, min vc    IADLs:  Pt to perform simple care taking/ home management tasks with min A and min vc    Balance, Strengthening, Endurance, Balance:  Pt to demonstrate dynamic standing balance as required to perform ADL's from standing level with RW and min vc.  Pt to demonstrate BUE strength during functional task and increased use of R UE.                   Plan:  Patient to be seen 5 x/week to address the above listed problems via self-care/home management, therapeutic activities, therapeutic exercises, therapeutic groups, neuromuscular re-education  Plan of Care expires: 06/09/22  Plan of Care reviewed with: patient      06/07/2022

## 2022-06-07 NOTE — PT/OT/SLP PROGRESS
Progress Note     Pt was scheduled from [id number]    Pt progress, plan of care and discharge plans were discussed during staffing at 0900

## 2022-06-07 NOTE — PROGRESS NOTES
[facility] ([facility])  Rehab Progress Note    Patient Name: Sammie Belcher  MRN: [id number]  Age: 52 y.o. Sex: female  : 1970  Hospital Length of Stay: 5 days  Date of Service:  2022  Chief Complaint: Left frontal lobe 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    Subjective:     Basic Information  Admit Information: 52-year-old  female presented to [facility] ED on 2022 complaining of dysarthria and expressive aphasia x3 days.  PMH significant hyperparathyroidism s/p parathyroidectomy 2022, anxiety/depression, and HTN.  Workup significant for reactive RPR and CT head significant for left frontal 6 cm cortical base hypodensity suggesting acute to subacute nonhemorrhagic infarct of the left KENISHA vascular territory.  MRA brain significant for left frontal lobe infarct extending into the corpus callosum with acute nonhemorrhagic infarct in the territory of the KENISHA, and old lacunar infarcts. Transferred to [facility] for neurology services.  Bicillin initiated on  for syphilis with plan for 3 doses with end date on .  Also received Flagyl x 7 days due to Trichomoniasis.  TTE significant for EF 74% with severe concentric hypertrophy and hyperdynamic systolic function with negative bubble study.  ASA and statin initiated.  LP completed on  due to suspicion of vasculitic CVA  neuro syphilis.  LP not revealing with no evidence of CNS infection.  Tolerating LINQ placement on .  CVA likely caused by long history cocaine abuse.  Continue to participate in progress therapy.  Tolerated transferred to [facility] inpatient rehab unit on  without incident.  Today's Information:  No labs or imaging today.  Reports good sleep and appetite.  Last BM .  Vital signs at goal.  No labs or imaging today.      Review of patient's allergies indicates:  No Known Allergies     Current Facility-Administered Medications:     acetaminophen  tablet 650 mg, 650 mg, Oral, Q4H PRN, Nishgordon Lymanehart, FNP, 650 mg at 06/04/22 2339    alendronate tablet 70 mg, 70 mg, Oral, Q7 Days, Nish HARMON Destiny, FNP, 70 mg at 06/03/22 0623    ALPRAZolam tablet 0.25 mg, 0.25 mg, Oral, TID PRN, Nish Lymanehart, FNP    amLODIPine tablet 5 mg, 5 mg, Oral, Daily, Nish FAUSTINO Destiny, FNP    aspirin EC tablet 325 mg, 325 mg, Oral, Daily, Nish FAUSTINO Destiny, FNP, 325 mg at 06/07/22 0821    atorvastatin tablet 40 mg, 40 mg, Oral, Daily, Nish FAUSTINO Destiny, FNP, 40 mg at 06/07/22 0821    benzonatate capsule 100 mg, 100 mg, Oral, TID PRN, Nish Lymanehart, FNP    bisacodyL suppository 10 mg, 10 mg, Rectal, Daily PRN, Nish Dixon, FNP    buPROPion TB24 tablet 150 mg, 150 mg, Oral, Daily, Nish FAUSTINO Destiny, FNP, 150 mg at 06/07/22 0821    enoxaparin injection 40 mg, 40 mg, Subcutaneous, Daily, Nishgordon Dixon, FNP, 40 mg at 06/06/22 1608    hydrALAZINE injection 10 mg, 10 mg, Intravenous, Q4H PRN, Nish Lymanehart, FNP    HYDROcodone-acetaminophen 5-325 mg per tablet 1 tablet, 1 tablet, Oral, Q4H PRN, Nish Woodart, FNP    hydrOXYzine pamoate capsule 50 mg, 50 mg, Oral, Nightly PRN, Nish Lymanehart, FNP    labetalol 20 mg/4 mL (5 mg/mL) IV syring, 10 mg, Intravenous, Q4H PRN, Nish Lymanehart, FNP    labetaloL tablet 200 mg, 200 mg, Oral, TID, Nish Lymanehart, FNP, 200 mg at 06/07/22 0821    lisinopriL tablet 10 mg, 10 mg, Oral, QHS, Nish Lymanehart, FNP, 10 mg at 06/06/22 2027    metoprolol injection 10 mg, 10 mg, Intravenous, Q2H PRN, SIRISHA Fam    multivitamin tablet, 1 tablet, Oral, Daily, SIRISHA Fam, 1 tablet at 06/07/22 0821    nicotine 21 mg/24 hr 1 patch, 1 patch, Transdermal, Daily, SIRISHA Fam, 1 patch at 06/06/22 0806    nitroGLYCERIN SL tablet 0.4 mg, 0.4 mg, Sublingual, Q5 Min PRN, SIRISHA Fam    ondansetron disintegrating tablet 4 mg, 4 mg, Oral,  "Q6H PRN, Nish Woodart, FNP    ondansetron disintegrating tablet 8 mg, 8 mg, Oral, Q6H PRN, Nish Lymanehart, FNP    polyethylene glycol packet 17 g, 17 g, Oral, Q12H PRN, Nish A Destiny, FNP    promethazine tablet 25 mg, 25 mg, Oral, Q6H PRN, Nish HARMON Destiny, FNP    sertraline tablet 25 mg, 25 mg, Oral, Daily, Nish HARMON Destiny, FNP, 25 mg at 06/07/22 0821    thiamine tablet 100 mg, 100 mg, Oral, TID, Nish HARMON Destiny, FNP, 100 mg at 06/07/22 0821     Review of Systems   Complete 12-point review of symptoms negative except for what's mentioned in HPI     Objective:     /79   Pulse 84   Temp 98.1 °F (36.7 °C) (Oral)   Resp 20   Ht 6' 0.99" (1.854 m)   Wt 65.5 kg (144 lb 6.4 oz)   LMP  (LMP Unknown)   SpO2 99%   BMI 19.06 kg/m²        Intake/Output Summary (Last 24 hours) at 6/7/2022 0907  Last data filed at 6/7/2022 0800  Gross per 24 hour   Intake 960 ml   Output --   Net 960 ml       Physical Exam  Constitutional:       Appearance: Normal appearance.   HENT:      Head: Normocephalic.      Mouth/Throat:      Mouth: Mucous membranes are moist.   Eyes:      Pupils: Pupils are equal, round, and reactive to light.   Cardiovascular:      Rate and Rhythm: Normal rate and regular rhythm.      Heart sounds: Normal heart sounds.   Pulmonary:      Effort: Pulmonary effort is normal.      Breath sounds: Normal breath sounds.   Abdominal:      General: Bowel sounds are normal.      Palpations: Abdomen is soft.   Musculoskeletal:      Cervical back: Neck supple.      Comments: Slight weakness to right side.  Weakness to left lower extremity.   Skin:     General: Skin is warm and dry.   Neurological:      Mental Status: She is alert and oriented to person, place, and time.      Comments: Dysarthria/expressive and receptive aphasia    Psychiatric:         Mood and Affect: Mood normal.     *MD performed and documented physical examination       Lines/Drains/Airways     None           "     Labs:  No results found for this or any previous visit (from the past 24 hour(s)).  Radiology:  MRI brain without contrast 05/19/2002 at 5:38 p.m., IMPRESSION: Motion degraded exam. In spite of this limitation: Moderate to severe multifocal flow signal abnormality about the bilateral ACAs, bilateral distal MCAs and to lesser extent bilateral PCAs.  Radiology  Transthoracic echo 05/22/2022 at 7:50 a.m., IMPRESSION:  LV is normal in size with hyperdynamic systolic function.  Estimated EF 75%.  There is grade 1 diastolic dysfunction consistent with impaired relaxation.  Left atrial pressure is normal.  RV normal size and function.  RA normal size.  AV structurally normal.  No regurgitation.  No stenosis.  There is nonspecific leaflet thickening 2 mitral valve.  There is trace mitral valve regurgitation.  There is no stenosis.  Tricuspid valve with trace regurgitation.  No valve stenosis.  PA P could not be obtained.  There is no prior care effusion.  There is no evidence 10 benign.  Negative bubble study.    Assessment/Plan:     52 y.o. AAF admitted on 6/2/2022    Left frontal lobe infarct   - with dysarthria/expressive and receptive aphasia and right-sided weakness  - continue                Aspirin 325 mg daily                Lipitor 40 mg daily                Multivitamin daily  - defer to physiatry for rehab and pain management  - PT/OT/RT/ST following     POSITIVE RPR  - s/p Bicillin x 3  - LP on 5/22 negative for CNS infection     History of alcohol dependence  - stable  - no withdrawals  - continue                Thiamine 100 mg t.i.d.     HTN  - BP at goal!!  - continue     Lisinopril 10 mg at bedtime (initiated 6/5)                Norvasc 10 mg daily                Labetalol 200 mg t.i.d.                Hydralazine 10 mg every 2 hours as needed for BP > 160/90                Labetalol 10 mg every 2 hours as needed for BP > 160/90     Nicotine dependence  - stable  - continue                Nicotine patch  21 mg daily     Anxiety/depression  - in remission  - continue                Wellbutrin 150 mg XR daily                Zoloft 25 mg daily     Hyperparathyroidism  - stable  - s/p parathyroidectomy 02/23/2022  - continue                Vitamin-D 61359 units Q weekly                Fosamax 70 mg Q weekly     AB therapy  Bicillin x3 doses (5/19-6/2)     VTE Prophylaxis:  Lovenox 40 mg daily  COVID-19 testing:  Negative on 6/02/2022  COVID-19 vaccination status:  Vaccinated (Pfizer):  08/25/2021 and 08/04/2021     POA: No  Living will: No  Contacts:  Gamaliel Hein ([facility]) [phone]     CODE STATUS: Full Code  Internal Medicine (attending): José Miguel Cesar MD     OUTPATIENT PROVIDERS  PCP: Wanda Bell MD  Neurology:  Fede Lopez MD  Infectious disease: Marcelina Nguyen MD     DISPOSITION: Condition stable.  Sleep hygiene, BM, and appetite at goal. Vital signs at goal.  No labs or imaging today.  Continue current POC.  Monitor closely.  Notify of acute changes. Staffing completed today.    Staffing 6/7/2022: Incontinent of bladder. Continent of bowel.  RT: Overall CGA.  She did have a fall over the weekend with no injury.  Appetite is good. PT: High fall risk.  PT: Overall CGA to mod assists ambulating 150 feet with RW. .  Linited by ataxia and apraxia. OT: Overall supervision with ADLs to partial mod assist.  Poor motor planning.  Needs more time. ST: Limited by fluency.  Receptive language impaired.  Projected discharge pending.       Dejan Dixon NP conducted independent physical examination and assisted with medical documentation.    Total time spent on this encounter including chart review and direct MD + NP 1-on-1 patient interaction: 38 minutes   Over 50% of this time was spent in counseling and coordination of care

## 2022-06-07 NOTE — PT/OT/SLP PROGRESS
Speech Language Pathology Treatment          Sammie Belcher   MRN: [id number]     Diet recommendations: Solid Diet Level: Easy to Chew Diet - IDDSI Level 7  Liquid Diet Level: Thin      Billable Minutes:  Speech Therapy Individual 30 minutes    General Precautions: Standard, aphasia, aspiration          Subjective:  Pt awake, alert, and cooperative throughout session.    Objective:   Pain/Comfort  Pain Rating 1: 0/10    Pt able to name pictures with 100% accuracy with moderate cues including cloze procedures and phonemic cues. Pt with dysfluencies throughout session but able to use pull out strategy with cues to pause and try again. SLP to continue POC.    Assessment:  Sammie Belcher is a 52 y.o. female with a SLP diagnosis of Aphasia. SLP intervention continues to be warranted to increase communicative effectiveness and ability to have needs/wants met.    Discharge recommendations: Discharge Facility/Level of Care Needs: home health speech therapy     Goals:   Multidisciplinary Problems     SLP Goals        Problem: SLP    Goal Priority Disciplines Outcome   SLP Goal     SLP    Description: LTG: To increase expressive/receptive language skills to enhance functional communication to express basic wants and needs with 100% effectiveness.     ST. Pt will answer simple, environmental, and personal yes/no questions with 90% accuracy and minimal cues.  2. Pt will complete confrontational naming tasks with 90% accuracy and minimal cues.  3. Pt will use fluency shaping/stuttering modification techniques to decrease syllables stuttered to less than 5%SS.                    Plan:   Patient to be seen Therapy Frequency: 5 x/week   Planned Interventions: Language Therapy  Plan of Care Expires: 22  Plan of Care reviewed with: patient  SLP Follow-up?: Yes  SLP - Next Visit Date: 22

## 2022-06-07 NOTE — PT/OT/SLP PROGRESS
"Physical Therapy         Treatment        Sammie Belcher   MRN: [id number]       Start time: 1100  Stop time: 1130      Billable Minutes:  Therapeutic Activity 30               PTA Visit Number: 3       General Precautions: Standard, fall, aphasia  Orthopedic Precautions: Orthopedic Precautions : N/A   Braces:      Patient found with: peripheral IV         Subjective:  "N/A"         Objective:  Therapeutic activities, therapeutic exercise performed to increase muscle strength and motor recruitment so that patient may improve quality of functionional mobility and increase functional independence.    VITALS:  See flow sheet         Functional Mobility:    Transfer Training:  Sit to stand:Minimal Assistance with Rolling Walker . VC for proper tech and 1 step commades 2/2 poor motore planning   Bed <> Chair:  Step Transfer with Moderate Assistance with Rolling Walker ( VC and TC to RLE and RUE for propr motor planning       Additional Treatment:  Standing balance with recreational therapy playying washer toss. Working on motor planning and standing balance.     Activity Tolerance:  Patient tolerated treatment well    Patient left up in chair with all lines intact, call button in reach and  notified .    Education Provided: roles and goals of PT/PTA    Expected compliance:  Moderate compliance        Assessment:  Sammie Belcher is a 52 y.o. female with a medical diagnosis of CVA (cerebral vascular accident). She presents with cognitive deficits, impaired coordination, impaired balance, gait deviations, difficulty with functional transfers, difficulty with bed mobility, ataxia and decreased safety. Pt to benefit from skilled PT services to address impairments with progression towards functional independence as tolerated.    Rehab potential is good.    Activity tolerance: Excellent    Discharge recommendations:       Equipment recommendations:       GOALS:   Multidisciplinary Problems     Physical Therapy Goals     "    Problem: Physical Therapy    Goal Priority Disciplines Outcome Goal Variances Interventions   Physical Therapy Goal     PT, PT/OT Ongoing, Progressing     Description: Short Term goals      Bed Mobility   Roll Right and Left Setup or Clean-up Assistance.  Lying to sitting Setup or Clean-up Assistance.  Sitting to lying Setup or Clean-up Assistance.    Transfers    Pt will be able to perform Stand step chair/bed to chair transfer With LRAD Setup or Clean-up Assistance.  Pt will be able to perform Sit to stand with LRAD   Setup or Clean-up Assistance.  Pt will be able to perform Car transfer with LRAD Setup or Clean-up Assistance.    Ambulation    Pt will ambulate 100 Feet with LRAD Setup or Clean-up Assistance.      Wheelchair Mobility   Pt will be able to propel 150 feet in paola wheelchair Setup or Clean-up Assistance.      Timeframe: By Discharge                      PLAN:    Patient to be seen 5 x/week  to address the above listed problems via gait training, therapeutic activities, therapeutic exercises, neuromuscular re-education  Plan of Care expires: 06/09/22  Plan of Care reviewed with: patient    6/7/2022

## 2022-06-07 NOTE — PROGRESS NOTES
Subjective:  HPI: 53yo BF with PMH of HTN, anxiety, depression, and hypothyroidism  presented to [facility] ED on 5/18 with complaints of dysarthria and expressive aphasia x3 days.  Patient's family member states that she has been having difficulty speaking for the past 3 days however today was worse so she was brought to the ED for further evaluation. Her sister at bedside states that she is unsure if she has been compliant with her home medications.  She does smoke marijuana daily with her significant other, as well as drinks about a six-pack of beer every day.  No history of stroke.  She is not on aspirin.  Sister reports that she has been having episodes of bladder and bowel incontinence and is very restless/fidgety over the past few days.  No reported history of falls or back pain or any saddle anesthesia.  Patient is oriented x 4 but is an extremely poor historian.  She does report left-sided weakness and paresthesias x 2 days. Upon arrival to outside facility the patient was markedly hypertensive but otherwise hemodynamically stable and afebrile. EKG: NSR with LVH. Labs unremarkable except for a reactive RPR. Patient denies high risk sexual behavior, states unprotected sexual intercourse with significant other but she is in a monogamous relationships. Positive for syphilis as well as trichomoniasis.  CT Head showed left frontal 6 cm cortical base hypodensity suggesting an acute to subacute nonhemorrhagic infarct in the left KENISHA vascular territory. MRI Brain was then obtained confirming the left frontal lobe extending into the corpus callosum with an acute nonhemorrhagic infarct in the territory of the anterior cerebral artery, and old lacunar infarcts. Transferred to [facility] for further CVA workup and management.  The trichomoniasis has been treated with Flagyl and she is receiving her 3rd dose of penicillin for syphilis.  Participating with therapy. Functional status includes bed mobility and transfers requiring  "minimal assistance. Amb w/RW for 80ft. Patient was evaluated, accepted, and admitted to inpatient rehab to improve functional status. Transferred to [facility] on 6/2 without incident.    6/7: Seen with PT, Ayaz w/RW. Right knee remains flexed. PT reports patient weak in RLE, but does have dorsiflexion and clearing right foot. Tolerating therapy without complaint. VSSAF.     Review of Systems  Psychiatric:Hx mental health/substance abuse: Pt. Has a history of anxiety and depression. Per chart, pt. Has a history of cocaine use which she quit a year ago. She does smoke cigarettes and marijuana.    Depression/Anxiety:   buPROPion TB24 tablet 150 mg qd  sertraline tablet 25 mg qd  ALPRAZolam tablet 0.25 mg qd  Pain: "yes" but unable to point to location. No signs or symptoms of pain  acetaminophen tablet 650 mg q4hr PRN mild pain  HYDROcodone-acetaminophen 5-325 mg, 1 tablet q4h PRN moderate pain  Bowels/Bladder: last documented BM 6/7 (6/2 x 4- DC Colace)  Appetite: good  Sleep: good      Physical Exam  General: well-developed, well-nourished, in no acute distress  Respiratory: equal chest rise, no SOB, no audible wheeze  Cardiovascular: regular rate and rhythm, no edema  Gastrointestinal: soft, non-tender, non-distended   Musculoskeletal: right sided weakness  Integumentary: no rashes or skin lesions present, LCW [id number]/i  Neurologic: AAO, right sided weakness, aphasia, dysarthria  *MD performed and documented physical examination         Assessment/Plan  Acute nonhemorrhagic CVA- L KENISHA territory  Dysarthria and expressive aphasia secondary to CVA  HTN  HLD  Hypothyroidism  Tobacco, EtOH, and Marijuana use  Reactive RPR  Trichomonas    Wounds: LCW [id number]/i  S/p LINQ placement on 5/26  Precautions: fall risk  Bracing/AD: RW  Swallowing: Easy to Chew Diet  Function: Tolerating therapy. Continue PT/OT  VTE Prophylaxis:   enoxaparin injection 40 mg SubQ q24hr  Code Status: FULL CODE "   Discharge:Pt. Lives with her fiance in [place] in an apartment. Her bedroom and bathroom are on the 2nd floor with approximately 12 steps to get to the 2nd floor. There is a wall then a railing on the left going up. Pt. Completed 10th or 11th grade. She has no  history. She was not working. She took care of her mother after she had surgery and then took care of her grandchildren so their mother could work. The patient has a fiance, who states that he is retired. He drives, does most of the cooking, cleaning, laundry, grocery shopping, and manages finances. He states that he can physically assist patient. He also states that her children will be able to help as much as they can. Children (3). Date pending.   Dos 6/7/22  I have personally evaluated the patient during Physical therapy today- walking well   Have discussed progress and problems with patient. Have reviewed barriers to progress as well as response to therapies with therapists. I have reviewed medical issues and plan of care with IM team. I met with  to review d/c plans and barriers. I have discussed skin integrity and B/B status with nursing. I am in agreement with present IRF plan of care.  I have been involved in  formation of this note with Nisha Wright.  Roel Wright NP, conducted additional independent physical examination and assisted with medical documentation.

## 2022-06-07 NOTE — PLAN OF CARE
Problem: Adult Inpatient Plan of Care  Goal: Plan of Care Review  Outcome: Ongoing, Progressing     Problem: Adjustment to Illness (Stroke, Ischemic/Transient Ischemic Attack)  Goal: Optimal Coping  Outcome: Ongoing, Progressing     Problem: Bowel Elimination Impaired (Stroke, Ischemic/Transient Ischemic Attack)  Goal: Effective Bowel Elimination  Outcome: Ongoing, Progressing     Problem: Cerebral Tissue Perfusion (Stroke, Ischemic/Transient Ischemic Attack)  Goal: Optimal Cerebral Tissue Perfusion  Outcome: Ongoing, Progressing     Problem: Adult Inpatient Plan of Care  Goal: Plan of Care Review  Outcome: Ongoing, Progressing     Problem: Adjustment to Illness (Stroke, Ischemic/Transient Ischemic Attack)  Goal: Optimal Coping  Outcome: Ongoing, Progressing     Problem: Bowel Elimination Impaired (Stroke, Ischemic/Transient Ischemic Attack)  Goal: Effective Bowel Elimination  Outcome: Ongoing, Progressing     Problem: Cerebral Tissue Perfusion (Stroke, Ischemic/Transient Ischemic Attack)  Goal: Optimal Cerebral Tissue Perfusion  Outcome: Ongoing, Progressing

## 2022-06-07 NOTE — PT/OT/SLP PROGRESS
Speech Language Pathology Treatment          Sammie Belcher   MRN: [id number]     Diet recommendations: Solid Diet Level: Easy to Chew Diet - IDDSI Level 7  Liquid Diet Level: Thin     Therapy Minutes  SLP Treatment Date: 22  Speech Start Time: 1400  Speech Stop Time: 1430  Speech Total (min): 30 min  SLP Individual: 30    Billable Minutes:  Speech Therapy Individual 30 minutes    General Precautions: Standard, aphasia, aspiration          Subjective:  Pt awake, alert, and cooperative throughout session.    Objective:   Pain/Comfort  Pain Rating 1: 0/10    Pt able to name pictures with 96% accuracy with moderate to maximal cues including cloze procedures and phonemic cues. Pt with dysfluencies throughout session but able to use pull out strategy with cues to pause and try again. SLP to continue POC.    Assessment:  Sammie Belcher is a 52 y.o. female with a SLP diagnosis of Aphasia. SLP intervention continues to be warranted targeting communicative effectiveness and ability to have needs/wants met.    Discharge recommendations: Discharge Facility/Level of Care Needs: home health speech therapy     Goals:   Multidisciplinary Problems     SLP Goals        Problem: SLP    Goal Priority Disciplines Outcome   SLP Goal     SLP    Description: LTG: To increase expressive/receptive language skills to enhance functional communication to express basic wants and needs with 100% effectiveness.     ST. Pt will answer simple, environmental, and personal yes/no questions with 90% accuracy and minimal cues.  2. Pt will complete confrontational naming tasks with 90% accuracy and minimal cues.  3. Pt will use fluency shaping/stuttering modification techniques to decrease syllables stuttered to less than 5%SS.                    Plan:   Patient to be seen Therapy Frequency: 5 x/week   Planned Interventions: Language Therapy  Plan of Care Expires: 06/10/22  Plan of Care reviewed with: patient  SLP Follow-up?: Yes  SLP -  Next Visit Date: 06/10/22        6/7/2022

## 2022-06-07 NOTE — PT/OT/SLP PROGRESS
"Physical Therapy         Treatment        Sammie Belcher   MRN: [id number]      start time 0830  Stop time 0900     Billable Minutes:  Gait [id number] and Therapeutic Activity 15min      Treatment Type: Treatment  PT/PTA: PTA     PTA Visit Number: 3       General Precautions: Standard, fall, aphasia  Orthopedic Precautions: Orthopedic Precautions : N/A   Braces:      Patient found with: Other (comments) (up in wc  on with wc and wc seat belt)         Subjective:  "pt in wc with no c/o questioned if needs rest room pt stated yes    Pain/Comfort  Pain Rating 1: 0/10    Objective:  Therapeutic activities, therapeutic exercise performed to increase muscle strength and motor recruitment so that patient may improve quality of functionional mobility and increase functional independence. and Gait training performed to improve functional strength, to increase functional mobility, safety and independence.    VITALS:  BP:     Hr 88 103/70 ending hr 76 126/80       In sitting      Transfer Training:  Sit to stand:Supervision or Set-up Assistance with Rolling Walker vc for hand placement   Toilet Transfer:  Pt Step Transfer with Contact Guard Assistance with Rolling Walker vc for safety with t/f to toilet accidental void assist with changing vc for safety      Gait Training:  Patient ambulates 200 feet using rolling walker with Contact Guard Assistance and   and many verbal cues rtle step length/height to improve gt and safety and to stay within rw  . Pt demonstrating a  step to/through gt pattern with decreased rajiv, decreased step length and decreased stride length.Impairments contributing to gait deviations include impaired balance, impaired coordination, impaired motor control, decreased ROM, decreased sensation and impaired sensory feedback      Activity Tolerance:  Patient tolerated treatment well and poor safety awareness     Patient left up in chair with call button in reach, chair alarm on and  on with wc " seat belt on.    Education Provided: transfer training, gait training, balance training, safety awareness and assistive device    Expected compliance:  High compliance        Assessment:  Sammie Belcher is a 52 y.o. female with a medical diagnosis of CVA (cerebral vascular accident). She presents with weakness and poor safety awareness . Pt to benefit from skilled PT services to address impairments with progression towards functional independence as tolerated.    Rehab potential is good.    Activity tolerance: Good    GOALS:   Multidisciplinary Problems     Physical Therapy Goals        Problem: Physical Therapy    Goal Priority Disciplines Outcome Goal Variances Interventions   Physical Therapy Goal     PT, PT/OT Ongoing, Progressing     Description: Short Term goals      Bed Mobility   Roll Right and Left Setup or Clean-up Assistance.  Lying to sitting Setup or Clean-up Assistance.  Sitting to lying Setup or Clean-up Assistance.    Transfers    Pt will be able to perform Stand step chair/bed to chair transfer With LRAD Setup or Clean-up Assistance.  Pt will be able to perform Sit to stand with LRAD   Setup or Clean-up Assistance.  Pt will be able to perform Car transfer with LRAD Setup or Clean-up Assistance.    Ambulation    Pt will ambulate 100 Feet with LRAD Setup or Clean-up Assistance.      Wheelchair Mobility   Pt will be able to propel 150 feet in paola wheelchair Setup or Clean-up Assistance.      Timeframe: By Discharge                      PLAN:    Patient to be seen 5 x/week  to address the above listed problems via gait training, therapeutic activities, therapeutic exercises, neuromuscular re-education  Plan of Care expires: 06/09/22  Plan of Care reviewed with: patient    6/7/2022

## 2022-06-07 NOTE — PROGRESS NOTES
[facility] - Rehab Inpatient Services  Adult Nutrition  Progress Note    SUMMARY       Recommendations    - Continue current diet per MD/SLP   - Continue MVI + thiamine as feasible   - Continue Boost Plus to provide 360 kcals, 14 gm pro per serving.         Goals: Meet >75% of est energy needs by f/u    Nutrition Goal Status: Met     Assessment and Plan    6/7: Rounded with pt in dining room. Able to answer questions about appetite. Reports good appetite. Observed lunch tray with ~100% meal consumed. Did not drink much boost, will monitor and d/c at f/u if pt continues to not drink.  Wt increased +1.1kg per most recent wt.     6/3: Pt with good appetite. Observed eating >75% lunch in dining room. Per EMR, pt averaging ~85% po intake x last 2 meals. Attempted to interview pt. Pt having difficulty communicating d/t expressive aphasia. Noted pt with significant wt loss per RD note at [facility] on (5/25). Continued Strawberry Boost Plus pt was receiving from [facility] prior to admit.        Malnutrition Assessment  Malnutrition Type: chronic illness  Energy Intake: moderate energy intake  Weight Loss (Malnutrition): 7.5% in 3 months  Energy Intake (Malnutrition): less than 75% for greater than or equal to 1 month  Subcutaneous Fat (Malnutrition): mild depletion  Muscle Mass (Malnutrition): mild depletion   Orbital Region (Subcutaneous Fat Loss): mild depletion  Upper Arm Region (Subcutaneous Fat Loss): mild depletion   Restorationism Region (Muscle Loss): mild depletion  Clavicle Bone Region (Muscle Loss): mild depletion  Dorsal Hand (Muscle Loss): mild depletion     Reason for Assessment    Reason For Assessment: consult (rehab)  Diagnosis: other (see comments) (Left frontal lobe 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,POSITIVE RPR  History of alcohol dependence  HTN  Nicotine dependence  Anxiety/depression   Hyperparathyroidism)  Relevant Medical History: Essential HTN  HLD   "Hypothyroidism  Anxiety  Depression  Hx Cocaine Use Disorder (sober over 1 yr)  THC Use, ETOH abuse    Nutrition Risk Screen    Nutrition Risk Screen: no indicators present    Nutrition/Diet History    Spiritual, Cultural Beliefs, Sabianist Practices, Values that Affect Care: no    Anthropometrics    Temp: 98.1 °F (36.7 °C)  Height: 6' 0.99" (185.4 cm)  Height (inches): 72.99 in  Weight Method: Estimated  Weight: 65.5 kg (144 lb 6.4 oz)  Weight (lb): 144.4 lb  Ideal Body Weight (IBW), Female: 164.95 lb  % Ideal Body Weight, Female (lb): 86.21 %  BMI (Calculated): 19.1  BMI Grade: 18.5-24.9 - normal  Usual Body Weight (UBW), k.73 kg  % Usual Body Weight: 88.87  % Weight Change From Usual Weight: -11.32 %     : 65.5 kg   6/3: 64.5 kg     UBW [facility] () "Per family, pt has had some appetite and wt loss before parathyroid gland removal ~ 2 months ago. States UBW ~ 160lb."       Lab/Procedures/Meds    Pertinent Labs Comments: GFR >60  Pertinent Medications Comments: statin, MVI, lisinopril, thiamine      Estimated/Assessed Needs    Weight Used For Calorie Calculations: 64.5 kg (142 lb 3.2 oz)  Energy Calorie Requirements (kcal):   Energy Need Method: [place]-St Jeor (x 1.4 SF)  Protein Requirements: 97 (1.5 g/kg)  Weight Used For Protein Calculations: 64.5 kg (142 lb 3.2 oz)  RDA Method (mL):     Nutrition Prescription Ordered    Current Diet Order: Easy to Chew, Boost Plus TID     Evaluation of Received Nutrient/Fluid Intake       % Intake of Estimated Energy Needs: 75 - 100 %  % Meal Intake: 75 - 100 %    Nutrition Problem  Malnutrition     Related to (etiology):   Thyroid disease     Signs and Symptoms (as evidenced by):   <75% est energy needs > 1 month  Physical evidence of mild muscle/fat wasting     Interventions(treatment strategy):  Commercial food, Multivitamin / Mineral supplement therapy and Collaboration with other providers     Nutrition Diagnosis " Status:   Improving      Nutrition Risk    Level of Risk/Frequency of Follow-up: moderate     Monitor and Evaluation      Wt, po intake, and nutrition related labs     Nutrition Follow-Up    RD Follow-up?: Yes

## 2022-06-08 PROCEDURE — 25000003 PHARM REV CODE 250: Performed by: NURSE PRACTITIONER

## 2022-06-08 PROCEDURE — 11800000 HC REHAB PRIVATE ROOM

## 2022-06-08 PROCEDURE — 97112 NEUROMUSCULAR REEDUCATION: CPT

## 2022-06-08 PROCEDURE — 97530 THERAPEUTIC ACTIVITIES: CPT

## 2022-06-08 PROCEDURE — 97110 THERAPEUTIC EXERCISES: CPT

## 2022-06-08 PROCEDURE — 94761 N-INVAS EAR/PLS OXIMETRY MLT: CPT

## 2022-06-08 PROCEDURE — 97116 GAIT TRAINING THERAPY: CPT

## 2022-06-08 PROCEDURE — 94799 UNLISTED PULMONARY SVC/PX: CPT

## 2022-06-08 PROCEDURE — S4991 NICOTINE PATCH NONLEGEND: HCPCS | Performed by: NURSE PRACTITIONER

## 2022-06-08 PROCEDURE — 92507 TX SP LANG VOICE COMM INDIV: CPT

## 2022-06-08 PROCEDURE — 63600175 PHARM REV CODE 636 W HCPCS: Performed by: NURSE PRACTITIONER

## 2022-06-08 RX ADMIN — ASPIRIN 325 MG: 325 TABLET, COATED ORAL at 08:06

## 2022-06-08 RX ADMIN — THIAMINE HCL TAB 100 MG 100 MG: 100 TAB at 04:06

## 2022-06-08 RX ADMIN — ATORVASTATIN CALCIUM 40 MG: 40 TABLET, FILM COATED ORAL at 08:06

## 2022-06-08 RX ADMIN — SERTRALINE HYDROCHLORIDE 25 MG: 25 TABLET ORAL at 08:06

## 2022-06-08 RX ADMIN — AMLODIPINE BESYLATE 5 MG: 5 TABLET ORAL at 08:06

## 2022-06-08 RX ADMIN — THIAMINE HCL TAB 100 MG 100 MG: 100 TAB at 07:06

## 2022-06-08 RX ADMIN — BUPROPION HYDROCHLORIDE 150 MG: 150 TABLET, FILM COATED, EXTENDED RELEASE ORAL at 08:06

## 2022-06-08 RX ADMIN — THIAMINE HCL TAB 100 MG 100 MG: 100 TAB at 08:06

## 2022-06-08 RX ADMIN — LABETALOL HYDROCHLORIDE 200 MG: 200 TABLET, FILM COATED ORAL at 08:06

## 2022-06-08 RX ADMIN — LABETALOL HYDROCHLORIDE 200 MG: 200 TABLET, FILM COATED ORAL at 04:06

## 2022-06-08 RX ADMIN — ENOXAPARIN SODIUM 40 MG: 40 INJECTION SUBCUTANEOUS at 04:06

## 2022-06-08 RX ADMIN — ACETAMINOPHEN 650 MG: 325 TABLET ORAL at 07:06

## 2022-06-08 RX ADMIN — MULTIPLE VITAMINS W/ MINERALS TAB 1 TABLET: TAB at 08:06

## 2022-06-08 RX ADMIN — LABETALOL HYDROCHLORIDE 200 MG: 200 TABLET, FILM COATED ORAL at 07:06

## 2022-06-08 RX ADMIN — LISINOPRIL 10 MG: 10 TABLET ORAL at 07:06

## 2022-06-08 RX ADMIN — NICOTINE 1 PATCH: 21 PATCH, EXTENDED RELEASE TRANSDERMAL at 08:06

## 2022-06-08 RX ADMIN — HYDROXYZINE PAMOATE 50 MG: 50 CAPSULE ORAL at 09:06

## 2022-06-08 NOTE — PT/OT/SLP PROGRESS
Occupational Therapy  Treatment      Sammie Belcher   MRN: [id number]   Admitting Diagnosis: CVA (cerebral vascular accident)    Therapy Minutes  OT Date of Treatment: 06/08/22  OT Start Time: 1100  OT Stop Time: 1200  OT Total Time (min): 60 min  OT Individual: 60    Billable Minutes:  Therapeutic Activity 45 and Therapeutic Exercise 15    OT/BETH: OT          General Precautions: Standard, fall  Orthopedic Precautions:           Subjective:  Patient communicated being tired.    Pain/Comfort  Pain Rating 1: 0/10  Objective:    Endurance and Strengthening  Bilateral upper extremity ergometer for 5 minutes for 2 sets with Independent      Fine/Gross motor coordination   Patient performed: large pegs in seated with Supervision or Set-up Assistance using RUE. Pt able to follow design from card and able to verbally identify colors with occasional beginning letter sound cues. Pt had occasional difficulty with grasping/releasing peg but once cued to close or open hand, pt able to succeed.  Pt also performed handwriting by writing name, address, eye color, favorite color.  Pt demonstrated decreased proprioception/motor planning when trying to form letters on paper as evidence of almost scribbling.  Pt able to verbally spell words when asked. Pt able to write better when word is pre-written on paper.  Pt also colored mandala design pattern. Able follow within lines and able to consistently follow color pattern with design.    Education Provided: Roles and goals of OT and ADLs    Expected compliance: High compliance    ASSESSMENT:  Sammie Belcher is a 52 y.o. female with a medical diagnosis of CVA (cerebral vascular accident) performed BUE therex and FM coordination to increase ADL independence and safety. Pt is progressing towards OT goals      GOALS:   Multidisciplinary Problems     Occupational Therapy Goals        Problem: Occupational Therapy    Goal Priority Disciplines Outcome Interventions   Occupational Therapy  Goal     OT, PT/OT Ongoing, Progressing    Description: ADLs:  Pt to perform grooming tasks with supervision and RW and min vc  Pt to perform feeding tasks with independence   Pt to perform UB dressing with set up assist and min vc  Pt to perform LB dressing with CGA and min vc   Pt to perform putting on/off footwear task with supervision and min vc  Pt to perform toileting with supervision and min vc    Functional Transfers:  Pt to perform toilet transfers with supervision and min vc and RW  Pt to perform a tub transfer with supervision, RW, min vc    IADLs:  Pt to perform simple care taking/ home management tasks with min A and min vc    Balance, Strengthening, Endurance, Balance:  Pt to demonstrate dynamic standing balance as required to perform ADL's from standing level with RW and min vc.  Pt to demonstrate BUE strength during functional task and increased use of R UE.                   Plan:  Patient to be seen 5 x/week to address the above listed problems via self-care/home management, therapeutic activities, therapeutic exercises, therapeutic groups, neuromuscular re-education  Plan of Care expires: 06/09/22  Plan of Care reviewed with: patient      06/08/2022

## 2022-06-08 NOTE — PT/OT/SLP PROGRESS
Speech Language Pathology Treatment          Sammie Belcher   MRN: [id number]     Diet recommendations: Solid Diet Level: Easy to Chew Diet - IDDSI Level 7  Liquid Diet Level: Thin     Therapy Minutes  SLP Treatment Date: 22  Speech Start Time: 1400  Speech Stop Time: 1430  Speech Total (min): 30 min  SLP Individual: 30    Billable Minutes:  Speech Therapy Individual 30 minutes    General Precautions: Standard, aphasia, aspiration    Subjective:  Pt awake and alert throughout session.    Objective:   Pain/Comfort  Pain Rating 1: 0/10    Pt reports she would like to continue to work on naming during this session. Pt presented with every day object picture cards. Pt able to name cards with 100% accuracy with min-mod phonemic cues.     Assessment:  Sammie Belcher is a 52 y.o. female with a SLP diagnosis of Aphasia. SLP intervention continues to be warranted targeting communicative effectiveness to increase ability to have needs/wants met. SLP to continue POC.    Discharge recommendations: Discharge Facility/Level of Care Needs: home health speech therapy     Goals:   Multidisciplinary Problems     SLP Goals        Problem: SLP    Goal Priority Disciplines Outcome   SLP Goal     SLP    Description: LTG: To increase expressive/receptive language skills to enhance functional communication to express basic wants and needs with 100% effectiveness.     ST. Pt will answer simple, environmental, and personal yes/no questions with 90% accuracy and minimal cues.  2. Pt will complete confrontational naming tasks with 90% accuracy and minimal cues.  3. Pt will use fluency shaping/stuttering modification techniques to decrease syllables stuttered to less than 5%SS.                    Plan:   Patient to be seen Therapy Frequency: 5 x/week   Planned Interventions: Language Therapy  Plan of Care Expires: 06/10/22  Plan of Care reviewed with: patient  SLP Follow-up?: Yes  SLP - Next Visit Date: 06/10/22        2022

## 2022-06-08 NOTE — PROGRESS NOTES
06/08/22 0830   Rec Therapy Time Calculation   Rec Start Time 0830   Rec Stop Time 0900   Rec Total Time (min) 30 min   Subjective   Patient states Pt stateded that she was OK today   Attendance   Activity Recreational Therapy  (Julian Urena)   Participation Active participation   Therapeutic Recreation   Problem Solving Activity Assistance tactile cues;verbal cues;Contact Guard Assistance  (Tactile and verbal cues needed for motor planning deficits and expressive deficits)   Assessment   Assessment Sit to stand was supervision as was dynamic standing balance/reaching. Standing tolerance was 4 minutes. Mod tactile cues and hand over hand assist were needed for motor planning of RUE. Comprehension and direction following skills were contact guard/supervision. Answering questions with word prompts at 75% accuracy. Determined   Plan   Planned Therapy Intervention Continue with current plan  (Julian Urena)   Expected Length of Stay Pending   PT Frequency Minimu of 5 visits per week;Other (see comments)  (Daily)   Time   Treatment time 2 units

## 2022-06-08 NOTE — PT/OT/SLP PROGRESS
"Physical Therapy         Treatment        Sammie Belcher   MRN: [id number]         Start time: 0930  Stop time: 1100    Billable Minutes:  Gait [id number], Therapeutic Exercise 30 and Neuromuscular Re-education 30             General Precautions: Standard, fall, aphasia  Orthopedic Precautions: Orthopedic Precautions : N/A   Braces:      Patient found with: peripheral IV         Subjective:  "None stated     Pain/Comfort  Pain Rating 1: 0/10    Objective:  Therapeutic activities, therapeutic exercise performed to increase muscle strength and motor recruitment so that patient may improve quality of functionional mobility and increase functional independence., Gait training performed to improve functional strength, to increase functional mobility, safety and independence. and Neuromuscular re-education performed to improve muscle facilitation and increase motor control/muscle recruitment.    VITALS:  BP:     111/75 HR 82   122/82 HR 67    Edema:  Skin integrity: Visible skin intact  Edema: None noted .      Functional Mobility:      Transfer Training:  Sit to stand:Contact Guard Assistance with Rolling Walker .  Bed <> Chair:  Step Transfer with Contact Guard Assistance with Rolling Walker .        Gait Training:  Patient ambulates 100 feet then again 140'  using rolling walker with Moderate Assistance and min verbal cues. Pt requires min a for steering and VC for increased step length on RLE.  Poor motor control noted throughout.     Stair Training:  Pt ascended/descend 4 stair(s) with reciprocal/non-reciprocal steps using bilateral handrails with Moderate Assistance and max verbal cues.       Current   Status   Functional Area: Care Score:   Roll Left and Right 4   Sit to Lying 3   Lying to Sitting on Side of Bed 3   Sit to Stand 4   Chair/Bed-to-Chair Transfer 4   Car Transfer 3   Walk 10 Feet 3   Walk 50 Feet with Two Turns 3   Walk 150 Feet 88   Walk 10 Feet Uneven Surface 88   1 Step (Curb) 3   4 Steps 3   12 " Steps 88   Picking Up Object 3   Wheel 50 Feet with Two Turns 3   Wheel 150 Feet 3         Additional Treatment:  Bike recumbent 10 mins    TherX 10 x 3 AAROM RLE; LLE 3# to. Noted tone with knee ext and flex.    Standing balance with RW RLE step ups on 6 in step. 10 x 3 working on motor control.      Activity Tolerance:  Patient tolerated treatment well    Patient left up in chair with all lines intact, call button in reach and chair alarm on.    Education Provided: roles and goals of PT/PTA, transfer training, bed mob, gait training and stair training    Expected compliance:  Moderate compliance        Assessment:  Sammie Belcher is a 52 y.o. female with a medical diagnosis of CVA (cerebral vascular accident). She presents with weakness, pain, impaired coordination, impaired balance, gait deviations, difficulty with functional transfers, difficulty with bed mobility, decreased motor control and decreased endurance. Pt to benefit from skilled PT services to address impairments with progression towards functional independence as tolerated.    Rehab potential is good.    Activity tolerance: Good    Discharge recommendations:       Equipment recommendations:       GOALS:   Multidisciplinary Problems     Physical Therapy Goals        Problem: Physical Therapy    Goal Priority Disciplines Outcome Goal Variances Interventions   Physical Therapy Goal     PT, PT/OT Ongoing, Progressing     Description: Short Term goals      Bed Mobility   Roll Right and Left Setup or Clean-up Assistance.  Lying to sitting Setup or Clean-up Assistance.  Sitting to lying Setup or Clean-up Assistance.    Transfers    Pt will be able to perform Stand step chair/bed to chair transfer With LRAD Setup or Clean-up Assistance.  Pt will be able to perform Sit to stand with LRAD   Setup or Clean-up Assistance.  Pt will be able to perform Car transfer with LRAD Setup or Clean-up Assistance.    Ambulation    Pt will ambulate 100 Feet with LRAD Setup  or Clean-up Assistance.      Wheelchair Mobility   Pt will be able to propel 150 feet in paola wheelchair Setup or Clean-up Assistance.      Timeframe: By Discharge                      PLAN:    Patient to be seen 5 x/week  to address the above listed problems via gait training, therapeutic activities, therapeutic exercises, neuromuscular re-education  Plan of Care expires: 06/09/22  Plan of Care reviewed with: patient    6/8/2022

## 2022-06-08 NOTE — PT/OT/SLP PROGRESS
Speech Language Pathology Treatment          Sammie Belcher   MRN: [id number]     Diet recommendations: Solid Diet Level: Easy to Chew Diet - IDDSI Level 7  Liquid Diet Level: Thin     Therapy Minutes  SLP Treatment Date: 22  Speech Start Time: 1300  Speech Stop Time: 1330  Speech Total (min): 30 min  SLP Individual: 30    Billable Minutes:  Speech Therapy Individual 30 minutes    General Precautions: Standard, aphasia, aspiration        Subjective:  Pt awake, alert, and cooperative throughout the session.    Objective:   Pain/Comfort  Pain Rating 1: 0/10    Pt able to participate in naming activity with picture cards of clothing items on this date. Increased difficulty with naming on this date however pt continues to push through blocks and participate in task. Pt able to complete task with 85% accuracy with moderate-maximal cues.    Assessment:  Sammie Belcher is a 52 y.o. female with a SLP diagnosis of Aphasia. SLP intervention continues to be warranted to increase communicative effectiveness and ability to have needs/wants met.    Discharge recommendations: Discharge Facility/Level of Care Needs: home health speech therapy     Goals:   Multidisciplinary Problems     SLP Goals        Problem: SLP    Goal Priority Disciplines Outcome   SLP Goal     SLP    Description: LTG: To increase expressive/receptive language skills to enhance functional communication to express basic wants and needs with 100% effectiveness.     ST. Pt will answer simple, environmental, and personal yes/no questions with 90% accuracy and minimal cues.  2. Pt will complete confrontational naming tasks with 90% accuracy and minimal cues.  3. Pt will use fluency shaping/stuttering modification techniques to decrease syllables stuttered to less than 5%SS.                    Plan:   Patient to be seen Therapy Frequency: 5 x/week   Planned Interventions: Language Therapy  Plan of Care Expires: 06/10/22  Plan of Care reviewed with:  patient  SLP Follow-up?: Yes  SLP - Next Visit Date: 06/10/22        6/8/2022

## 2022-06-08 NOTE — PROGRESS NOTES
[facility] ([facility])  Rehab Progress Note    Patient Name: Sammie Belcher  MRN: [id number]  Age: 52 y.o. Sex: female  : 1970  Hospital Length of Stay: 6 days  Date of Service:  2022  Chief Complaint: Left frontal lobe 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    Subjective:     Basic Information  Admit Information: 52-year-old  female presented to [facility] ED on 2022 complaining of dysarthria and expressive aphasia x3 days.  PMH significant hyperparathyroidism s/p parathyroidectomy 2022, anxiety/depression, and HTN.  Workup significant for reactive RPR and CT head significant for left frontal 6 cm cortical base hypodensity suggesting acute to subacute nonhemorrhagic infarct of the left KENISHA vascular territory.  MRA brain significant for left frontal lobe infarct extending into the corpus callosum with acute nonhemorrhagic infarct in the territory of the KENISHA, and old lacunar infarcts. Transferred to [facility] for neurology services.  Bicillin initiated on  for syphilis with plan for 3 doses with end date on .  Also received Flagyl x 7 days due to Trichomoniasis.  TTE significant for EF 74% with severe concentric hypertrophy and hyperdynamic systolic function with negative bubble study.  ASA and statin initiated.  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  LP not revealing with no evidence of CNS infection.  Tolerating LINQ placement on .  CVA likely caused by long history cocaine abuse.  Continue to participate in progress therapy.  Tolerated transferred to [facility] inpatient rehab unit on  without incident.  Today's Information:  No labs or imaging today.  Sitting up in recliner.  Reports good sleep and appetite.  Last BM .  Vital signs at goal.  No labs or imaging today.      Review of patient's allergies indicates:  No Known Allergies     Current Facility-Administered  Medications:     acetaminophen tablet 650 mg, 650 mg, Oral, Q4H PRN, Nish FAUSTINO Destiny, FNP, 650 mg at 06/04/22 2339    alendronate tablet 70 mg, 70 mg, Oral, Q7 Days, Nish FAUSTINO Destiny, FNP, 70 mg at 06/03/22 0623    ALPRAZolam tablet 0.25 mg, 0.25 mg, Oral, TID PRN, Nish Dixon, FNP    amLODIPine tablet 5 mg, 5 mg, Oral, Daily, Nish FAUSTINO Destiny, FNP, 5 mg at 06/08/22 0814    aspirin EC tablet 325 mg, 325 mg, Oral, Daily, Nish Dixon, FNP, 325 mg at 06/08/22 0814    atorvastatin tablet 40 mg, 40 mg, Oral, Daily, Nish Lymanehart, FNP, 40 mg at 06/08/22 0814    benzonatate capsule 100 mg, 100 mg, Oral, TID PRN, Nish Dixon, FNP    bisacodyL suppository 10 mg, 10 mg, Rectal, Daily PRN, Nish Dixon, FNP    buPROPion TB24 tablet 150 mg, 150 mg, Oral, Daily, Nish Lymanehart, FNP, 150 mg at 06/08/22 0814    enoxaparin injection 40 mg, 40 mg, Subcutaneous, Daily, Nish Lymanehart, FNP, 40 mg at 06/07/22 1736    hydrALAZINE injection 10 mg, 10 mg, Intravenous, Q4H PRN, Nish Dixon, FNP    HYDROcodone-acetaminophen 5-325 mg per tablet 1 tablet, 1 tablet, Oral, Q4H PRN, Nish Dixon, FNP    hydrOXYzine pamoate capsule 50 mg, 50 mg, Oral, Nightly PRN, Nish Dixon, FNP    labetalol 20 mg/4 mL (5 mg/mL) IV syring, 10 mg, Intravenous, Q4H PRN, Nish Lymanehart, FNP    labetaloL tablet 200 mg, 200 mg, Oral, TID, Nish Woodart, FNP, 200 mg at 06/08/22 0814    lisinopriL tablet 10 mg, 10 mg, Oral, QHS, Nish Dixon, FNP, 10 mg at 06/07/22 2002    metoprolol injection 10 mg, 10 mg, Intravenous, Q2H PRN, SIRISHA Fam    multivitamin tablet, 1 tablet, Oral, Daily, Nish Dixon, FNP, 1 tablet at 06/08/22 0814    nicotine 21 mg/24 hr 1 patch, 1 patch, Transdermal, Daily, GARY FamP, 1 patch at 06/08/22 0814    nitroGLYCERIN SL tablet 0.4 mg, 0.4 mg, Sublingual, Q5 Min PRN, Nish Dixon,  "FNP    ondansetron disintegrating tablet 4 mg, 4 mg, Oral, Q6H PRN, Nish Dixon, FNP    ondansetron disintegrating tablet 8 mg, 8 mg, Oral, Q6H PRN, Nish Lymanehart, FNP    polyethylene glycol packet 17 g, 17 g, Oral, Q12H PRN, Nish HARMON Destiny, FNP    promethazine tablet 25 mg, 25 mg, Oral, Q6H PRN, Nish HARMON Destiny, FNP    sertraline tablet 25 mg, 25 mg, Oral, Daily, Nish HARMON Destiny, FNP, 25 mg at 06/08/22 0814    thiamine tablet 100 mg, 100 mg, Oral, TID, Nish HARMON Destiny, FNP, 100 mg at 06/08/22 0814     Review of Systems   Complete 12-point review of symptoms negative except for what's mentioned in HPI     Objective:     /84   Pulse 81   Temp 97.6 °F (36.4 °C) (Oral)   Resp 16   Ht 6' 0.99" (1.854 m)   Wt 65.5 kg (144 lb 6.4 oz)   LMP  (LMP Unknown)   SpO2 98%   BMI 19.06 kg/m²        Intake/Output Summary (Last 24 hours) at 6/8/2022 0832  Last data filed at 6/7/2022 2000  Gross per 24 hour   Intake 840 ml   Output --   Net 840 ml       Physical Exam  Constitutional:       Appearance: Normal appearance.   HENT:      Head: Normocephalic.      Mouth/Throat:      Mouth: Mucous membranes are moist.   Eyes:      Pupils: Pupils are equal, round, and reactive to light.   Cardiovascular:      Rate and Rhythm: Normal rate and regular rhythm.      Heart sounds: Normal heart sounds.   Pulmonary:      Effort: Pulmonary effort is normal.      Breath sounds: Normal breath sounds.   Abdominal:      General: Bowel sounds are normal.      Palpations: Abdomen is soft.   Musculoskeletal:      Cervical back: Neck supple.      Comments: Slight weakness to right side.  Weakness to left lower extremity.   Skin:     General: Skin is warm and dry.   Neurological:      Mental Status: She is alert and oriented to person, place, and time.      Comments: Dysarthria/expressive and receptive aphasia    Psychiatric:         Mood and Affect: Mood normal.     *MD performed and documented physical " examination       Lines/Drains/Airways     None               Labs:  No results found for this or any previous visit (from the past 24 hour(s)).  Radiology:  MRI brain without contrast 05/19/2002 at 5:38 p.m., IMPRESSION: Motion degraded exam. In spite of this limitation: Moderate to severe multifocal flow signal abnormality about the bilateral ACAs, bilateral distal MCAs and to lesser extent bilateral PCAs.  Radiology  Transthoracic echo 05/22/2022 at 7:50 a.m., IMPRESSION:  LV is normal in size with hyperdynamic systolic function.  Estimated EF 75%.  There is grade 1 diastolic dysfunction consistent with impaired relaxation.  Left atrial pressure is normal.  RV normal size and function.  RA normal size.  AV structurally normal.  No regurgitation.  No stenosis.  There is nonspecific leaflet thickening 2 mitral valve.  There is trace mitral valve regurgitation.  There is no stenosis.  Tricuspid valve with trace regurgitation.  No valve stenosis.  PA P could not be obtained.  There is no prior care effusion.  There is no evidence 10 benign.  Negative bubble study.    Assessment/Plan:     52 y.o. AAF admitted on 6/2/2022    Left frontal lobe infarct   - with dysarthria/expressive and receptive aphasia and right-sided weakness  - continue                Aspirin 325 mg daily                Lipitor 40 mg daily                Multivitamin daily  - defer to physiatry for rehab and pain management  - PT/OT/RT/ST following     POSITIVE RPR  - s/p Bicillin x 3  - LP on 5/22 negative for CNS infection     History of alcohol dependence  - stable  - no withdrawals  - continue                Thiamine 100 mg t.i.d.     HTN  - BP at goal!!  - continue     Lisinopril 10 mg at bedtime (initiated 6/5)                Norvasc 10 mg daily                Labetalol 200 mg t.i.d.                Hydralazine 10 mg every 2 hours as needed for BP > 160/90                Labetalol 10 mg every 2 hours as needed for BP > 160/90     Nicotine  dependence  - stable  - continue                Nicotine patch 21 mg daily     Anxiety/depression  - in remission  - continue                Wellbutrin 150 mg XR daily                Zoloft 25 mg daily     Hyperparathyroidism  - stable  - s/p parathyroidectomy 02/23/2022  - continue                Vitamin-D 13730 units Q weekly                Fosamax 70 mg Q weekly     AB therapy  Bicillin x3 doses (5/19-6/2)     VTE Prophylaxis:  Lovenox 40 mg daily  COVID-19 testing:  Negative on 6/02/2022  COVID-19 vaccination status:  Vaccinated (Pfizer):  08/25/2021 and 08/04/2021     POA: No  Living will: No  Contacts:  Gamaliel Hein ([facility]) [phone]     CODE STATUS: Full Code  Internal Medicine (attending): José Miguel Cesar MD     OUTPATIENT PROVIDERS  PCP: Wanda Bell MD  Neurology:  Fede Lopez MD  Infectious disease: Marcelina Nguyen MD     DISPOSITION: Condition stable.  Sleep hygiene, BM, and appetite at goal. Vital signs at goal.  No labs or imaging today.  Continue current POC.  Monitor closely.  Notify of acute changes.    Staffing 6/7/2022: Incontinent of bladder. Continent of bowel.  RT: Overall CGA.  She did have a fall over the weekend with no injury.  Appetite is good. PT: High fall risk.  PT: Overall CGA to mod assists ambulating 150 feet with RW. .  Linited by ataxia and apraxia. OT: Overall supervision with ADLs to partial mod assist.  Poor motor planning.  Needs more time. ST: Limited by fluency.  Receptive language impaired.  Projected discharge pending.       Dejan Dixon NP conducted independent physical examination and assisted with medical documentation.    Total time spent on this encounter including chart review and direct MD + NP 1-on-1 patient interaction: 38 minutes   Over 50% of this time was spent in counseling and coordination of care

## 2022-06-09 PROCEDURE — 99233 SBSQ HOSP IP/OBS HIGH 50: CPT | Mod: ,,, | Performed by: PHYSICAL MEDICINE & REHABILITATION

## 2022-06-09 PROCEDURE — 97535 SELF CARE MNGMENT TRAINING: CPT

## 2022-06-09 PROCEDURE — 97530 THERAPEUTIC ACTIVITIES: CPT

## 2022-06-09 PROCEDURE — 25000003 PHARM REV CODE 250: Performed by: NURSE PRACTITIONER

## 2022-06-09 PROCEDURE — 94761 N-INVAS EAR/PLS OXIMETRY MLT: CPT

## 2022-06-09 PROCEDURE — 94799 UNLISTED PULMONARY SVC/PX: CPT

## 2022-06-09 PROCEDURE — S4991 NICOTINE PATCH NONLEGEND: HCPCS | Performed by: NURSE PRACTITIONER

## 2022-06-09 PROCEDURE — 99233 PR SUBSEQUENT HOSPITAL CARE,LEVL III: ICD-10-PCS | Mod: ,,, | Performed by: PHYSICAL MEDICINE & REHABILITATION

## 2022-06-09 PROCEDURE — 11800000 HC REHAB PRIVATE ROOM

## 2022-06-09 PROCEDURE — 63600175 PHARM REV CODE 636 W HCPCS: Performed by: NURSE PRACTITIONER

## 2022-06-09 PROCEDURE — 97164 PT RE-EVAL EST PLAN CARE: CPT

## 2022-06-09 PROCEDURE — 97168 OT RE-EVAL EST PLAN CARE: CPT

## 2022-06-09 PROCEDURE — 92507 TX SP LANG VOICE COMM INDIV: CPT

## 2022-06-09 RX ORDER — BACLOFEN 5 MG/1
5 TABLET ORAL 2 TIMES DAILY
Status: DISCONTINUED | OUTPATIENT
Start: 2022-06-09 | End: 2022-06-23 | Stop reason: HOSPADM

## 2022-06-09 RX ADMIN — MULTIPLE VITAMINS W/ MINERALS TAB 1 TABLET: TAB at 08:06

## 2022-06-09 RX ADMIN — SERTRALINE HYDROCHLORIDE 25 MG: 25 TABLET ORAL at 08:06

## 2022-06-09 RX ADMIN — LISINOPRIL 10 MG: 10 TABLET ORAL at 08:06

## 2022-06-09 RX ADMIN — BACLOFEN 5 MG: 5 TABLET ORAL at 10:06

## 2022-06-09 RX ADMIN — BUPROPION HYDROCHLORIDE 150 MG: 150 TABLET, FILM COATED, EXTENDED RELEASE ORAL at 08:06

## 2022-06-09 RX ADMIN — THIAMINE HCL TAB 100 MG 100 MG: 100 TAB at 08:06

## 2022-06-09 RX ADMIN — ASPIRIN 325 MG: 325 TABLET, COATED ORAL at 08:06

## 2022-06-09 RX ADMIN — AMLODIPINE BESYLATE 5 MG: 5 TABLET ORAL at 08:06

## 2022-06-09 RX ADMIN — ENOXAPARIN SODIUM 40 MG: 40 INJECTION SUBCUTANEOUS at 04:06

## 2022-06-09 RX ADMIN — LABETALOL HYDROCHLORIDE 200 MG: 200 TABLET, FILM COATED ORAL at 08:06

## 2022-06-09 RX ADMIN — LABETALOL HYDROCHLORIDE 200 MG: 200 TABLET, FILM COATED ORAL at 04:06

## 2022-06-09 RX ADMIN — NICOTINE 1 PATCH: 21 PATCH, EXTENDED RELEASE TRANSDERMAL at 08:06

## 2022-06-09 RX ADMIN — BACLOFEN 5 MG: 5 TABLET ORAL at 08:06

## 2022-06-09 RX ADMIN — THIAMINE HCL TAB 100 MG 100 MG: 100 TAB at 04:06

## 2022-06-09 RX ADMIN — ATORVASTATIN CALCIUM 40 MG: 40 TABLET, FILM COATED ORAL at 08:06

## 2022-06-09 NOTE — PLAN OF CARE
"Called Gamaliel leyva ([phone]), to inquire about family potentially coming in to spend time with patient to incorporate family more for safety, as the patient has gotten up and fallen twice now.  He indicated that he takes care of patient's mother (in a wheelchair) at night.  He could "try to start coming more," but then he would be spread too thin/not get rest.  Pt's dtr just had a baby and has 2 other young children.  Sons work full-time, and one lives in [place].  Assured him that we understand their limitations.  "

## 2022-06-09 NOTE — PT/OT/SLP PROGRESS
Speech Language Pathology Treatment          Sammie Belcher   MRN: [id number]     Diet recommendations: Solid Diet Level: Easy to Chew Diet - IDDSI Level 7  Liquid Diet Level: Thin     Billable Minutes:  Speech Therapy Individual 30 minutes    General Precautions: Standard, aphasia          Subjective:  Pt awake, alert, and cooperative throughout session.    Objective:   Pain/Comfort  Pain Rating 1: 0/10    Pt able to participate in naming activity with picture cards of clothing items on this date. Continues to demonstrate difficulty with naming on this date however pt tenacious and continues to push through blocks and participate in task, declining cues at times. Pt able to complete task with 100% accuracy with moderate cues.    Assessment:  Sammie Belcher is a 52 y.o. female with a SLP diagnosis of Aphasia. SLP intervention continues to be warranted to increase communicative effectiveness and ability to have needs/wants met.    Discharge recommendations: Discharge Facility/Level of Care Needs: home health speech therapy     Goals:   Multidisciplinary Problems     SLP Goals        Problem: SLP    Goal Priority Disciplines Outcome   SLP Goal     SLP    Description: LTG: To increase expressive/receptive language skills to enhance functional communication to express basic wants and needs with 100% effectiveness.     ST. Pt will answer simple, environmental, and personal yes/no questions with 90% accuracy and minimal cues.  2. Pt will complete confrontational naming tasks with 90% accuracy and minimal cues.  3. Pt will use fluency shaping/stuttering modification techniques to decrease syllables stuttered to less than 5%SS.                    Plan:   Patient to be seen Therapy Frequency: 5 x/week   Planned Interventions: Language Therapy  Plan of Care Expires: 06/10/22  Plan of Care reviewed with: patient  SLP Follow-up?: Yes  SLP - Next Visit Date: 06/10/22        2022

## 2022-06-09 NOTE — PROGRESS NOTES
[facility] ([facility])  Rehab Progress Note    Patient Name: Sammie Belcher  MRN: [id number]  Age: 52 y.o. Sex: female  : 1970  Hospital Length of Stay: 7 days  Date of Service:  2022  Chief Complaint: Left frontal lobe 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    Subjective:     Basic Information  Admit Information: 52-year-old  female presented to [facility] ED on 2022 complaining of dysarthria and expressive aphasia x3 days.  PMH significant hyperparathyroidism s/p parathyroidectomy 2022, anxiety/depression, and HTN.  Workup significant for reactive RPR and CT head significant for left frontal 6 cm cortical base hypodensity suggesting acute to subacute nonhemorrhagic infarct of the left KENISHA vascular territory.  MRA brain significant for left frontal lobe infarct extending into the corpus callosum with acute nonhemorrhagic infarct in the territory of the KENISHA, and old lacunar infarcts. Transferred to [facility] for neurology services.  Bicillin initiated on  for syphilis with plan for 3 doses with end date on .  Also received Flagyl x 7 days due to Trichomoniasis.  TTE significant for EF 74% with severe concentric hypertrophy and hyperdynamic systolic function with negative bubble study.  ASA and statin initiated.  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  LP not revealing with no evidence of CNS infection.  Tolerating LINQ placement on .  CVA likely caused by long history cocaine abuse.  Continue to participate in progress therapy.  Tolerated transferred to [facility] inpatient rehab unit on  without incident.  Today's Information:  No acute events overnight.  She did have a a fall yesterday afternoon posting lip, with bleeding CV upper gums, and small abrasion to left knee.  No complaints of acute pain.  Lying comfortably in bed.  Moved to room 402, close to the nurse's station.  Staff  report sleep is good.  Appetite is good.  Last BM 6/8.  Vital signs at goal with no recorded fevers.  No labs or imaging today.    Review of patient's allergies indicates:  No Known Allergies     Current Facility-Administered Medications:     acetaminophen tablet 650 mg, 650 mg, Oral, Q4H PRN, Nish A Destiny, FNP, 650 mg at 06/08/22 1948    alendronate tablet 70 mg, 70 mg, Oral, Q7 Days, Nish A Destiny, FNP, 70 mg at 06/03/22 0623    ALPRAZolam tablet 0.25 mg, 0.25 mg, Oral, TID PRN, Nish A Destiny, FNP    amLODIPine tablet 5 mg, 5 mg, Oral, Daily, Nish A Destiny, FNP, 5 mg at 06/08/22 0814    aspirin EC tablet 325 mg, 325 mg, Oral, Daily, Nish A Destiny, FNP, 325 mg at 06/08/22 0814    atorvastatin tablet 40 mg, 40 mg, Oral, Daily, Nish A Destiny, FNP, 40 mg at 06/08/22 0814    benzonatate capsule 100 mg, 100 mg, Oral, TID PRN, Nish A Destiny, FNP    bisacodyL suppository 10 mg, 10 mg, Rectal, Daily PRN, Nish A Destiny, FNP    buPROPion TB24 tablet 150 mg, 150 mg, Oral, Daily, Nish A Destiny, FNP, 150 mg at 06/08/22 0814    enoxaparin injection 40 mg, 40 mg, Subcutaneous, Daily, Nish A Destiny, FNP, 40 mg at 06/08/22 1650    hydrALAZINE injection 10 mg, 10 mg, Intravenous, Q4H PRN, Nish A Destiny, FNP    HYDROcodone-acetaminophen 5-325 mg per tablet 1 tablet, 1 tablet, Oral, Q4H PRN, Nish A Destiny, FNP    hydrOXYzine pamoate capsule 50 mg, 50 mg, Oral, Nightly PRN, Nish A Destiny, FNP, 50 mg at 06/08/22 2145    labetalol 20 mg/4 mL (5 mg/mL) IV syring, 10 mg, Intravenous, Q4H PRN, Nish A Destiny, FNP    labetaloL tablet 200 mg, 200 mg, Oral, TID, SIRISHA Fam, 200 mg at 06/08/22 1952    lisinopriL tablet 10 mg, 10 mg, Oral, QHS, SIRISHA Fam, 10 mg at 06/08/22 1953    metoprolol injection 10 mg, 10 mg, Intravenous, Q2H PRN, SIRISHA Fam    multivitamin tablet, 1 tablet, Oral, Daily, Nish HARMON  "Destiny, FNP, 1 tablet at 06/08/22 0814    nicotine 21 mg/24 hr 1 patch, 1 patch, Transdermal, Daily, Nish HARMON Destiny, FNP, 1 patch at 06/08/22 0814    nitroGLYCERIN SL tablet 0.4 mg, 0.4 mg, Sublingual, Q5 Min PRN, Nish HARMON Destiny, FNP    ondansetron disintegrating tablet 4 mg, 4 mg, Oral, Q6H PRN, Nish HARMON Destiny, FNP    ondansetron disintegrating tablet 8 mg, 8 mg, Oral, Q6H PRN, Nish HARMON Destiny, FNP    polyethylene glycol packet 17 g, 17 g, Oral, Q12H PRN, Nish HARMON Destiny, FNP    promethazine tablet 25 mg, 25 mg, Oral, Q6H PRN, Nish FAUSTINO Destiny, FNP    sertraline tablet 25 mg, 25 mg, Oral, Daily, Nish HARMON Destiny, FNP, 25 mg at 06/08/22 0814    thiamine tablet 100 mg, 100 mg, Oral, TID, Nish HARMON Destiny, FNP, 100 mg at 06/08/22 1953     Review of Systems   Complete 12-point review of symptoms negative except for what's mentioned in HPI     Objective:     /78   Pulse 80   Temp 97.8 °F (36.6 °C) (Oral)   Resp 18   Ht 6' 0.99" (1.854 m)   Wt 65.5 kg (144 lb 6.4 oz)   LMP  (LMP Unknown)   SpO2 97%   BMI 19.06 kg/m²        Intake/Output Summary (Last 24 hours) at 6/9/2022 0554  Last data filed at 6/8/2022 1700  Gross per 24 hour   Intake 480 ml   Output --   Net 480 ml       Physical Exam  Constitutional:       Appearance: Normal appearance.   HENT:      Head: Normocephalic.      Mouth/Throat:      Mouth: Mucous membranes are moist.   Eyes:      Pupils: Pupils are equal, round, and reactive to light.   Cardiovascular:      Rate and Rhythm: Normal rate and regular rhythm.      Heart sounds: Normal heart sounds.   Pulmonary:      Effort: Pulmonary effort is normal.      Breath sounds: Normal breath sounds.   Abdominal:      General: Bowel sounds are normal.      Palpations: Abdomen is soft.   Musculoskeletal:      Cervical back: Neck supple.      Comments: Slight weakness to right side.  Weakness to left lower extremity.   Skin:     General: Skin is warm and dry. "   Neurological:      Mental Status: She is alert and oriented to person, place, and time.      Comments: Dysarthria/expressive and receptive aphasia    Psychiatric:         Mood and Affect: Mood normal.     *MD performed and documented physical examination       Lines/Drains/Airways     None               Labs:  No results found for this or any previous visit (from the past 24 hour(s)).  Radiology:  MRI brain without contrast 05/19/2002 at 5:38 p.m., IMPRESSION: Motion degraded exam. In spite of this limitation: Moderate to severe multifocal flow signal abnormality about the bilateral ACAs, bilateral distal MCAs and to lesser extent bilateral PCAs.  Radiology  Transthoracic echo 05/22/2022 at 7:50 a.m., IMPRESSION:  LV is normal in size with hyperdynamic systolic function.  Estimated EF 75%.  There is grade 1 diastolic dysfunction consistent with impaired relaxation.  Left atrial pressure is normal.  RV normal size and function.  RA normal size.  AV structurally normal.  No regurgitation.  No stenosis.  There is nonspecific leaflet thickening 2 mitral valve.  There is trace mitral valve regurgitation.  There is no stenosis.  Tricuspid valve with trace regurgitation.  No valve stenosis.  PA P could not be obtained.  There is no prior care effusion.  There is no evidence 10 benign.  Negative bubble study.    Assessment/Plan:     52 y.o. AAF admitted on 6/2/2022    Left frontal lobe infarct   - with dysarthria/expressive and receptive aphasia and right-sided weakness  - continue                Aspirin 325 mg daily                Lipitor 40 mg daily                Multivitamin daily  - defer to physiatry for rehab and pain management  - PT/OT/RT/ST following     POSITIVE RPR  - s/p Bicillin x 3  - LP on 5/22 negative for CNS infection     History of alcohol dependence  - stable  - no withdrawals  - continue                Thiamine 100 mg t.i.d.     HTN  - BP at goal!!  - continue     Lisinopril 10 mg at bedtime  (initiated 6/5)                Norvasc 10 mg daily                Labetalol 200 mg t.i.d.                Hydralazine 10 mg every 2 hours as needed for BP > 160/90                Labetalol 10 mg every 2 hours as needed for BP > 160/90     Nicotine dependence  - stable  - continue                Nicotine patch 21 mg daily     Anxiety/depression  - in remission  - continue                Wellbutrin 150 mg XR daily                Zoloft 25 mg daily     Hyperparathyroidism  - stable  - s/p parathyroidectomy 02/23/2022  - continue                Vitamin-D 61007 units Q weekly                Fosamax 70 mg Q weekly     AB therapy  Bicillin x3 doses (5/19-6/2)     VTE Prophylaxis:  Lovenox 40 mg daily  COVID-19 testing:  Negative on 6/02/2022  COVID-19 vaccination status:  Vaccinated (Pfizer):  08/25/2021 and 08/04/2021     POA: No  Living will: No  Contacts:  Gamaliel Hein ([facility]) [phone]     CODE STATUS: Full Code  Internal Medicine (attending): José Miguel Cesar MD     OUTPATIENT PROVIDERS  PCP: Wanda Bell MD  Neurology:  Fede Lopez MD  Infectious disease: Marcelina Nguyen MD     DISPOSITION: Condition stable.  Sleep hygiene, BM, and appetite at goal.  She did have a fall with abrasions to lip and knee.  No complaints of pain this morning.  No neurological changes.  Moved to room closest to nurse's station yesterday afternoon.  Vital signs at goal.  No labs or imaging today.  Continue current POC.  Monitor closely.  Notify of acute changes.    Staffing 6/7/2022: Incontinent of bladder. Continent of bowel.  RT: Overall CGA.  She did have a fall over the weekend with no injury.  Appetite is good. PT: High fall risk.  PT: Overall CGA to mod assists ambulating 150 feet with RW. .  Linited by ataxia and apraxia. OT: Overall supervision with ADLs to partial mod assist.  Poor motor planning.  Needs more time. ST: Limited by fluency.  Receptive language impaired.  Projected discharge pending.       Dejan  PINKY Dixon conducted independent physical examination and assisted with medical documentation.    Total time spent on this encounter including chart review and direct MD + NP 1-on-1 patient interaction: 38 minutes   Over 50% of this time was spent in counseling and coordination of care

## 2022-06-09 NOTE — PROGRESS NOTES
Subjective:  HPI: 53yo BF with PMH of HTN, anxiety, depression, and hypothyroidism  presented to [facility] ED on 5/18 with complaints of dysarthria and expressive aphasia x3 days.  Patient's family member states that she has been having difficulty speaking for the past 3 days however today was worse so she was brought to the ED for further evaluation. Her sister at bedside states that she is unsure if she has been compliant with her home medications.  She does smoke marijuana daily with her significant other, as well as drinks about a six-pack of beer every day.  No history of stroke.  She is not on aspirin.  Sister reports that she has been having episodes of bladder and bowel incontinence and is very restless/fidgety over the past few days.  No reported history of falls or back pain or any saddle anesthesia.  Patient is oriented x 4 but is an extremely poor historian.  She does report left-sided weakness and paresthesias x 2 days. Upon arrival to outside facility the patient was markedly hypertensive but otherwise hemodynamically stable and afebrile. EKG: NSR with LVH. Labs unremarkable except for a reactive RPR. Patient denies high risk sexual behavior, states unprotected sexual intercourse with significant other but she is in a monogamous relationships. Positive for syphilis as well as trichomoniasis.  CT Head showed left frontal 6 cm cortical base hypodensity suggesting an acute to subacute nonhemorrhagic infarct in the left KENISHA vascular territory. MRI Brain was then obtained confirming the left frontal lobe extending into the corpus callosum with an acute nonhemorrhagic infarct in the territory of the anterior cerebral artery, and old lacunar infarcts. Transferred to [facility] for further CVA workup and management.  The trichomoniasis has been treated with Flagyl and she is receiving her 3rd dose of penicillin for syphilis.  Participating with therapy. Functional status includes bed mobility and transfers requiring  "minimal assistance. Amb w/RW for 80ft. Patient was evaluated, accepted, and admitted to inpatient rehab to improve functional status. Transferred to [facility] on 6/2 without incident.    6/9: Seen with RT/ST, seated in WC. Attempting to respond with "good morning", but getting blocked. ST instructs to pause and take a breath to try again. She then was able to state "Good Morning". Amb w/RW with PT. PT reports noting tone and weakness in RLE. Would like to try a small dose of tone-reducing medication to help with the fluency of gait. Her knee remains flexed. Tolerating therapy without complaint. VSSAF.     Review of Systems  Psychiatric:Hx mental health/substance abuse: Pt. Has a history of anxiety and depression. Per chart, pt. Has a history of cocaine use which she quit a year ago. She does smoke cigarettes and marijuana.    Depression/Anxiety:   buPROPion TB24 tablet 150 mg qd  sertraline tablet 25 mg qd  ALPRAZolam tablet 0.25 mg qd  Pain: denies  acetaminophen tablet 650 mg q4hr PRN mild pain  HYDROcodone-acetaminophen 5-325 mg, 1 tablet q4h PRN moderate pain  baclofen tablet 5 mg BID  Bowels/Bladder: last documented BM 6/8 (6/2 x 4- DC Colace)  Appetite: good  Sleep: good      Physical Exam  General: well-developed, well-nourished, in no acute distress  Respiratory: equal chest rise, no SOB, no audible wheeze  Cardiovascular: regular rate and rhythm, no edema  Gastrointestinal: soft, non-tender, non-distended   Musculoskeletal: right sided weakness  Integumentary: no rashes or skin lesions present, LCW [id number]-o/d/i  Neurologic: AAO, right sided weakness, aphasia, dysarthria  *MD performed and documented physical examination         Assessment/Plan  Acute nonhemorrhagic CVA- L KENISHA territory  Dysarthria and expressive aphasia secondary to CVA  HTN  HLD  Hypothyroidism  Tobacco, EtOH, and Marijuana use  Reactive RPR  Trichomonas    Wounds: LCW [id number]-mrqnykhpjmm-t/d/i  S/p LINQ placement on " "5/26  Precautions: fall risk  Bracing/AD: RW  Swallowing: Easy to Chew Diet  Function: Tolerating therapy. Continue PT/OT  VTE Prophylaxis:   enoxaparin injection 40 mg SubQ q24hr  Code Status: FULL CODE   Discharge:Pt. Lives with her fiance in [place] in an apartment. Her bedroom and bathroom are on the 2nd floor with approximately 12 steps to get to the 2nd floor. There is a wall then a railing on the left going up. Pt. Completed 10th or 11th grade. She has no  history. She was not working. She took care of her mother after she had surgery and then took care of her grandchildren so their mother could work. The patient has a fiance, who states that he is retired. He drives, does most of the cooking, cleaning, laundry, grocery shopping, and manages finances. He states that he can physically assist patient. He also states that her children will be able to help as much as they can. Children (3). Date pending.   Dos 6/9/22  I have personally evaluated the patient during therapy today. Speaking more spontanously- asked"how are you"!!!  Have discussed progress and problems with patient. Have reviewed barriers to progress as well as response to therapies with therapists. I have reviewed medical issues and plan of care with IM team. I met with  to review d/c plans and barriers. I have discussed skin integrity and B/B status with nursing. I am in agreement with present IRF plan of care.  I have been involved in  formation of this note with Nisha Wright.  Roel Wright NP, conducted additional independent physical examination and assisted with medical documentation.   "

## 2022-06-09 NOTE — PT/OT/SLP RE-EVAL
Physical Therapy          Inpatient Rehab Re-Evaluation    Sammie Belcher   MRN: [id number]        Start time 0900  Stop time 1000    Billable Minutes:   Re-eval 30 and Therapeutic Activity 30    Diagnosis: CVA (cerebral vascular accident)  Past Medical History:   Diagnosis Date    Hypertension     Thyroid disease       Past Surgical History:   Procedure Laterality Date    INSERTION OF IMPLANTABLE LOOP RECORDER N/A 5/26/2022    Procedure: Insertion, Implantable Loop Recorder;  Surgeon: Arias Brown MD;  Location: [facility] CATH LAB;  Service: Cardiology;  Laterality: N/A;    THYROIDECTOMY      TUBAL LIGATION         3  General Precautions: Standard, fall, aphasia  Orthopedic Precautions: N/A   Braces: N/A     Spiritual, Cultural Beliefs, Mormonism Practices, Values that Affect Care: no    Patient History:  Lives With: significant other  Living Arrangements: apartment  Home Accessibility: stairs to enter home  Home Layout: Bathroom on 2nd floor, Bedroom on 2nd floor  Living Environment Comment: All Prior level of function from CM notes. Pt unable to expressive 2/2 speech deficits  Equipment Currently Used at Home: none    DME owned (not currently used): none    Previous Level of Function:  Ambulation Skills: independent  Transfer Skills: independent  ADL Skills: independent  Work/Leisure Activity: independent    Subjective:  Communicated with RN prior to session.    Chief Complaint: N/A  Patient goals: Pt unable to state  Family goals: not present upon evaluation    Pain/Comfort  Pain Rating 1: 0/10    Objective:    BP:     Start 142/83 HR 76   Stop 114/77 HR 76        Patient found all needs in reach, chair alarm, and  present ;      Cognitive Exam:  Oriented to: Person  Follows Commands/attention: Follows one-step commands  Communication: expressive aphasia and receptive aphasia  Safety awareness/insight to disability: impaired    Physical Exam:  Postural examination/scapula alignment:    -       No  postural abnormalities identified    Skin integrity: Visible skin intact  Edema: None noted .      Sensation:      -       Intact    Upper Extremity Range of Motion:  Refer to OT evaluation for UE ROM.    Upper Extremity Strength:  Refer to OT evaluation for UE strength.    Lower Extremity Range of Motion:  Right Lower Extremity: Deficits: unable to formally assess 2/2 tone and apraxia. Pt appears to have >3/5  MMT   Left Lower Extremity: WFL    Lower Extremity Strength:  Right Lower Extremity: Deficits: unable to formally assess 2/2 cognitive deficits and apraxia. Pt appears to have >3/5 MMT   Left Lower Extremity: WNL    Gross motor coordination: apraxia and poor motor control on RLE and RUE     Functional Mobility:    Bed Mobility :   Supine to sit: Supervision or Set-up Assistance   Sit to supine: Supervision or Set-up Assistance   Rolling: Supervision or Set-up Assistance   Scooting: Supervision or Set-up Assistance    Transfers:  Sit to stand:Contact Guard Assistance with Rolling Walker .  Bed <> Chair:  Step Transfer with Contact Guard Assistance with Rolling Walker .  Car Transfer:  Step Transfer with Contact Guard Assistance with Rolling Walker .        Gait:  Patient ambulates 130 feet using rolling walker with Moderate Assistance and Min verbal cues. Pt demonstrates decreased step length on BLE and decreased toe clearance on RLE. VC for increased step length and occasional steering assist with RW.        Current   Status   Functional Area: Care Score:   Roll Left and Right 4   Sit to Lying 4   Lying to Sitting on Side of Bed 4   Sit to Stand 4   Chair/Bed-to-Chair Transfer 4   Car Transfer 4   Walk 10 Feet 4   Walk 50 Feet with Two Turns 3   Walk 150 Feet 88   Walk 10 Feet Uneven Surface 88   1 Step (Curb) 3   4 Steps 3   12 Steps 88   Picking Up Object 3   Wheel 50 Feet with Two Turns 3   Wheel 150 Feet 3       Additional Treatment:  // bars with airex foam. Step up 10 x 3 with BUE support  // bars with  airex foam. Step ups 10 x 3 with LUE support only.     Education Provided: roles and goals of PT/PTA, transfer training, bed mob, gait training, balance training and safety awareness    Expected compliance: High compliance        Patient left up in chair with all lines intact, call button in reach and OT present.    Assessment:  Sammie Belcher is a 52 y.o. female with a medical diagnosis of CVA (cerebral vascular accident). She presents with pain, cognitive deficits, impaired coordination, impaired balance, gait deviations, difficulty with functional transfers, difficulty with bed mobility, impulsivity, decreased motor control, decreased safety and decreased endurance   limiting tolerance and safety during functional mobility tasks.  Pt to benefit from skilled PT services to address impairments with progression towards functional independence as tolerated.      IMPAIRMENTS  coordination, endurance, decreased ROM, decreased safety and functional strength    FUNCTIONAL LIMITATIONS  Bed mob, dynamic standing balance and gait impairments    ACTIVITY LIMITATIONS  endurance, functional mobility, safety awareness and impulsivity    ACTIVITY CAPABILITIES  Independent premorbid      Patient agrees with need for treatment: no    QI Scores:   Admission Assessment   Functional Area: Care Score:    Roll Left and Right 4   Sit to Lying 3   Lying to Sitting on Side of Bed 3   Sit to Stand 3   Chair/Bed-to-Chair Transfer 3   Walk 10 Feet 3   Walk 50 Feet with Two Turns 88   Walk 150 Feet 88   1 Step (Curb) 88   4 Steps 88   12 Steps 88   Wheel 50 Feet with Two Turns 3   Wheel 150 Feet 3     Rehab potential is good.    Activity tolerance: Good    Plan: continue with current plan    Discharge recommendations:       Equipment recommendations:      GOALS:   Multidisciplinary Problems     Physical Therapy Goals        Problem: Physical Therapy    Goal Priority Disciplines Outcome Goal Variances Interventions   Physical Therapy Goal      PT, PT/OT Ongoing, Progressing     Description: Short Term goals      Bed Mobility   Roll Right and Left Setup or Clean-up Assistance.  Lying to sitting Setup or Clean-up Assistance.  Sitting to lying Setup or Clean-up Assistance.    Transfers    Pt will be able to perform Stand step chair/bed to chair transfer With LRAD Setup or Clean-up Assistance.  Pt will be able to perform Sit to stand with LRAD   Setup or Clean-up Assistance.  Pt will be able to perform Car transfer with LRAD Setup or Clean-up Assistance.    Ambulation    Pt will ambulate 100 Feet with LRAD Setup or Clean-up Assistance.  Pt will ascend and descend 12 stairs with L rail CGA      Wheelchair Mobility   Pt will be able to propel 150 feet in paola wheelchair Setup or Clean-up Assistance.      Timeframe: By Discharge                      PLAN:    Patient to be seen 5 x/week to address the above listed problems via gait training, therapeutic activities, therapeutic exercises, neuromuscular re-education  Plan of Care expires: 06/09/22  Plan of Care reviewed with: patient    6/9/2022

## 2022-06-09 NOTE — PROGRESS NOTES
"   06/09/22 0830   Rec Therapy Time Calculation   Rec Start Time 0830   Rec Stop Time 0900   Rec Total Time (min) 30 min   Subjective   Patient states Pt. stated "Good Morning" to this staff without prompting   Attendance   Activity Recreational Therapy  ([place]orseshoes)   Participation Active participation   Therapeutic Recreation   Problem Solving Activity Assistance verbal cues;tactile cues;Contact Guard Assistance   Assessment   Assessment Sit to stand was supervision/setup as was dynamic standing balance. Standing tolerance was 3 minutes. RUE coordination was contact guard. LUE coordination was setup. Speech was more spontaneous and was less delayed with motor planning. Attentive to [place]orses[place]es   Goals   Goal 1 STG: Will increase sit to stand to contact guard.  Goal Met   Goal 2 STG: Will improve dynamic standing balance/reaching to contact guard. Goal Met   Goal 3 STG: Will increase RUE coordination/dexteriety to to contact guard. Progressing   Goal 4 LTG: Will increase standing tolerance to 5 minutes. Progressing   Goal 5 LTG: Will improve dynamic standing balance/reaching to supervision. Progressing  (LTG: Will increase RUE coordination/dexteriety to supervision. Progressing)   Plan   Planned Therapy Intervention Continue with current plan  (Angelo)   Expected Length of Stay Pending   PT Frequency Minimu of 5 visits per week;Other (see comments)  (Daily)   Time   Treatment time 2 units     "

## 2022-06-09 NOTE — PLAN OF CARE
Problem: Occupational Therapy  Goal: Occupational Therapy Goal  Description: ADLs:  Pt to perform grooming tasks with supervision and RW and min vc MET  Pt to perform feeding tasks with independence PROGRESSING REQUIRES SET UP WITH RUE WEAKNESS  Pt to perform UB dressing with set up assist and min vc PROGRESSING  Pt to perform LB dressing with CGA and min vc MET  Pt to perform putting on/off footwear task with supervision and min vc MET  Pt to perform toileting with supervision and min vc MET BUT MOSTLY INCONTINENT     Functional Transfers:  Pt to perform toilet transfers with supervision and min vc and RW PROGRESSING  Pt to perform a tub transfer with supervision, RW, min vc MET    IADLs:  Pt to perform simple care taking/ home management tasks with min A and min vc PROGRESSING    Balance, Strengthening, Endurance, Balance:  Pt to demonstrate dynamic standing balance as required to perform ADL's from standing level with RW and min vc. PROGRESSING  Pt to demonstrate BUE strength during functional task and increased use of R UE. PROGRESSING  Outcome: Ongoing, Progressing

## 2022-06-09 NOTE — PT/OT/SLP PROGRESS
Physical Therapy         Treatment        Sammie Beclher   MRN: [id number]     Therapy Minutes  PT Received On: 06/09/22  PT Start Time: 1330  PT Stop Time: 1400  PT Total Time (min): 30 min  PT Individual: 30     Billable Minutes:  Therapeutic Activity 30      General Precautions: Standard, fall, aphasia  Orthopedic Precautions: Orthopedic Precautions : N/A   Braces:      Patient found with: peripheral IV      Subjective:  None stated     Pain/Comfort  Pain Rating 1: 0/10    Objective:  Therapeutic activities, therapeutic exercise performed to increase muscle strength and motor recruitment so that patient may improve quality of functionional mobility and increase functional independence.    VITALS:  BP:        105/74 HR 89     125/77 HR 80      Transfer Training:  Sit to stand:Contact Guard Assistance with Rolling Walker .      Additional Treatment:  Standing balance performed with ballon taps ~ 15 mins with seated rest breaks. HHA to LUE for pt to utilize RUE. 1 LOB with Mod A to recover.     Donned lap tray     Activity Tolerance:  Patient tolerated treatment well    Patient left up in chair with all lines intact, call button in reach, chair alarm on and  and Lay tray donned .    Education Provided: roles and goals of PT/PTA    Expected compliance:  High compliance        Assessment:  Sammie Belcher is a 52 y.o. female with a medical diagnosis of CVA (cerebral vascular accident). She presents with cognitive deficits, impaired coordination, impaired balance, gait deviations, difficulty with functional transfers, decreased motor control and decreased safety. Pt to benefit from skilled PT services to address impairments with progression towards functional independence as tolerated.    Rehab potential is good.    Activity tolerance: Good      GOALS:   Multidisciplinary Problems     Physical Therapy Goals        Problem: Physical Therapy    Goal Priority Disciplines Outcome Goal Variances Interventions    Physical Therapy Goal     PT, PT/OT Ongoing, Progressing     Description: Short Term goals      Bed Mobility   Roll Right and Left Setup or Clean-up Assistance.  Lying to sitting Setup or Clean-up Assistance.  Sitting to lying Setup or Clean-up Assistance.    Transfers    Pt will be able to perform Stand step chair/bed to chair transfer With LRAD Setup or Clean-up Assistance.  Pt will be able to perform Sit to stand with LRAD   Setup or Clean-up Assistance.  Pt will be able to perform Car transfer with LRAD Setup or Clean-up Assistance.    Ambulation    Pt will ambulate 100 Feet with LRAD Setup or Clean-up Assistance.  Pt will ascend and descend 12 stairs with L rail CGA      Wheelchair Mobility   Pt will be able to propel 150 feet in paola wheelchair Setup or Clean-up Assistance.      Timeframe: By Discharge                      PLAN:    Patient to be seen 5 x/week  to address the above listed problems via gait training, therapeutic activities, therapeutic exercises, neuromuscular re-education  Plan of Care expires: 06/09/22  Plan of Care reviewed with: patient    6/9/2022

## 2022-06-09 NOTE — PT/OT/SLP PROGRESS
Speech Language Pathology Treatment          Sammie Belcher   MRN: [id number]     Diet recommendations: Solid Diet Level: Easy to Chew Diet - IDDSI Level 7  Liquid Diet Level: Thin      Billable Minutes:  Speech Therapy Individual 30 minutes    General Precautions: Standard, aphasia          Subjective:  Pt awake, alert, and cooperative throughout session.    Objective:   Pain/Comfort  Pain Rating 1: 0/10    Pt able to repeat short phrases on this date with moderate cues as well as follow two step directions with min-mod verbal and visual cues. Additionally, pt continues to demonstrate use of pull out fluency strategy with min cues to pause, take a deep breath, and try again.    Assessment:  Sammie Belcher is a 52 y.o. female with a SLP diagnosis of Aphasia. SLP intervention continues to be warranted to increase communicative effectiveness and ability to communicate functional wants and needs.    Discharge recommendations: Discharge Facility/Level of Care Needs: home health speech therapy     Goals:   Multidisciplinary Problems     SLP Goals        Problem: SLP    Goal Priority Disciplines Outcome   SLP Goal     SLP    Description: LTG: To increase expressive/receptive language skills to enhance functional communication to express basic wants and needs with 100% effectiveness.     ST. Pt will answer simple, environmental, and personal yes/no questions with 90% accuracy and minimal cues.  2. Pt will complete confrontational naming tasks with 90% accuracy and minimal cues.  3. Pt will use fluency shaping/stuttering modification techniques to decrease syllables stuttered to less than 5%SS.                    Plan:   Patient to be seen Therapy Frequency: 5 x/week   Planned Interventions: Language Therapy  Plan of Care Expires: 06/10/22  Plan of Care reviewed with: patient  SLP Follow-up?: Yes  SLP - Next Visit Date: 06/10/22        2022

## 2022-06-09 NOTE — PT/OT/SLP RE-EVAL
Occupational Therapy         Re-Evaluation     Sammie Belcher   MRN: [id number]   Admitting Diagnosis: CVA (cerebral vascular accident)    Therapy Minutes  OT Date of Treatment: 06/09/22  OT Start Time: 1000  OT Stop Time: 1130  OT Total Time (min): 90 min  OT Individual: 90    Billable Minutes:   Re-eval 15 and Self Care/Home Management 75    Diagnosis: CVA (cerebral vascular accident)      Past Medical History:   Diagnosis Date    Hypertension     Thyroid disease       Past Surgical History:   Procedure Laterality Date    INSERTION OF IMPLANTABLE LOOP RECORDER N/A 5/26/2022    Procedure: Insertion, Implantable Loop Recorder;  Surgeon: Arias Brown MD;  Location: [facility] CATH LAB;  Service: Cardiology;  Laterality: N/A;    THYROIDECTOMY      TUBAL LIGATION         General Precautions: Standard, fall  Orthopedic Precautions:    Braces:            Patient History:       Prior level of function:    Bed Mobility/Transfers: independent  Grooming: independent  Bathing: independent  Upper Body Dressing: independent  Lower Body Dressing: independent  Toileting: independent  Homemaking Responsibilities: No  Driving License: Yes  Mode of Transportation: Car  Occupation: Unemployed  IADL Comments: her fiance does everything, but she normally helps out, she has a daughter that she helps care for     Dominant hand: right    Subjective:  Pain/Comfort  Pain Rating 1: 0/10    Objective:       Cognitive Exam:  Oriented to: Person  Follows Commands/attention: Follows one-step commands  Communication: expressive aphasia  Memory:  Impaired STM and Impaired LTM  Safety awareness/insight to disability: impaired  Coping skills/emotional control: Appropriate to situation    Visual/perceptual:  Intact    Physical Exam:  Postural examination/scapula alignment:    -       No postural abnormalities identified  Skin integrity: Dry  Edema: None noted      Sensation:      -       Intact    Upper Extremity Range of Motion:  Right Upper  Extremity: WFL  Left Upper Extremity: WFL    Upper Extremity Strength:  Right Upper Extremity: WFL except R hand   Left Upper Extremity: WFL   Strength: 3+/5    Fine motor coordination:      -       Impaired  Right hand, finger to nose  , Right hand thumb/finger opposition skills   and Right hand, manipulation of objects      Gross motor coordination: WFL    Functional Mobility:  Bed Mobility:   Activity did not occur, pt up in chair upon arrival.       Transfers:   Sit to stand:Supervision with Rolling Walker with VCs for motor planning   Patient tub bench transfer Step Transfer with Stand-by Assistance with Rolling Walker with VCs for motor planning     Current Status   Functional Area: Care Score:   Eating 5   Oral Hygiene 5   Toileting Hygiene 4   Shower/Bathe Self 4   Upper Body Dressing 4   Lower Body Dressing 4   Putting On/Taking Off Footwear 4   Toilet Transfer 3     Pt requires set-up for eating due to lack of FM coordination of opening packaging. Oral care was performed standing at the sink with supervision.Toileting - pt was incontinent once on TTB but able to pull up/down pants and clean herself. Pt needed supervision for showering for safety but also because of mod VCs needed for motor planning and initiation of task.  UB/LB dressing and donning for socks/shoes with supervision with VCs for technique and motor planning. Toiet/tub/shower transfers with min A-SBA with VCs.    Assessment:  Sammie Belcher is a good rehab candidate and would benefit from skilled OT treatment to increase ADL independence and safety.  Sammie Belcher is a 52 y.o. female with a medical diagnosis of CVA (cerebral vascular accident) and presents with the following impairments, limitations, and capabilities.     Impairments: Cognitive limits, coordination, R/L UE weakness, R/L LE weakness, precautions, decreased ROM, decreased safety and functional strength  Functional Limitations: ADLs, home mgmt, functional t/fs, stand  balance and community integration  Activity capabilities: Independent premorbid and endurance  Activity limitations: Cognitive function, endurance, functional mobility, precautions, safety awareness and impulsivity    Recommended length of stay: 14  Patient agrees with need for treatment:   Yes      Rehab potential is excellent    Activity tolerance: Excellent      GOALS:   Multidisciplinary Problems     Occupational Therapy Goals        Problem: Occupational Therapy    Goal Priority Disciplines Outcome Interventions   Occupational Therapy Goal     OT, PT/OT Ongoing, Progressing    Description: ADLs:  Pt to perform grooming tasks with supervision and RW and min vc MET  Pt to perform feeding tasks with independence PROGRESSING REQUIRES SET UP WITH RUE WEAKNESS  Pt to perform UB dressing with set up assist and min vc PROGRESSING  Pt to perform LB dressing with CGA and min vc MET  Pt to perform putting on/off footwear task with supervision and min vc MET  Pt to perform toileting with supervision and min vc MET BUT MOSTLY INCONTINENT     Functional Transfers:  Pt to perform toilet transfers with supervision and min vc and RW PROGRESSING  Pt to perform a tub transfer with supervision, RW, min vc MET    IADLs:  Pt to perform simple care taking/ home management tasks with min A and min vc PROGRESSING    Balance, Strengthening, Endurance, Balance:  Pt to demonstrate dynamic standing balance as required to perform ADL's from standing level with RW and min vc. PROGRESSING  Pt to demonstrate BUE strength during functional task and increased use of R UE. PROGRESSING                   PLAN: Patient to be seen 5 x/week to address the above listed problems via self-care/home management, therapeutic activities, therapeutic exercises, therapeutic groups, neuromuscular re-education  Plan of Care expires: 06/09/22  Plan of Care reviewed with: patient    06/09/2022

## 2022-06-10 PROCEDURE — 97116 GAIT TRAINING THERAPY: CPT

## 2022-06-10 PROCEDURE — 25000003 PHARM REV CODE 250: Performed by: NURSE PRACTITIONER

## 2022-06-10 PROCEDURE — 97110 THERAPEUTIC EXERCISES: CPT

## 2022-06-10 PROCEDURE — 97535 SELF CARE MNGMENT TRAINING: CPT

## 2022-06-10 PROCEDURE — 63600175 PHARM REV CODE 636 W HCPCS: Performed by: NURSE PRACTITIONER

## 2022-06-10 PROCEDURE — S4991 NICOTINE PATCH NONLEGEND: HCPCS | Performed by: NURSE PRACTITIONER

## 2022-06-10 PROCEDURE — 97530 THERAPEUTIC ACTIVITIES: CPT

## 2022-06-10 PROCEDURE — 92507 TX SP LANG VOICE COMM INDIV: CPT

## 2022-06-10 PROCEDURE — 11800000 HC REHAB PRIVATE ROOM

## 2022-06-10 PROCEDURE — 92523 SPEECH SOUND LANG COMPREHEN: CPT

## 2022-06-10 RX ORDER — VENLAFAXINE 37.5 MG/1
37.5 TABLET ORAL 2 TIMES DAILY
Status: DISCONTINUED | OUTPATIENT
Start: 2022-06-10 | End: 2022-06-22

## 2022-06-10 RX ADMIN — AMLODIPINE BESYLATE 5 MG: 5 TABLET ORAL at 08:06

## 2022-06-10 RX ADMIN — BACLOFEN 5 MG: 5 TABLET ORAL at 08:06

## 2022-06-10 RX ADMIN — ASPIRIN 325 MG: 325 TABLET, COATED ORAL at 08:06

## 2022-06-10 RX ADMIN — VENLAFAXINE 37.5 MG: 37.5 TABLET ORAL at 12:06

## 2022-06-10 RX ADMIN — SERTRALINE HYDROCHLORIDE 25 MG: 25 TABLET ORAL at 08:06

## 2022-06-10 RX ADMIN — MULTIPLE VITAMINS W/ MINERALS TAB 1 TABLET: TAB at 08:06

## 2022-06-10 RX ADMIN — LABETALOL HYDROCHLORIDE 200 MG: 200 TABLET, FILM COATED ORAL at 08:06

## 2022-06-10 RX ADMIN — THIAMINE HCL TAB 100 MG 100 MG: 100 TAB at 02:06

## 2022-06-10 RX ADMIN — THIAMINE HCL TAB 100 MG 100 MG: 100 TAB at 08:06

## 2022-06-10 RX ADMIN — ENOXAPARIN SODIUM 40 MG: 40 INJECTION SUBCUTANEOUS at 05:06

## 2022-06-10 RX ADMIN — VENLAFAXINE 37.5 MG: 37.5 TABLET ORAL at 09:06

## 2022-06-10 RX ADMIN — ATORVASTATIN CALCIUM 40 MG: 40 TABLET, FILM COATED ORAL at 08:06

## 2022-06-10 RX ADMIN — LABETALOL HYDROCHLORIDE 200 MG: 200 TABLET, FILM COATED ORAL at 09:06

## 2022-06-10 RX ADMIN — LABETALOL HYDROCHLORIDE 200 MG: 200 TABLET, FILM COATED ORAL at 02:06

## 2022-06-10 RX ADMIN — THIAMINE HCL TAB 100 MG 100 MG: 100 TAB at 09:06

## 2022-06-10 RX ADMIN — NICOTINE 1 PATCH: 21 PATCH, EXTENDED RELEASE TRANSDERMAL at 08:06

## 2022-06-10 RX ADMIN — BACLOFEN 5 MG: 5 TABLET ORAL at 09:06

## 2022-06-10 RX ADMIN — LISINOPRIL 10 MG: 10 TABLET ORAL at 09:06

## 2022-06-10 RX ADMIN — ALENDRONATE SODIUM 70 MG: 70 TABLET ORAL at 05:06

## 2022-06-10 NOTE — PLAN OF CARE
Problem: Adjustment to Illness (Stroke, Ischemic/Transient Ischemic Attack)  Goal: Optimal Coping  Outcome: Ongoing, Progressing     Problem: Bowel Elimination Impaired (Stroke, Ischemic/Transient Ischemic Attack)  Goal: Effective Bowel Elimination  Outcome: Ongoing, Progressing     Problem: Cerebral Tissue Perfusion (Stroke, Ischemic/Transient Ischemic Attack)  Goal: Optimal Cerebral Tissue Perfusion  Outcome: Ongoing, Progressing     Problem: Cognitive Impairment (Stroke, Ischemic/Transient Ischemic Attack)  Goal: Optimal Cognitive Function  Outcome: Ongoing, Progressing     Problem: Communication Impairment (Stroke, Ischemic/Transient Ischemic Attack)  Goal: Improved Communication Skills  Outcome: Ongoing, Progressing     Problem: Urinary Elimination Impaired (Stroke, Ischemic/Transient Ischemic Attack)  Goal: Effective Urinary Elimination  Outcome: Ongoing, Progressing

## 2022-06-10 NOTE — PROGRESS NOTES
[facility] ([facility])  Rehab Progress Note    Patient Name: Sammie Belcher  MRN: [id number]  Age: 52 y.o. Sex: female  : 1970  Hospital Length of Stay: 8 days  Date of Service:  6/10/2022  Chief Complaint: Left frontal lobe 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    Subjective:     Basic Information  Admit Information: 52-year-old  female presented to [facility] ED on 2022 complaining of dysarthria and expressive aphasia x3 days.  PMH significant hyperparathyroidism s/p parathyroidectomy 2022, anxiety/depression, and HTN.  Workup significant for reactive RPR and CT head significant for left frontal 6 cm cortical base hypodensity suggesting acute to subacute nonhemorrhagic infarct of the left KENISHA vascular territory.  MRA brain significant for left frontal lobe infarct extending into the corpus callosum with acute nonhemorrhagic infarct in the territory of the KENISHA, and old lacunar infarcts. Transferred to [facility] for neurology services.  Bicillin initiated on  for syphilis with plan for 3 doses with end date on .  Also received Flagyl x 7 days due to Trichomoniasis.  TTE significant for EF 74% with severe concentric hypertrophy and hyperdynamic systolic function with negative bubble study.  ASA and statin initiated.  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  LP not revealing with no evidence of CNS infection.  Tolerating LINQ placement on .  CVA likely caused by long history cocaine abuse.  Continue to participate in progress therapy.  Tolerated transferred to [facility] inpatient rehab unit on  without incident.  Today's Information:  No acute events overnight. Patient states she slept very well.  No complaints of acute pain.  Just got up to wheelchair. Staff report sleep is good.  Appetite is good.  Last BM .  Vital signs at goal with no recorded fevers.  No labs or imaging  today.    Review of patient's allergies indicates:  No Known Allergies     Current Facility-Administered Medications:     acetaminophen tablet 650 mg, 650 mg, Oral, Q4H PRN, Nish Lymanehart, FNP, 650 mg at 06/08/22 1948    alendronate tablet 70 mg, 70 mg, Oral, Q7 Days, Nish Lymanehart, FNP, 70 mg at 06/10/22 0524    ALPRAZolam tablet 0.25 mg, 0.25 mg, Oral, TID PRN, Nish Lymanehart, FNP    amLODIPine tablet 5 mg, 5 mg, Oral, Daily, Nish Lymanehart, FNP, 5 mg at 06/09/22 0807    aspirin EC tablet 325 mg, 325 mg, Oral, Daily, Nish Lymanehart, FNP, 325 mg at 06/09/22 0807    atorvastatin tablet 40 mg, 40 mg, Oral, Daily, Nish Lymanehart, FNP, 40 mg at 06/09/22 0807    baclofen tablet 5 mg, 5 mg, Oral, BID, Matilde HARMON Kyle, FNP, 5 mg at 06/09/22 2051    benzonatate capsule 100 mg, 100 mg, Oral, TID PRN, Nish Lymanehart, FNP    bisacodyL suppository 10 mg, 10 mg, Rectal, Daily PRN, Nish Lymanehart, FNP    buPROPion TB24 tablet 150 mg, 150 mg, Oral, Daily, Nish HARMON Destiny, FNP, 150 mg at 06/09/22 0807    enoxaparin injection 40 mg, 40 mg, Subcutaneous, Daily, Nish Lymanehart, FNP, 40 mg at 06/09/22 1654    hydrALAZINE injection 10 mg, 10 mg, Intravenous, Q4H PRN, Nish Lymanehart, FNP    HYDROcodone-acetaminophen 5-325 mg per tablet 1 tablet, 1 tablet, Oral, Q4H PRN, Nish Lymanehart, FNP    hydrOXYzine pamoate capsule 50 mg, 50 mg, Oral, Nightly PRN, Nish Lymanehart, FNP, 50 mg at 06/08/22 2145    labetalol 20 mg/4 mL (5 mg/mL) IV syring, 10 mg, Intravenous, Q4H PRN, Nish Dixon, FNP    labetaloL tablet 200 mg, 200 mg, Oral, TID, Nish Dixon, FNP, 200 mg at 06/09/22 2051    lisinopriL tablet 10 mg, 10 mg, Oral, QHS, Nish Woodart, FNP, 10 mg at 06/09/22 2051    metoprolol injection 10 mg, 10 mg, Intravenous, Q2H PRN, Nish Dixon, FNP    multivitamin tablet, 1 tablet, Oral, Daily, Nish Dixon, FNP, 1 tablet at  "06/09/22 0807    nicotine 21 mg/24 hr 1 patch, 1 patch, Transdermal, Daily, Nish HARMON Destiny, FNP, 1 patch at 06/09/22 0807    nitroGLYCERIN SL tablet 0.4 mg, 0.4 mg, Sublingual, Q5 Min PRN, Nish A Destiny, FNP    ondansetron disintegrating tablet 4 mg, 4 mg, Oral, Q6H PRN, Nish A Destiny, FNP    ondansetron disintegrating tablet 8 mg, 8 mg, Oral, Q6H PRN, Nish A Destiny, FNP    polyethylene glycol packet 17 g, 17 g, Oral, Q12H PRN, Nish A Destiny, FNP    promethazine tablet 25 mg, 25 mg, Oral, Q6H PRN, Nish A Destiny, FNP    sertraline tablet 25 mg, 25 mg, Oral, Daily, Nish A Destiny, FNP, 25 mg at 06/09/22 0807    thiamine tablet 100 mg, 100 mg, Oral, TID, Nish A Destiny, FNP, 100 mg at 06/09/22 2051     Review of Systems   Complete 12-point review of symptoms negative except for what's mentioned in HPI     Objective:     BP (!) 145/88   Pulse 72   Temp 97.5 °F (36.4 °C) (Oral)   Resp 18   Ht 6' 0.99" (1.854 m)   Wt 65.5 kg (144 lb 6.4 oz)   LMP  (LMP Unknown)   SpO2 98%   BMI 19.06 kg/m²        Intake/Output Summary (Last 24 hours) at 6/10/2022 0623  Last data filed at 6/9/2022 1700  Gross per 24 hour   Intake 840 ml   Output --   Net 840 ml       Physical Exam  Constitutional:       Appearance: Normal appearance.   HENT:      Head: Normocephalic.      Mouth/Throat:      Mouth: Mucous membranes are moist.   Eyes:      Pupils: Pupils are equal, round, and reactive to light.   Cardiovascular:      Rate and Rhythm: Normal rate and regular rhythm.      Heart sounds: Normal heart sounds.   Pulmonary:      Effort: Pulmonary effort is normal.      Breath sounds: Normal breath sounds.   Abdominal:      General: Bowel sounds are normal.      Palpations: Abdomen is soft.   Musculoskeletal:      Cervical back: Neck supple.      Comments: Slight weakness to right side.  Weakness to left lower extremity.   Skin:     General: Skin is warm and dry.   Neurological:      " Mental Status: She is alert and oriented to person, place, and time.      Comments: Dysarthria/expressive and receptive aphasia    Psychiatric:         Mood and Affect: Mood normal.     *MD performed and documented physical examination       Lines/Drains/Airways     None               Labs:  No results found for this or any previous visit (from the past 24 hour(s)).  Radiology:  MRI brain without contrast 05/19/2002 at 5:38 p.m., IMPRESSION: Motion degraded exam. In spite of this limitation: Moderate to severe multifocal flow signal abnormality about the bilateral ACAs, bilateral distal MCAs and to lesser extent bilateral PCAs.  Radiology  Transthoracic echo 05/22/2022 at 7:50 a.m., IMPRESSION:  LV is normal in size with hyperdynamic systolic function.  Estimated EF 75%.  There is grade 1 diastolic dysfunction consistent with impaired relaxation.  Left atrial pressure is normal.  RV normal size and function.  RA normal size.  AV structurally normal.  No regurgitation.  No stenosis.  There is nonspecific leaflet thickening 2 mitral valve.  There is trace mitral valve regurgitation.  There is no stenosis.  Tricuspid valve with trace regurgitation.  No valve stenosis.  PA P could not be obtained.  There is no prior care effusion.  There is no evidence 10 benign.  Negative bubble study.    Assessment/Plan:     52 y.o. AAF admitted on 6/2/2022    Left frontal lobe infarct   - with dysarthria/expressive and receptive aphasia and right-sided weakness  - continue                Aspirin 325 mg daily                Lipitor 40 mg daily                Multivitamin daily  - defer to physiatry for rehab and pain management  - PT/OT/RT/ST following     POSITIVE RPR  - s/p Bicillin x 3  - LP on 5/22 negative for CNS infection     History of alcohol dependence  - stable  - no withdrawals  - continue                Thiamine 100 mg t.i.d.     HTN  - BP at goal!!  - continue     Lisinopril 10 mg at bedtime (initiated 6/5)                 Norvasc 10 mg daily                Labetalol 200 mg t.i.d.                Hydralazine 10 mg every 2 hours as needed for BP > 160/90                Labetalol 10 mg every 2 hours as needed for BP > 160/90     Nicotine dependence  - stable  - continue                Nicotine patch 21 mg daily     Anxiety/depression  - in remission  - continue                Wellbutrin 150 mg XR daily                Zoloft 25 mg daily     Hyperparathyroidism  - stable  - s/p parathyroidectomy 02/23/2022  - continue                Vitamin-D 95945 units Q weekly                Fosamax 70 mg Q weekly     AB therapy  Bicillin x3 doses (5/19-6/2)     VTE Prophylaxis:  Lovenox 40 mg daily  COVID-19 testing:  Negative on 6/02/2022  COVID-19 vaccination status:  Vaccinated (Pfizer):  08/25/2021 and 08/04/2021     POA: No  Living will: No  Contacts:  Gamaliel Hein ([facility]) [phone]     CODE STATUS: Full Code  Internal Medicine (attending): José Miguel Cesar MD     OUTPATIENT PROVIDERS  PCP: Wanda Bell MD  Neurology:  Fede Lopez MD  Infectious disease: Marcelina Nguyen MD     DISPOSITION: Condition stable.  Sleep hygiene, BM, and appetite at goal. Moved to room closest to nurse's station 6/9 following a fall.  Vital signs at goal.  No labs or imaging today.  Continue current POC.  Monitor closely.  Notify of acute changes. Monday labs.    Staffing 6/7/2022: Incontinent of bladder. Continent of bowel.  RT: Overall CGA.  She did have a fall over the weekend with no injury.  Appetite is good. PT: High fall risk.  PT: Overall CGA to mod assists ambulating 150 feet with RW. .  Linited by ataxia and apraxia. OT: Overall supervision with ADLs to partial mod assist.  Poor motor planning.  Needs more time. ST: Limited by fluency.  Receptive language impaired.  Projected discharge pending.     Aylin Jordan NP conducted independent physical examination and assisted with medical documentation.    Total time spent on this encounter  including chart review and direct MD + NP 1-on-1 patient interaction: 39 minutes   Over 50% of this time was spent in counseling and coordination of care

## 2022-06-10 NOTE — PT/OT/SLP PROGRESS
"Physical Therapy         Treatment        Sammie Belcher   MRN: [id number]     Therapy Minutes  PT Received On: 06/10/22  PT Start Time: 1330  PT Stop Time: 1400  PT Total Time (min): 30 min  PT Individual: 30     Billable Minutes:  Therapeutic Activity 30           General Precautions: Standard, fall, aphasia  Orthopedic Precautions: Orthopedic Precautions : N/A   Braces:      Patient found with: peripheral IV         Subjective:  "None stated"         Objective:  Therapeutic activities, therapeutic exercise performed to increase muscle strength and motor recruitment so that patient may improve quality of functionional mobility and increase functional independence.    VITALS:  BP:     131/79 HR80   139/75 HR77       In sitting        Transfer Training:  Sit to stand:Contact Guard Assistance with Rolling Walker .  Bed <> Chair:  Step Transfer with Contact Guard Assistance with Rolling Walker .      Ramp/Uneven surfaces:     Patient ambulate onto uneven surfaces 15 using RW with Moderate Assistance.    Additional Treatment:  Step ups with RW onto cone with RLE. Pt requires increased time for processing and difficulty with initiation of movement. Total 5 times with multiple unsuccessful attempts    Activity Tolerance:  Patient tolerated treatment well    Patient left up in chair with all lines intact, call button in reach and with SLP. chair alarm and lap tray donned.    Education Provided: roles and goals of PT/PTA and transfer training    Expected compliance:  High compliance        Assessment:  Sammie Belcher is a 52 y.o. female with a medical diagnosis of CVA (cerebral vascular accident). She presents with impaired coordination, impaired balance, gait deviations and difficulty with functional transfers. Pt to benefit from skilled PT services to address impairments with progression towards functional independence as tolerated.    Rehab potential is excellent.    Activity tolerance: Excellent    Discharge " recommendations:       Equipment recommendations:       GOALS:   Multidisciplinary Problems     Physical Therapy Goals        Problem: Physical Therapy    Goal Priority Disciplines Outcome Goal Variances Interventions   Physical Therapy Goal     PT, PT/OT Ongoing, Progressing     Description: Short Term goals      Bed Mobility   Roll Right and Left Setup or Clean-up Assistance.  Lying to sitting Setup or Clean-up Assistance.  Sitting to lying Setup or Clean-up Assistance.    Transfers    Pt will be able to perform Stand step chair/bed to chair transfer With LRAD Setup or Clean-up Assistance.  Pt will be able to perform Sit to stand with LRAD   Setup or Clean-up Assistance.  Pt will be able to perform Car transfer with LRAD Setup or Clean-up Assistance.    Ambulation    Pt will ambulate 100 Feet with LRAD Setup or Clean-up Assistance.  Pt will ascend and descend 12 stairs with L rail CGA      Wheelchair Mobility   Pt will be able to propel 150 feet in paola wheelchair Setup or Clean-up Assistance.      Timeframe: By Discharge                      PLAN:    Patient to be seen 5 x/week  to address the above listed problems via gait training, therapeutic activities, therapeutic exercises, neuromuscular re-education  Plan of Care expires: 06/09/22  Plan of Care reviewed with: patient    6/10/2022

## 2022-06-10 NOTE — PT/OT/SLP PROGRESS
"Physical Therapy         Treatment        Sammie Belcher   MRN: [id number]      [id number]     Billable Minutes:  Gait [id number] and Therapeutic Activity 10      Treatment Type: Treatment  PT/PTA: PT         General Precautions: Standard, fall  Orthopedic Precautions: Orthopedic Precautions : N/A     Subjective:  "Ok"    Pain/Comfort  Pain Rating 1: 0/10    Objective:  Therapeutic activities, therapeutic exercise performed to increase muscle strength and motor recruitment so that patient may improve quality of functionional mobility and increase functional independence. and Gait training performed to improve functional strength, to increase functional mobility, safety and independence.    VITALS:  BP at start of session: 136/84, 69bpm       In sitting  BP at end of session: 135/85, 72bpm    Functional Mobility:    Transfer Training:  Sit to stand:Supervision or Set-up Assistance with Rolling Walker      Gait Training:  Patient ambulates 160 feet x2 + 215 feet using rolling walker with Contact Guard Assistance and Minimal Assistance and Mod verbal cues. Decreased pallavi foot clearance at times, VC for clearance. R knee flexed throughout entire gait pattern.     Additional Treatment:  Standing with RW, pt performed 3x10 pallavi of foot taps onto 4" box, seated rest breaks between. Difficulty with placing R foot fully on top of box, no buckling of R knee when weightbearing on it.    Activity Tolerance:  Patient tolerated treatment well    Patient left up in chair with call button in reach, chair alarm on and restraints reapplied at end of session.    Education Provided: gait training, balance training, safety awareness, body mechanics and strengthening exercises    Expected compliance:  Moderate compliance        Assessment:  Sammie Belcher is a 52 y.o. female with a medical diagnosis of CVA (cerebral vascular accident). She presents with aphasia, gait impairments. Pt to benefit from skilled PT services to address " impairments with progression towards functional independence as tolerated.    Rehab potential is good.    Activity tolerance: Good    Discharge recommendations:       Equipment recommendations:       GOALS:   Multidisciplinary Problems     Physical Therapy Goals        Problem: Physical Therapy    Goal Priority Disciplines Outcome Goal Variances Interventions   Physical Therapy Goal     PT, PT/OT Ongoing, Progressing     Description: Short Term goals      Bed Mobility   Roll Right and Left Setup or Clean-up Assistance.  Lying to sitting Setup or Clean-up Assistance.  Sitting to lying Setup or Clean-up Assistance.    Transfers    Pt will be able to perform Stand step chair/bed to chair transfer With LRAD Setup or Clean-up Assistance.  Pt will be able to perform Sit to stand with LRAD   Setup or Clean-up Assistance.  Pt will be able to perform Car transfer with LRAD Setup or Clean-up Assistance.    Ambulation    Pt will ambulate 100 Feet with LRAD Setup or Clean-up Assistance.  Pt will ascend and descend 12 stairs with L rail CGA      Wheelchair Mobility   Pt will be able to propel 150 feet in paola wheelchair Setup or Clean-up Assistance.      Timeframe: By Discharge                      PLAN:    Patient to be seen 5 x/week  to address the above listed problems via gait training, therapeutic activities, therapeutic exercises, neuromuscular re-education  Plan of Care expires: 06/09/22  Plan of Care reviewed with: patient    6/10/2022

## 2022-06-10 NOTE — PROGRESS NOTES
"   06/10/22 1030   Rec Therapy Time Calculation   Rec Start Time 1030   Rec Stop Time 1100   Rec Total Time (min) 30 min   Subjective   Patient states Pt stated that she slept well last night   Assessment   Cognitive Concerns problem solving;short term memory loss   Attendance   Activity Recreational Therapy  (Bowling activity)   Participation Active participation   Assessment   Assessment Sit to stand was supervision/setup as was dynamic standing balance/reaching. Standing tolerance was 10 minutes. She sang the words to the song "My Girl" in its entirety as she danced and used hand gestures to the song. RUE coordination was improved to contact guard with more spontaneous grasp and relase of bowling ball. She was more animated and playful   Plan   Planned Therapy Intervention Continue with current plan  (Bowling)   Expected Length of Stay Pending   PT Frequency Minimu of 5 visits per week;Other (see comments)  (Daily)   Time   Treatment time 2 units     "

## 2022-06-10 NOTE — PT/OT/SLP PROGRESS
Speech Language Pathology Treatment          Sammie Belcher   MRN: [id number]     Diet recommendations: Solid Diet Level: Easy to Chew Diet - IDDSI Level 7  Liquid Diet Level: Thin      Therapy Minutes  SLP Treatment Date: 06/10/22  Speech Start Time: 1030  Speech Stop Time: 1100  Speech Total (min): 30 min  SLP Individual: 30    Billable Minutes:  Speech Therapy Individual 30 minutes    General Precautions: Standard, aphasia          Subjective:  Pt awake, alert, and cooperative throughout session.    Objective:   Pt able to hold simple conversation using short phrases with TR and SLP. Moderate dysfluencies noted on this date, but pt able to pause and restart.    Additionally, pt able to engage in singing familiar songs with no dysfluencies noted. SLP discussed melodic intonation therapy with pt and she is agreeable to incorporating this therapy.    Assessment:  Sammie Belcher is a 52 y.o. female with a SLP diagnosis of Aphasia. SLP intervention continues to be warranted to increase communicative effectiveness and ability to have needs/wants met.    Discharge recommendations: Discharge Facility/Level of Care Needs: home health speech therapy     Goals:   Multidisciplinary Problems     SLP Goals        Problem: SLP    Goal Priority Disciplines Outcome   SLP Goal     SLP    Description: LTG: To increase expressive/receptive language skills to enhance functional communication to express basic wants and needs with 100% effectiveness.     ST. Pt will answer simple, environmental, and personal yes/no questions with 90% accuracy and minimal cues.  2. Pt will complete confrontational naming tasks with 90% accuracy and minimal cues.  3. Pt will use fluency shaping/stuttering modification techniques to decrease syllables stuttered to less than 5%SS.                    Plan:   Patient to be seen Therapy Frequency: 5 x/week   Planned Interventions: Language Therapy  Plan of Care Expires: 06/10/22  Plan of Care  reviewed with: patient  SLP Follow-up?: Yes  SLP - Next Visit Date: 06/10/22        6/10/2022

## 2022-06-10 NOTE — PLAN OF CARE
Problem: Cognitive Impairment (Stroke, Ischemic/Transient Ischemic Attack)  Goal: Optimal Cognitive Function  Outcome: Ongoing, Progressing  Intervention: Optimize Cognitive Function  Flowsheets (Taken 6/9/2022 2251)  Environment Familiarity/Consistency: daily routine followed  Reorientation Measures:   clock in view   reorientation provided     Problem: Communication Impairment (Stroke, Ischemic/Transient Ischemic Attack)  Goal: Improved Communication Skills  Outcome: Ongoing, Progressing  Intervention: Optimize Communication Skills  Flowsheets (Taken 6/9/2022 2251)  Communication Enhancement Strategies:   call light answered in person   extra time allowed for response   nonverbal strategies used   verbal and visual cues paired

## 2022-06-10 NOTE — PT/OT/SLP PROGRESS
Occupational Therapy  Treatment      Sammie Belcher   MRN: [id number]   Admitting Diagnosis: CVA (cerebral vascular accident)         Billable Minutes:  Self Care/Home Management 30, Therapeutic Activity 15 and Therapeutic Exercise 15    OT/BETH: OT          General Precautions: Standard, fall  Orthopedic Precautions:           Subjective:    Pain/Comfort  Pain Rating 1: 0/10  Objective:       Functional Mobility:  Transfer Training:   Sit to stand:Supervision or Set-up Assistance with Rolling Walker    Toilet Transfer:  Pt Step Transfer with Supervision or Set-up Assistance with Rolling Walker    Balance:  Patient performed standing balance with RW and SPV assist for 2 minutes while performing toileting/LE dressing     ADLs:   LE Dressing:  Patient don/doffed socks with Supervision or Set-up Assistance, Patient don/doffed shoes with Supervision or Set-up Assistance, Patient performed don/doffed adult brief with Supervision or Set-up Assistance and Patient performed don/doffed pants with Supervision or Set-up Assistance    Toilet Training:  Pt performed toileting with Supervision or Set-up Assistance with no AD at Commode.      Endurance and Strengthening  Bilateral upper extremity ergometer for 5 minutes for 2 sets with Independent with 2 rest breaks      Fine/Gross motor coordination   Patient performed: shoulder arc in seated with Independent using B upper extremities initially in standing but needed to sit down due to fatigue in RLE.    Education Provided: Roles and goals of OT, ADLs and transfer training    Expected compliance: High compliance    ASSESSMENT:  Sammie Belcher is a 52 y.o. female with a medical diagnosis of CVA (cerebral vascular accident) performed BUE therex, dressing to increase ADL independence and safety. Pt is progressing towards OT goals      GOALS:   Multidisciplinary Problems     Occupational Therapy Goals        Problem: Occupational Therapy    Goal Priority Disciplines Outcome  Interventions   Occupational Therapy Goal     OT, PT/OT Ongoing, Progressing    Description: ADLs:  Pt to perform grooming tasks with supervision and RW and min vc MET  Pt to perform feeding tasks with independence PROGRESSING REQUIRES SET UP WITH RUE WEAKNESS  Pt to perform UB dressing with set up assist and min vc PROGRESSING  Pt to perform LB dressing with CGA and min vc MET  Pt to perform putting on/off footwear task with supervision and min vc MET  Pt to perform toileting with supervision and min vc MET BUT MOSTLY INCONTINENT     Functional Transfers:  Pt to perform toilet transfers with supervision and min vc and RW PROGRESSING  Pt to perform a tub transfer with supervision, RW, min vc MET    IADLs:  Pt to perform simple care taking/ home management tasks with min A and min vc PROGRESSING    Balance, Strengthening, Endurance, Balance:  Pt to demonstrate dynamic standing balance as required to perform ADL's from standing level with RW and min vc. PROGRESSING  Pt to demonstrate BUE strength during functional task and increased use of R UE. PROGRESSING                   Plan:  Patient to be seen 5 x/week to address the above listed problems via self-care/home management, therapeutic activities, therapeutic exercises, therapeutic groups, neuromuscular re-education  Plan of Care expires: 06/09/22  Plan of Care reviewed with: patient      06/10/2022

## 2022-06-10 NOTE — PT/OT/SLP RE-EVAL
Speech Language Pathology  Evaluation    Sammie Belcher   MRN: [id number]   Admitting Diagnosis: CVA (cerebral vascular accident)    Diet recommendations: Solid Diet Level: Easy to Chew Diet - IDDSI Level 7  Liquid Diet Level: Thin liquids - IDDSI Level 0    Billable Minutes:  Re-eval 30 minutes    Diagnosis: CVA (cerebral vascular accident)    Past Medical History:   Diagnosis Date    Hypertension     Thyroid disease      Past Surgical History:   Procedure Laterality Date    INSERTION OF IMPLANTABLE LOOP RECORDER N/A 2022    Procedure: Insertion, Implantable Loop Recorder;  Surgeon: Arias Brown MD;  Location: [facility] CATH LAB;  Service: Cardiology;  Laterality: N/A;    THYROIDECTOMY      TUBAL LIGATION         Has the patient been evaluated by SLP for swallowing? : Yes  Keep patient NPO?: No   General Precautions: Standard, aphasia          SUBJECTIVE:  General patient information:  Primary Language: English    Primary Communication used : Verbal    Behavior: alert, appropriate and cooperative    OBJECTIVE:  Pain Level   Pain/Comfort  Pain Rating 1: 0/10     SPEECH PRODUCTION   Phoneme Production: adequate   Voice Quality: adequate   Voice Production: adequate   Speech Rate: adequate   Loudness: adequate   Respiration: adequate   Resonance: adequate   Prosody: adequate   Speech Intelligibility  Known Context: Greater that 90%  Unknown Context: Greater that 90%    AUDITORY COMPREHENSION  Following Directions:   1-Step: 100%   2-Step: 100%    Yes/No Questions:   Biographical: 100%   Environmental: 100%   Simple: 90%   Complex: 100%      VERBAL EXPRESSION  Automatic Speech:   Days of the week: 100%   Months of the year: 100% cues to start   Countin% cue to start   Alphabet: 100%    Repetition:   Phonemes: 100%   Single syllable words: 90%   Bi-syllabic words:     Multi-syllabic words: 70%   Phrases: 100%   Sentences: 20%    ASSESSMENT:  Unable to complete assessment on this date  as pt requires extra time for task due to increase in dysfluencies.     PLAN:  SLP to continue administration of speech, language, and cognition evaluation during next scheduled session. Results and goals to be updated accordingly.    Discharge recommendations: Discharge Facility/Level of Care Needs: home health speech therapy     Goals:   Multidisciplinary Problems     SLP Goals        Problem: SLP    Goal Priority Disciplines Outcome   SLP Goal     SLP Ongoing, Progressing   Description: LTG: To increase expressive/receptive language skills to enhance functional communication to express basic wants and needs with 100% effectiveness.     ST. Pt will answer simple, environmental, and personal yes/no questions with 90% accuracy and minimal cues.--GOAL MET  2. Pt will complete confrontational naming tasks with 90% accuracy and minimal cues.--PROGRESSING, CONTINUE  3. Pt will use fluency shaping/stuttering modification techniques to decrease syllables stuttered to less than 5%SS.--PROGRESSING, CONTINUE                    Plan:   Patient to be seen Therapy Frequency: 5 x/week  Planned Interventions: Language Therapy  Plan of Care expires: 22  Plan of Care reviewed with: patient  SLP Follow-up?: Yes  SLP - Next Visit Date: 06/17/22      06/10/2022

## 2022-06-10 NOTE — PLAN OF CARE
Problem: SLP  Goal: SLP Goal  Description: LTG: To increase expressive/receptive language skills to enhance functional communication to express basic wants and needs with 100% effectiveness.     ST. Pt will answer simple, environmental, and personal yes/no questions with 90% accuracy and minimal cues.--GOAL MET  2. Pt will complete confrontational naming tasks with 90% accuracy and minimal cues.--PROGRESSING, CONTINUE  3. Pt will use fluency shaping/stuttering modification techniques to decrease syllables stuttered to less than 5%SS.--PROGRESSING, CONTINUE  Outcome: Ongoing, Progressing

## 2022-06-10 NOTE — PROGRESS NOTES
[facility] - Rehab Inpatient Services  Adult Nutrition  Progress Note    SUMMARY       Recommendations    - Continue current diet per SLP   - Continue MVI + thiamine as feasible   - Continue Boost Plus to provide 360 kcals, 14 gm pro per serving.          Goals: Meet >75% of est energy needs by f/u    Nutrition Goal Status: Met     Assessment and Plan    6/10: Pt reports continues with good appetite. Per EMR is averaging ~80% meals x last 3 days.     6/7: Rounded with pt in dining room. Able to answer questions about appetite. Reports good appetite. Observed lunch tray with ~100% meal consumed. Did not drink much boost, will monitor and d/c at f/u if pt continues to not drink.  Wt increased +1.1kg per most recent wt.      6/3: Pt with good appetite. Observed eating >75% lunch in dining room. Per EMR, pt averaging ~85% po intake x last 2 meals. Attempted to interview pt. Pt having difficulty communicating d/t expressive aphasia. Noted pt with significant wt loss per RD note at [facility] on (5/25). Continued Strawberry Boost Plus pt was receiving from [facility] prior to admit.     Malnutrition Assessment  Malnutrition Type: chronic illness  Energy Intake: moderate energy intake  Weight Loss (Malnutrition): 7.5% in 3 months  Energy Intake (Malnutrition): less than 75% for greater than or equal to 1 month  Subcutaneous Fat (Malnutrition): mild depletion  Muscle Mass (Malnutrition): mild depletion   Orbital Region (Subcutaneous Fat Loss): mild depletion  Upper Arm Region (Subcutaneous Fat Loss): mild depletion   [place] Region (Muscle Loss): mild depletion  Clavicle Bone Region (Muscle Loss): mild depletion  Dorsal Hand (Muscle Loss): mild depletion     Reason for Assessment    Reason For Assessment: consult (rehab)  Diagnosis: other (see comments) (Left frontal lobe 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,POSITIVE RPR  History of alcohol dependence  HTN  Nicotine  "dependence  Anxiety/depression   Hyperparathyroidism)  Relevant Medical History: Essential HTN  HLD  Hypothyroidism  Anxiety  Depression  Hx Cocaine Use Disorder (sober over 1 yr)  THC Use, ETOH abuse    Nutrition Risk Screen    Nutrition Risk Screen: no indicators present    Nutrition/Diet History    Spiritual, Cultural Beliefs, Holiness Practices, Values that Affect Care: no    Anthropometrics    Temp: 97.5 °F (36.4 °C)  Height: 6' 0.99" (185.4 cm)  Height (inches): 72.99 in  Weight Method: Estimated  Weight: 65.5 kg (144 lb 6.4 oz)  Weight (lb): 144.4 lb  Ideal Body Weight (IBW), Female: 164.95 lb  % Ideal Body Weight, Female (lb): 86.21 %  BMI (Calculated): 19.1  BMI Grade: 18.5-24.9 - normal  Usual Body Weight (UBW), k.73 kg  % Usual Body Weight: 88.87  % Weight Change From Usual Weight: -11.32 %    6/10: No new wt   : 65.5 kg   6/3: 64.5 kg      Lab/Procedures/Meds    Pertinent Labs Comments: GFR >60  Pertinent Medications Comments: statin, MVI, lisinopril, thiamine,    Estimated/Assessed Needs    Weight Used For Calorie Calculations: 64.5 kg (142 lb 3.2 oz)  Energy Calorie Requirements (kcal):   Energy Need Method: [place]-St Jeor (x 1.4 SF)  Protein Requirements: 97 (1.5 g/kg)  Weight Used For Protein Calculations: 64.5 kg (142 lb 3.2 oz)  RDA Method (mL):      Nutrition Prescription Ordered    Current Diet Order: Easy to Chew, Thin liquids     Evaluation of Received Nutrient/Fluid Intake       % Intake of Estimated Energy Needs: 75 - 100 %  % Meal Intake: 75 - 100 %    Nutrition Problem  Malnutrition     Related to (etiology):   Thyroid disease     Signs and Symptoms (as evidenced by):   <75% est energy needs > 1 month  Physical evidence of mild muscle/fat wasting     Interventions(treatment strategy):  Commercial food, Multivitamin / Mineral supplement therapy and Collaboration with other providers     Nutrition Diagnosis Status:   Improving     Nutrition Risk    Level of Risk/Frequency of " Follow-up: moderate     Monitor and Evaluation      wt, po intake, nutrition related labs     Nutrition Follow-Up    RD Follow-up?: Yes     12-Mar-2020 19:30

## 2022-06-11 PROCEDURE — 25000003 PHARM REV CODE 250: Performed by: NURSE PRACTITIONER

## 2022-06-11 PROCEDURE — 11800000 HC REHAB PRIVATE ROOM

## 2022-06-11 PROCEDURE — 63600175 PHARM REV CODE 636 W HCPCS: Performed by: NURSE PRACTITIONER

## 2022-06-11 PROCEDURE — S4991 NICOTINE PATCH NONLEGEND: HCPCS | Performed by: NURSE PRACTITIONER

## 2022-06-11 PROCEDURE — 94799 UNLISTED PULMONARY SVC/PX: CPT

## 2022-06-11 PROCEDURE — 92507 TX SP LANG VOICE COMM INDIV: CPT

## 2022-06-11 RX ORDER — BISACODYL 10 MG
10 SUPPOSITORY, RECTAL RECTAL ONCE
Status: DISCONTINUED | OUTPATIENT
Start: 2022-06-11 | End: 2022-06-22

## 2022-06-11 RX ADMIN — ASPIRIN 325 MG: 325 TABLET, COATED ORAL at 09:06

## 2022-06-11 RX ADMIN — LABETALOL HYDROCHLORIDE 200 MG: 200 TABLET, FILM COATED ORAL at 08:06

## 2022-06-11 RX ADMIN — NICOTINE 1 PATCH: 21 PATCH, EXTENDED RELEASE TRANSDERMAL at 09:06

## 2022-06-11 RX ADMIN — THIAMINE HCL TAB 100 MG 100 MG: 100 TAB at 03:06

## 2022-06-11 RX ADMIN — BACLOFEN 5 MG: 5 TABLET ORAL at 08:06

## 2022-06-11 RX ADMIN — LABETALOL HYDROCHLORIDE 200 MG: 200 TABLET, FILM COATED ORAL at 03:06

## 2022-06-11 RX ADMIN — THIAMINE HCL TAB 100 MG 100 MG: 100 TAB at 08:06

## 2022-06-11 RX ADMIN — ENOXAPARIN SODIUM 40 MG: 40 INJECTION SUBCUTANEOUS at 05:06

## 2022-06-11 RX ADMIN — SERTRALINE HYDROCHLORIDE 25 MG: 25 TABLET ORAL at 09:06

## 2022-06-11 RX ADMIN — VENLAFAXINE 37.5 MG: 37.5 TABLET ORAL at 08:06

## 2022-06-11 RX ADMIN — LABETALOL HYDROCHLORIDE 200 MG: 200 TABLET, FILM COATED ORAL at 09:06

## 2022-06-11 RX ADMIN — AMLODIPINE BESYLATE 5 MG: 5 TABLET ORAL at 09:06

## 2022-06-11 RX ADMIN — MULTIPLE VITAMINS W/ MINERALS TAB 1 TABLET: TAB at 09:06

## 2022-06-11 RX ADMIN — HYDROXYZINE PAMOATE 50 MG: 50 CAPSULE ORAL at 08:06

## 2022-06-11 RX ADMIN — LISINOPRIL 10 MG: 10 TABLET ORAL at 08:06

## 2022-06-11 RX ADMIN — VENLAFAXINE 37.5 MG: 37.5 TABLET ORAL at 09:06

## 2022-06-11 RX ADMIN — BACLOFEN 5 MG: 5 TABLET ORAL at 09:06

## 2022-06-11 RX ADMIN — THIAMINE HCL TAB 100 MG 100 MG: 100 TAB at 09:06

## 2022-06-11 RX ADMIN — ATORVASTATIN CALCIUM 40 MG: 40 TABLET, FILM COATED ORAL at 09:06

## 2022-06-11 NOTE — PT/OT/SLP PROGRESS
Speech Language Pathology Treatment          Sammie Belcher   MRN: [id number]     Diet recommendations: Solid Diet Level: Easy to Chew Diet - IDDSI Level 7  Liquid Diet Level: Thin liquids - IDDSI Level 0     Therapy Minutes  SLP Treatment Date: 06/11/22  Speech Start Time: 1000  Speech Stop Time: 1030  Speech Total (min): 30 min  SLP Individual: 30    Billable Minutes:  Speech Therapy Individual 30     Vital Signs:  Heart Rate:83  Blood Pressure: 147/84      General Precautions: Standard, aphasia          Subjective:  SLP services delivered at bedside. Patient with excellent motivation and participation.    Objective:   Patient alert, appropriate and cooperative;  SLP instructed patient in RET to facilitate expressive language to increase number of content words, word-finding, and efficiency/effectiveness with communication. SLP provided verbal/written cues to facilitate patient success. SLP implemented fluency shaping techniques such as easy onset to facilitate fluency and decrease breakdowns. SLP utilized [facility] Conversation [facility]jit to promote visual stimuli for task.    Pain/Comfort  Pain Rating 1: 0/10  Pain Rating Post-Intervention 1: 0/10    Assessment:  Sammie Belcher is a 52 y.o. female with a SLP diagnosis of Aphasia. She present with expressive/receptive language and fluency deficits negatively impacting functional communication.    Patient achieved up to 9 words per utterance at final step of RET. She continues to present with fluency breakdowns which adversely affects functional comunication.    Discharge recommendations: Discharge Facility/Level of Care Needs: home health speech therapy     Goals:   Multidisciplinary Problems     SLP Goals        Problem: SLP    Goal Priority Disciplines Outcome   SLP Goal     SLP Ongoing, Progressing   Description: LTG: To increase expressive/receptive language skills to enhance functional communication to express basic wants and needs with 100% effectiveness.      ST. Pt will answer simple, environmental, and personal yes/no questions with 90% accuracy and minimal cues.--GOAL MET  2. Pt will complete confrontational naming tasks with 90% accuracy and minimal cues.--PROGRESSING, CONTINUE  3. Pt will use fluency shaping/stuttering modification techniques to decrease syllables stuttered to less than 5%SS.--PROGRESSING, CONTINUE                    Plan:   Patient to be seen Therapy Frequency: 5 x/week   Planned Interventions: Language Therapy  Plan of Care Expires: 22  Plan of Care reviewed with: patient  SLP Follow-up?: Yes  SLP - Next Visit Date: 22

## 2022-06-11 NOTE — PLAN OF CARE
Problem: Communication Impairment (Stroke, Ischemic/Transient Ischemic Attack)  Goal: Improved Communication Skills  Outcome: Ongoing, Progressing  Intervention: Optimize Communication Skills  Flowsheets (Taken 6/10/2022 2302)  Communication Enhancement Strategies:   call light answered in person   communication board used   extra time allowed for response   nonverbal strategies used     Problem: Swallowing Impairment (Stroke, Ischemic/Transient Ischemic Attack)  Goal: Optimal Eating and Swallowing without Aspiration  Outcome: Ongoing, Progressing  Intervention: Optimize Eating and Swallowing  Flowsheets (Taken 6/10/2022 2302)  Aspiration Precautions:   awake/alert before oral intake   food consistency adjusted   NPO pending swallow screening/evaluation  Feeding/Eating Techniques: close supervision provided

## 2022-06-11 NOTE — PLAN OF CARE
Problem: Adjustment to Illness (Stroke, Ischemic/Transient Ischemic Attack)  Goal: Optimal Coping  Outcome: Ongoing, Progressing     Problem: Cerebral Tissue Perfusion (Stroke, Ischemic/Transient Ischemic Attack)  Goal: Optimal Cerebral Tissue Perfusion  Outcome: Ongoing, Progressing

## 2022-06-11 NOTE — PROGRESS NOTES
[facility] ([facility])  Rehab Progress Note    Patient Name: Sammie Belcher  MRN: [id number]  Age: 52 y.o. Sex: female  : 1970  Hospital Length of Stay: 9 days  Date of Service:  2022  Chief Complaint: Left frontal lobe 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    Subjective:     Basic Information  Admit Information: 52-year-old  female presented to [facility] ED on 2022 complaining of dysarthria and expressive aphasia x3 days.  PMH significant hyperparathyroidism s/p parathyroidectomy 2022, anxiety/depression, and HTN.  Workup significant for reactive RPR and CT head significant for left frontal 6 cm cortical base hypodensity suggesting acute to subacute nonhemorrhagic infarct of the left KENISHA vascular territory.  MRA brain significant for left frontal lobe infarct extending into the corpus callosum with acute nonhemorrhagic infarct in the territory of the KENISHA, and old lacunar infarcts. Transferred to [facility] for neurology services.  Bicillin initiated on  for syphilis with plan for 3 doses with end date on .  Also received Flagyl x 7 days due to Trichomoniasis.  TTE significant for EF 74% with severe concentric hypertrophy and hyperdynamic systolic function with negative bubble study.  ASA and statin initiated.  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  LP not revealing with no evidence of CNS infection.  Tolerating LINQ placement on .  CVA likely caused by long history cocaine abuse.  Continue to participate in progress therapy.  Tolerated transferred to [facility] inpatient rehab unit on  without incident.  Today's Information:  No acute events overnight. Laying in bed comfortably. Reports good sleep and appetite. Last BM .  Vital signs at goal with no recorded fevers.  No labs or imaging today.    Review of patient's allergies indicates:  No Known Allergies     Current  Facility-Administered Medications:     acetaminophen tablet 650 mg, 650 mg, Oral, Q4H PRN, Nish Lymanehart, FNP, 650 mg at 06/08/22 1948    alendronate tablet 70 mg, 70 mg, Oral, Q7 Days, Nish HARMON Destiny, FNP, 70 mg at 06/10/22 0524    ALPRAZolam tablet 0.25 mg, 0.25 mg, Oral, TID PRN, Nish Dixon, FNP    amLODIPine tablet 5 mg, 5 mg, Oral, Daily, Nish FAUSTINO Destiny, FNP, 5 mg at 06/10/22 0806    aspirin EC tablet 325 mg, 325 mg, Oral, Daily, Nish Lymanehart, FNP, 325 mg at 06/10/22 0806    atorvastatin tablet 40 mg, 40 mg, Oral, Daily, Nish FAUSTINO Destiny, FNP, 40 mg at 06/10/22 0806    baclofen tablet 5 mg, 5 mg, Oral, BID, Matilde Wright, FNP, 5 mg at 06/10/22 2104    benzonatate capsule 100 mg, 100 mg, Oral, TID PRN, Nish Dixon, FNP    bisacodyL suppository 10 mg, 10 mg, Rectal, Daily PRN, Nish Woodart, FNP    enoxaparin injection 40 mg, 40 mg, Subcutaneous, Daily, Nish A Destiny, FNP, 40 mg at 06/10/22 1736    hydrALAZINE injection 10 mg, 10 mg, Intravenous, Q4H PRN, Nish Lymanehart, FNP    HYDROcodone-acetaminophen 5-325 mg per tablet 1 tablet, 1 tablet, Oral, Q4H PRN, Nish Dixon, FNP    hydrOXYzine pamoate capsule 50 mg, 50 mg, Oral, Nightly PRN, Nish Lymanehart, FNP, 50 mg at 06/08/22 2145    labetalol 20 mg/4 mL (5 mg/mL) IV syring, 10 mg, Intravenous, Q4H PRN, Nish Woodart, FNP    labetaloL tablet 200 mg, 200 mg, Oral, TID, Nish Dixon, FNP, 200 mg at 06/10/22 2105    lisinopriL tablet 10 mg, 10 mg, Oral, QHS, Nish Dixon, FNP, 10 mg at 06/10/22 2100    metoprolol injection 10 mg, 10 mg, Intravenous, Q2H PRN, SIRISHA Fam    multivitamin tablet, 1 tablet, Oral, Daily, GARY FamP, 1 tablet at 06/10/22 0806    nicotine 21 mg/24 hr 1 patch, 1 patch, Transdermal, Daily, SIRISHA Fam, 1 patch at 06/10/22 0806    nitroGLYCERIN SL tablet 0.4 mg, 0.4 mg, Sublingual, Q5 Min  "PRN, Nish Lymanehart, FNP    ondansetron disintegrating tablet 4 mg, 4 mg, Oral, Q6H PRN, Nish HARMON Destiny, FNP    ondansetron disintegrating tablet 8 mg, 8 mg, Oral, Q6H PRN, Nish A Destiny, FNP    polyethylene glycol packet 17 g, 17 g, Oral, Q12H PRN, Nish A Destiny, FNP    promethazine tablet 25 mg, 25 mg, Oral, Q6H PRN, Nish HARMON Destiny, FNP    sertraline tablet 25 mg, 25 mg, Oral, Daily, Nish HARMON Destiny, FNP, 25 mg at 06/10/22 0806    thiamine tablet 100 mg, 100 mg, Oral, TID, Nish HARMON Destiny, FNP, 100 mg at 06/10/22 2104    venlafaxine tablet 37.5 mg, 37.5 mg, Oral, BID, Syeda Crooks, FNP, 37.5 mg at 06/10/22 2103     Review of Systems   Complete 12-point review of symptoms negative except for what's mentioned in HPI     Objective:     BP (!) 148/78   Pulse 78   Temp 97.6 °F (36.4 °C) (Oral)   Resp 18   Ht 6' 0.99" (1.854 m)   Wt 66.2 kg (145 lb 15.1 oz)   LMP  (LMP Unknown)   SpO2 99%   BMI 19.26 kg/m²        Intake/Output Summary (Last 24 hours) at 6/11/2022 0931  Last data filed at 6/10/2022 1715  Gross per 24 hour   Intake 600 ml   Output 4 ml   Net 596 ml       Physical Exam  Constitutional:       Appearance: Normal appearance.   HENT:      Head: Normocephalic.      Mouth/Throat:      Mouth: Mucous membranes are moist.   Eyes:      Pupils: Pupils are equal, round, and reactive to light.   Cardiovascular:      Rate and Rhythm: Normal rate and regular rhythm.      Heart sounds: Normal heart sounds.   Pulmonary:      Effort: Pulmonary effort is normal.      Breath sounds: Normal breath sounds.   Abdominal:      General: Bowel sounds are normal.      Palpations: Abdomen is soft.   Musculoskeletal:      Cervical back: Neck supple.      Comments: Slight weakness to right side.  Weakness to left lower extremity.   Skin:     General: Skin is warm and dry.   Neurological:      Mental Status: She is alert and oriented to person, place, and time.      Comments: " Dysarthria/expressive and receptive aphasia    Psychiatric:         Mood and Affect: Mood normal.         Lines/Drains/Airways     None               Labs:  No results found for this or any previous visit (from the past 24 hour(s)).  Radiology:  MRI brain without contrast 05/19/2002 at 5:38 p.m., IMPRESSION: Motion degraded exam. In spite of this limitation: Moderate to severe multifocal flow signal abnormality about the bilateral ACAs, bilateral distal MCAs and to lesser extent bilateral PCAs.  Radiology  Transthoracic echo 05/22/2022 at 7:50 a.m., IMPRESSION:  LV is normal in size with hyperdynamic systolic function.  Estimated EF 75%.  There is grade 1 diastolic dysfunction consistent with impaired relaxation.  Left atrial pressure is normal.  RV normal size and function.  RA normal size.  AV structurally normal.  No regurgitation.  No stenosis.  There is nonspecific leaflet thickening 2 mitral valve.  There is trace mitral valve regurgitation.  There is no stenosis.  Tricuspid valve with trace regurgitation.  No valve stenosis.  PA P could not be obtained.  There is no prior care effusion.  There is no evidence 10 benign.  Negative bubble study.    Assessment/Plan:     52 y.o. AAF admitted on 6/2/2022    Left frontal lobe infarct   - with dysarthria/expressive and receptive aphasia and right-sided weakness  - continue                Aspirin 325 mg daily                Lipitor 40 mg daily                Multivitamin daily  - defer to physiatry for rehab and pain management  - PT/OT/RT/ST following     POSITIVE RPR  - s/p Bicillin x 3  - LP on 5/22 negative for CNS infection     History of alcohol dependence  - stable  - no withdrawals  - continue                Thiamine 100 mg t.i.d.     HTN  - BP at goal!!  - continue     Lisinopril 10 mg at bedtime (initiated 6/5)                Norvasc 10 mg daily                Labetalol 200 mg t.i.d.                Hydralazine 10 mg every 2 hours as needed for BP > 160/90                 Labetalol 10 mg every 2 hours as needed for BP > 160/90     Nicotine dependence  - stable  - continue                Nicotine patch 21 mg daily     Anxiety/depression  - in remission  - continue                Wellbutrin 150 mg XR daily                Zoloft 25 mg daily     Hyperparathyroidism  - stable  - s/p parathyroidectomy 02/23/2022  - continue                Vitamin-D 66037 units Q weekly                Fosamax 70 mg Q weekly     AB therapy  Bicillin x3 doses (5/19-6/2)     VTE Prophylaxis:  Lovenox 40 mg daily  COVID-19 testing:  Negative on 6/02/2022  COVID-19 vaccination status:  Vaccinated (Pfizer):  08/25/2021 and 08/04/2021     POA: No  Living will: No  Contacts:  Gamaliel Hein ([facility]) [phone]     CODE STATUS: Full Code  Internal Medicine (attending): José Miguel Cesar MD     OUTPATIENT PROVIDERS  PCP: Wanda Bell MD  Neurology:  Fede Lopez MD  Infectious disease: Marcelina Nguyen MD     DISPOSITION: Condition stable.  Vitals at goals. No labs today. Sleep hygiene and appetite at goal. Last BM on 6/8. Initiate Doculax supp, if no BM by 1400 give soap suds enema. Progressing well with therapy. Monitor closely.  Notify of acute changes.     Staffing 6/7/2022: Incontinent of bladder. Continent of bowel.  RT: Overall CGA.  She did have a fall over the weekend with no injury.  Appetite is good. PT: High fall risk.  PT: Overall CGA to mod assists ambulating 150 feet with RW. .  Linited by ataxia and apraxia. OT: Overall supervision with ADLs to partial mod assist.  Poor motor planning.  Needs more time. ST: Limited by fluency.  Receptive language impaired.  Projected discharge pending.     Total time spent on this encounter including chart review and direct 1-on-1 patient interaction: 38 minutes   Over 50% of this time was spent in counseling and coordination of care

## 2022-06-12 PROCEDURE — 97110 THERAPEUTIC EXERCISES: CPT | Mod: CQ

## 2022-06-12 PROCEDURE — 25000003 PHARM REV CODE 250: Performed by: NURSE PRACTITIONER

## 2022-06-12 PROCEDURE — 11800000 HC REHAB PRIVATE ROOM

## 2022-06-12 PROCEDURE — S4991 NICOTINE PATCH NONLEGEND: HCPCS | Performed by: NURSE PRACTITIONER

## 2022-06-12 PROCEDURE — 97535 SELF CARE MNGMENT TRAINING: CPT

## 2022-06-12 PROCEDURE — 63600175 PHARM REV CODE 636 W HCPCS: Performed by: NURSE PRACTITIONER

## 2022-06-12 PROCEDURE — 97530 THERAPEUTIC ACTIVITIES: CPT | Mod: CQ

## 2022-06-12 PROCEDURE — 97530 THERAPEUTIC ACTIVITIES: CPT

## 2022-06-12 PROCEDURE — 97116 GAIT TRAINING THERAPY: CPT | Mod: CQ

## 2022-06-12 RX ADMIN — THIAMINE HCL TAB 100 MG 100 MG: 100 TAB at 09:06

## 2022-06-12 RX ADMIN — ATORVASTATIN CALCIUM 40 MG: 40 TABLET, FILM COATED ORAL at 09:06

## 2022-06-12 RX ADMIN — BACLOFEN 5 MG: 5 TABLET ORAL at 09:06

## 2022-06-12 RX ADMIN — ENOXAPARIN SODIUM 40 MG: 40 INJECTION SUBCUTANEOUS at 04:06

## 2022-06-12 RX ADMIN — ASPIRIN 325 MG: 325 TABLET, COATED ORAL at 09:06

## 2022-06-12 RX ADMIN — MULTIPLE VITAMINS W/ MINERALS TAB 1 TABLET: TAB at 09:06

## 2022-06-12 RX ADMIN — VENLAFAXINE 37.5 MG: 37.5 TABLET ORAL at 09:06

## 2022-06-12 RX ADMIN — NICOTINE 1 PATCH: 21 PATCH, EXTENDED RELEASE TRANSDERMAL at 09:06

## 2022-06-12 RX ADMIN — AMLODIPINE BESYLATE 5 MG: 5 TABLET ORAL at 09:06

## 2022-06-12 RX ADMIN — THIAMINE HCL TAB 100 MG 100 MG: 100 TAB at 04:06

## 2022-06-12 RX ADMIN — LABETALOL HYDROCHLORIDE 200 MG: 200 TABLET, FILM COATED ORAL at 09:06

## 2022-06-12 RX ADMIN — LABETALOL HYDROCHLORIDE 200 MG: 200 TABLET, FILM COATED ORAL at 04:06

## 2022-06-12 RX ADMIN — LISINOPRIL 10 MG: 10 TABLET ORAL at 09:06

## 2022-06-12 RX ADMIN — SERTRALINE HYDROCHLORIDE 25 MG: 25 TABLET ORAL at 09:06

## 2022-06-12 RX ADMIN — HYDROXYZINE PAMOATE 50 MG: 50 CAPSULE ORAL at 09:06

## 2022-06-12 NOTE — PT/OT/SLP PROGRESS
Occupational Therapy  Treatment      Sammie Belcher   MRN: [id number]   Admitting Diagnosis: CVA (cerebral vascular accident)    Therapy Minutes  OT Date of Treatment: 06/12/22  OT Start Time: 1030  OT Stop Time: 1130  OT Total Time (min): 60 min    Billable Minutes:  Self Care/Home Management 15 and Therapeutic Activity 45               General Precautions: Standard, fall  Orthopedic Precautions:  N/A         Subjective:    Pain/Comfort  Pain Rating 1: 0/10     Objective:       Functional Mobility:  Transfer Training:   Sit to stand:Contact Guard Assistance with Rolling Walker    Toilet Transfer:  Pt Step Transfer with Contact Guard Assistance with Rolling Walker    Balance:  Patient performed standing balance activity, folding assorted laundry items to work on bilateral manipulation/coordination, gross motor coordination, dynamic standing balance with no UE support, motor apraxia, sequencing, and recalling items correctly. Pt required verbal cues x2 to recall descriptions of the total 20 items. CGA provided when standing for safety; however, no LOB noted    Therapeutic Activity:  Pt participated in a card game, Retana, using BUEs to work on bilateral manipulation and RUE control and manipulation. Pt was able to shuffle and deal the deck of cards with [place]. Pt able to participate in game with 100% accuracy. Pt intermittently voiced the kind of card delt and why either the OT or pt won each round delt.     ADLs:    Current Status   Functional Area: Care Score:   Toileting Hygiene 4 + void, urine; pt's pullups were saturated   Lower Body Dressing 4   VCs provided    Toilet Transfer 4   CGA provided for safety     Education Provided: Roles and goals of OT, ADLs, transfer training, body mechanics, assistive device and sequencing    Expected compliance: High compliance    ASSESSMENT:  Sammie Belcher is a 52 y.o. female with a medical diagnosis of CVA (cerebral vascular accident) performed therapeutic  activities and ADL tasks to increase ADL independence and safety. Pt is progressing towards OT goals      GOALS:   Multidisciplinary Problems     Occupational Therapy Goals        Problem: Occupational Therapy    Goal Priority Disciplines Outcome Interventions   Occupational Therapy Goal     OT, PT/OT Ongoing, Progressing    Description: ADLs:  Pt to perform grooming tasks with supervision and RW and min vc MET  Pt to perform feeding tasks with independence PROGRESSING REQUIRES SET UP WITH RUE WEAKNESS  Pt to perform UB dressing with set up assist and min vc PROGRESSING  Pt to perform LB dressing with CGA and min vc MET  Pt to perform putting on/off footwear task with supervision and min vc MET  Pt to perform toileting with supervision and min vc MET BUT MOSTLY INCONTINENT     Functional Transfers:  Pt to perform toilet transfers with supervision and min vc and RW PROGRESSING  Pt to perform a tub transfer with supervision, RW, min vc MET    IADLs:  Pt to perform simple care taking/ home management tasks with min A and min vc PROGRESSING    Balance, Strengthening, Endurance, Balance:  Pt to demonstrate dynamic standing balance as required to perform ADL's from standing level with RW and min vc. PROGRESSING  Pt to demonstrate BUE strength during functional task and increased use of R UE. PROGRESSING                   Plan:  Patient to be seen 5 x/week to address the above listed problems via self-care/home management, therapeutic activities, therapeutic exercises, therapeutic groups, neuromuscular re-education  Plan of Care expires: 06/09/22  Plan of Care reviewed with: patient      06/12/2022

## 2022-06-12 NOTE — PROGRESS NOTES
[facility] ([facility])  Rehab Progress Note    Patient Name: Sammie Belcher  MRN: [id number]  Age: 52 y.o. Sex: female  : 1970  Hospital Length of Stay: 10 days  Date of Service:  2022  Chief Complaint: Left frontal lobe 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    Subjective:     Basic Information  Admit Information: 52-year-old  female presented to [facility] ED on 2022 complaining of dysarthria and expressive aphasia x3 days.  PMH significant hyperparathyroidism s/p parathyroidectomy 2022, anxiety/depression, and HTN.  Workup significant for reactive RPR and CT head significant for left frontal 6 cm cortical base hypodensity suggesting acute to subacute nonhemorrhagic infarct of the left KENISHA vascular territory.  MRA brain significant for left frontal lobe infarct extending into the corpus callosum with acute nonhemorrhagic infarct in the territory of the KENISHA, and old lacunar infarcts. Transferred to [facility] for neurology services.  Bicillin initiated on  for syphilis with plan for 3 doses with end date on .  Also received Flagyl x 7 days due to Trichomoniasis.  TTE significant for EF 74% with severe concentric hypertrophy and hyperdynamic systolic function with negative bubble study.  ASA and statin initiated.  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  LP not revealing with no evidence of CNS infection.  Tolerating LINQ placement on .  CVA likely caused by long history cocaine abuse.  Continue to participate in progress therapy.  Tolerated transferred to [facility] inpatient rehab unit on  without incident.  Today's Information:  No acute events overnight. Sitting in chair comfortably. Reports good sleep and appetite. Last BM 6/10.  Vital signs at goal with no recorded fevers.  No labs or imaging today.    Review of patient's allergies indicates:  No Known Allergies     Current  Facility-Administered Medications:     acetaminophen tablet 650 mg, 650 mg, Oral, Q4H PRN, Nish Lymanehart, FNP, 650 mg at 06/08/22 1948    alendronate tablet 70 mg, 70 mg, Oral, Q7 Days, Nish Lymanehart, FNP, 70 mg at 06/10/22 0524    ALPRAZolam tablet 0.25 mg, 0.25 mg, Oral, TID PRN, Nish Woodart, FNP    amLODIPine tablet 5 mg, 5 mg, Oral, Daily, Nish Lymanehart, FNP, 5 mg at 06/12/22 0906    aspirin EC tablet 325 mg, 325 mg, Oral, Daily, Nish Lymanehart, FNP, 325 mg at 06/12/22 0906    atorvastatin tablet 40 mg, 40 mg, Oral, Daily, Nish Lymanehart, FNP, 40 mg at 06/12/22 0906    baclofen tablet 5 mg, 5 mg, Oral, BID, Matilde Wright, FNP, 5 mg at 06/12/22 0906    benzonatate capsule 100 mg, 100 mg, Oral, TID PRN, Nish Woodart, FNP    bisacodyL suppository 10 mg, 10 mg, Rectal, Daily PRN, Nish Lymanehart, FNP    bisacodyL suppository 10 mg, 10 mg, Rectal, Once, SIRISHA Murphy    enoxaparin injection 40 mg, 40 mg, Subcutaneous, Daily, Nish Lymanehart, FNP, 40 mg at 06/11/22 1729    hydrALAZINE injection 10 mg, 10 mg, Intravenous, Q4H PRN, Nish Lymanehart, FNP    HYDROcodone-acetaminophen 5-325 mg per tablet 1 tablet, 1 tablet, Oral, Q4H PRN, Nish Lymanehart, FNP    hydrOXYzine pamoate capsule 50 mg, 50 mg, Oral, Nightly PRN, Nish Lymanehart, FNP, 50 mg at 06/11/22 2036    labetalol 20 mg/4 mL (5 mg/mL) IV syring, 10 mg, Intravenous, Q4H PRN, Nish Lymanehart, FNP    labetaloL tablet 200 mg, 200 mg, Oral, TID, Nish Dixon, FNP, 200 mg at 06/12/22 0906    lisinopriL tablet 10 mg, 10 mg, Oral, QHS, Nish Dixon, FNP, 10 mg at 06/11/22 2036    metoprolol injection 10 mg, 10 mg, Intravenous, Q2H PRN, Nish Dixon FNP    multivitamin tablet, 1 tablet, Oral, Daily, GARY FamP, 1 tablet at 06/12/22 0906    nicotine 21 mg/24 hr 1 patch, 1 patch, Transdermal, Daily, GARY FamP, 1 patch at  "06/12/22 0916    nitroGLYCERIN SL tablet 0.4 mg, 0.4 mg, Sublingual, Q5 Min PRN, Nish HARMON Destiny, FNP    ondansetron disintegrating tablet 4 mg, 4 mg, Oral, Q6H PRN, Nish A Destiny, FNP    ondansetron disintegrating tablet 8 mg, 8 mg, Oral, Q6H PRN, Nish A Destiny, FNP    polyethylene glycol packet 17 g, 17 g, Oral, Q12H PRN, Nish A Destiny, FNP    promethazine tablet 25 mg, 25 mg, Oral, Q6H PRN, Nish A Destiny, FNP    sertraline tablet 25 mg, 25 mg, Oral, Daily, Nish HARMON Destiny, FNP, 25 mg at 06/12/22 0906    thiamine tablet 100 mg, 100 mg, Oral, TID, Nish A Destiny, FNP, 100 mg at 06/12/22 0906    venlafaxine tablet 37.5 mg, 37.5 mg, Oral, BID, Syeda Crooks, FNP, 37.5 mg at 06/12/22 0906     Review of Systems   Complete 12-point review of symptoms negative except for what's mentioned in HPI     Objective:     BP (!) 144/89   Pulse 76   Temp 97.9 °F (36.6 °C)   Resp 18   Ht 6' 0.99" (1.854 m)   Wt 66.2 kg (145 lb 15.1 oz)   LMP  (LMP Unknown)   SpO2 98%   BMI 19.26 kg/m²        Intake/Output Summary (Last 24 hours) at 6/12/2022 0926  Last data filed at 6/12/2022 0800  Gross per 24 hour   Intake 840 ml   Output --   Net 840 ml       Physical Exam  Constitutional:       Appearance: Normal appearance.   HENT:      Head: Normocephalic.      Mouth/Throat:      Mouth: Mucous membranes are moist.   Eyes:      Pupils: Pupils are equal, round, and reactive to light.   Cardiovascular:      Rate and Rhythm: Normal rate and regular rhythm.      Heart sounds: Normal heart sounds.   Pulmonary:      Effort: Pulmonary effort is normal.      Breath sounds: Normal breath sounds.   Abdominal:      General: Bowel sounds are normal.      Palpations: Abdomen is soft.   Musculoskeletal:      Cervical back: Neck supple.      Comments: Slight weakness to right side.  Weakness to left lower extremity.   Skin:     General: Skin is warm and dry.   Neurological:      Mental Status: She is " alert and oriented to person, place, and time.      Comments: Dysarthria/expressive and receptive aphasia    Psychiatric:         Mood and Affect: Mood normal.         Lines/Drains/Airways     None               Labs:  No results found for this or any previous visit (from the past 24 hour(s)).  Radiology:  MRI brain without contrast 05/19/2002 at 5:38 p.m., IMPRESSION: Motion degraded exam. In spite of this limitation: Moderate to severe multifocal flow signal abnormality about the bilateral ACAs, bilateral distal MCAs and to lesser extent bilateral PCAs.  Radiology  Transthoracic echo 05/22/2022 at 7:50 a.m., IMPRESSION:  LV is normal in size with hyperdynamic systolic function.  Estimated EF 75%.  There is grade 1 diastolic dysfunction consistent with impaired relaxation.  Left atrial pressure is normal.  RV normal size and function.  RA normal size.  AV structurally normal.  No regurgitation.  No stenosis.  There is nonspecific leaflet thickening 2 mitral valve.  There is trace mitral valve regurgitation.  There is no stenosis.  Tricuspid valve with trace regurgitation.  No valve stenosis.  PA P could not be obtained.  There is no prior care effusion.  There is no evidence 10 benign.  Negative bubble study.    Assessment/Plan:     52 y.o. AAF admitted on 6/2/2022    Left frontal lobe infarct   - with dysarthria/expressive and receptive aphasia and right-sided weakness  - continue                Aspirin 325 mg daily                Lipitor 40 mg daily                Multivitamin daily  - defer to physiatry for rehab and pain management  - PT/OT/RT/ST following     POSITIVE RPR  - s/p Bicillin x 3  - LP on 5/22 negative for CNS infection     History of alcohol dependence  - stable  - no withdrawals  - continue                Thiamine 100 mg t.i.d.     HTN  - BP at goal!!  - continue     Lisinopril 10 mg at bedtime (initiated 6/5)                Norvasc 10 mg daily                Labetalol 200 mg t.i.d.                 Hydralazine 10 mg every 2 hours as needed for BP > 160/90                Labetalol 10 mg every 2 hours as needed for BP > 160/90     Nicotine dependence  - stable  - continue                Nicotine patch 21 mg daily     Anxiety/depression  - in remission  - continue                Wellbutrin 150 mg XR daily                Zoloft 25 mg daily     Hyperparathyroidism  - stable  - s/p parathyroidectomy 02/23/2022  - continue                Vitamin-D 82350 units Q weekly                Fosamax 70 mg Q weekly     AB therapy  Bicillin x3 doses (5/19-6/2)     VTE Prophylaxis:  Lovenox 40 mg daily  COVID-19 testing:  Negative on 6/02/2022  COVID-19 vaccination status:  Vaccinated (Pfizer):  08/25/2021 and 08/04/2021     POA: No  Living will: No  Contacts:  Gamaliel Hein ([facility]) [phone]     CODE STATUS: Full Code  Internal Medicine (attending): José Miguel Cesar MD     OUTPATIENT PROVIDERS  PCP: Wanda Bell MD  Neurology:  Fede Lopez MD  Infectious disease: Marcelina Nguyen MD     DISPOSITION: Condition stable.  Vitals at goals. No labs today. Sleep hygiene, bowel management, and appetite at goal. Progressing well with therapy. Monitor closely.  Notify of acute changes.     Staffing 6/7/2022: Incontinent of bladder. Continent of bowel.  RT: Overall CGA.  She did have a fall over the weekend with no injury.  Appetite is good. PT: High fall risk.  PT: Overall CGA to mod assists ambulating 150 feet with RW. .  Linited by ataxia and apraxia. OT: Overall supervision with ADLs to partial mod assist.  Poor motor planning.  Needs more time. ST: Limited by fluency.  Receptive language impaired.  Projected discharge pending.     Total time spent on this encounter including chart review and direct 1-on-1 patient interaction: 32 minutes   Over 50% of this time was spent in counseling and coordination of care

## 2022-06-12 NOTE — PT/OT/SLP PROGRESS
Physical Therapy         Treatment        Sammie Belcher   MRN: [id number]     Therapy Minutes  PT Start Time: 0730  PT Stop Time: 0830  PT Total Time (min): 60 min  PT Individual: 60     Billable Minutes:  Gait [id number], Therapeutic Activity 15 and Therapeutic Exercise 15      Treatment Type: Treatment  PT/PTA: PTA     PTA Visit Number: 3       General Precautions: Standard, fall  Orthopedic Precautions: Orthopedic Precautions : N/A   Braces:          Subjective:      Pain/Comfort  Pain Rating 1: 0/10    Objective:  Therapeutic activities, therapeutic exercise performed to increase muscle strength and motor recruitment so that patient may improve quality of functionional mobility and increase functional independence. and Gait training performed to improve functional strength, to increase functional mobility, safety and independence.    VITALS:  BP:  133/83 HR 78 at conclusion 143/90 HR 71          In sitting              Transfer Training:  Sit to stand wc to RW sup        Gait Training:  Patient ambulates 150ft +100ft +100ft feet using rolling walker with supervision to min assist for turns and  verbal cues to emphasis heel strike. Pt demonstrating a  reciprocal gait with decreased rajiv and decreased toe-to-floor clearance.Impairments contributing to gait deviations include impaired coordination, impaired motor control and decreased strength        Additional Treatment:  4 square step for practice, pt using RW increased time for initiation, vc to take bigger steps, min LOB lateral and backward, with practice pt improved overall with coordination    2x10 standing heel taps on 6 inch step to improve RLE hip/knee flex    Seated BLE 1# each 2x10 LAQs, heel/toe raises, and march-AAROM for RLE at times, pt with difficulty initiating hip flexors      Activity Tolerance:  Patient tolerated treatment well    Patient left up in chair with call button in reach, chair alarm on and  notified.    Education Provided:  gait training, balance training, safety awareness, assistive device, strengthening exercises and fall prevention    Expected compliance:  High compliance        Assessment:  Sammie Belcher is a 52 y.o. female with a medical diagnosis of CVA (cerebral vascular accident). Pt to benefit from skilled PT services to address impairments with progression towards functional independence as tolerated.    Rehab potential is excellent and good.    Activity tolerance: Good    Discharge recommendations:     Equipment recommendations:     GOALS:   Multidisciplinary Problems     Physical Therapy Goals        Problem: Physical Therapy    Goal Priority Disciplines Outcome Goal Variances Interventions   Physical Therapy Goal     PT, PT/OT Ongoing, Progressing     Description: Short Term goals      Bed Mobility   Roll Right and Left Setup or Clean-up Assistance.  Lying to sitting Setup or Clean-up Assistance.  Sitting to lying Setup or Clean-up Assistance.    Transfers    Pt will be able to perform Stand step chair/bed to chair transfer With LRAD Setup or Clean-up Assistance.  Pt will be able to perform Sit to stand with LRAD   Setup or Clean-up Assistance.  Pt will be able to perform Car transfer with LRAD Setup or Clean-up Assistance.    Ambulation    Pt will ambulate 100 Feet with LRAD Setup or Clean-up Assistance.  Pt will ascend and descend 12 stairs with L rail CGA      Wheelchair Mobility   Pt will be able to propel 150 feet in paola wheelchair Setup or Clean-up Assistance.      Timeframe: By Discharge                      PLAN:    Patient to be seen 5 x/week  to address the above listed problems via gait training, therapeutic activities, therapeutic exercises, neuromuscular re-education  Plan of Care expires: 06/09/22  Plan of Care reviewed with: patient    6/12/2022

## 2022-06-12 NOTE — PLAN OF CARE
Problem: Adjustment to Illness (Stroke, Ischemic/Transient Ischemic Attack)  Goal: Optimal Coping  Outcome: Ongoing, Progressing     Problem: Cognitive Impairment (Stroke, Ischemic/Transient Ischemic Attack)  Goal: Optimal Cognitive Function  Outcome: Ongoing, Progressing

## 2022-06-12 NOTE — PLAN OF CARE
Problem: Communication Impairment (Stroke, Ischemic/Transient Ischemic Attack)  Goal: Improved Communication Skills  Outcome: Ongoing, Progressing  Intervention: Optimize Communication Skills  Flowsheets (Taken 6/11/2022 2234)  Communication Enhancement Strategies:   call light answered in person   extra time allowed for response   verbal communication attempts encouraged     Problem: Swallowing Impairment (Stroke, Ischemic/Transient Ischemic Attack)  Goal: Optimal Eating and Swallowing without Aspiration  Outcome: Ongoing, Progressing  Intervention: Optimize Eating and Swallowing  Flowsheets (Taken 6/11/2022 2234)  Aspiration Precautions:   awake/alert before oral intake   upright posture maintained   liquids thickened   medication route adjusted

## 2022-06-13 LAB
ALBUMIN SERPL-MCNC: 3.5 GM/DL (ref 3.5–5)
ALBUMIN/GLOB SERPL: 1.1 RATIO (ref 1.1–2)
ALP SERPL-CCNC: 70 UNIT/L (ref 40–150)
ALT SERPL-CCNC: 39 UNIT/L (ref 0–55)
AST SERPL-CCNC: 29 UNIT/L (ref 5–34)
BASOPHILS # BLD AUTO: 0.04 X10(3)/MCL (ref 0–0.2)
BASOPHILS NFR BLD AUTO: 0.6 %
BILIRUBIN DIRECT+TOT PNL SERPL-MCNC: 0.4 MG/DL
BUN SERPL-MCNC: 14.7 MG/DL (ref 9.8–20.1)
CALCIUM SERPL-MCNC: 9.9 MG/DL (ref 8.4–10.2)
CHLORIDE SERPL-SCNC: 106 MMOL/L (ref 98–107)
CO2 SERPL-SCNC: 28 MMOL/L (ref 22–29)
CREAT SERPL-MCNC: 0.73 MG/DL (ref 0.55–1.02)
EOSINOPHIL # BLD AUTO: 0.29 X10(3)/MCL (ref 0–0.9)
EOSINOPHIL NFR BLD AUTO: 4 %
ERYTHROCYTE [DISTWIDTH] IN BLOOD BY AUTOMATED COUNT: 14 % (ref 11.5–17)
GLOBULIN SER-MCNC: 3.2 GM/DL (ref 2.4–3.5)
GLUCOSE SERPL-MCNC: 81 MG/DL (ref 74–100)
HCT VFR BLD AUTO: 40.2 % (ref 37–47)
HGB BLD-MCNC: 12.9 GM/DL (ref 12–16)
IMM GRANULOCYTES # BLD AUTO: 0.01 X10(3)/MCL (ref 0–0.02)
IMM GRANULOCYTES NFR BLD AUTO: 0.1 % (ref 0–0.43)
LYMPHOCYTES # BLD AUTO: 2.25 X10(3)/MCL (ref 0.6–4.6)
LYMPHOCYTES NFR BLD AUTO: 31.1 %
MAGNESIUM SERPL-MCNC: 2 MG/DL (ref 1.6–2.6)
MCH RBC QN AUTO: 30.6 PG (ref 27–31)
MCHC RBC AUTO-ENTMCNC: 32.1 MG/DL (ref 33–36)
MCV RBC AUTO: 95.5 FL (ref 80–94)
MONOCYTES # BLD AUTO: 0.52 X10(3)/MCL (ref 0.1–1.3)
MONOCYTES NFR BLD AUTO: 7.2 %
NEUTROPHILS # BLD AUTO: 4.1 X10(3)/MCL (ref 2.1–9.2)
NEUTROPHILS NFR BLD AUTO: 57 %
NRBC BLD AUTO-RTO: 0 %
PHOSPHATE SERPL-MCNC: 4.4 MG/DL (ref 2.3–4.7)
PLATELET # BLD AUTO: 272 X10(3)/MCL (ref 130–400)
PMV BLD AUTO: 10.3 FL (ref 9.4–12.4)
POTASSIUM SERPL-SCNC: 4.5 MMOL/L (ref 3.5–5.1)
PROT SERPL-MCNC: 6.7 GM/DL (ref 6.4–8.3)
RBC # BLD AUTO: 4.21 X10(6)/MCL (ref 4.2–5.4)
SODIUM SERPL-SCNC: 143 MMOL/L (ref 136–145)
WBC # SPEC AUTO: 7.2 X10(3)/MCL (ref 4.5–11.5)

## 2022-06-13 PROCEDURE — 25000003 PHARM REV CODE 250: Performed by: NURSE PRACTITIONER

## 2022-06-13 PROCEDURE — 97530 THERAPEUTIC ACTIVITIES: CPT | Mod: CQ

## 2022-06-13 PROCEDURE — 63600175 PHARM REV CODE 636 W HCPCS: Performed by: NURSE PRACTITIONER

## 2022-06-13 PROCEDURE — 36415 COLL VENOUS BLD VENIPUNCTURE: CPT | Performed by: NURSE PRACTITIONER

## 2022-06-13 PROCEDURE — 97535 SELF CARE MNGMENT TRAINING: CPT

## 2022-06-13 PROCEDURE — S4991 NICOTINE PATCH NONLEGEND: HCPCS | Performed by: NURSE PRACTITIONER

## 2022-06-13 PROCEDURE — 84100 ASSAY OF PHOSPHORUS: CPT | Performed by: NURSE PRACTITIONER

## 2022-06-13 PROCEDURE — 11800000 HC REHAB PRIVATE ROOM

## 2022-06-13 PROCEDURE — 85025 COMPLETE CBC W/AUTO DIFF WBC: CPT | Performed by: NURSE PRACTITIONER

## 2022-06-13 PROCEDURE — 99233 PR SUBSEQUENT HOSPITAL CARE,LEVL III: ICD-10-PCS | Mod: ,,, | Performed by: PHYSICAL MEDICINE & REHABILITATION

## 2022-06-13 PROCEDURE — 92507 TX SP LANG VOICE COMM INDIV: CPT

## 2022-06-13 PROCEDURE — 83735 ASSAY OF MAGNESIUM: CPT | Performed by: NURSE PRACTITIONER

## 2022-06-13 PROCEDURE — 80053 COMPREHEN METABOLIC PANEL: CPT | Performed by: NURSE PRACTITIONER

## 2022-06-13 PROCEDURE — 94799 UNLISTED PULMONARY SVC/PX: CPT

## 2022-06-13 PROCEDURE — 99233 SBSQ HOSP IP/OBS HIGH 50: CPT | Mod: ,,, | Performed by: PHYSICAL MEDICINE & REHABILITATION

## 2022-06-13 PROCEDURE — 97110 THERAPEUTIC EXERCISES: CPT | Mod: CQ

## 2022-06-13 PROCEDURE — 97116 GAIT TRAINING THERAPY: CPT

## 2022-06-13 RX ADMIN — LABETALOL HYDROCHLORIDE 200 MG: 200 TABLET, FILM COATED ORAL at 08:06

## 2022-06-13 RX ADMIN — BACLOFEN 5 MG: 5 TABLET ORAL at 08:06

## 2022-06-13 RX ADMIN — VENLAFAXINE 37.5 MG: 37.5 TABLET ORAL at 08:06

## 2022-06-13 RX ADMIN — AMLODIPINE BESYLATE 5 MG: 5 TABLET ORAL at 08:06

## 2022-06-13 RX ADMIN — MULTIPLE VITAMINS W/ MINERALS TAB 1 TABLET: TAB at 08:06

## 2022-06-13 RX ADMIN — ATORVASTATIN CALCIUM 40 MG: 40 TABLET, FILM COATED ORAL at 08:06

## 2022-06-13 RX ADMIN — SERTRALINE HYDROCHLORIDE 25 MG: 25 TABLET ORAL at 08:06

## 2022-06-13 RX ADMIN — THIAMINE HCL TAB 100 MG 100 MG: 100 TAB at 08:06

## 2022-06-13 RX ADMIN — HYDROXYZINE PAMOATE 50 MG: 50 CAPSULE ORAL at 08:06

## 2022-06-13 RX ADMIN — ENOXAPARIN SODIUM 40 MG: 40 INJECTION SUBCUTANEOUS at 04:06

## 2022-06-13 RX ADMIN — LISINOPRIL 10 MG: 10 TABLET ORAL at 08:06

## 2022-06-13 RX ADMIN — NICOTINE 1 PATCH: 21 PATCH, EXTENDED RELEASE TRANSDERMAL at 08:06

## 2022-06-13 RX ADMIN — THIAMINE HCL TAB 100 MG 100 MG: 100 TAB at 04:06

## 2022-06-13 RX ADMIN — LABETALOL HYDROCHLORIDE 200 MG: 200 TABLET, FILM COATED ORAL at 04:06

## 2022-06-13 RX ADMIN — ASPIRIN 325 MG: 325 TABLET, COATED ORAL at 08:06

## 2022-06-13 NOTE — PROGRESS NOTES
[facility] ([facility])  Rehab Progress Note    Patient Name: Sammie Belcher  MRN: [id number]  Age: 52 y.o. Sex: female  : 1970  Hospital Length of Stay: 11 days  Date of Service:  2022  Chief Complaint: Left frontal lobe 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    Subjective:     Basic Information  Admit Information: 52-year-old  female presented to [facility] ED on 2022 complaining of dysarthria and expressive aphasia x3 days.  PMH significant hyperparathyroidism s/p parathyroidectomy 2022, anxiety/depression, and HTN.  Workup significant for reactive RPR and CT head significant for left frontal 6 cm cortical base hypodensity suggesting acute to subacute nonhemorrhagic infarct of the left KENISHA vascular territory.  MRA brain significant for left frontal lobe infarct extending into the corpus callosum with acute nonhemorrhagic infarct in the territory of the KENISHA, and old lacunar infarcts. Transferred to [facility] for neurology services.  Bicillin initiated on  for syphilis with plan for 3 doses with end date on .  Also received Flagyl x 7 days due to Trichomoniasis.  TTE significant for EF 74% with severe concentric hypertrophy and hyperdynamic systolic function with negative bubble study.  ASA and statin initiated.  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  LP not revealing with no evidence of CNS infection.  Tolerating LINQ placement on .  CVA likely caused by long history cocaine abuse.  Continue to participate in progress therapy.  Tolerated transferred to [facility] inpatient rehab unit on  without incident.  Today's Information:  No acute events overnight. Sitting in chair comfortably. Reports good sleep and appetite. Last BM .  Vital signs at goal with no recorded fevers. A.M. labs pending.    Review of patient's allergies indicates:  No Known Allergies     Current  Facility-Administered Medications:     acetaminophen tablet 650 mg, 650 mg, Oral, Q4H PRN, Nish Lymanehart, FNP, 650 mg at 06/08/22 1948    alendronate tablet 70 mg, 70 mg, Oral, Q7 Days, Nish Lymanehart, FNP, 70 mg at 06/10/22 0524    ALPRAZolam tablet 0.25 mg, 0.25 mg, Oral, TID PRN, Nish Woodart, FNP    amLODIPine tablet 5 mg, 5 mg, Oral, Daily, Nish Lymanehart, FNP, 5 mg at 06/12/22 0906    aspirin EC tablet 325 mg, 325 mg, Oral, Daily, Nish Lymanehart, FNP, 325 mg at 06/12/22 0906    atorvastatin tablet 40 mg, 40 mg, Oral, Daily, Nish HARMON Destiny, FNP, 40 mg at 06/12/22 0906    baclofen tablet 5 mg, 5 mg, Oral, BID, Matilde Wright, FNP, 5 mg at 06/12/22 2129    benzonatate capsule 100 mg, 100 mg, Oral, TID PRN, Nish Woodart, FNP    bisacodyL suppository 10 mg, 10 mg, Rectal, Daily PRN, Nish Lymanehart, FNP    bisacodyL suppository 10 mg, 10 mg, Rectal, Once, Syeda Crooks, SIRISHA    enoxaparin injection 40 mg, 40 mg, Subcutaneous, Daily, Nish Lymanehart, FNP, 40 mg at 06/12/22 1604    hydrALAZINE injection 10 mg, 10 mg, Intravenous, Q4H PRN, Nish Lymanehart, FNP    HYDROcodone-acetaminophen 5-325 mg per tablet 1 tablet, 1 tablet, Oral, Q4H PRN, Nish Lymanehart, FNP    hydrOXYzine pamoate capsule 50 mg, 50 mg, Oral, Nightly PRN, Nish Lymanehart, FNP, 50 mg at 06/12/22 2129    labetalol 20 mg/4 mL (5 mg/mL) IV syring, 10 mg, Intravenous, Q4H PRN, Nish Lymanehart, FNP    labetaloL tablet 200 mg, 200 mg, Oral, TID, Nish Dixon, FNP, 200 mg at 06/12/22 2129    lisinopriL tablet 10 mg, 10 mg, Oral, QHS, Nish Dixon, FNP, 10 mg at 06/12/22 2129    metoprolol injection 10 mg, 10 mg, Intravenous, Q2H PRN, Nish Dixon, FNP    multivitamin tablet, 1 tablet, Oral, Daily, Nish Dixon FNP, 1 tablet at 06/12/22 0906    nicotine 21 mg/24 hr 1 patch, 1 patch, Transdermal, Daily, GARY FamP, 1 patch at  "06/12/22 0916    nitroGLYCERIN SL tablet 0.4 mg, 0.4 mg, Sublingual, Q5 Min PRN, Nish HARMON Destiny, FNP    ondansetron disintegrating tablet 4 mg, 4 mg, Oral, Q6H PRN, Nish A Destiny, FNP    ondansetron disintegrating tablet 8 mg, 8 mg, Oral, Q6H PRN, Nish A Destiny, FNP    polyethylene glycol packet 17 g, 17 g, Oral, Q12H PRN, Nish A Destiny, FNP    promethazine tablet 25 mg, 25 mg, Oral, Q6H PRN, Nish A Destiny, FNP    sertraline tablet 25 mg, 25 mg, Oral, Daily, Nish HARMON Destiny, FNP, 25 mg at 06/12/22 0906    thiamine tablet 100 mg, 100 mg, Oral, TID, Nish HARMON Destiny, FNP, 100 mg at 06/12/22 2129    venlafaxine tablet 37.5 mg, 37.5 mg, Oral, BID, Syeda Crooks, FNP, 37.5 mg at 06/12/22 2129     Review of Systems   Complete 12-point review of symptoms negative except for what's mentioned in HPI     Objective:     /79   Pulse 77   Temp 97.9 °F (36.6 °C)   Resp 18   Ht 6' 0.99" (1.854 m)   Wt 66.2 kg (145 lb 15.1 oz)   LMP  (LMP Unknown)   SpO2 99%   BMI 19.26 kg/m²        Intake/Output Summary (Last 24 hours) at 6/13/2022 0526  Last data filed at 6/12/2022 1200  Gross per 24 hour   Intake 600 ml   Output --   Net 600 ml       Physical Exam  Constitutional:       Appearance: Normal appearance.   HENT:      Head: Normocephalic.      Mouth/Throat:      Mouth: Mucous membranes are moist.   Eyes:      Pupils: Pupils are equal, round, and reactive to light.   Cardiovascular:      Rate and Rhythm: Normal rate and regular rhythm.      Heart sounds: Normal heart sounds.   Pulmonary:      Effort: Pulmonary effort is normal.      Breath sounds: Normal breath sounds.   Abdominal:      General: Bowel sounds are normal.      Palpations: Abdomen is soft.   Musculoskeletal:      Cervical back: Neck supple.      Comments: Slight weakness to right side.  Weakness to left lower extremity.   Skin:     General: Skin is warm and dry.   Neurological:      Mental Status: She is alert " and oriented to person, place, and time.      Comments: Dysarthria/expressive and receptive aphasia    Psychiatric:         Mood and Affect: Mood normal.         Lines/Drains/Airways     None               Labs:  No results found for this or any previous visit (from the past 24 hour(s)).  Radiology:  MRI brain without contrast 05/19/2002 at 5:38 p.m., IMPRESSION: Motion degraded exam. In spite of this limitation: Moderate to severe multifocal flow signal abnormality about the bilateral ACAs, bilateral distal MCAs and to lesser extent bilateral PCAs.  Radiology  Transthoracic echo 05/22/2022 at 7:50 a.m., IMPRESSION:  LV is normal in size with hyperdynamic systolic function.  Estimated EF 75%.  There is grade 1 diastolic dysfunction consistent with impaired relaxation.  Left atrial pressure is normal.  RV normal size and function.  RA normal size.  AV structurally normal.  No regurgitation.  No stenosis.  There is nonspecific leaflet thickening 2 mitral valve.  There is trace mitral valve regurgitation.  There is no stenosis.  Tricuspid valve with trace regurgitation.  No valve stenosis.  PAP could not be obtained.  There is no prior care effusion.  There is no evidence 10 benign.  Negative bubble study.    Assessment/Plan:     52 y.o. AAF admitted on 6/2/2022    Left frontal lobe infarct   - with dysarthria/expressive and receptive aphasia and right-sided weakness  - continue                Aspirin 325 mg daily                Lipitor 40 mg daily                Multivitamin daily  - defer to physiatry for rehab and pain management  - PT/OT/RT/ST following     POSITIVE RPR  - s/p Bicillin x 3  - LP on 5/22 negative for CNS infection     History of alcohol dependence  - stable  - no withdrawals  - continue                Thiamine 100 mg t.i.d.     HTN  - BP at goal!!  - continue     Lisinopril 10 mg at bedtime (initiated 6/5)                Norvasc 10 mg daily                Labetalol 200 mg t.i.d.                 Hydralazine 10 mg every 2 hours as needed for BP > 160/90                Labetalol 10 mg every 2 hours as needed for BP > 160/90     Nicotine dependence  - stable  - continue                Nicotine patch 21 mg daily     Anxiety/depression  - in remission  - continue                Wellbutrin 150 mg XR daily                Zoloft 25 mg daily     Hyperparathyroidism  - stable  - s/p parathyroidectomy 02/23/2022  - continue                Vitamin-D 12389 units Q weekly                Fosamax 70 mg Q weekly     AB therapy  Bicillin x3 doses (5/19-6/2)     VTE Prophylaxis:  Lovenox 40 mg daily  COVID-19 testing:  Negative on 6/02/2022  COVID-19 vaccination status:  Vaccinated (Pfizer):  08/25/2021 and 08/04/2021     POA: No  Living will: No  Contacts:  Gamaliel Hein ([facility]) [phone]     CODE STATUS: Full Code  Internal Medicine (attending): José Miguel Cesar MD     OUTPATIENT PROVIDERS  PCP: Wanda Bell MD  Neurology:  Fede Lopez MD  Infectious disease: Marcelina Nguyen MD     DISPOSITION: Condition stable.  Vitals at goals. Sleep hygiene, bowel management, and appetite at goal. Progressing well with therapy. Monitor closely.  Notify of acute changes. Monday A.M. labs pending    Staffing 6/7/2022: Incontinent of bladder. Continent of bowel.  RT: Overall CGA.  She did have a fall over the weekend with no injury.  Appetite is good. PT: High fall risk.  PT: Overall CGA to mod assists ambulating 150 feet with RW. .  Linited by ataxia and apraxia. OT: Overall supervision with ADLs to partial mod assist.  Poor motor planning.  Needs more time. ST: Limited by fluency.  Receptive language impaired.  Projected discharge pending.     Aylin Jordan NP conducted independent physical examination and assisted with medical documentation.    Total time spent on this encounter including chart review and direct 1-on-1 patient interaction: 35 minutes   Over 50% of this time was spent in counseling and coordination of  care

## 2022-06-13 NOTE — PT/OT/SLP PROGRESS
Physical Therapy         Treatment        Sammie Belcher   MRN: [id number]           Billable Minutes:  Therapeutic Activity 30 and Therapeutic Exercise 30               PTA Visit Number: 3       General Precautions: Standard, fall  Orthopedic Precautions: Orthopedic Precautions : N/A   Braces: Braces: N/A              Subjective:  Pt c/o R knee discomfort while gait training.     Pain/Comfort  Pain Rating 1: other (see comments) (did not rate pain)  Location - Side 1: Right  Location 1: knee  Pain Addressed 1: Other (see comments) (Ice)    Objective:  Therapeutic activities, therapeutic exercise performed to increase muscle strength and motor recruitment so that patient may improve quality of functionional mobility and increase functional independence. and Gait training performed to improve functional strength, to increase functional mobility, safety and independence.    VITALS:  BP:   108 / 74 at beginning of session        In sitting     107/ 73 HR 80 at conclusion of session.           Functional Mobility:  Bed Mobility:   Supine to sit: Standby Assistance    Sit to supine: Standby Assistance, increase time to complete   Scooting: Standby Assistance     Balance Training  Static Sitting/Standing:    Static Sit:GOOD+: Takes MAXIMAL challenges from all directions.    Static Stand: GOOD-: Takes MODERATE challenges from all directions inconsistently    Dynamic Sitting/Standing:  Patient performed dynamic standing on level surface using rolling walker with Minimal Assistance and moderate verbal cues during moderate excursions.  Dynamic Sit: GOOD-: Incosistently Maintains balance through MODERATE excursions of active trunk movement,     Dynamic Stand: GOOD-: Needs SUPERVISION only during gait and able to self right with moderate LOB inconsistently      Transfer Training:  Sit to stand:Stand-by Assistance with Rolling Walker    Chair <> Mat: Step Transfer with Contact Guard Assistance with  Rolling Walker Pt needed  "safety cues when stand step pivoting from one surface to the other  Toilet Transfer:  Pt Step Transfer with Stand-by Assistance with Rolling Walker PTA assist with doffing, donning brief and pants, also with pericare.      Gait Training:  Patient ambulates 150 feet using rolling walker with Contact Guard Assistance and verbal cues to increase R heel strike, R knee extension, and HELADIO step length. Pt demonstrating a  swing-through gait with decreased stride length, decreased toe-to-floor clearance and decreased weight-shifting ability.Impairments contributing to gait deviations include impaired balance, impaired coordination, impaired motor control and also pt stated "yes" to PTA when asked if pt is having pain in R knee, and responded "no" when asked if pt has pain in R hip, R ankle, or L LE.       Additional Treatment:    B LE exercise 2 x 10 reps: resisted LTR, bridges with approximation to R LE, hamstring curls with bosu ball     Core strengthening: Pt completed core activity with ball.     Toe taps on 4in. Box with emphasis on increasing R hip and R ankle dorsiflexion to prevent toe drag.       Activity Tolerance:  Patient tolerated treatment well    Patient left in wheel chair in speech therapist office to start speech therapy with chair alarm on and w/c tray in front of pt. and seat belt. .    Education Provided: roles and goals of PT/PTA, transfer training, bed mob, gait training, balance training, safety awareness, assistive device, strengthening exercises and fall prevention          Assessment:  Sammie Belcher is a 52 y.o. female with a medical diagnosis of CVA (cerebral vascular accident) L lobe infact with expressive and receptive aphasia. She presents with R side weakness. Pt to benefit from skilled PT services to address impairments with progression towards functional independence as tolerated.    Rehab potential is good.    Activity tolerance: Good    Discharge recommendations:       Equipment " recommendations:       GOALS:   Multidisciplinary Problems     Physical Therapy Goals        Problem: Physical Therapy    Goal Priority Disciplines Outcome Goal Variances Interventions   Physical Therapy Goal     PT, PT/OT Ongoing, Progressing     Description: Short Term goals      Bed Mobility   Roll Right and Left Setup or Clean-up Assistance.  Lying to sitting Setup or Clean-up Assistance.  Sitting to lying Setup or Clean-up Assistance.    Transfers    Pt will be able to perform Stand step chair/bed to chair transfer With LRAD Setup or Clean-up Assistance.  Pt will be able to perform Sit to stand with LRAD   Setup or Clean-up Assistance.  Pt will be able to perform Car transfer with LRAD Setup or Clean-up Assistance.    Ambulation    Pt will ambulate 100 Feet with LRAD Setup or Clean-up Assistance.  Pt will ascend and descend 12 stairs with L rail CGA      Wheelchair Mobility   Pt will be able to propel 150 feet in paola wheelchair Setup or Clean-up Assistance.      Timeframe: By Discharge                      PLAN:    Patient to be seen 5 x/week  to address the above listed problems via gait training, therapeutic activities, therapeutic exercises, neuromuscular re-education  Plan of Care expires: 06/09/22  Plan of Care reviewed with: patient    6/13/2022

## 2022-06-13 NOTE — PLAN OF CARE
Problem: Adjustment to Illness (Stroke, Ischemic/Transient Ischemic Attack)  Goal: Optimal Coping  Outcome: Ongoing, Progressing  Intervention: Support Psychosocial Response to Stroke  Flowsheets (Taken 6/12/2022 2301)  Supportive Measures:   active listening utilized   self-care encouraged     Problem: Communication Impairment (Stroke, Ischemic/Transient Ischemic Attack)  Goal: Improved Communication Skills  Outcome: Ongoing, Progressing  Intervention: Optimize Communication Skills  Flowsheets (Taken 6/12/2022 2301)  Communication Enhancement Strategies:   call light answered in person   extra time allowed for response   nonverbal strategies used   one-step directions provided   verbal communication attempts encouraged

## 2022-06-13 NOTE — PT/OT/SLP PROGRESS
Speech Language Pathology Treatment          Sammie Belcher   MRN: [id number]     Diet recommendations: Solid Diet Level: Easy to Chew Diet - IDDSI Level 7  Liquid Diet Level: Thin liquids - IDDSI Level 0   Billable Minutes:  Speech Therapy Individual 30 minutes    General Precautions: Standard, aphasia          Subjective:  Pt awake, alert, and cooperative throughout session.    Objective:   Pain/Comfort  Pain Rating 1: 0/10    Pt able to complete Melodic Intonation Therapy, elementary level, with 86% accuracy overall. Pt able to complete step 1 (unison) and step 2 (unison with fading) with 100% accuracy. Pt with increased difficulty with immediate repetition, however, able to complete this step (4) with 85% accuracy. Significant difficulty noted with answering a question. Pt able to complete this step with 53% accuracy.    Assessment:  Sammie Belcher is a 52 y.o. female with a SLP diagnosis of Aphasia. SLP intervention continues to be warranted to increase communicative effectiveness and ability to have needs/wants met.    Discharge recommendations: Discharge Facility/Level of Care Needs: home health speech therapy     Goals:   Multidisciplinary Problems     SLP Goals        Problem: SLP    Goal Priority Disciplines Outcome   SLP Goal     SLP Ongoing, Progressing   Description: LTG: To increase expressive/receptive language skills to enhance functional communication to express basic wants and needs with 100% effectiveness.     ST. Pt will answer simple, environmental, and personal yes/no questions with 90% accuracy and minimal cues.--GOAL MET  2. Pt will complete confrontational naming tasks with 90% accuracy and minimal cues.--PROGRESSING, CONTINUE  3. Pt will use fluency shaping/stuttering modification techniques to decrease syllables stuttered to less than 5%SS.--PROGRESSING, CONTINUE                    Plan:   Patient to be seen Therapy Frequency: 5 x/week   Planned Interventions: Language  Therapy  Plan of Care Expires: 06/17/22  Plan of Care reviewed with: patient  SLP Follow-up?: Yes  SLP - Next Visit Date: 06/17/22 6/13/2022

## 2022-06-13 NOTE — PT/OT/SLP PROGRESS
Speech Language Pathology Treatment          Sammie Belcher   MRN: [id number]     Diet recommendations: Solid Diet Level: Easy to Chew Diet - IDDSI Level 7  Liquid Diet Level: Thin liquids - IDDSI Level 0   Therapy Minutes  SLP Treatment Date: 22  Speech Start Time: 830  Speech Stop Time: 0900  Speech Total (min): 30 min  SLP Individual: 30    Billable Minutes:  Speech Therapy Individual 30 minutes    General Precautions: Standard, aphasia        Vital Signs:  Heart Rate: 81 bpm  Blood Pressure: 129/78    Subjective:  Pt awake, alert, and cooperative throughout session.     Objective:   Pt able to use short phrases on this date with minimal cues. Dysfluencies noted throughout speech requiring moderate cues for easy onset, pull out, and cancellations.     Assessment:  Sammie Belcher is a 52 y.o. female with a SLP diagnosis of Aphasia. SLP intervention continues to be warranted to increase communicative effectiveness and ability to communicate functional needs/wants.    Discharge recommendations: Discharge Facility/Level of Care Needs: home health speech therapy     Goals:   Multidisciplinary Problems     SLP Goals        Problem: SLP    Goal Priority Disciplines Outcome   SLP Goal     SLP Ongoing, Progressing   Description: LTG: To increase expressive/receptive language skills to enhance functional communication to express basic wants and needs with 100% effectiveness.     ST. Pt will answer simple, environmental, and personal yes/no questions with 90% accuracy and minimal cues.--GOAL MET  2. Pt will complete confrontational naming tasks with 90% accuracy and minimal cues.--PROGRESSING, CONTINUE  3. Pt will use fluency shaping/stuttering modification techniques to decrease syllables stuttered to less than 5%SS.--PROGRESSING, CONTINUE                    Plan:   Patient to be seen Therapy Frequency: 5 x/week   Planned Interventions: Language Therapy  Plan of Care Expires: 22  Plan of Care reviewed  with: patient  SLP Follow-up?: Yes  SLP - Next Visit Date: 06/17/22 6/13/2022

## 2022-06-13 NOTE — PROGRESS NOTES
06/13/22 0830   Rec Therapy Time Calculation   Rec Start Time 0830   Rec Stop Time 0900   Rec Total Time (min) 30 min   Subjective   Patient states Pt stated that she was doing good   Assessment   Cognitive Concerns   (Expressive deficits)   Attendance   Activity Recreational Therapy  (Bean Bag Toss)   Participation Active participation   Therapeutic Recreation   Problem Solving Activity Assistance verbal cues;tactile cues;Contact Guard Assistance  (Verbal and tactlie cues needed for motor planning of RUE)   Assessment   Assessment Sit to stand was supervision/setup as was dynamic standing balance/reaching . Standing tolerance was 7 minutes. RUE coordination was conatct guard but improving with grasp and release of bean bag. Counting 1,2,3 and 3,2,1 with fewer verbal cues. Compliant   Plan   Planned Therapy Intervention Continue with current plan  (Bean Bag Toss)   Expected Length of Stay Pending   PT Frequency Minimu of 5 visits per week;Other (see comments)  (Daily)   Time   Treatment time 2 units

## 2022-06-13 NOTE — PT/OT/SLP PROGRESS
"Physical Therapy         Treatment        Sammie Belcher   MRN: [id number]      [id number]     Billable Minutes:  Gait [id number] and Therapeutic Activity 10      Treatment Type: Treatment  PT/PTA: PT       General Precautions: Standard, fall  Orthopedic Precautions: Orthopedic Precautions : N/A   Braces: Braces: N/A    Subjective:  "I'm good"    Pain/Comfort  Pain Rating 1: 0/10    Objective:  Gait training performed to improve functional strength, to increase functional mobility, safety and independence.    VITALS:  BP at start of session: 127/82, 75bpm      In sitting  BP at end of session: 113/79, 86bpm      Functional Mobility:    Balance Training  Dynamic Standing:  Patient performed dynamic standing on level surface using rolling walker with Minimal Assistance and moderate verbal cues during moderate excursions. Pt stood with RW and performed 2x5 pallavi cone taps. Pt improved with RLE after several failed repetitions, able to perform 4 taps in a row without knocking over cone.     Transfer Training:  Sit to stand:Supervision or Set-up Assistance with Rolling Walker        Gait Training:  - Patient ambulates 130 feet + 190 feet using rolling walker with Contact Guard Assistance and Min verbal cues. Pt demonstrating a  swing through with decreased weight-shifting ability, decreased balance, decreased endurance, and R knee flexion throughout gait cycle.   - Patient attempted backward ambulation with RW, WC close behind for safety. Pt demonstrated difficulty with motor planning to step backward with RLE. Counted out loud 10 steps forward, then 10 steps backward, which helped with rhythm of steps. Visual and verbal cues provided. Pt able to perform 3 steps backward with RLE successfully.     Activity Tolerance:  Patient tolerated treatment well    Patient left up in chair with call button in reach, chair alarm on and restraints reapplied at end of session.    Education Provided: gait training, balance training and " body mechanics    Expected compliance:  High compliance        Assessment:  Sammie Belchre is a 52 y.o. female with a medical diagnosis of CVA (cerebral vascular accident). She presents with aphasia. Pt to benefit from skilled PT services to address impairments with progression towards functional independence as tolerated.    Rehab potential is good.    Activity tolerance: Good    Discharge recommendations:       Equipment recommendations: Equipment Needed After Discharge: walker, rolling     GOALS:   Multidisciplinary Problems     Physical Therapy Goals        Problem: Physical Therapy    Goal Priority Disciplines Outcome Goal Variances Interventions   Physical Therapy Goal     PT, PT/OT Ongoing, Progressing     Description: Short Term goals      Bed Mobility   Roll Right and Left Setup or Clean-up Assistance.  Lying to sitting Setup or Clean-up Assistance.  Sitting to lying Setup or Clean-up Assistance.    Transfers    Pt will be able to perform Stand step chair/bed to chair transfer With LRAD Setup or Clean-up Assistance.  Pt will be able to perform Sit to stand with LRAD   Setup or Clean-up Assistance.  Pt will be able to perform Car transfer with LRAD Setup or Clean-up Assistance.    Ambulation    Pt will ambulate 100 Feet with LRAD Setup or Clean-up Assistance.  Pt will ascend and descend 12 stairs with L rail CGA      Wheelchair Mobility   Pt will be able to propel 150 feet in paola wheelchair Setup or Clean-up Assistance.      Timeframe: By Discharge                      PLAN:    Patient to be seen 5 x/week  to address the above listed problems via gait training, therapeutic activities, therapeutic exercises, neuromuscular re-education  Plan of Care expires: 06/09/22  Plan of Care reviewed with: patient    6/13/2022

## 2022-06-13 NOTE — PROGRESS NOTES
Subjective:  HPI: 53yo BF with PMH of HTN, anxiety, depression, and hypothyroidism  presented to [facility] ED on 5/18 with complaints of dysarthria and expressive aphasia x3 days.  Patient's family member states that she has been having difficulty speaking for the past 3 days however today was worse so she was brought to the ED for further evaluation. Her sister at bedside states that she is unsure if she has been compliant with her home medications.  She does smoke marijuana daily with her significant other, as well as drinks about a six-pack of beer every day.  No history of stroke.  She is not on aspirin.  Sister reports that she has been having episodes of bladder and bowel incontinence and is very restless/fidgety over the past few days.  No reported history of falls or back pain or any saddle anesthesia.  Patient is oriented x 4 but is an extremely poor historian.  She does report left-sided weakness and paresthesias x 2 days. Upon arrival to outside facility the patient was markedly hypertensive but otherwise hemodynamically stable and afebrile. EKG: NSR with LVH. Labs unremarkable except for a reactive RPR. Patient denies high risk sexual behavior, states unprotected sexual intercourse with significant other but she is in a monogamous relationships. Positive for syphilis as well as trichomoniasis.  CT Head showed left frontal 6 cm cortical base hypodensity suggesting an acute to subacute nonhemorrhagic infarct in the left KENISHA vascular territory. MRI Brain was then obtained confirming the left frontal lobe extending into the corpus callosum with an acute nonhemorrhagic infarct in the territory of the anterior cerebral artery, and old lacunar infarcts. Transferred to [facility] for further CVA workup and management.  The trichomoniasis has been treated with Flagyl and she is receiving her 3rd dose of penicillin for syphilis.  Participating with therapy. Functional status includes bed mobility and transfers requiring  "minimal assistance. Ayaz w/KAYDEN for 80ft. Patient was evaluated, accepted, and admitted to inpatient rehab to improve functional status. Transferred to [facility] on 6/2 without incident.    6/13: Seen with PT, Ayaz w/KAYDEN. She has new amy in her hair and tells me which staff member did her hair. When asked how she is doing, she promptly responds with "I'm good". Progressing with therapy without complaints. VSSAF.     Review of Systems  Psychiatric:Hx mental health/substance abuse: Pt. Has a history of anxiety and depression. Per chart, pt. Has a history of cocaine use which she quit a year ago. She does smoke cigarettes and marijuana.    Depression/Anxiety:   buPROPion TB24 tablet 150 mg qd  sertraline tablet 25 mg qd  ALPRAZolam tablet 0.25 mg qd  Pain: denies  acetaminophen tablet 650 mg q4hr PRN mild pain  HYDROcodone-acetaminophen 5-325 mg, 1 tablet q4h PRN moderate pain  baclofen tablet 5 mg BID  Bowels/Bladder: last documented BM 6/12 (6/2 x 4- DC Colace)  Appetite: good  Sleep: good      Physical Exam  General: well-developed, well-nourished, in no acute distress  Respiratory: equal chest rise, no SOB, no audible wheeze  Cardiovascular: regular rate and rhythm, no edema  Gastrointestinal: soft, non-tender, non-distended   Musculoskeletal: right sided weakness  Integumentary: no rashes or skin lesions present, LCW [id number]-s/d/i  Neurologic: AAO, right sided weakness, aphasia, dysarthria  *MD performed and documented physical examination         Labs:   Latest Reference Range & Units 06/13/22 06:21 06/13/22 10:56   WBC 4.5 - 11.5 x10(3)/mcL  7.2   RBC 4.20 - 5.40 x10(6)/mcL  4.21   Hemoglobin 12.0 - 16.0 gm/dL  12.9   Hematocrit 37.0 - 47.0 %  40.2   MCV 80.0 - 94.0 fL  95.5 (H)   MCH 27.0 - 31.0 pg  30.6   MCHC 33.0 - 36.0 mg/dL  32.1 (L)   RDW 11.5 - 17.0 %  14.0   Platelets 130 - 400 x10(3)/mcL  272   MPV 9.4 - 12.4 fL  10.3   Neut % %  57.0   LYMPH % %  31.1   Mono % %  7.2   Eosinophil % %  4.0 "   Basophil % %  0.6   Immature Granulocytes 0 - 0.43 %  0.1   Neut # 2.1 - 9.2 x10(3)/mcL  4.1   Lymph # 0.6 - 4.6 x10(3)/mcL  2.25   Mono # 0.1 - 1.3 x10(3)/mcL  0.52   Eos # 0 - 0.9 x10(3)/mcL  0.29   Baso # 0 - 0.2 x10(3)/mcL  0.04   Immature Grans (Abs) 0 - 0.0155 x10(3)/mcL  0.01   nRBC %  0.0   Sodium 136 - 145 mmol/L 143    Potassium 3.5 - 5.1 mmol/L 4.5    Chloride 98 - 107 mmol/L 106    CO2 22 - 29 mmol/L 28    BUN 9.8 - 20.1 mg/dL 14.7    Creatinine 0.55 - 1.02 mg/dL 0.73    eGFR if African American mls/min/1.73/m2 >60    Glucose 74 - 100 mg/dL 81    Calcium 8.4 - 10.2 mg/dL 9.9    Phosphorus 2.3 - 4.7 mg/dL 4.4    Magnesium 1.60 - 2.60 mg/dL 2.00    Alkaline Phosphatase 40 - 150 unit/L 70    PROTEIN TOTAL 6.4 - 8.3 gm/dL 6.7    Albumin 3.5 - 5.0 gm/dL 3.5    Albumin/Globulin Ratio 1.1 - 2.0 ratio 1.1    BILIRUBIN TOTAL <=1.5 mg/dL 0.4    AST 5 - 34 unit/L 29    ALT 0 - 55 unit/L 39    Globulin, Total 2.4 - 3.5 gm/dL 3.2    (H): Data is abnormally high  (L): Data is abnormally low    Assessment/Plan  Acute nonhemorrhagic CVA- L KENISHA territory  Dysarthria and expressive aphasia secondary to CVA  HTN  HLD  Hypothyroidism  Tobacco, EtOH, and Marijuana use  Reactive RPR  Trichomonas    Wounds: LCW [id number]-p/d/i  S/p LINQ placement on 5/26  Precautions: fall risk  Bracing/AD: RW  Swallowing: Easy to Chew Diet  Function: Tolerating therapy. Continue PT/OT  VTE Prophylaxis:   enoxaparin injection 40 mg SubQ q24hr  Code Status: FULL CODE   Discharge:Pt. Lives with her fiance in [place] in an apartment. Her bedroom and bathroom are on the 2nd floor with approximately 12 steps to get to the 2nd floor. There is a wall then a railing on the left going up. Pt. Completed 10th or 11th grade. She has no  history. She was not working. She took care of her mother after she had surgery and then took care of her grandchildren so their mother could work. The patient has a fiance, who states that he is  retired. He drives, does most of the cooking, cleaning, laundry, grocery shopping, and manages finances. He states that he can physically assist patient. He also states that her children will be able to help as much as they can. Children (3). Date pending.   Dos 6/13/22  I have personally evaluated the patient during therapy today. Have discussed progress and problems with patient. Have reviewed barriers to progress as well as response to therapies with therapists. I have reviewed medical issues and plan of care with IM team. I met with  to review d/c plans and barriers. I have discussed skin integrity and B/B status with nursing. I am in agreement with present IRF plan of care.  I have been involved in  formation of this note with Nisha Wright.  Roel Wright NP, conducted additional independent physical examination and assisted with medical documentation.

## 2022-06-14 PROCEDURE — S4991 NICOTINE PATCH NONLEGEND: HCPCS | Performed by: NURSE PRACTITIONER

## 2022-06-14 PROCEDURE — 25000003 PHARM REV CODE 250: Performed by: NURSE PRACTITIONER

## 2022-06-14 PROCEDURE — 94799 UNLISTED PULMONARY SVC/PX: CPT

## 2022-06-14 PROCEDURE — 63600175 PHARM REV CODE 636 W HCPCS: Performed by: NURSE PRACTITIONER

## 2022-06-14 PROCEDURE — 97110 THERAPEUTIC EXERCISES: CPT

## 2022-06-14 PROCEDURE — 97535 SELF CARE MNGMENT TRAINING: CPT

## 2022-06-14 PROCEDURE — 11800000 HC REHAB PRIVATE ROOM

## 2022-06-14 PROCEDURE — 97530 THERAPEUTIC ACTIVITIES: CPT

## 2022-06-14 PROCEDURE — 92507 TX SP LANG VOICE COMM INDIV: CPT

## 2022-06-14 PROCEDURE — 99233 SBSQ HOSP IP/OBS HIGH 50: CPT | Mod: ,,, | Performed by: PHYSICAL MEDICINE & REHABILITATION

## 2022-06-14 PROCEDURE — 97116 GAIT TRAINING THERAPY: CPT

## 2022-06-14 PROCEDURE — 99233 PR SUBSEQUENT HOSPITAL CARE,LEVL III: ICD-10-PCS | Mod: ,,, | Performed by: PHYSICAL MEDICINE & REHABILITATION

## 2022-06-14 RX ADMIN — THIAMINE HCL TAB 100 MG 100 MG: 100 TAB at 08:06

## 2022-06-14 RX ADMIN — THIAMINE HCL TAB 100 MG 100 MG: 100 TAB at 04:06

## 2022-06-14 RX ADMIN — LABETALOL HYDROCHLORIDE 200 MG: 200 TABLET, FILM COATED ORAL at 04:06

## 2022-06-14 RX ADMIN — SERTRALINE HYDROCHLORIDE 25 MG: 25 TABLET ORAL at 08:06

## 2022-06-14 RX ADMIN — ENOXAPARIN SODIUM 40 MG: 40 INJECTION SUBCUTANEOUS at 04:06

## 2022-06-14 RX ADMIN — VENLAFAXINE 37.5 MG: 37.5 TABLET ORAL at 08:06

## 2022-06-14 RX ADMIN — BACLOFEN 5 MG: 5 TABLET ORAL at 08:06

## 2022-06-14 RX ADMIN — ATORVASTATIN CALCIUM 40 MG: 40 TABLET, FILM COATED ORAL at 08:06

## 2022-06-14 RX ADMIN — LISINOPRIL 10 MG: 10 TABLET ORAL at 08:06

## 2022-06-14 RX ADMIN — LABETALOL HYDROCHLORIDE 200 MG: 200 TABLET, FILM COATED ORAL at 08:06

## 2022-06-14 RX ADMIN — NICOTINE 1 PATCH: 21 PATCH, EXTENDED RELEASE TRANSDERMAL at 08:06

## 2022-06-14 RX ADMIN — AMLODIPINE BESYLATE 5 MG: 5 TABLET ORAL at 08:06

## 2022-06-14 RX ADMIN — MULTIPLE VITAMINS W/ MINERALS TAB 1 TABLET: TAB at 08:06

## 2022-06-14 RX ADMIN — HYDROXYZINE PAMOATE 50 MG: 50 CAPSULE ORAL at 08:06

## 2022-06-14 RX ADMIN — ASPIRIN 325 MG: 325 TABLET, COATED ORAL at 08:06

## 2022-06-14 NOTE — PT/OT/SLP PROGRESS
"Speech Language Pathology Treatment          Sammie Belcher   MRN: [id number]     Diet recommendations: Solid Diet Level: Easy to Chew Diet - IDDSI Level 7  Liquid Diet Level: Thin liquids - IDDSI Level 0   Billable Minutes:  Speech Therapy Individual 30 minutes    General Precautions: Standard, aphasia    Subjective:  Pt awake, alert, and cooperative     Objective:   Pain/Comfort  Pain Rating 1: 0/10    Pt able to use short phrases on this date with minimal cues. Cues required to expand phrases as pt continues to answer most questions with "okay," "yeah," and "nope." Dysfluencies noted throughout speech requiring moderate cues for easy onset, pull out, and cancellations.     Assessment:  Sammie Belcher is a 52 y.o. female with a SLP diagnosis of Aphasia. SLP intervention continues to be warranted to increase communicative effectiveness and ability to communicate functional needs/wants.    Discharge recommendations: Discharge Facility/Level of Care Needs: home health speech therapy     Goals:   Multidisciplinary Problems     SLP Goals        Problem: SLP    Goal Priority Disciplines Outcome   SLP Goal     SLP Ongoing, Progressing   Description: LTG: To increase expressive/receptive language skills to enhance functional communication to express basic wants and needs with 100% effectiveness.     ST. Pt will answer simple, environmental, and personal yes/no questions with 90% accuracy and minimal cues.--GOAL MET  2. Pt will complete confrontational naming tasks with 90% accuracy and minimal cues.--PROGRESSING, CONTINUE  3. Pt will use fluency shaping/stuttering modification techniques to decrease syllables stuttered to less than 5%SS.--PROGRESSING, CONTINUE                    Plan:   Patient to be seen Therapy Frequency: 5 x/week   Planned Interventions: Language Therapy  Plan of Care Expires: 22  Plan of Care reviewed with: patient  SLP Follow-up?: Yes  SLP - Next Visit Date: 22  "

## 2022-06-14 NOTE — PROGRESS NOTES
[facility] ([facility])  Rehab Progress Note    Patient Name: Sammie Belcher  MRN: [id number]  Age: 52 y.o. Sex: female  : 1970  Hospital Length of Stay: 12 days  Date of Service:  2022  Chief Complaint: Left frontal lobe 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    Subjective:     Basic Information  Admit Information: 52-year-old  female presented to [facility] ED on 2022 complaining of dysarthria and expressive aphasia x3 days.  PMH significant hyperparathyroidism s/p parathyroidectomy 2022, anxiety/depression, and HTN.  Workup significant for reactive RPR and CT head significant for left frontal 6 cm cortical base hypodensity suggesting acute to subacute nonhemorrhagic infarct of the left KENISHA vascular territory.  MRA brain significant for left frontal lobe infarct extending into the corpus callosum with acute nonhemorrhagic infarct in the territory of the KENISHA, and old lacunar infarcts. Transferred to [facility] for neurology services.  Bicillin initiated on  for syphilis with plan for 3 doses with end date on .  Also received Flagyl x 7 days due to Trichomoniasis.  TTE significant for EF 74% with severe concentric hypertrophy and hyperdynamic systolic function with negative bubble study.  ASA and statin initiated.  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  LP not revealing with no evidence of CNS infection.  Tolerating LINQ placement on .  CVA likely caused by long history cocaine abuse.  Continue to participate in progress therapy.  Tolerated transferred to [facility] inpatient rehab unit on  without incident.  Today's Information:  No acute events overnight.  Sitting in wheelchair comfortably eating breakfast.  Reports good sleep and appetite.  Last BM .  Vital signs at goal with no recent recorded fevers.  No labs or imaging today.    Review of patient's allergies indicates:  No  Known Allergies     Current Facility-Administered Medications:     acetaminophen tablet 650 mg, 650 mg, Oral, Q4H PRN, Nish Lymanehart, FNP, 650 mg at 06/08/22 1948    alendronate tablet 70 mg, 70 mg, Oral, Q7 Days, Nish Lymanehart, FNP, 70 mg at 06/10/22 0524    ALPRAZolam tablet 0.25 mg, 0.25 mg, Oral, TID PRN, Nish Woodart, FNP    amLODIPine tablet 5 mg, 5 mg, Oral, Daily, Nish HARMON Destiny, FNP, 5 mg at 06/14/22 0803    aspirin EC tablet 325 mg, 325 mg, Oral, Daily, Nish Lymanehart, FNP, 325 mg at 06/14/22 0803    atorvastatin tablet 40 mg, 40 mg, Oral, Daily, Nish Lymanehart, FNP, 40 mg at 06/14/22 0803    baclofen tablet 5 mg, 5 mg, Oral, BID, Matilde Wright, FNP, 5 mg at 06/14/22 0803    benzonatate capsule 100 mg, 100 mg, Oral, TID PRN, Nish Woodart, FNP    bisacodyL suppository 10 mg, 10 mg, Rectal, Daily PRN, Nish Lymanehart, FNP    bisacodyL suppository 10 mg, 10 mg, Rectal, Once, SIRISHA Murphy    enoxaparin injection 40 mg, 40 mg, Subcutaneous, Daily, Nish Lymanehart, FNP, 40 mg at 06/13/22 1645    hydrALAZINE injection 10 mg, 10 mg, Intravenous, Q4H PRN, Nish Lymanehart, FNP    HYDROcodone-acetaminophen 5-325 mg per tablet 1 tablet, 1 tablet, Oral, Q4H PRN, Nish Lymanehart, FNP    hydrOXYzine pamoate capsule 50 mg, 50 mg, Oral, Nightly PRN, Nish Lymanehart, FNP, 50 mg at 06/13/22 2015    labetalol 20 mg/4 mL (5 mg/mL) IV syring, 10 mg, Intravenous, Q4H PRN, Nish Dixon, FNP    labetaloL tablet 200 mg, 200 mg, Oral, TID, GARY FamP, 200 mg at 06/14/22 0803    lisinopriL tablet 10 mg, 10 mg, Oral, QHS, GARY FamP, 10 mg at 06/13/22 2015    metoprolol injection 10 mg, 10 mg, Intravenous, Q2H PRN, SIRISHA Fam    multivitamin tablet, 1 tablet, Oral, Daily, GARY FamP, 1 tablet at 06/14/22 0803    nicotine 21 mg/24 hr 1 patch, 1 patch, Transdermal, Daily, Nish HARMON  "Destiny, FNP, 1 patch at 06/14/22 0803    nitroGLYCERIN SL tablet 0.4 mg, 0.4 mg, Sublingual, Q5 Min PRN, Nish HARMON Destiny, FNP    ondansetron disintegrating tablet 4 mg, 4 mg, Oral, Q6H PRN, Nish A Destiny, FNP    ondansetron disintegrating tablet 8 mg, 8 mg, Oral, Q6H PRN, Nish HARMON Destiny, FNP    polyethylene glycol packet 17 g, 17 g, Oral, Q12H PRN, Nish A Destiny, FNP    promethazine tablet 25 mg, 25 mg, Oral, Q6H PRN, Nish A Destiny, FNP    sertraline tablet 25 mg, 25 mg, Oral, Daily, Nish HARMON Destiny, FNP, 25 mg at 06/14/22 0803    thiamine tablet 100 mg, 100 mg, Oral, TID, Nish HARMON Destiny, FNP, 100 mg at 06/14/22 0803    venlafaxine tablet 37.5 mg, 37.5 mg, Oral, BID, Syeda Crooks, FNP, 37.5 mg at 06/14/22 0803     Review of Systems   Complete 12-point review of symptoms negative except for what's mentioned in HPI     Objective:     BP (!) 157/85   Pulse 64   Temp 97.8 °F (36.6 °C) (Oral)   Resp 16   Ht 6' 0.99" (1.854 m)   Wt 66.2 kg (145 lb 15.1 oz)   LMP  (LMP Unknown)   SpO2 98%   BMI 19.26 kg/m²        Intake/Output Summary (Last 24 hours) at 6/14/2022 0856  Last data filed at 6/13/2022 2000  Gross per 24 hour   Intake 1890 ml   Output --   Net 1890 ml       Physical Exam  Constitutional:       Appearance: Normal appearance.   HENT:      Head: Normocephalic.      Mouth/Throat:      Mouth: Mucous membranes are moist.   Eyes:      Pupils: Pupils are equal, round, and reactive to light.   Cardiovascular:      Rate and Rhythm: Normal rate and regular rhythm.      Heart sounds: Normal heart sounds.   Pulmonary:      Effort: Pulmonary effort is normal.      Breath sounds: Normal breath sounds.   Abdominal:      General: Bowel sounds are normal.      Palpations: Abdomen is soft.   Musculoskeletal:      Cervical back: Neck supple.      Comments: Slight weakness to right side.  Weakness to left lower extremity.   Skin:     General: Skin is warm and dry. "   Neurological:      Mental Status: She is alert and oriented to person, place, and time.      Comments: Dysarthria/expressive and receptive aphasia    Psychiatric:         Mood and Affect: Mood normal.     *MD performed and documented physical examination         Lines/Drains/Airways     None               Labs:  Recent Results (from the past 24 hour(s))   CBC with Differential    Collection Time: 06/13/22 10:56 AM   Result Value Ref Range    WBC 7.2 4.5 - 11.5 x10(3)/mcL    RBC 4.21 4.20 - 5.40 x10(6)/mcL    Hgb 12.9 12.0 - 16.0 gm/dL    Hct 40.2 37.0 - 47.0 %    MCV 95.5 (H) 80.0 - 94.0 fL    MCH 30.6 27.0 - 31.0 pg    MCHC 32.1 (L) 33.0 - 36.0 mg/dL    RDW 14.0 11.5 - 17.0 %    Platelet 272 130 - 400 x10(3)/mcL    MPV 10.3 9.4 - 12.4 fL    Neut % 57.0 %    Lymph % 31.1 %    Mono % 7.2 %    Eos % 4.0 %    Basophil % 0.6 %    Lymph # 2.25 0.6 - 4.6 x10(3)/mcL    Neut # 4.1 2.1 - 9.2 x10(3)/mcL    Mono # 0.52 0.1 - 1.3 x10(3)/mcL    Eos # 0.29 0 - 0.9 x10(3)/mcL    Baso # 0.04 0 - 0.2 x10(3)/mcL    IG# 0.01 0 - 0.0155 x10(3)/mcL    IG% 0.1 0 - 0.43 %    NRBC% 0.0 %       Radiology:  MRI brain without contrast 05/19/2002 at 5:38 p.m., IMPRESSION: Motion degraded exam. In spite of this limitation: Moderate to severe multifocal flow signal abnormality about the bilateral ACAs, bilateral distal MCAs and to lesser extent bilateral PCAs.  Radiology  Transthoracic echo 05/22/2022 at 7:50 a.m., IMPRESSION:  LV is normal in size with hyperdynamic systolic function.  Estimated EF 75%.  There is grade 1 diastolic dysfunction consistent with impaired relaxation.  Left atrial pressure is normal.  RV normal size and function.  RA normal size.  AV structurally normal.  No regurgitation.  No stenosis.  There is nonspecific leaflet thickening 2 mitral valve.  There is trace mitral valve regurgitation.  There is no stenosis.  Tricuspid valve with trace regurgitation.  No valve stenosis.  PAP could not be obtained.  There is no prior  care effusion.  There is no evidence 10 benign.  Negative bubble study.    Assessment/Plan:     52 y.o. AAF admitted on 6/2/2022    Left frontal lobe infarct   - with dysarthria/expressive and receptive aphasia and right-sided weakness  - continue                Aspirin 325 mg daily                Lipitor 40 mg daily                Multivitamin daily  - defer to physiatry for rehab and pain management  - PT/OT/RT/ST following     POSITIVE RPR  - s/p Bicillin x 3  - LP on 5/22 negative for CNS infection     History of alcohol dependence  - stable  - no withdrawals  - continue                Thiamine 100 mg t.i.d.     HTN  - BP at goal!!  - continue     Lisinopril 10 mg at bedtime (initiated 6/5)                Norvasc 10 mg daily                Labetalol 200 mg t.i.d.                Hydralazine 10 mg every 2 hours as needed for BP > 160/90                Labetalol 10 mg every 2 hours as needed for BP > 160/90     Nicotine dependence  - stable  - continue                Nicotine patch 21 mg daily     Anxiety/depression  - in remission  - continue                Wellbutrin 150 mg XR daily                Zoloft 25 mg daily     Hyperparathyroidism  - stable  - s/p parathyroidectomy 02/23/2022  - continue                Vitamin-D 71528 units Q weekly                Fosamax 70 mg Q weekly     AB therapy  Bicillin x3 doses (5/19-6/2)     VTE Prophylaxis:  Lovenox 40 mg daily  COVID-19 testing:  Negative on 6/02/2022  COVID-19 vaccination status:  Vaccinated (Pfizer):  08/25/2021 and 08/04/2021     POA: No  Living will: No  Contacts:  Gamaliel Hein ([facility]) [phone]     CODE STATUS: Full Code  Internal Medicine (attending): José Miguel Cesar MD     OUTPATIENT PROVIDERS  PCP: Wanda Bell MD  Neurology:  Fede Lopez MD  Infectious disease: Marcelina Nguyen MD     DISPOSITION: Condition stable.  Vitals at goals.  No labs today.  Sleep hygiene, bowel management, and appetite at goal.  Continues to progress well  with therapy. Monitor closely.  Notify of acute changes.  Staffing today below.    Staffing 6/14/2022: Incontinent of bladder and bowel.  RT: Overall supervision.  Expression deficits.  Appetite is good. PT: High fall risk.  PT: Overall CGA to mod assists ambulating 180 feet with RW.  Linited by ataxia and apraxia. Will need support at home 2/2 safety and cognition. OT: Overall supervision with ADLs, maintaining standing balance, requires cueing. ST: Limited by fluency.  Receptive language impaired. Improving.  Projected discharge pending.     Syeda Crooks NP conducted independent physical examination and assisted with medical documentation.    Total time spent on this encounter including chart review and direct 1-on-1 patient interaction: 41 minutes   Over 50% of this time was spent in counseling and coordination of care

## 2022-06-14 NOTE — PT/OT/SLP PROGRESS
Progress note    Pt was scheduled from [id number]    Pt progress, plan of care and discharge plans were discussed during staffing at 0900

## 2022-06-14 NOTE — PT/OT/SLP PROGRESS
Occupational Therapy  Treatment      Sammie Belcher   MRN: [id number]   Admitting Diagnosis: CVA (cerebral vascular accident)         Billable Minutes:  Self Care/Home Management 15, Therapeutic Activity 60 and Therapeutic Exercise 15    OT/BETH: OT          General Precautions: Standard, fall  Orthopedic Precautions:           Subjective:  Pt willing to cooperate with treatment.     Objective:       Functional Mobility:  Transfer Training:   Sit to stand:Supervision or Set-up Assistance with Rolling Walker    Toilet Transfer:  Pt Step Transfer with Supervision or Set-up Assistance with Rolling Walker    Balance:  Patient performed standing balance with RW and SPV assist for 2 minutes while performing to pull up pants and FM back to chair.    ADLs:   Toilet Training:  Pt performed toileting with Supervision or Set-up Assistance with no AD at Commode.      Endurance and Strengthening  Bilateral upper extremity ergometer for 5 minutes for 2 sets with Independent      Fine/Gross motor coordination   Patient performed: clothespins, picking up marbles, picking up 3 pennies and coloring mandala picture in seated with verbal cues for motor planning using R upper extremity. Noted motor planning deficits with for in-hand manipulation with bringing one coin out of hand at a time using only R hand. With coloring mandala picture OT noted perseveration on coloring one spot but was able to coordinate colors to corresponding space on all sides of picture.    Education Provided: Roles and goals of OT, ADLs and transfer training    Expected compliance: High compliance    ASSESSMENT:  Sammie Belcher is a 52 y.o. female with a medical diagnosis of CVA (cerebral vascular accident) performed BUE therex, FM coordination to increase ADL independence and safety. Pt is progressing towards OT goals      GOALS:   Multidisciplinary Problems     Occupational Therapy Goals        Problem: Occupational Therapy    Goal Priority Disciplines  Outcome Interventions   Occupational Therapy Goal     OT, PT/OT Ongoing, Progressing    Description: ADLs:  Pt to perform grooming tasks with supervision and RW and min vc MET  Pt to perform feeding tasks with independence PROGRESSING REQUIRES SET UP WITH RUE WEAKNESS  Pt to perform UB dressing with set up assist and min vc PROGRESSING  Pt to perform LB dressing with CGA and min vc MET  Pt to perform putting on/off footwear task with supervision and min vc MET  Pt to perform toileting with supervision and min vc MET BUT MOSTLY INCONTINENT     Functional Transfers:  Pt to perform toilet transfers with supervision and min vc and RW PROGRESSING  Pt to perform a tub transfer with supervision, RW, min vc MET    IADLs:  Pt to perform simple care taking/ home management tasks with min A and min vc PROGRESSING    Balance, Strengthening, Endurance, Balance:  Pt to demonstrate dynamic standing balance as required to perform ADL's from standing level with RW and min vc. PROGRESSING  Pt to demonstrate BUE strength during functional task and increased use of R UE. PROGRESSING                   Plan:  Patient to be seen 5 x/week to address the above listed problems via self-care/home management, therapeutic activities, therapeutic exercises, therapeutic groups, neuromuscular re-education  Plan of Care expires: 06/09/22  Plan of Care reviewed with: patient      06/14/2022

## 2022-06-14 NOTE — PLAN OF CARE
Met with patient then called Gamaliel leyva ([phone]), to discuss updates from team conference.  Scheduled initial family training with Gamaliel for Thurs, 6/16, at 1300.  He admits to being very busy, but also having a desire to know how patient is progressing and having a desire to know how to care for patient when she gets home.  He also mentioned in conversation that he is in charge of transporting someone to/from their job.

## 2022-06-14 NOTE — PT/OT/SLP PROGRESS
"Physical Therapy         Treatment        Sammie Belcher   MRN: [id number]     Therapy Minutes  PT Received On: 06/14/22  PT Start Time: 1130  PT Stop Time: 1200  PT Total Time (min): 30 min  PT Individual: 30     Billable Minutes:  Gait [id number]      General Precautions: Standard, fall  Orthopedic Precautions: Orthopedic Precautions : N/A   Braces:      Patient found with: peripheral IV         Subjective:  "My knee feels good." Pt able state with cueing    Pain/Comfort  Pain Rating 1: 0/10    Objective:  Gait training performed to improve functional strength, to increase functional mobility, safety and independence.    VITALS:  BP:    115/77 HR 72             125/81 HR 64        In sitting        Transfer Training:  Sit to stand:Contact Guard Assistance with Rolling Walker (VC to uncross legs prior to stand)   Bed <> Chair:  Step Transfer with Contact Guard Assistance with Rolling Walker .        Gait Training:  Patient ambulates 190 feet x 2 with seated rest break using rolling walker with Contact Guard Assistance and verbal cues for increased step length on RLE and noted knee flexion throughout.     Activity Tolerance:  Patient tolerated treatment well    Patient left up in chair with all lines intact, call button in reach, chair alarm on and CNA present.    Education Provided: roles and goals of PT/PTA, transfer training, gait training, safety awareness and assistive device    Expected compliance:  Moderate compliance        Assessment:  Sammie Belcher is a 52 y.o. female with a medical diagnosis of CVA (cerebral vascular accident). She presents with cognitive deficits, impaired coordination, impaired balance, gait deviations, difficulty with functional transfers, R side neglect, R hemiparesis, impaired sensation, impaired proprioception, decreased motor control and decreased safety. Pt to benefit from skilled PT services to address impairments with progression towards functional independence as " tolerated.    Rehab potential is excellent.    Activity tolerance: Excellent    Discharge recommendations:       Equipment recommendations:       GOALS:   Multidisciplinary Problems     Physical Therapy Goals        Problem: Physical Therapy    Goal Priority Disciplines Outcome Goal Variances Interventions   Physical Therapy Goal     PT, PT/OT Ongoing, Progressing     Description: Short Term goals      Bed Mobility   Roll Right and Left Setup or Clean-up Assistance.  Lying to sitting Setup or Clean-up Assistance.  Sitting to lying Setup or Clean-up Assistance.    Transfers    Pt will be able to perform Stand step chair/bed to chair transfer With LRAD Setup or Clean-up Assistance.  Pt will be able to perform Sit to stand with LRAD   Setup or Clean-up Assistance.  Pt will be able to perform Car transfer with LRAD Setup or Clean-up Assistance.    Ambulation    Pt will ambulate 100 Feet with LRAD Setup or Clean-up Assistance.  Pt will ascend and descend 12 stairs with L rail CGA      Wheelchair Mobility   Pt will be able to propel 150 feet in paola wheelchair Setup or Clean-up Assistance.      Timeframe: By Discharge                      PLAN:    Patient to be seen 5 x/week  to address the above listed problems via gait training, therapeutic activities, therapeutic exercises, neuromuscular re-education  Plan of Care expires: 06/09/22  Plan of Care reviewed with: patient    6/14/2022

## 2022-06-14 NOTE — PROGRESS NOTES
Subjective:  HPI: 51yo BF with PMH of HTN, anxiety, depression, and hypothyroidism  presented to [facility] ED on 5/18 with complaints of dysarthria and expressive aphasia x3 days.  Patient's family member states that she has been having difficulty speaking for the past 3 days however today was worse so she was brought to the ED for further evaluation. Her sister at bedside states that she is unsure if she has been compliant with her home medications.  She does smoke marijuana daily with her significant other, as well as drinks about a six-pack of beer every day.  No history of stroke.  She is not on aspirin.  Sister reports that she has been having episodes of bladder and bowel incontinence and is very restless/fidgety over the past few days.  No reported history of falls or back pain or any saddle anesthesia.  Patient is oriented x 4 but is an extremely poor historian.  She does report left-sided weakness and paresthesias x 2 days. Upon arrival to outside facility the patient was markedly hypertensive but otherwise hemodynamically stable and afebrile. EKG: NSR with LVH. Labs unremarkable except for a reactive RPR. Patient denies high risk sexual behavior, states unprotected sexual intercourse with significant other but she is in a monogamous relationships. Positive for syphilis as well as trichomoniasis.  CT Head showed left frontal 6 cm cortical base hypodensity suggesting an acute to subacute nonhemorrhagic infarct in the left KENISHA vascular territory. MRI Brain was then obtained confirming the left frontal lobe extending into the corpus callosum with an acute nonhemorrhagic infarct in the territory of the anterior cerebral artery, and old lacunar infarcts. Transferred to [facility] for further CVA workup and management.  The trichomoniasis has been treated with Flagyl and she is receiving her 3rd dose of penicillin for syphilis.  Participating with therapy. Functional status includes bed mobility and transfers requiring  "minimal assistance. Amb w/RW for 80ft. Patient was evaluated, accepted, and admitted to inpatient rehab to improve functional status. Transferred to [facility] on 6/2 without incident.    6/14: Seen with ST, seated in WC. With VC, asks me "How are you?" Discussed sleep overnight as she was given Vistaril 2/2 to staying up most of the nights and snacking. She did admit to snacking some on "chips" and "donuts". Progressing in therapy without complaint. VSSAF with mild HTN noted. IM following.     Review of Systems  Psychiatric:Hx mental health/substance abuse: Pt. Has a history of anxiety and depression. Per chart, pt. Has a history of cocaine use which she quit a year ago. She does smoke cigarettes and marijuana.    Depression/Anxiety:   buPROPion TB24 tablet 150 mg qd  sertraline tablet 25 mg qd  ALPRAZolam tablet 0.25 mg qd  Pain: denies  acetaminophen tablet 650 mg q4hr PRN mild pain  HYDROcodone-acetaminophen 5-325 mg, 1 tablet q4h PRN moderate pain  baclofen tablet 5 mg BID  Bowels/Bladder: last documented BM 6/12 (6/2 x 4- DC Colace)  Appetite: good  Sleep: good   hydrOXYzine pamoate capsule 50 mg nightly PRN insomnia    Physical Exam  General: well-developed, well-nourished, in no acute distress  Respiratory: equal chest rise, no SOB, no audible wheeze  Cardiovascular: regular rate and rhythm, no edema  Gastrointestinal: soft, non-tender, non-distended   Musculoskeletal: right sided weakness  Integumentary: no rashes or skin lesions present, LCW [id number]/i  Neurologic: AAO, right sided weakness, aphasia, dysarthria  *MD performed and documented physical examination           Assessment/Plan  Acute nonhemorrhagic CVA- L KENISHA territory  Dysarthria and expressive aphasia secondary to CVA  HTN  HLD  Hypothyroidism  Tobacco, EtOH, and Marijuana use  Reactive RPR  Trichomonas    Wounds: LCW [id number]/i  S/p LINQ placement on 5/26  Precautions: fall risk  Bracing/AD: RW  Swallowing: Easy to " Chew Diet  Function: Tolerating therapy. Continue PT/OT  VTE Prophylaxis:   enoxaparin injection 40 mg SubQ q24hr  Code Status: FULL CODE   Discharge:Pt. Lives with her fiance in [place] in an apartment. Her bedroom and bathroom are on the 2nd floor with approximately 12 steps to get to the 2nd floor. There is a wall then a railing on the left going up. Pt. Completed 10th or 11th grade. She has no  history. She was not working. She took care of her mother after she had surgery and then took care of her grandchildren so their mother could work. The patient has a fiance, who states that he is retired. He drives, does most of the cooking, cleaning, laundry, grocery shopping, and manages finances. He states that he can physically assist patient. He also states that her children will be able to help as much as they can. Children (3). Date pending.   Dos 6/14/22  I have personally evaluated the patient during therapy today. Have discussed progress and problems with patient. Have reviewed barriers to progress as well as response to therapies with therapists. I have reviewed medical issues and plan of care with IM team. I met with  to review d/c plans and barriers. I have discussed skin integrity and B/B status with nursing. I am in agreement with present IRF plan of care.  I have been involved in  formation of this note with Nisha Wright.  Roel Wright NP, conducted additional independent physical examination and assisted with medical documentation.

## 2022-06-14 NOTE — PLAN OF CARE
Problem: Adult Inpatient Plan of Care  Goal: Plan of Care Review  Outcome: Ongoing, Progressing  Goal: Patient-Specific Goal (Individualized)  Outcome: Ongoing, Progressing  Goal: Absence of Hospital-Acquired Illness or Injury  Outcome: Ongoing, Progressing  Goal: Optimal Comfort and Wellbeing  Outcome: Ongoing, Progressing  Goal: Readiness for Transition of Care  Outcome: Ongoing, Progressing     Problem: Adjustment to Illness (Stroke, Ischemic/Transient Ischemic Attack)  Goal: Optimal Coping  Outcome: Ongoing, Progressing     Problem: Bowel Elimination Impaired (Stroke, Ischemic/Transient Ischemic Attack)  Goal: Effective Bowel Elimination  Outcome: Ongoing, Progressing     Problem: Cerebral Tissue Perfusion (Stroke, Ischemic/Transient Ischemic Attack)  Goal: Optimal Cerebral Tissue Perfusion  Outcome: Ongoing, Progressing     Problem: Cognitive Impairment (Stroke, Ischemic/Transient Ischemic Attack)  Goal: Optimal Cognitive Function  Outcome: Ongoing, Progressing     Problem: Sensorimotor Impairment (Stroke, Ischemic/Transient Ischemic Attack)  Goal: Improved Sensorimotor Function  Outcome: Ongoing, Progressing     Problem: Swallowing Impairment (Stroke, Ischemic/Transient Ischemic Attack)  Goal: Optimal Eating and Swallowing without Aspiration  Outcome: Ongoing, Progressing     Problem: Urinary Elimination Impaired (Stroke, Ischemic/Transient Ischemic Attack)  Goal: Effective Urinary Elimination  Outcome: Ongoing, Progressing     Problem: Skin Injury Risk Increased  Goal: Skin Health and Integrity  Outcome: Ongoing, Progressing

## 2022-06-14 NOTE — PROGRESS NOTES
06/14/22 0830   Rec Therapy Time Calculation   Rec Start Time 0830   Rec Stop Time 0900   Rec Total Time (min) 30 min   Subjective   Patient states Pt said she was feeling good today   Assessment   Cognitive Concerns problem solving   Attendance   Activity Recreational Therapy  (Washer Toss)   Participation Active participation   Therapeutic Recreation   Problem Solving Activity Assistance tactile cues;verbal cues;Contact Guard Assistance   Assessment   Assessment Sit to stand was setup as was dynamic standing balance/reaching. Standing tolerance was 7 minutes. Hand over hand assist needed for motor planning of RUE. Improved memory recall with counting forward and backward 1,2,3 and 3,2,1. Cheerful   Plan   Planned Therapy Intervention Continue with current plan  (Washer Toss)   Expected Length of Stay Pending   PT Frequency Minimu of 5 visits per week;Other (see comments)  (Daily)   Time   Treatment time 2 units

## 2022-06-14 NOTE — PT/OT/SLP PROGRESS
Speech Language Pathology Treatment          Sammie Belcher   MRN: [id number]     Diet recommendations: Solid Diet Level: Easy to Chew Diet - IDDSI Level 7  Liquid Diet Level: Thin liquids - IDDSI Level 0   Billable Minutes:  Speech Therapy Individual 30 minutes    General Precautions: Standard, aphasia          Subjective:  Pt awake, alert, and cooperative throughout session.    Objective:   Pain/Comfort  Pain Rating 1: 0/10    Pt able to complete Melodic Intonation Therapy, elementary level, with 90% accuracy overall. Pt able to complete step 1 (unison) and step 2 (unison with fading) with 100% accuracy. Pt with increased difficulty with immediate repetition, however, able to complete this step (4) with 90% accuracy. Continued difficulty noted with answering a question. Pt able to complete this step with 67% accuracy.    Assessment:  Sammie Belcher is a 52 y.o. female with a SLP diagnosis of Aphasia. SLP intervention continues to be warranted to increase communicative effectiveness and ability to have needs/wants met.    Discharge recommendations: Discharge Facility/Level of Care Needs: home health speech therapy     Goals:   Multidisciplinary Problems     SLP Goals        Problem: SLP    Goal Priority Disciplines Outcome   SLP Goal     SLP Ongoing, Progressing   Description: LTG: To increase expressive/receptive language skills to enhance functional communication to express basic wants and needs with 100% effectiveness.     ST. Pt will answer simple, environmental, and personal yes/no questions with 90% accuracy and minimal cues.--GOAL MET  2. Pt will complete confrontational naming tasks with 90% accuracy and minimal cues.--PROGRESSING, CONTINUE  3. Pt will use fluency shaping/stuttering modification techniques to decrease syllables stuttered to less than 5%SS.--PROGRESSING, CONTINUE                    Plan:   Patient to be seen Therapy Frequency: 5 x/week   Planned Interventions: Language Therapy  Plan  of Care Expires: 06/17/22  Plan of Care reviewed with: patient  SLP Follow-up?: Yes  SLP - Next Visit Date: 06/17/22 6/14/2022

## 2022-06-14 NOTE — PLAN OF CARE
Problem: Adult Inpatient Plan of Care  Goal: Plan of Care Review  Outcome: Ongoing, Progressing  Flowsheets (Taken 6/14/2022 1131)  Plan of Care Reviewed With: patient  Goal: Absence of Hospital-Acquired Illness or Injury  Outcome: Ongoing, Progressing  Intervention: Identify and Manage Fall Risk  Flowsheets (Taken 6/14/2022 1131)  Safety Promotion/Fall Prevention:   assistive device/personal item within reach   Fall Risk reviewed with patient/family   Fall Risk signage in place   gait belt with ambulation   high risk medications identified   lighting adjusted   medications reviewed   nonskid shoes/socks when out of bed   /camera at bedside   side rails raised x 2   toileting scheduled   instructed to call staff for mobility  Intervention: Prevent Skin Injury  Flowsheets (Taken 6/14/2022 1131)  Body Position: (sitting in wheelchair) other (see comments)  Skin Protection: incontinence pads utilized  Intervention: Prevent and Manage VTE (Venous Thromboembolism) Risk  Flowsheets (Taken 6/14/2022 1131)  Activity Management: Up in chair - L3  VTE Prevention/Management:   ambulation promoted   bleeding precations maintained   bleeding risk assessed   bleeding risk factor(s) identified, provider notified   dorsiflexion/plantar flexion performed   fluids promoted  Range of Motion: active ROM (range of motion) encouraged  Intervention: Prevent Infection  Flowsheets (Taken 6/14/2022 1131)  Infection Prevention:   environmental surveillance performed   equipment surfaces disinfected   hand hygiene promoted   personal protective equipment utilized   rest/sleep promoted   single patient room provided  Goal: Optimal Comfort and Wellbeing  Outcome: Ongoing, Progressing  Intervention: Monitor Pain and Promote Comfort  Flowsheets (Taken 6/14/2022 1131)  Pain Management Interventions:   pain management plan reviewed with patient/caregiver   pillow support provided   position adjusted   prescribed exercises  encouraged   quiet environment facilitated  Intervention: Provide Person-Centered Care  Flowsheets (Taken 6/14/2022 1131)  Trust Relationship/Rapport:   care explained   choices provided   emotional support provided   empathic listening provided   questions answered   questions encouraged   reassurance provided   thoughts/feelings acknowledged     Problem: Adjustment to Illness (Stroke, Ischemic/Transient Ischemic Attack)  Goal: Optimal Coping  Outcome: Ongoing, Progressing  Intervention: Support Psychosocial Response to Stroke  Flowsheets (Taken 6/14/2022 1131)  Supportive Measures:   active listening utilized   decision-making supported   goal-setting facilitated   self-care encouraged  Family/Support System Care: support provided     Problem: Bowel Elimination Impaired (Stroke, Ischemic/Transient Ischemic Attack)  Goal: Effective Bowel Elimination  Outcome: Ongoing, Progressing  Intervention: Promote Effective Bowel Elimination  Flowsheets (Taken 6/14/2022 1131)  Bowel Elimination Management:   hygiene measures promoted   relaxation techniques promoted   sitting position facilitated   toileting offered  Bowel Program: training program maintained     Problem: Cerebral Tissue Perfusion (Stroke, Ischemic/Transient Ischemic Attack)  Goal: Optimal Cerebral Tissue Perfusion  Outcome: Ongoing, Progressing  Intervention: Protect and Optimize Cerebral Perfusion  Flowsheets (Taken 6/14/2022 1131)  Sensory Stimulation Regulation: television on  Cerebral Perfusion Promotion: blood pressure monitored     Problem: Cognitive Impairment (Stroke, Ischemic/Transient Ischemic Attack)  Goal: Optimal Cognitive Function  Outcome: Ongoing, Progressing  Intervention: Optimize Cognitive Function  Flowsheets (Taken 6/14/2022 1131)  Environment Familiarity/Consistency:   daily routine followed   personal clothing/items utilized  Reorientation Measures:   clock in view   familiar social contact encouraged   reorientation provided  Sensory  Stimulation Regulation: television on     Problem: Communication Impairment (Stroke, Ischemic/Transient Ischemic Attack)  Goal: Improved Communication Skills  Outcome: Ongoing, Progressing  Intervention: Optimize Communication Skills  Flowsheets (Taken 6/14/2022 1131)  Communication Enhancement Strategies:   call light answered in person   extra time allowed for response   family involved in communication plan     Problem: Functional Ability Impaired (Stroke, Ischemic/Transient Ischemic Attack)  Goal: Optimal Functional Ability  Outcome: Ongoing, Progressing  Intervention: Optimize Functional Ability  Flowsheets (Taken 6/14/2022 1131)  Self-Care Promotion:   independence encouraged   BADL personal objects within reach   BADL personal routines maintained   safe use of adaptive equipment encouraged  Activity Management: Up in chair - L3     Problem: Respiratory Compromise (Stroke, Ischemic/Transient Ischemic Attack)  Goal: Effective Oxygenation and Ventilation  Outcome: Ongoing, Progressing  Intervention: Optimize Oxygenation and Ventilation  Flowsheets (Taken 6/14/2022 1131)  Head of Bed (HOB) Positioning: (sitting in wheelchair) other (see comments)     Problem: Sensorimotor Impairment (Stroke, Ischemic/Transient Ischemic Attack)  Goal: Improved Sensorimotor Function  Outcome: Ongoing, Progressing  Intervention: Optimize Range of Motion, Motor Control and Function  Flowsheets (Taken 6/14/2022 1131)  Positioning: Shoulder: positioned on unaffected side  Positioning/Transfer Devices: pillows  Range of Motion: active ROM (range of motion) encouraged  Intervention: Optimize Sensory and Perceptual Ability  Flowsheets (Taken 6/14/2022 1131)  Pressure Reduction Techniques:   frequent weight shift encouraged   weight shift assistance provided  Sensation Impairment Protection: cues provided for safety  Pressure Reduction Devices: pressure-redistributing mattress utilized     Problem: Swallowing Impairment (Stroke,  Ischemic/Transient Ischemic Attack)  Goal: Optimal Eating and Swallowing without Aspiration  Outcome: Ongoing, Progressing  Intervention: Optimize Eating and Swallowing  Flowsheets (Taken 6/14/2022 1131)  Aspiration Precautions:   awake/alert before oral intake   distractions minimized during oral intake   food texture adjusted   oral hygiene care promoted   upright posture maintained  Swallowing Interventions: Dysphagia: upright position maintained 45 mins after intake  Feeding/Eating Techniques: close supervision provided     Problem: Urinary Elimination Impaired (Stroke, Ischemic/Transient Ischemic Attack)  Goal: Effective Urinary Elimination  Outcome: Ongoing, Progressing  Intervention: Promote Effective Bladder Elimination  Flowsheets (Taken 6/14/2022 1131)  Urinary Elimination Promotion: toileting offered     Problem: Skin Injury Risk Increased  Goal: Skin Health and Integrity  Outcome: Ongoing, Progressing  Intervention: Optimize Skin Protection  Flowsheets (Taken 6/14/2022 1131)  Pressure Reduction Techniques:   frequent weight shift encouraged   weight shift assistance provided  Pressure Reduction Devices: pressure-redistributing mattress utilized  Skin Protection: incontinence pads utilized  Head of Bed (HOB) Positioning: (sitting in wheelchair) other (see comments)  Intervention: Promote and Optimize Oral Intake  Flowsheets (Taken 6/14/2022 1131)  Oral Nutrition Promotion:   rest periods promoted   physical activity promoted

## 2022-06-15 LAB — PATH REV: NORMAL

## 2022-06-15 PROCEDURE — 25000003 PHARM REV CODE 250: Performed by: NURSE PRACTITIONER

## 2022-06-15 PROCEDURE — 92507 TX SP LANG VOICE COMM INDIV: CPT

## 2022-06-15 PROCEDURE — 11800000 HC REHAB PRIVATE ROOM

## 2022-06-15 PROCEDURE — 97530 THERAPEUTIC ACTIVITIES: CPT

## 2022-06-15 PROCEDURE — S4991 NICOTINE PATCH NONLEGEND: HCPCS | Performed by: NURSE PRACTITIONER

## 2022-06-15 PROCEDURE — 97535 SELF CARE MNGMENT TRAINING: CPT

## 2022-06-15 PROCEDURE — 97164 PT RE-EVAL EST PLAN CARE: CPT

## 2022-06-15 PROCEDURE — 97116 GAIT TRAINING THERAPY: CPT

## 2022-06-15 PROCEDURE — 94799 UNLISTED PULMONARY SVC/PX: CPT

## 2022-06-15 PROCEDURE — 63600175 PHARM REV CODE 636 W HCPCS: Performed by: NURSE PRACTITIONER

## 2022-06-15 RX ORDER — HYDRALAZINE HYDROCHLORIDE 20 MG/ML
10 INJECTION INTRAMUSCULAR; INTRAVENOUS EVERY 4 HOURS PRN
Status: DISCONTINUED | OUTPATIENT
Start: 2022-06-15 | End: 2022-06-23 | Stop reason: HOSPADM

## 2022-06-15 RX ORDER — BISACODYL 10 MG
10 SUPPOSITORY, RECTAL RECTAL ONCE
Status: DISCONTINUED | OUTPATIENT
Start: 2022-06-15 | End: 2022-06-22

## 2022-06-15 RX ORDER — LABETALOL HCL 20 MG/4 ML
10 SYRINGE (ML) INTRAVENOUS EVERY 4 HOURS PRN
Status: DISCONTINUED | OUTPATIENT
Start: 2022-06-15 | End: 2022-06-23 | Stop reason: HOSPADM

## 2022-06-15 RX ADMIN — SERTRALINE HYDROCHLORIDE 25 MG: 25 TABLET ORAL at 08:06

## 2022-06-15 RX ADMIN — HYDROXYZINE PAMOATE 50 MG: 50 CAPSULE ORAL at 08:06

## 2022-06-15 RX ADMIN — VENLAFAXINE 37.5 MG: 37.5 TABLET ORAL at 08:06

## 2022-06-15 RX ADMIN — AMLODIPINE BESYLATE 5 MG: 5 TABLET ORAL at 08:06

## 2022-06-15 RX ADMIN — ATORVASTATIN CALCIUM 40 MG: 40 TABLET, FILM COATED ORAL at 08:06

## 2022-06-15 RX ADMIN — ASPIRIN 325 MG: 325 TABLET, COATED ORAL at 08:06

## 2022-06-15 RX ADMIN — BACLOFEN 5 MG: 5 TABLET ORAL at 08:06

## 2022-06-15 RX ADMIN — MULTIPLE VITAMINS W/ MINERALS TAB 1 TABLET: TAB at 08:06

## 2022-06-15 RX ADMIN — LABETALOL HYDROCHLORIDE 200 MG: 200 TABLET, FILM COATED ORAL at 03:06

## 2022-06-15 RX ADMIN — LABETALOL HYDROCHLORIDE 200 MG: 200 TABLET, FILM COATED ORAL at 08:06

## 2022-06-15 RX ADMIN — THIAMINE HCL TAB 100 MG 100 MG: 100 TAB at 08:06

## 2022-06-15 RX ADMIN — NICOTINE 1 PATCH: 21 PATCH, EXTENDED RELEASE TRANSDERMAL at 08:06

## 2022-06-15 RX ADMIN — ENOXAPARIN SODIUM 40 MG: 40 INJECTION SUBCUTANEOUS at 04:06

## 2022-06-15 RX ADMIN — LISINOPRIL 10 MG: 10 TABLET ORAL at 08:06

## 2022-06-15 RX ADMIN — THIAMINE HCL TAB 100 MG 100 MG: 100 TAB at 03:06

## 2022-06-15 RX ADMIN — ALPRAZOLAM 0.25 MG: 0.25 TABLET ORAL at 09:06

## 2022-06-15 NOTE — PLAN OF CARE
Problem: Adjustment to Illness (Stroke, Ischemic/Transient Ischemic Attack)  Goal: Optimal Coping  Outcome: Ongoing, Progressing  Intervention: Support Psychosocial Response to Stroke  Flowsheets (Taken 6/14/2022 2227)  Supportive Measures:   active listening utilized   positive reinforcement provided   verbalization of feelings encouraged   self-care encouraged     Problem: Communication Impairment (Stroke, Ischemic/Transient Ischemic Attack)  Goal: Improved Communication Skills  Outcome: Ongoing, Progressing  Intervention: Optimize Communication Skills  Flowsheets (Taken 6/14/2022 2227)  Communication Enhancement Strategies:   call light answered in person   extra time allowed for response   nonverbal strategies used   one-step directions provided   verbal communication attempts encouraged

## 2022-06-15 NOTE — PROGRESS NOTES
[facility] ([facility])  Rehab Progress Note    Patient Name: Sammie Belcher  MRN: [id number]  Age: 52 y.o. Sex: female  : 1970  Hospital Length of Stay: 13 days  Date of Service:  6/15/2022  Chief Complaint: Left frontal lobe 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    Subjective:     Basic Information  Admit Information: 52-year-old  female presented to [facility] ED on 2022 complaining of dysarthria and expressive aphasia x3 days.  PMH significant hyperparathyroidism s/p parathyroidectomy 2022, anxiety/depression, and HTN.  Workup significant for reactive RPR and CT head significant for left frontal 6 cm cortical base hypodensity suggesting acute to subacute nonhemorrhagic infarct of the left KENISHA vascular territory.  MRA brain significant for left frontal lobe infarct extending into the corpus callosum with acute nonhemorrhagic infarct in the territory of the KENISHA, and old lacunar infarcts. Transferred to [facility] for neurology services.  Bicillin initiated on  for syphilis with plan for 3 doses with end date on .  Also received Flagyl x 7 days due to Trichomoniasis.  TTE significant for EF 74% with severe concentric hypertrophy and hyperdynamic systolic function with negative bubble study.  ASA and statin initiated.  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  LP not revealing with no evidence of CNS infection.  Tolerating LINQ placement on .  CVA likely caused by long history cocaine abuse.  Continue to participate in progress therapy.  Tolerated transferred to [facility] inpatient rehab unit on  without incident.  Today's Information:  No acute events overnight.  Sitting in wheelchair comfortably eating breakfast.  Reports good sleep and appetite.  Last BM .  Vital signs at goal with no recent recorded fevers.  No labs or imaging today.    Review of patient's allergies indicates:  No  Known Allergies     Current Facility-Administered Medications:     acetaminophen tablet 650 mg, 650 mg, Oral, Q4H PRN, Nish Lymanehart, FNP, 650 mg at 06/08/22 1948    alendronate tablet 70 mg, 70 mg, Oral, Q7 Days, Nish Lymanehart, FNP, 70 mg at 06/10/22 0524    ALPRAZolam tablet 0.25 mg, 0.25 mg, Oral, TID PRN, Nish Woodart, FNP    amLODIPine tablet 5 mg, 5 mg, Oral, Daily, Nish HARMON Destiny, FNP, 5 mg at 06/15/22 0857    aspirin EC tablet 325 mg, 325 mg, Oral, Daily, Nish Lymanehart, FNP, 325 mg at 06/15/22 0857    atorvastatin tablet 40 mg, 40 mg, Oral, Daily, Nish Lymanehart, FNP, 40 mg at 06/15/22 0857    baclofen tablet 5 mg, 5 mg, Oral, BID, Matilde Wright, FNP, 5 mg at 06/15/22 0857    benzonatate capsule 100 mg, 100 mg, Oral, TID PRN, Nish Woodart, FNP    bisacodyL suppository 10 mg, 10 mg, Rectal, Daily PRN, Nish Lymanehart, FNP    bisacodyL suppository 10 mg, 10 mg, Rectal, Once, SIRISHA Murphy    enoxaparin injection 40 mg, 40 mg, Subcutaneous, Daily, Nish Lymanehart, FNP, 40 mg at 06/14/22 1611    hydrALAZINE injection 10 mg, 10 mg, Intravenous, Q4H PRN, Nish Lymanehart, FNP    HYDROcodone-acetaminophen 5-325 mg per tablet 1 tablet, 1 tablet, Oral, Q4H PRN, Nish Lymanehart, FNP    hydrOXYzine pamoate capsule 50 mg, 50 mg, Oral, Nightly PRN, Nish Lymanehart, FNP, 50 mg at 06/14/22 2048    labetalol 20 mg/4 mL (5 mg/mL) IV syring, 10 mg, Intravenous, Q4H PRN, Nish Lymanehart, FNP    labetaloL tablet 200 mg, 200 mg, Oral, TID, GARY FamP, 200 mg at 06/15/22 0857    lisinopriL tablet 10 mg, 10 mg, Oral, QHS, SIRISHA Fam, 10 mg at 06/14/22 2048    metoprolol injection 10 mg, 10 mg, Intravenous, Q2H PRN, SIRISHA Fam    multivitamin tablet, 1 tablet, Oral, Daily, SIRISHA Fam, 1 tablet at 06/15/22 0857    nicotine 21 mg/24 hr 1 patch, 1 patch, Transdermal, Daily, Nish HARMON  "Destiny, FNP, 1 patch at 06/15/22 0857    nitroGLYCERIN SL tablet 0.4 mg, 0.4 mg, Sublingual, Q5 Min PRN, Nish HARMON Destiny, FNP    ondansetron disintegrating tablet 4 mg, 4 mg, Oral, Q6H PRN, Nish HARMON Destiny, FNP    ondansetron disintegrating tablet 8 mg, 8 mg, Oral, Q6H PRN, Nish HARMON Destiny, FNP    polyethylene glycol packet 17 g, 17 g, Oral, Q12H PRN, Nish A Destiny, FNP    promethazine tablet 25 mg, 25 mg, Oral, Q6H PRN, Nish A Destiny, FNP    sertraline tablet 25 mg, 25 mg, Oral, Daily, Nish HARMON Destiny, FNP, 25 mg at 06/15/22 0857    thiamine tablet 100 mg, 100 mg, Oral, TID, Nish HARMON Destiny, FNP, 100 mg at 06/15/22 0857    venlafaxine tablet 37.5 mg, 37.5 mg, Oral, BID, Syeda Crooks, FNP, 37.5 mg at 06/15/22 0857     Review of Systems   Complete 12-point review of symptoms negative except for what's mentioned in HPI     Objective:     /80   Pulse 76   Temp 98 °F (36.7 °C) (Oral)   Resp 18   Ht 6' 0.99" (1.854 m)   Wt 66.2 kg (145 lb 15.1 oz)   LMP  (LMP Unknown)   SpO2 99%   BMI 19.26 kg/m²        Intake/Output Summary (Last 24 hours) at 6/15/2022 1115  Last data filed at 6/15/2022 0900  Gross per 24 hour   Intake 360 ml   Output --   Net 360 ml       Physical Exam  Constitutional:       Appearance: Normal appearance.   HENT:      Head: Normocephalic.      Mouth/Throat:      Mouth: Mucous membranes are moist.   Eyes:      Pupils: Pupils are equal, round, and reactive to light.   Cardiovascular:      Rate and Rhythm: Normal rate and regular rhythm.      Heart sounds: Normal heart sounds.   Pulmonary:      Effort: Pulmonary effort is normal.      Breath sounds: Normal breath sounds.   Abdominal:      General: Bowel sounds are normal.      Palpations: Abdomen is soft.   Musculoskeletal:      Cervical back: Neck supple.      Comments: Slight weakness to right side.  Weakness to left lower extremity.   Skin:     General: Skin is warm and dry.   Neurological: "      Mental Status: She is alert and oriented to person, place, and time.      Comments: Dysarthria/expressive and receptive aphasia    Psychiatric:         Mood and Affect: Mood normal.     *MD performed and documented physical examination         Lines/Drains/Airways     None               Labs:  No results found for this or any previous visit (from the past 24 hour(s)).    Radiology:  MRI brain without contrast 05/19/2002 at 5:38 p.m., IMPRESSION: Motion degraded exam. In spite of this limitation: Moderate to severe multifocal flow signal abnormality about the bilateral ACAs, bilateral distal MCAs and to lesser extent bilateral PCAs.  Radiology  Transthoracic echo 05/22/2022 at 7:50 a.m., IMPRESSION:  LV is normal in size with hyperdynamic systolic function.  Estimated EF 75%.  There is grade 1 diastolic dysfunction consistent with impaired relaxation.  Left atrial pressure is normal.  RV normal size and function.  RA normal size.  AV structurally normal.  No regurgitation.  No stenosis.  There is nonspecific leaflet thickening 2 mitral valve.  There is trace mitral valve regurgitation.  There is no stenosis.  Tricuspid valve with trace regurgitation.  No valve stenosis.  PAP could not be obtained.  There is no prior care effusion.  There is no evidence 10 benign.  Negative bubble study.    Assessment/Plan:     52 y.o. AAF admitted on 6/2/2022    Left frontal lobe infarct   - with dysarthria/expressive and receptive aphasia and right-sided weakness  - continue                Aspirin 325 mg daily                Lipitor 40 mg daily                Multivitamin daily  - defer to physiatry for rehab and pain management  - PT/OT/RT/ST following     POSITIVE RPR  - s/p Bicillin x 3  - LP on 5/22 negative for CNS infection     History of alcohol dependence  - stable  - no withdrawals  - continue                Thiamine 100 mg t.i.d.     HTN  - BP at goal!!  - continue     Lisinopril 10 mg at bedtime (initiated 6/5)                 Norvasc 10 mg daily                Labetalol 200 mg t.i.d.                Hydralazine 10 mg every 2 hours as needed for BP > 160/90                Labetalol 10 mg every 2 hours as needed for BP > 160/90     Nicotine dependence  - stable  - continue                Nicotine patch 21 mg daily     Anxiety/depression  - in remission  - continue                Wellbutrin 150 mg XR daily                Zoloft 25 mg daily     Hyperparathyroidism  - stable  - s/p parathyroidectomy 02/23/2022  - continue                Vitamin-D 61824 units Q weekly                Fosamax 70 mg Q weekly     AB therapy  Bicillin x3 doses (5/19-6/2)     VTE Prophylaxis:  Lovenox 40 mg daily  COVID-19 testing:  Negative on 6/02/2022  COVID-19 vaccination status:  Vaccinated (Pfizer):  08/25/2021 and 08/04/2021     POA: No  Living will: No  Contacts:  Gamaliel Hein ([facility]) [phone]     CODE STATUS: Full Code  Internal Medicine (attending): José Miguel Cesar MD     OUTPATIENT PROVIDERS  PCP: Wanda Bell MD  Neurology:  Fede Lopez MD  Infectious disease: Marcelina Nguyen MD     DISPOSITION: Condition stable.  Vitals at goals.  No labs today.  Last BM 6/12.  Sleep hygiene and appetite at goal.  Initiate Dulcolax suppository once now, if no BM by 1400 give soapsuds enema.  Continues to progress well with therapy. Monitor closely.  Notify of acute changes.      Staffing 6/14/2022: Incontinent of bladder and bowel.  RT: Overall supervision.  Expression deficits.  Appetite is good. PT: High fall risk.  PT: Overall CGA to mod assists ambulating 180 feet with RW.  Linited by ataxia and apraxia. Will need support at home 2/2 safety and cognition. OT: Overall supervision with ADLs, maintaining standing balance, requires cueing. ST: Limited by fluency.  Receptive language impaired. Improving.  Projected discharge pending.     Syeda Crooks NP conducted independent physical examination and assisted with medical  documentation.    Total time spent on this encounter including chart review and direct 1-on-1 patient interaction: 39 minutes   Over 50% of this time was spent in counseling and coordination of care

## 2022-06-15 NOTE — PT/OT/SLP PROGRESS
Speech Language Pathology Treatment          Sammie Belcher   MRN: [id number]     Diet recommendations: Solid Diet Level: Regular Diet - IDDSI Level 7  Liquid Diet Level: Thin liquids - IDDSI Level 0   Billable Minutes:  Speech Therapy Individual 30 minutes    General Precautions: Standard, aphasia    Subjective:  Pt awake, alert, and cooperative     Objective:   Pain/Comfort  Pain Rating 1: 0/10    Pt able to participate in naming task on this date with 90% accuracy with min-mod cues. Additionally, pt able to engage in simple conversation with SLP. Dysfluencies noted throughout, with cues required for strategy usage. Pt appears to be aware of dysfluencies at some times but awareness is inconsistent. Pt able to follow 2 step commands with 70% accuracy and answer complex yes/no questions with 85% accuracy and min-mod cues.    Assessment:  Sammie Belcher is a 52 y.o. female with a SLP diagnosis of Aphasia. SLP intervention continues to be warranted to increase communicative effectiveness and ability to communicate functional needs/wants.    Discharge recommendations: Discharge Facility/Level of Care Needs: home health speech therapy     Goals:   Multidisciplinary Problems     SLP Goals        Problem: SLP    Goal Priority Disciplines Outcome   SLP Goal     SLP Ongoing, Progressing   Description: LTG: To increase expressive/receptive language skills to enhance functional communication to express basic wants and needs with 100% effectiveness.     ST. Pt will answer simple, environmental, and personal yes/no questions with 90% accuracy and minimal cues.--GOAL MET  2. Pt will complete confrontational naming tasks with 90% accuracy and minimal cues.--PROGRESSING, CONTINUE  3. Pt will use fluency shaping/stuttering modification techniques to decrease syllables stuttered to less than 5%SS.--PROGRESSING, CONTINUE                    Plan:   Patient to be seen Therapy Frequency: 5 x/week   Planned Interventions:  Language Therapy  Plan of Care Expires: 06/17/22  Plan of Care reviewed with: patient  SLP Follow-up?: Yes  SLP - Next Visit Date: 06/17/22        6/15/2022

## 2022-06-15 NOTE — PT/OT/SLP PROGRESS
Occupational Therapy  Treatment      Sammie Belcher   MRN: [id number]   Admitting Diagnosis: CVA (cerebral vascular accident)         Billable Minutes:  Self Care/Home Management 60    OT/BETH: OT          General Precautions: Standard, fall  Orthopedic Precautions:           Subjective:    Pain/Comfort  Pain Rating 1: 0/10  Objective:       Functional Mobility:  Transfer Training:   Sit to stand:Supervision or Set-up Assistance with Rolling Walker    Toilet Transfer:  Pt Step Transfer with Supervision or Set-up Assistance with Rolling Walker    Patient tub bench transfer Step Transfer with Supervision or Set-up Assistance with Rolling Walker.  Balance:  Patient performed standing balance with RW and SPV assist for 3 minutes while performing oral care at sink     ADLs:    Current Status   Functional Area: Care Score:   Eating 5   Oral Hygiene 5   Standing at sink with SPV   Toileting Hygiene 4   With pulling down/up pants while standing   Shower/Bathe Self 4   While standing to clean mina area   Upper Body Dressing 4   VC's with sequencing taking off shirt   Lower Body Dressing 4   While standing to pull up/down LE garments   Putting On/Taking Off Footwear 6   Toilet Transfer 4   For safety and with VC's     Education Provided: Roles and goals of OT, ADLs and transfer training    Expected compliance: High compliance    ASSESSMENT:  Sammie Belcher is a 52 y.o. female with a medical diagnosis of CVA (cerebral vascular accident) performed  self care to increase ADL independence and safety. Pt is progressing towards OT goals      GOALS:   Multidisciplinary Problems     Occupational Therapy Goals        Problem: Occupational Therapy    Goal Priority Disciplines Outcome Interventions   Occupational Therapy Goal     OT, PT/OT Ongoing, Progressing    Description: ADLs:  Pt to perform grooming tasks with supervision and RW and min vc MET  Pt to perform feeding tasks with independence PROGRESSING REQUIRES SET UP WITH LOU  WEAKNESS  Pt to perform UB dressing with set up assist and min vc PROGRESSING  Pt to perform LB dressing with CGA and min vc MET  Pt to perform putting on/off footwear task with supervision and min vc MET  Pt to perform toileting with supervision and min vc MET BUT MOSTLY INCONTINENT     Functional Transfers:  Pt to perform toilet transfers with supervision and min vc and RW PROGRESSING  Pt to perform a tub transfer with supervision, RW, min vc MET    IADLs:  Pt to perform simple care taking/ home management tasks with min A and min vc PROGRESSING    Balance, Strengthening, Endurance, Balance:  Pt to demonstrate dynamic standing balance as required to perform ADL's from standing level with RW and min vc. PROGRESSING  Pt to demonstrate BUE strength during functional task and increased use of R UE. PROGRESSING                   Plan:  Patient to be seen 5 x/week to address the above listed problems via self-care/home management, therapeutic activities, therapeutic exercises, therapeutic groups, neuromuscular re-education  Plan of Care expires: 06/09/22  Plan of Care reviewed with: patient      06/15/2022

## 2022-06-15 NOTE — PT/OT/SLP PROGRESS
Speech Language Pathology Treatment          Sammie Belcher   MRN: [id number]     Diet recommendations: Solid Diet Level: Regular Diet - IDDSI Level 7  Liquid Diet Level: Thin liquids - IDDSI Level 0     Billable Minutes:  Speech Therapy Individual 30 minutes    General Precautions: Standard, aphasia          Subjective:  Pt awake, alert, and cooperative throughout session.    Objective:   Pain/Comfort  Pain Rating 1: 0/10    Pt requesting to use singing for therapy this afternoon. Pt able to sing familiar songs x2 with no cues from SLP. During discussion of songs, pt noted to have increased dysfluencies and distractibility. As a result, pt often answering with one to two word phrases. Pt cues to elaborate and model provided by SLP.  Assessment:  Sammie Belcher is a 52 y.o. female with a SLP diagnosis of Aphasia. Skilled SLP intervention continues to be warranted at this time.    Discharge recommendations: Discharge Facility/Level of Care Needs: home health speech therapy     Goals:   Multidisciplinary Problems     SLP Goals        Problem: SLP    Goal Priority Disciplines Outcome   SLP Goal     SLP Ongoing, Progressing   Description: LTG: To increase expressive/receptive language skills to enhance functional communication to express basic wants and needs with 100% effectiveness.     ST. Pt will answer simple, environmental, and personal yes/no questions with 90% accuracy and minimal cues.--GOAL MET  2. Pt will complete confrontational naming tasks with 90% accuracy and minimal cues.--PROGRESSING, CONTINUE  3. Pt will use fluency shaping/stuttering modification techniques to decrease syllables stuttered to less than 5%SS.--PROGRESSING, CONTINUE                    Plan:   Patient to be seen Therapy Frequency: 5 x/week   Planned Interventions: Language Therapy  Plan of Care Expires: 22  Plan of Care reviewed with: patient  SLP Follow-up?: Yes  SLP - Next Visit Date: 06/17/22        6/15/2022

## 2022-06-15 NOTE — PLAN OF CARE
Problem: Adjustment to Illness (Stroke, Ischemic/Transient Ischemic Attack)  Goal: Optimal Coping  Outcome: Ongoing, Progressing     Problem: Bowel Elimination Impaired (Stroke, Ischemic/Transient Ischemic Attack)  Goal: Effective Bowel Elimination  Outcome: Ongoing, Progressing     Problem: Cerebral Tissue Perfusion (Stroke, Ischemic/Transient Ischemic Attack)  Goal: Optimal Cerebral Tissue Perfusion  Outcome: Ongoing, Progressing     Problem: Cognitive Impairment (Stroke, Ischemic/Transient Ischemic Attack)  Goal: Optimal Cognitive Function  Outcome: Ongoing, Progressing

## 2022-06-15 NOTE — PROGRESS NOTES
06/15/22 1000   Rec Therapy Time Calculation   Rec Start Time 1000   Rec Stop Time 1030   Rec Total Time (min) 30 min   Subjective   Patient states Pt c/o a sore throat   Assessment   Cognitive Concerns problem solving   Attendance   Activity Recreational Therapy  (Julian Urena)   Participation Active participation   Therapeutic Recreation   Problem Solving Activity Assistance verbal cues;tactile cues;Supervision   Assessment   Assessment Sit to stand was setup as was dynamic standing balanace/reaching. Standing tolerance was 5 minutes. RUE coordination improved to supervision. More spontaneous with grasp and release of RUE. Min with comprehension of task. More distracted today   Plan   Planned Therapy Intervention Continue with current plan  (Julian Urena)   Expected Length of Stay Pending   PT Frequency Minimu of 5 visits per week;Other (see comments)  (Daily)   Time   Treatment time 2 units

## 2022-06-15 NOTE — PT/OT/SLP RE-EVAL
Physical Therapy          Inpatient Rehab Re-Evaluation    Sammie Belcher   MRN: [id number]       Start time 1100  Stop time 1200      Billable Minutes:   Re-eval 30, Gait [id number] and Therapeutic Activity 15    Diagnosis: CVA (cerebral vascular accident)  Past Medical History:   Diagnosis Date    Hypertension     Thyroid disease       Past Surgical History:   Procedure Laterality Date    INSERTION OF IMPLANTABLE LOOP RECORDER N/A 5/26/2022    Procedure: Insertion, Implantable Loop Recorder;  Surgeon: Arias Brown MD;  Location: [facility] CATH LAB;  Service: Cardiology;  Laterality: N/A;    THYROIDECTOMY      TUBAL LIGATION           General Precautions: Standard, fall  Orthopedic Precautions: N/A   Braces: N/A     Spiritual, Cultural Beliefs, Alevism Practices, Values that Affect Care: no    Patient History:  Lives With: significant other  Living Arrangements: apartment  Home Accessibility: stairs to enter home  Home Layout: Bathroom on 2nd floor, Bedroom on 2nd floor  Living Environment Comment: All Prior level of function from CM notes. Pt unable to expressive 2/2 speech deficits  Equipment Currently Used at Home: none    DME owned (not currently used): none    Previous Level of Function:  Ambulation Skills: independent  Transfer Skills: independent  ADL Skills: independent  Work/Leisure Activity: independent    Subjective:  Communicated with RN prior to session.    Chief Complaint: None stated   Patient goals: Pt unable to state   Family goals: not present upon evaluation    Pain/Comfort  Pain Rating 1: 0/10    Objective:    BP:    120/73 HR 88   131/84 HR 70        In sitting    Patient found with all needs in reach;      Cognitive Exam:  Oriented to: Person  Follows Commands/attention: Follows two-step commands  Communication: expressive aphasia and receptive aphasia  Safety awareness/insight to disability: impaired    Physical Exam:  Postural examination/scapula alignment:    -       No postural  abnormalities identified    Skin integrity: Visible skin intact  Edema: None noted .    Sensation:      -       Intact    Upper Extremity Range of Motion:  Refer to OT evaluation for UE ROM.    Upper Extremity Strength:  Refer to OT evaluation for UE strength.    Lower Extremity Range of Motion:  Right Lower Extremity: Deficits: .  Left Lower Extremity: WFL            Lower Extremity Strength:    Right LE    Knee extension: 4   Knee flexion: 4   Hip flexion: 3+   Hip extension:  Not Tested   Hip abduction: 4   Hip adduction: 4   Ankle dorsiflexion: 4+   Ankle plantarflexion: 4+       Functional Mobility:    Bed Mobility :   Supine to sit: Standby Assistance   Sit to supine: Standby Assistance   Rolling: Standby Assistance   Scooting: Activity did not occur    Transfers:  Sit to stand:Contact Guard Assistance with Rolling Walker .  Bed <> Chair:  Step Transfer with Contact Guard Assistance with Rolling Walker .        Gait:  Patient ambulates 155 feet using rolling walker with Contact Guard Assistance and  verbal cues for increased step length. Performed with out AFO.     Pt ambulates 105' using Rw with CGA/SBA with Anterior Prefab AFO. Pt demos increased foot clearance and less R knee flexion.         Current   Status   Functional Area: Care Score:   Roll Left and Right 4   Sit to Lying 4   Lying to Sitting on Side of Bed 4   Sit to Stand 4   Chair/Bed-to-Chair Transfer 4   Car Transfer 4   Walk 10 Feet 4   Walk 50 Feet with Two Turns 4   Walk 150 Feet 4   Walk 10 Feet Uneven Surface 3   1 Step (Curb) 4   4 Steps 4   12 Steps 88   Picking Up Object 3   Wheel 50 Feet with Two Turns 3   Wheel 150 Feet 3     Additional Treatment:  Cone taps with Rw. Overall CGA. Pt able to perform 10 x 3 with less VC and less difficulty.     Education Provided: roles and goals of PT/PTA, transfer training, bed mob, gait training, balance training, safety awareness and assistive device    Expected compliance: High  compliance        Patient left up in chair with all lines intact, call button in reach and  and bed alarm .    Assessment:  Sammie Belcher is a 52 y.o. female with a medical diagnosis of CVA (cerebral vascular accident). She presents with cognitive deficits, impaired coordination, impaired balance, gait deviations, difficulty with functional transfers, difficulty with bed mobility, R hemiparesis, impaired proprioception, decreased motor control and decreased safety  limiting tolerance and safety during functional mobility tasks.  Pt to benefit from skilled PT services to address impairments with progression towards functional independence as tolerated.      QI Scores:   Admission Assessment   Functional Area: Care Score:    Roll Left and Right 4   Sit to Lying 3   Lying to Sitting on Side of Bed 3   Sit to Stand 3   Chair/Bed-to-Chair Transfer 3   Walk 10 Feet 3   Walk 50 Feet with Two Turns 88   Walk 150 Feet 88   1 Step (Curb) 88   4 Steps 88   12 Steps 88   Wheel 50 Feet with Two Turns 3   Wheel 150 Feet 3     Rehab potential is excellent.    Activity tolerance: Good    Plan: continue with current plan    Discharge recommendations:       Equipment recommendations: walker, rolling    GOALS:   Multidisciplinary Problems     Physical Therapy Goals        Problem: Physical Therapy    Goal Priority Disciplines Outcome Goal Variances Interventions   Physical Therapy Goal     PT, PT/OT Ongoing, Progressing     Description: Short Term goals      Bed Mobility   Roll Right and Left Setup or Clean-up Assistance.  Lying to sitting Setup or Clean-up Assistance.  Sitting to lying Setup or Clean-up Assistance.    Transfers    Pt will be able to perform Stand step chair/bed to chair transfer With LRAD Setup or Clean-up Assistance.  Pt will be able to perform Sit to stand with LRAD   Setup or Clean-up Assistance.  Pt will be able to perform Car transfer with LRAD Setup or Clean-up Assistance.    Ambulation    Pt will  ambulate 100 Feet with LRAD Setup or Clean-up Assistance.  Pt will ascend and descend 12 stairs with L rail CGA      Wheelchair Mobility   Pt will be able to propel 150 feet in paola wheelchair Setup or Clean-up Assistance.      Timeframe: By Discharge                      PLAN:    Patient to be seen 5 x/week to address the above listed problems via gait training, therapeutic activities, therapeutic exercises, neuromuscular re-education  Plan of Care expires: 06/09/22  Plan of Care reviewed with: patient    6/15/2022

## 2022-06-15 NOTE — PROGRESS NOTES
[facility]  Adult Nutrition  Progress Note    SUMMARY       Recommendations    - Continue current diet per SLP   - Continue MVI + thiamine as feasible   - Continue ONS Boost Plus to provide 360 kcals, 14 gm pro per serving.         Goals: Meet >75% of est energy needs by f/u  Nutrition Goal Status: progressing    Assessment and Plan    6/15: Pt continues to report good appetite. Per EMR, is averaging ~75% po intake x 3 days. Fly bring food, has snacks in room. SLP aware. Denies any nutrition related concerns at this time.     6/10: Pt reports continues with good appetite. Per EMR is averaging ~80% meals x last 3 days.      6/7: Rounded with pt in dining room. Able to answer questions about appetite. Reports good appetite. Observed lunch tray with ~100% meal consumed. Did not drink much boost, will monitor and d/c at f/u if pt continues to not drink.  Wt increased +1.1kg per most recent wt.      6/3: Pt with good appetite. Observed eating >75% lunch in dining room. Per EMR, pt averaging ~85% po intake x last 2 meals. Attempted to interview pt. Pt having difficulty communicating d/t expressive aphasia. Noted pt with significant wt loss per RD note at [facility] on (5/25). Continued Strawberry Boost Plus pt was receiving from [facility] prior to admit.     Malnutrition Assessment  Malnutrition Type: chronic illness  Energy Intake: moderate energy intake  Weight Loss (Malnutrition): 7.5% in 3 months  Energy Intake (Malnutrition): less than 75% for greater than or equal to 1 month  Subcutaneous Fat (Malnutrition): mild depletion  Muscle Mass (Malnutrition): mild depletion   Orbital Region (Subcutaneous Fat Loss): mild depletion  Upper Arm Region (Subcutaneous Fat Loss): mild depletion   Oriental orthodox Region (Muscle Loss): mild depletion  Clavicle Bone Region (Muscle Loss): mild depletion  Dorsal Hand (Muscle Loss): mild depletion      Reason for Assessment    Reason For  "Assessment: consult (rehab)  Diagnosis: other (see comments) (Left frontal lobe 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,POSITIVE RPR  History of alcohol dependence  HTN  Nicotine dependence  Anxiety/depression   Hyperparathyroidism)  Relevant Medical History: Essential HTN  HLD  Hypothyroidism  Anxiety  Depression  Hx Cocaine Use Disorder (sober over 1 yr)  THC Use, ETOH abuse    Nutrition Risk Screen    Nutrition Risk Screen: no indicators present    Nutrition/Diet History    Spiritual, Cultural Beliefs, Oriental orthodox Practices, Values that Affect Care: no    Anthropometrics    Temp: 97.6 °F (36.4 °C)  Height: 6' 0.99" (185.4 cm)  Height (inches): 72.99 in  Weight Method:  (standing)  Weight: 66.2 kg (145 lb 15.1 oz)  Weight (lb): 145.95 lb  Ideal Body Weight (IBW), Female: 164.95 lb  % Ideal Body Weight, Female (lb): 86.21 %  BMI (Calculated): 19.3  BMI Grade: 18.5-24.9 - normal  Usual Body Weight (UBW), k.73 kg  % Usual Body Weight: 88.87  % Weight Change From Usual Weight: -11.32 %   6/15: 66.2 kg   6/10: No new wt   : 65.5 kg   6/3: 64.5 kg   Lab/Procedures/Meds    Pertinent Labs Comments: CBC WNL  Pertinent Medications Comments: statin, aspirin, MVI, metoprolol, thiamine    Estimated/Assessed Needs    Weight Used For Calorie Calculations: 64.5 kg (142 lb 3.2 oz)  Energy Calorie Requirements (kcal):   Energy Need Method: [place]-St Jeor (x 1.4 SF)  Protein Requirements: 97 (1.5 g/kg)  Weight Used For Protein Calculations: 64.5 kg (142 lb 3.2 oz)        RDA Method (mL):          Nutrition Prescription Ordered    Current Diet Order: Easy to Chew    Evaluation of Received Nutrient/Fluid Intake    % Intake of Estimated Energy Needs: 75 - 100 %  % Meal Intake: 75 - 100%      Nutrition Problem  Malnutrition     Related to (etiology):   Thyroid disease     Signs and Symptoms (as evidenced by):   <75% est energy needs > 1 month  Physical evidence of mild muscle/fat " wasting     Interventions(treatment strategy):  Commercial food, Multivitamin / Mineral supplement therapy and Collaboration with other providers     Nutrition Diagnosis Status:   Improving      Nutrition Risk    Level of Risk/Frequency of Follow-up: moderate     Monitor and Evaluation      wt, po intake, nutrition related labs     Nutrition Follow-Up    RD Follow-up?: Yes

## 2022-06-16 PROCEDURE — 97530 THERAPEUTIC ACTIVITIES: CPT

## 2022-06-16 PROCEDURE — S4991 NICOTINE PATCH NONLEGEND: HCPCS | Performed by: NURSE PRACTITIONER

## 2022-06-16 PROCEDURE — 94761 N-INVAS EAR/PLS OXIMETRY MLT: CPT

## 2022-06-16 PROCEDURE — 92507 TX SP LANG VOICE COMM INDIV: CPT

## 2022-06-16 PROCEDURE — 63600175 PHARM REV CODE 636 W HCPCS: Performed by: NURSE PRACTITIONER

## 2022-06-16 PROCEDURE — 97535 SELF CARE MNGMENT TRAINING: CPT

## 2022-06-16 PROCEDURE — 25000003 PHARM REV CODE 250: Performed by: NURSE PRACTITIONER

## 2022-06-16 PROCEDURE — 11800000 HC REHAB PRIVATE ROOM

## 2022-06-16 PROCEDURE — 94799 UNLISTED PULMONARY SVC/PX: CPT

## 2022-06-16 PROCEDURE — 97112 NEUROMUSCULAR REEDUCATION: CPT

## 2022-06-16 PROCEDURE — 99233 SBSQ HOSP IP/OBS HIGH 50: CPT | Mod: ,,, | Performed by: PHYSICAL MEDICINE & REHABILITATION

## 2022-06-16 PROCEDURE — 97116 GAIT TRAINING THERAPY: CPT

## 2022-06-16 PROCEDURE — 99233 PR SUBSEQUENT HOSPITAL CARE,LEVL III: ICD-10-PCS | Mod: ,,, | Performed by: PHYSICAL MEDICINE & REHABILITATION

## 2022-06-16 RX ADMIN — THIAMINE HCL TAB 100 MG 100 MG: 100 TAB at 09:06

## 2022-06-16 RX ADMIN — ALPRAZOLAM 0.25 MG: 0.25 TABLET ORAL at 09:06

## 2022-06-16 RX ADMIN — SERTRALINE HYDROCHLORIDE 25 MG: 25 TABLET ORAL at 10:06

## 2022-06-16 RX ADMIN — HYDROXYZINE PAMOATE 50 MG: 50 CAPSULE ORAL at 09:06

## 2022-06-16 RX ADMIN — BACLOFEN 5 MG: 5 TABLET ORAL at 09:06

## 2022-06-16 RX ADMIN — ENOXAPARIN SODIUM 40 MG: 40 INJECTION SUBCUTANEOUS at 04:06

## 2022-06-16 RX ADMIN — VENLAFAXINE 37.5 MG: 37.5 TABLET ORAL at 09:06

## 2022-06-16 RX ADMIN — ATORVASTATIN CALCIUM 40 MG: 40 TABLET, FILM COATED ORAL at 09:06

## 2022-06-16 RX ADMIN — LABETALOL HYDROCHLORIDE 200 MG: 200 TABLET, FILM COATED ORAL at 09:06

## 2022-06-16 RX ADMIN — NICOTINE 1 PATCH: 21 PATCH, EXTENDED RELEASE TRANSDERMAL at 09:06

## 2022-06-16 RX ADMIN — MULTIPLE VITAMINS W/ MINERALS TAB 1 TABLET: TAB at 09:06

## 2022-06-16 RX ADMIN — THIAMINE HCL TAB 100 MG 100 MG: 100 TAB at 03:06

## 2022-06-16 RX ADMIN — LISINOPRIL 10 MG: 10 TABLET ORAL at 09:06

## 2022-06-16 RX ADMIN — ASPIRIN 325 MG: 325 TABLET, COATED ORAL at 09:06

## 2022-06-16 RX ADMIN — LABETALOL HYDROCHLORIDE 200 MG: 200 TABLET, FILM COATED ORAL at 03:06

## 2022-06-16 RX ADMIN — AMLODIPINE BESYLATE 5 MG: 5 TABLET ORAL at 09:06

## 2022-06-16 NOTE — PT/OT/SLP PROGRESS
Occupational Therapy  Treatment      Sammie Belcher   MRN: [id number]   Admitting Diagnosis: CVA (cerebral vascular accident)         Billable Minutes:  Therapeutic Activity 30 and Neuromuscular Re-education 30               General Precautions: Standard, fall  Orthopedic Precautions:           Subjective:  Patient communicated good motivation to participate in today's session    Pain/Comfort  Pain Rating 1: 0/10  Objective:       Functional Mobility:  Transfer Training:   Sit to stand:Minimal Assistance with Rolling Walker from w/c level   Pt performed bed mobility at SPV level.   Balance:  Patient performed standing balance with HHA and Min assist for 3 bouts of 5 min, 3, then 2 minutes while performing weightshifting, dancing, motor planning/sequencing task. Pt demo difficulty coordinating RLE movement with the beat of the song/dance, therefore, pt participated in motor planning task, initially max tactile, verbal and visual cues, slowly improving to only visual cueing to complete. Pt able to demo carryover of skill to apply to standing dance move to improve coordination of movement  Dynamic sitting balance while folding laundry in unsupported short sit.     Endurance and Strengthening  Performed LB and core strengthening at Mat level. Attempted to bring self into quadriped, but unsuccessful, pt communicating fatigue with this.       Neuro re-ed  Pt demo difficulty coordinating RLE movement with the beat of the song/dance, therefore, pt participated in motor planning task, initially max tactile, verbal and visual cues, slowly improving to only visual cueing to complete. Pt able to demo carryover of skill to apply to standing dance move to improve coordination of movement. Performed wb t/o session. Able to complete 3 sets of 3-step sequencing card tasks, min to no cueing.       Education Provided: Roles and goals of OT, transfer training and sequencing    Expected compliance: Moderate  compliance    ASSESSMENT:  Sammie Belcher is a 52 y.o. female with a medical diagnosis of CVA (cerebral vascular accident) performed neuro re-ed and therax to increase ADL independence and safety. Pt is progressing towards OT goals      GOALS:   Multidisciplinary Problems     Occupational Therapy Goals        Problem: Occupational Therapy    Goal Priority Disciplines Outcome Interventions   Occupational Therapy Goal     OT, PT/OT Ongoing, Progressing    Description: ADLs:  Pt to perform grooming tasks with supervision and RW and min vc MET  Pt to perform feeding tasks with independence PROGRESSING REQUIRES SET UP WITH RUE WEAKNESS  Pt to perform UB dressing with set up assist and min vc PROGRESSING  Pt to perform LB dressing with CGA and min vc MET  Pt to perform putting on/off footwear task with supervision and min vc MET  Pt to perform toileting with supervision and min vc MET BUT MOSTLY INCONTINENT     Functional Transfers:  Pt to perform toilet transfers with supervision and min vc and RW PROGRESSING  Pt to perform a tub transfer with supervision, RW, min vc MET    IADLs:  Pt to perform simple care taking/ home management tasks with min A and min vc PROGRESSING    Balance, Strengthening, Endurance, Balance:  Pt to demonstrate dynamic standing balance as required to perform ADL's from standing level with RW and min vc. PROGRESSING  Pt to demonstrate BUE strength during functional task and increased use of R UE. PROGRESSING                   Plan:  Patient to be seen 5 x/week to address the above listed problems via self-care/home management, therapeutic activities, therapeutic exercises, therapeutic groups, neuromuscular re-education  Plan of Care expires: 06/09/22  Plan of Care reviewed with: patient      06/16/2022

## 2022-06-16 NOTE — PT/OT/SLP PROGRESS
Speech Language Pathology Treatment          Sammie Belcher   MRN: [id number]     Diet recommendations: Solid Diet Level: Regular Diet - IDDSI Level 7  Liquid Diet Level: Thin liquids - IDDSI Level 0     Therapy Minutes  SLP Treatment Date: 22  Speech Start Time: 1300  Speech Stop Time: 1330  Speech Total (min): 30 min  SLP Individual: 30    Billable Minutes:  Self Care/Home Management Training 30 minutes    General Precautions: Standard, aphasia          Subjective:  Pt awake, alert, and cooperative throughout session.    Objective:   Family training completed with pt and fiance regarding SLP POC, current deficits, and progress made thus far. Pt's fiance reports pt has always had a slight stutter however fluency is significantly worse following CVA. Strategies for fluency discussed and fiance verbalizes understanding.    Assessment:  Sammie Belcher is a 52 y.o. female with a SLP diagnosis of Aphasia. SLP intervention continues to be warranted to increase communicative effectiveness and ability to have needs/wants met.    Discharge recommendations: Discharge Facility/Level of Care Needs: home health speech therapy     Goals:   Multidisciplinary Problems     SLP Goals        Problem: SLP    Goal Priority Disciplines Outcome   SLP Goal     SLP Ongoing, Progressing   Description: LTG: To increase expressive/receptive language skills to enhance functional communication to express basic wants and needs with 100% effectiveness.     ST. Pt will answer simple, environmental, and personal yes/no questions with 90% accuracy and minimal cues.--GOAL MET  2. Pt will complete confrontational naming tasks with 90% accuracy and minimal cues.--PROGRESSING, CONTINUE  3. Pt will use fluency shaping/stuttering modification techniques to decrease syllables stuttered to less than 5%SS.--PROGRESSING, CONTINUE                    Plan:   Patient to be seen Therapy Frequency: 5 x/week   Planned Interventions: Language  Therapy  Plan of Care Expires: 06/17/22  Plan of Care reviewed with: patient  SLP Follow-up?: Yes  SLP - Next Visit Date: 06/17/22 6/16/2022

## 2022-06-16 NOTE — PLAN OF CARE
Problem: Swallowing Impairment (Stroke, Ischemic/Transient Ischemic Attack)  Goal: Optimal Eating and Swallowing without Aspiration  Outcome: Ongoing, Progressing  Intervention: Optimize Eating and Swallowing  Flowsheets (Taken 6/15/2022 2319)  Aspiration Precautions:   awake/alert before oral intake   food consistency adjusted   medication route adjusted     Problem: Urinary Elimination Impaired (Stroke, Ischemic/Transient Ischemic Attack)  Goal: Effective Urinary Elimination  Outcome: Ongoing, Progressing  Intervention: Promote Effective Bladder Elimination  Flowsheets (Taken 6/15/2022 2319)  Urinary Elimination Promotion:   absorbent pad/diaper use encouraged   frequent voiding encouraged   toileting scheduled

## 2022-06-16 NOTE — PROGRESS NOTES
06/16/22 1000   Rec Therapy Time Calculation   Rec Start Time 1000   Rec Stop Time 1030   Rec Total Time (min) 30 min   Subjective   Patient states Pt did not voice any physical complaints   Assessment   Cognitive Concerns attention span   Attendance   Activity Recreational Therapy  (Ring Toss)   Participation Active participation   Therapeutic Recreation   Problem Solving Activity Assistance verbal cues;tactile cues;Stand by Assistance  (SBA needed for motor planning of RUE)   Assessment   Assessment Si to stand was setup as was dynamic standing balance/reaching. Standing tolerance was 10 minutes. Hand over hand assist needed for motor planning of RUE. Continues with expressive deficits but more spontaneous with responses. Said she enjoyed being outside   Goals   Goal 1 STG: Will increase sit to stand to contact guard. Goal Met   Goal 2 STG:Will improve dynamic standing balance/reaching to contact guard. Goal Met   Goal 3 STG: Will increase RUE coordination/dexteriety to contact guard. Progressing   Goal 4 LTG: Will increase standing tolerance to 5 minutes.  Goal Met   Goal 5 LTG: Will improve dynamic standing balance/reaching to supervision. Goal Met  (LTG: Will increase RUE coordination to Supervision. Progressing)   Plan   Planned Therapy Intervention Continue with current plan  (Ring Toss)   Expected Length of Stay Pending   PT Frequency Minimu of 5 visits per week;Other (see comments)  (Daily)   Time   Treatment time 2 units

## 2022-06-16 NOTE — PROGRESS NOTES
[facility] ([facility])  Rehab Progress Note    Patient Name: Sammie Belcher  MRN: [id number]  Age: 52 y.o. Sex: female  : 1970  Hospital Length of Stay: 14 days  Date of Service:  2022  Chief Complaint: Left frontal lobe 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    Subjective:     Basic Information  Admit Information: 52-year-old  female presented to [facility] ED on 2022 complaining of dysarthria and expressive aphasia x3 days.  PMH significant hyperparathyroidism s/p parathyroidectomy 2022, anxiety/depression, and HTN.  Workup significant for reactive RPR and CT head significant for left frontal 6 cm cortical base hypodensity suggesting acute to subacute nonhemorrhagic infarct of the left KENISHA vascular territory.  MRA brain significant for left frontal lobe infarct extending into the corpus callosum with acute nonhemorrhagic infarct in the territory of the KENISHA, and old lacunar infarcts. Transferred to [facility] for neurology services.  Bicillin initiated on  for syphilis with plan for 3 doses with end date on .  Also received Flagyl x 7 days due to Trichomoniasis.  TTE significant for EF 74% with severe concentric hypertrophy and hyperdynamic systolic function with negative bubble study.  ASA and statin initiated.  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  LP not revealing with no evidence of CNS infection.  Tolerating LINQ placement on .  CVA likely caused by long history cocaine abuse.  Continue to participate in progress therapy.  Tolerated transferred to [facility] inpatient rehab unit on  without incident.  Today's Information:  No acute events overnight.  Sitting in wheelchair comfortably eating breakfast.  Reports good sleep and appetite.  Last BM 6/15.  Vital signs at goal with no recent recorded fevers.  No labs or imaging today.    Review of patient's allergies indicates:  No  Known Allergies     Current Facility-Administered Medications:     acetaminophen tablet 650 mg, 650 mg, Oral, Q4H PRN, Nish FAUSTINO Destiny, FNP, 650 mg at 06/08/22 1948    alendronate tablet 70 mg, 70 mg, Oral, Q7 Days, Nish HARMON Destiny, FNP, 70 mg at 06/10/22 0524    ALPRAZolam tablet 0.25 mg, 0.25 mg, Oral, TID PRN, Nish FAUSTINO Destiny, FNP, 0.25 mg at 06/15/22 2118    amLODIPine tablet 5 mg, 5 mg, Oral, Daily, Nish A Destiny, FNP, 5 mg at 06/15/22 0857    aspirin EC tablet 325 mg, 325 mg, Oral, Daily, Nish A Destiny, FNP, 325 mg at 06/15/22 0857    atorvastatin tablet 40 mg, 40 mg, Oral, Daily, Nish Lymanehart, FNP, 40 mg at 06/15/22 0857    baclofen tablet 5 mg, 5 mg, Oral, BID, Matilde Wright, FNP, 5 mg at 06/15/22 2048    benzonatate capsule 100 mg, 100 mg, Oral, TID PRN, Nish Woodart, FNP    bisacodyL suppository 10 mg, 10 mg, Rectal, Daily PRN, Nish A Destiny, FNP    bisacodyL suppository 10 mg, 10 mg, Rectal, Once, SIRISHA Murphy    bisacodyL suppository 10 mg, 10 mg, Rectal, Once, SIRISHA Murphy    enoxaparin injection 40 mg, 40 mg, Subcutaneous, Daily, Nish A Destiny, FNP, 40 mg at 06/15/22 1624    hydrALAZINE injection 10 mg, 10 mg, Intravenous, Q4H PRN, José Miguel Cesar MD    HYDROcodone-acetaminophen 5-325 mg per tablet 1 tablet, 1 tablet, Oral, Q4H PRN, Nish Lymanehart, FNP    hydrOXYzine pamoate capsule 50 mg, 50 mg, Oral, Nightly PRN, Nish Lymanehart, FNP, 50 mg at 06/15/22 2048    labetalol 20 mg/4 mL (5 mg/mL) IV syring, 10 mg, Intravenous, Q4H PRN, José Miguel Cesar MD    labetaloL tablet 200 mg, 200 mg, Oral, TID, GARY FamP, 200 mg at 06/15/22 2048    lisinopriL tablet 10 mg, 10 mg, Oral, QHS, GARY FamP, 10 mg at 06/15/22 2048    metoprolol injection 10 mg, 10 mg, Intravenous, Q2H PRN, SIRISHA Fam    multivitamin tablet, 1 tablet, Oral, Daily, SIRISHA Fam, 1 tablet at  "06/15/22 0857    nicotine 21 mg/24 hr 1 patch, 1 patch, Transdermal, Daily, Nish HARMON Destiny, FNP, 1 patch at 06/15/22 0857    nitroGLYCERIN SL tablet 0.4 mg, 0.4 mg, Sublingual, Q5 Min PRN, Nish A Destiny, FNP    ondansetron disintegrating tablet 4 mg, 4 mg, Oral, Q6H PRN, Nish A Destiny, FNP    ondansetron disintegrating tablet 8 mg, 8 mg, Oral, Q6H PRN, Nish A Destiny, FNP    polyethylene glycol packet 17 g, 17 g, Oral, Q12H PRN, Nish A Destiny, FNP    promethazine tablet 25 mg, 25 mg, Oral, Q6H PRN, Nish A Destiny, FNP    sertraline tablet 25 mg, 25 mg, Oral, Daily, Nish A Destiny, FNP, 25 mg at 06/15/22 0857    thiamine tablet 100 mg, 100 mg, Oral, TID, Nish A Destiny, FNP, 100 mg at 06/15/22 2048    venlafaxine tablet 37.5 mg, 37.5 mg, Oral, BID, Syeda Crooks, FNP, 37.5 mg at 06/15/22 2048     Review of Systems   Complete 12-point review of symptoms negative except for what's mentioned in HPI     Objective:     /87   Pulse 62   Temp 97.8 °F (36.6 °C) (Oral)   Resp 18   Ht 6' 0.99" (1.854 m)   Wt 66.2 kg (145 lb 15.1 oz)   LMP  (LMP Unknown)   SpO2 98%   BMI 19.26 kg/m²        Intake/Output Summary (Last 24 hours) at 6/16/2022 0901  Last data filed at 6/15/2022 2300  Gross per 24 hour   Intake 840 ml   Output --   Net 840 ml       Physical Exam  Constitutional:       Appearance: Normal appearance.   HENT:      Head: Normocephalic.      Mouth/Throat:      Mouth: Mucous membranes are moist.   Eyes:      Pupils: Pupils are equal, round, and reactive to light.   Cardiovascular:      Rate and Rhythm: Normal rate and regular rhythm.      Heart sounds: Normal heart sounds.   Pulmonary:      Effort: Pulmonary effort is normal.      Breath sounds: Normal breath sounds.   Abdominal:      General: Bowel sounds are normal.      Palpations: Abdomen is soft.   Musculoskeletal:      Cervical back: Neck supple.      Comments: Slight weakness to right side.  " Weakness to left lower extremity.   Skin:     General: Skin is warm and dry.   Neurological:      Mental Status: She is alert and oriented to person, place, and time.      Comments: Dysarthria/expressive and receptive aphasia    Psychiatric:         Mood and Affect: Mood normal.     *MD performed and documented physical examination         Lines/Drains/Airways     None               Labs:  No results found for this or any previous visit (from the past 24 hour(s)).    Radiology:  MRI brain without contrast 05/19/2002 at 5:38 p.m., IMPRESSION: Motion degraded exam. In spite of this limitation: Moderate to severe multifocal flow signal abnormality about the bilateral ACAs, bilateral distal MCAs and to lesser extent bilateral PCAs.  Radiology  Transthoracic echo 05/22/2022 at 7:50 a.m., IMPRESSION:  LV is normal in size with hyperdynamic systolic function.  Estimated EF 75%.  There is grade 1 diastolic dysfunction consistent with impaired relaxation.  Left atrial pressure is normal.  RV normal size and function.  RA normal size.  AV structurally normal.  No regurgitation.  No stenosis.  There is nonspecific leaflet thickening 2 mitral valve.  There is trace mitral valve regurgitation.  There is no stenosis.  Tricuspid valve with trace regurgitation.  No valve stenosis.  PAP could not be obtained.  There is no prior care effusion.  There is no evidence 10 benign.  Negative bubble study.    Assessment/Plan:     52 y.o. AAF admitted on 6/2/2022    Left frontal lobe infarct   - with dysarthria/expressive and receptive aphasia and right-sided weakness  - continue                Aspirin 325 mg daily                Lipitor 40 mg daily                Multivitamin daily  - defer to physiatry for rehab and pain management  - PT/OT/RT/ST following     POSITIVE RPR  - s/p Bicillin x 3  - LP on 5/22 negative for CNS infection     History of alcohol dependence  - stable  - no withdrawals  - continue                Thiamine 100 mg  t.i.d.     HTN  - BP at goal!!  - continue     Lisinopril 10 mg at bedtime (initiated 6/5)                Norvasc 10 mg daily                Labetalol 200 mg t.i.d.                Hydralazine 10 mg every 2 hours as needed for BP > 160/90                Labetalol 10 mg every 2 hours as needed for BP > 160/90     Nicotine dependence  - stable  - continue                Nicotine patch 21 mg daily     Anxiety/depression  - in remission  - continue                Wellbutrin 150 mg XR daily                Zoloft 25 mg daily     Hyperparathyroidism  - stable  - s/p parathyroidectomy 02/23/2022  - continue                Vitamin-D 24856 units Q weekly                Fosamax 70 mg Q weekly     AB therapy  Bicillin x3 doses (5/19-6/2)     VTE Prophylaxis:  Lovenox 40 mg daily  COVID-19 testing:  Negative on 6/02/2022  COVID-19 vaccination status:  Vaccinated (Pfizer):  08/25/2021 and 08/04/2021     POA: No  Living will: No  Contacts:  Gamaliel Hein ([facility]) [phone]     CODE STATUS: Full Code  Internal Medicine (attending): José Miguel Cesar MD     OUTPATIENT PROVIDERS  PCP: Wanda Bell MD  Neurology:  Fede Lopez MD  Infectious disease: Marcelina Nguyen MD     DISPOSITION: Condition stable.  Vitals at goals.  No labs today.  Sleep hygiene, bowel management, and appetite at goal.  Continues to progress well with therapy. Monitor closely.  Notify of acute changes.      Staffing 6/14/2022: Incontinent of bladder and bowel.  RT: Overall supervision.  Expression deficits.  Appetite is good. PT: High fall risk.  PT: Overall CGA to mod assists ambulating 180 feet with RW.  Linited by ataxia and apraxia. Will need support at home 2/2 safety and cognition. OT: Overall supervision with ADLs, maintaining standing balance, requires cueing. ST: Limited by fluency.  Receptive language impaired. Improving.  Projected discharge pending.     Syeda Crooks NP conducted independent physical examination and assisted with  medical documentation.    Total time spent on this encounter including chart review and direct 1-on-1 patient interaction: 36 minutes   Over 50% of this time was spent in counseling and coordination of care

## 2022-06-16 NOTE — PT/OT/SLP PROGRESS
"Physical Therapy         Treatment        Sammie Belcher   MRN: [id number]      Start time 0900  Stop time 0930     Billable Minutes:  Gait [id number] and Therapeutic Activity 10          General Precautions: Standard, fall  Orthopedic Precautions: Orthopedic Precautions : N/A   Braces:      Patient found with: Other (comments) ( and chair alarm)         Subjective:  "Im feeling good today"    Pain/Comfort  Pain Rating 1: 0/10    Objective:  Therapeutic activities, therapeutic exercise performed to increase muscle strength and motor recruitment so that patient may improve quality of functionional mobility and increase functional independence. and Gait training performed to improve functional strength, to increase functional mobility, safety and independence.    VITALS:  BP:   133/84 HR 80   141/82 Hr 88         In sitting        Functional Mobility:      Transfer Training:  Sit to stand:Stand-by Assistance with Rolling Walker    Bed <> Chair:  Step Transfer with Contact Guard Assistance with Rolling Walker (Increased time to complete full turn.)         Gait Training:  Patient ambulates 150 feet x 2 using rolling walker with Contact Guard Assistance and  verbal cues for increased step length and toe clearance. Pt demonstratingdecreased step length and decreased toe-to-floor clearance.Impairments contributing to gait deviations include impaired balance, impaired coordination and impaired motor control    Stair Training:  Pt ascended/descend 20 stair(s) with reciprocal/non-reciprocal steps using left handrail with Contact Guard Assistance and  verbal cues for proper tech and sequencing.        Activity Tolerance:  Patient tolerated treatment well    Patient left up in chair with all lines intact, call button in reach, chair alarm on and seatbelt and  notified .    Education Provided: roles and goals of PT/PTA, transfer training, gait training and stair training    Expected compliance:  High " compliance        Assessment:  Sammie Belcher is a 52 y.o. female with a medical diagnosis of CVA (cerebral vascular accident). She presents with impaired coordination, impaired balance, gait deviations, difficulty with functional transfers and decreased motor control. Pt to benefit from skilled PT services to address impairments with progression towards functional independence as tolerated.    Rehab potential is excellent.    Activity tolerance: Excellent    Discharge recommendations:       Equipment recommendations:       GOALS:   Multidisciplinary Problems     Physical Therapy Goals        Problem: Physical Therapy    Goal Priority Disciplines Outcome Goal Variances Interventions   Physical Therapy Goal     PT, PT/OT Ongoing, Progressing     Description: Short Term goals      Bed Mobility   Roll Right and Left Setup or Clean-up Assistance.  Lying to sitting Setup or Clean-up Assistance.  Sitting to lying Setup or Clean-up Assistance.    Transfers    Pt will be able to perform Stand step chair/bed to chair transfer With LRAD Setup or Clean-up Assistance.  Pt will be able to perform Sit to stand with LRAD   Setup or Clean-up Assistance.  Pt will be able to perform Car transfer with LRAD Setup or Clean-up Assistance.    Ambulation    Pt will ambulate 100 Feet with LRAD Setup or Clean-up Assistance.  Pt will ascend and descend 12 stairs with L rail CGA      Wheelchair Mobility   Pt will be able to propel 150 feet in paola wheelchair Setup or Clean-up Assistance.      Timeframe: By Discharge                      PLAN:    Patient to be seen 5 x/week  to address the above listed problems via gait training, therapeutic activities, therapeutic exercises, neuromuscular re-education  Plan of Care expires: 06/09/22  Plan of Care reviewed with: patient    6/16/2022

## 2022-06-16 NOTE — PT/OT/SLP PROGRESS
Speech Language Pathology Treatment          Sammie Belcher   MRN: [id number]     Diet recommendations: Solid Diet Level: Regular Diet - IDDSI Level 7  Liquid Diet Level: Thin liquids - IDDSI Level 0      Billable Minutes:  Speech Therapy Individual 30 minutes    General Precautions: Standard, aphasia          Subjective:  Pt awake, alert, and cooperative throughout session.    Objective:   Pt seen outside for today's treatment. Pt able to complete naming task with 90% accuracy and minimal cues. Pt also noted to require increased cues for fluency strategies. Decreased attention noted throughout session.    Assessment:  Sammie Belcher is a 52 y.o. female with a SLP diagnosis of Aphasia. SLP intervention continues to be warranted to increase communicative effectiveness and ability to have needs/wants met.    Discharge recommendations: Discharge Facility/Level of Care Needs: home health speech therapy     Goals:   Multidisciplinary Problems     SLP Goals        Problem: SLP    Goal Priority Disciplines Outcome   SLP Goal     SLP Ongoing, Progressing   Description: LTG: To increase expressive/receptive language skills to enhance functional communication to express basic wants and needs with 100% effectiveness.     ST. Pt will answer simple, environmental, and personal yes/no questions with 90% accuracy and minimal cues.--GOAL MET  2. Pt will complete confrontational naming tasks with 90% accuracy and minimal cues.--PROGRESSING, CONTINUE  3. Pt will use fluency shaping/stuttering modification techniques to decrease syllables stuttered to less than 5%SS.--PROGRESSING, CONTINUE                    Plan:   Patient to be seen Therapy Frequency: 5 x/week   Planned Interventions: Language Therapy  Plan of Care Expires: 22  Plan of Care reviewed with: patient  SLP Follow-up?: Yes  SLP - Next Visit Date: 22

## 2022-06-16 NOTE — PT/OT/SLP PROGRESS
"Physical Therapy         Treatment        Sammie Belcher   MRN: [id number]     Therapy Minutes  PT Received On: 06/16/22  PT Start Time: 1330  PT Stop Time: 1430  PT Total Time (min): 60 min  PT Individual: 60     Billable Minutes:  Gait [id number] and Therapeutic Activity 45      General Precautions: Standard, fall  Orthopedic Precautions: Orthopedic Precautions : N/A   Braces:      Patient found with: Other (comments) ( and chair alarm)         Subjective:  "Im doing good."    Pain/Comfort  Pain Rating 1: 0/10    Objective:  Therapeutic activities, therapeutic exercise performed to increase muscle strength and motor recruitment so that patient may improve quality of functionional mobility and increase functional independence. and Gait training performed to improve functional strength, to increase functional mobility, safety and independence.    VITALS:  BP:      141/82 HR 72   135/90 HR 70      In sitting        Functional Mobility:  Bed Mobility:   Supine to sit: Standby Assistance with 0 cues   Sit to supine: Activity did not occur with 0 cues   Rolling: Activity did not occur with 0 cues   Scooting: Supervision or Set-up Assistance with 0 cues        Transfer Training:  Sit to stand:Stand-by Assistance with Rolling Walker    Bed <> Chair:  Step Transfer with Stand-by Assistance and Contact Guard Assistance with Rolling Walker  (VC for safety and proper tech to come to complete turn prior to sitting)B        Gait Training:  Patient ambulates 190 feet using rolling walker with Contact Guard Assistance and  verbal cues for increased step length.       Stair Training:  Pt ascended/descend 4" curb step with reciprocal/non-reciprocal steps using RW with Contact Guard Assistance and verbal cues for proper sequencing.    Picking up object:    From a standing position, patient picks up an object from the floor using RW with Supervision and minimal verbal cues.    Ramp/Uneven surfaces:     Patient ambulate onto " uneven surfaces 15 using RW with Contact Guard Assistance.    Activity Tolerance:  Patient tolerated treatment well    Patient left up in chair with all lines intact, call button in reach, chair alarm on and Seat belt donned and  notified .    Education Provided: roles and goals of PT/PTA    Expected compliance:  High compliance        Assessment:  Sammie Belcher is a 52 y.o. female with a medical diagnosis of CVA (cerebral vascular accident). She presents with R side neglect and poor motor planning. Pt to benefit from skilled PT services to address impairments with progression towards functional independence as tolerated.    Rehab potential is good.    Activity tolerance: Excellent    Discharge recommendations:       Equipment recommendations:       GOALS:   Multidisciplinary Problems     Physical Therapy Goals        Problem: Physical Therapy    Goal Priority Disciplines Outcome Goal Variances Interventions   Physical Therapy Goal     PT, PT/OT Ongoing, Progressing     Description: Short Term goals      Bed Mobility   Roll Right and Left Setup or Clean-up Assistance.  Lying to sitting Setup or Clean-up Assistance.  Sitting to lying Setup or Clean-up Assistance.    Transfers    Pt will be able to perform Stand step chair/bed to chair transfer With LRAD Setup or Clean-up Assistance.  Pt will be able to perform Sit to stand with LRAD   Setup or Clean-up Assistance.  Pt will be able to perform Car transfer with LRAD Setup or Clean-up Assistance.    Ambulation    Pt will ambulate 100 Feet with LRAD Setup or Clean-up Assistance.  Pt will ascend and descend 12 stairs with L rail CGA      Wheelchair Mobility   Pt will be able to propel 150 feet in paola wheelchair Setup or Clean-up Assistance.      Timeframe: By Discharge                      PLAN:    Patient to be seen 5 x/week  to address the above listed problems via gait training, therapeutic activities, therapeutic exercises, neuromuscular re-education  Plan  of Care expires: 06/09/22  Plan of Care reviewed with: patient    6/16/2022

## 2022-06-16 NOTE — PROGRESS NOTES
I have personally evaluated the patient during therapy today. Family training ongoing and observed. Have discussed progress and problems with patient & family.  Have reviewed barriers to progress as well as response to therapies with therapists. I have reviewed medical issues and plan of care with IM team. I met with  to review d/c plans and barriers. I have discussed skin integrity and B/B status with nursing. I am in agreement with present IRF plan of care.  I have been involved in  formation of this note with Nisha Wright.  Roel Villarreal MD   Subjective:  HPI: 51yo BF with PMH of HTN, anxiety, depression, and hypothyroidism  presented to [facility] ED on 5/18 with complaints of dysarthria and expressive aphasia x3 days.  Patient's family member states that she has been having difficulty speaking for the past 3 days however today was worse so she was brought to the ED for further evaluation. Her sister at bedside states that she is unsure if she has been compliant with her home medications.  She does smoke marijuana daily with her significant other, as well as drinks about a six-pack of beer every day.  No history of stroke.  She is not on aspirin.  Sister reports that she has been having episodes of bladder and bowel incontinence and is very restless/fidgety over the past few days.  No reported history of falls or back pain or any saddle anesthesia.  Patient is oriented x 4 but is an extremely poor historian.  She does report left-sided weakness and paresthesias x 2 days. Upon arrival to outside facility the patient was markedly hypertensive but otherwise hemodynamically stable and afebrile. EKG: NSR with LVH. Labs unremarkable except for a reactive RPR. Patient denies high risk sexual behavior, states unprotected sexual intercourse with significant other but she is in a monogamous relationships. Positive for syphilis as well as trichomoniasis.  CT Head showed left frontal 6 cm cortical base hypodensity  suggesting an acute to subacute nonhemorrhagic infarct in the left KENISHA vascular territory. MRI Brain was then obtained confirming the left frontal lobe extending into the corpus callosum with an acute nonhemorrhagic infarct in the territory of the anterior cerebral artery, and old lacunar infarcts. Transferred to [facility] for further CVA workup and management.  The trichomoniasis has been treated with Flagyl and she is receiving her 3rd dose of penicillin for syphilis.  Participating with therapy. Functional status includes bed mobility and transfers requiring minimal assistance. Amb w/RW for 80ft. Patient was evaluated, accepted, and admitted to inpatient rehab to improve functional status. Transferred to [facility] on 6/2 without incident.    6/16: Seen with PT, climbing stairs. Requiring VC to only use left hand rail when going up and keeping RUE down. Progressing in therapy without complaint. VSSAF.       Review of Systems  Psychiatric:Hx mental health/substance abuse: Pt. Has a history of anxiety and depression. Per chart, pt. Has a history of cocaine use which she quit a year ago. She does smoke cigarettes and marijuana.    Depression/Anxiety:   buPROPion TB24 tablet 150 mg qd  sertraline tablet 25 mg qd  ALPRAZolam tablet 0.25 mg qd  Pain: denies  acetaminophen tablet 650 mg q4hr PRN mild pain  HYDROcodone-acetaminophen 5-325 mg, 1 tablet q4h PRN moderate pain  baclofen tablet 5 mg BID  Bowels/Bladder: last documented BM 6/15 x 3 (6/2 x 4- DC Colace)  Appetite: good  Sleep: good   hydrOXYzine pamoate capsule 50 mg nightly PRN insomnia    Physical Exam  General: well-developed, well-nourished, in no acute distress  Respiratory: equal chest rise, no SOB, no audible wheeze  Cardiovascular: regular rate and rhythm, no edema  Gastrointestinal: soft, non-tender, non-distended   Musculoskeletal: right sided weakness  Integumentary: no rashes or skin lesions present, LCW [id number]-f/d/i  Neurologic: AAO, right  sided weakness, aphasia, dysarthria  *MD performed and documented physical examination           Assessment/Plan  Acute nonhemorrhagic CVA- L KENISHA territory  Dysarthria and expressive aphasia secondary to CVA  HTN  HLD  Hypothyroidism  Tobacco, EtOH, and Marijuana use  Reactive RPR  Trichomonas    Wounds: LCW [id number]-[id number]-m/d/i  S/p LINQ placement on 5/26  Precautions: fall risk  Bracing/AD: Pre-laurie AFO ordered/RW  Swallowing: Easy to Chew Diet  Function: Tolerating therapy. Continue PT/OT  VTE Prophylaxis:   enoxaparin injection 40 mg SubQ q24hr  Code Status: FULL CODE   Discharge:Pt. Lives with her fiance in [place] in an apartment. Her bedroom and bathroom are on the 2nd floor with approximately 12 steps to get to the 2nd floor. There is a wall then a railing on the left going up. Pt. Completed 10th or 11th grade. She has no  history. She was not working. She took care of her mother after she had surgery and then took care of her grandchildren so their mother could work. The patient has a fiance, who states that he is retired. He drives, does most of the cooking, cleaning, laundry, grocery shopping, and manages finances. He states that he can physically assist patient. He also states that her children will be able to help as much as they can. Children (3). Date pending.                             Matilde Wright NP, conducted additional independent physical examination and assisted with medical documentation.

## 2022-06-17 PROCEDURE — 25000003 PHARM REV CODE 250: Performed by: NURSE PRACTITIONER

## 2022-06-17 PROCEDURE — 94761 N-INVAS EAR/PLS OXIMETRY MLT: CPT

## 2022-06-17 PROCEDURE — 97535 SELF CARE MNGMENT TRAINING: CPT

## 2022-06-17 PROCEDURE — 92523 SPEECH SOUND LANG COMPREHEN: CPT

## 2022-06-17 PROCEDURE — 97110 THERAPEUTIC EXERCISES: CPT

## 2022-06-17 PROCEDURE — 94799 UNLISTED PULMONARY SVC/PX: CPT

## 2022-06-17 PROCEDURE — S4991 NICOTINE PATCH NONLEGEND: HCPCS | Performed by: NURSE PRACTITIONER

## 2022-06-17 PROCEDURE — 11800000 HC REHAB PRIVATE ROOM

## 2022-06-17 PROCEDURE — 92507 TX SP LANG VOICE COMM INDIV: CPT

## 2022-06-17 PROCEDURE — 63600175 PHARM REV CODE 636 W HCPCS: Performed by: NURSE PRACTITIONER

## 2022-06-17 PROCEDURE — 97530 THERAPEUTIC ACTIVITIES: CPT

## 2022-06-17 RX ADMIN — LABETALOL HYDROCHLORIDE 200 MG: 200 TABLET, FILM COATED ORAL at 02:06

## 2022-06-17 RX ADMIN — THIAMINE HCL TAB 100 MG 100 MG: 100 TAB at 10:06

## 2022-06-17 RX ADMIN — NICOTINE 1 PATCH: 21 PATCH, EXTENDED RELEASE TRANSDERMAL at 08:06

## 2022-06-17 RX ADMIN — ATORVASTATIN CALCIUM 40 MG: 40 TABLET, FILM COATED ORAL at 08:06

## 2022-06-17 RX ADMIN — LISINOPRIL 10 MG: 10 TABLET ORAL at 08:06

## 2022-06-17 RX ADMIN — VENLAFAXINE 37.5 MG: 37.5 TABLET ORAL at 08:06

## 2022-06-17 RX ADMIN — BACLOFEN 5 MG: 5 TABLET ORAL at 08:06

## 2022-06-17 RX ADMIN — ASPIRIN 325 MG: 325 TABLET, COATED ORAL at 08:06

## 2022-06-17 RX ADMIN — HYDROXYZINE PAMOATE 50 MG: 50 CAPSULE ORAL at 08:06

## 2022-06-17 RX ADMIN — THIAMINE HCL TAB 100 MG 100 MG: 100 TAB at 02:06

## 2022-06-17 RX ADMIN — LABETALOL HYDROCHLORIDE 200 MG: 200 TABLET, FILM COATED ORAL at 08:06

## 2022-06-17 RX ADMIN — ENOXAPARIN SODIUM 40 MG: 40 INJECTION SUBCUTANEOUS at 04:06

## 2022-06-17 RX ADMIN — THIAMINE HCL TAB 100 MG 100 MG: 100 TAB at 08:06

## 2022-06-17 RX ADMIN — ALENDRONATE SODIUM 70 MG: 70 TABLET ORAL at 05:06

## 2022-06-17 RX ADMIN — AMLODIPINE BESYLATE 5 MG: 5 TABLET ORAL at 08:06

## 2022-06-17 RX ADMIN — MULTIPLE VITAMINS W/ MINERALS TAB 1 TABLET: TAB at 08:06

## 2022-06-17 RX ADMIN — SERTRALINE HYDROCHLORIDE 25 MG: 25 TABLET ORAL at 08:06

## 2022-06-17 NOTE — PT/OT/SLP PROGRESS
Progress Note      Pt was scheduled from 0925-3574.  Pt Significant Other attend and assisted with her participation

## 2022-06-17 NOTE — PT/OT/SLP PROGRESS
Occupational Therapy  Treatment      Sammie Belcher   MRN: [id number]   Admitting Diagnosis: CVA (cerebral vascular accident)         Billable Minutes:  Self Care/Home Management 15 and Therapeutic Exercise 15    OT/BETH: OT          General Precautions: Standard, fall  Orthopedic Precautions:           Subjective:  Patient communicated need to use the restroom; however, pt was already soiled and needed to change adult brief.    Pain/Comfort  Pain Rating 1: 0/10  Objective:       Functional Mobility:  Transfer Training:   Sit to stand:Supervision or Set-up Assistance with Rolling Walker    Toilet Transfer:  Pt Step Transfer with Supervision or Set-up Assistance with Rolling Walker    Balance:  Patient performed standing balance with RW and SPV assist for 2 minutes while performing hand hygiene at sink.    ADLs:   Toilet Training:  Pt performed toileting with Supervision or Set-up Assistance with Grab  bar at Bedside Commode.over toilet.      Endurance and Strengthening  Bilateral upper extremity ergometer for 5 minutes for 2 sets with Independent    Education Provided: Roles and goals of OT, ADLs and transfer training    Expected compliance: Moderate compliance    ASSESSMENT:  Sammie Belcher is a 52 y.o. female with a medical diagnosis of CVA (cerebral vascular accident) performed BUE therex, toileting to increase ADL independence and safety. Pt is progressing towards OT goals      GOALS:   Multidisciplinary Problems     Occupational Therapy Goals        Problem: Occupational Therapy    Goal Priority Disciplines Outcome Interventions   Occupational Therapy Goal     OT, PT/OT Ongoing, Progressing    Description: ADLs:  Pt to perform grooming tasks with supervision and RW and min vc MET  Pt to perform feeding tasks with independence PROGRESSING REQUIRES SET UP WITH RUE WEAKNESS  Pt to perform UB dressing with set up assist and min vc PROGRESSING  Pt to perform LB dressing with CGA and min vc MET  Pt to perform  putting on/off footwear task with supervision and min vc MET  Pt to perform toileting with supervision and min vc MET BUT MOSTLY INCONTINENT     Functional Transfers:  Pt to perform toilet transfers with supervision and min vc and RW PROGRESSING  Pt to perform a tub transfer with supervision, RW, min vc MET    IADLs:  Pt to perform simple care taking/ home management tasks with min A and min vc PROGRESSING    Balance, Strengthening, Endurance, Balance:  Pt to demonstrate dynamic standing balance as required to perform ADL's from standing level with RW and min vc. PROGRESSING  Pt to demonstrate BUE strength during functional task and increased use of R UE. PROGRESSING                   Plan:  Patient to be seen 5 x/week to address the above listed problems via self-care/home management, therapeutic activities, therapeutic exercises, therapeutic groups, neuromuscular re-education  Plan of Care expires: 06/09/22  Plan of Care reviewed with: patient      06/17/2022

## 2022-06-17 NOTE — PLAN OF CARE
Problem: Communication Impairment (Stroke, Ischemic/Transient Ischemic Attack)  Goal: Improved Communication Skills  Outcome: Ongoing, Progressing  Intervention: Optimize Communication Skills  Flowsheets (Taken 6/16/2022 4034)  Communication Enhancement Strategies:   call light answered in person   extra time allowed for response   verbal communication attempts encouraged   nonverbal strategies used     Problem: Swallowing Impairment (Stroke, Ischemic/Transient Ischemic Attack)  Goal: Optimal Eating and Swallowing without Aspiration  Outcome: Ongoing, Progressing  Intervention: Optimize Eating and Swallowing  Flowsheets (Taken 6/16/2022 3468)  Aspiration Precautions:   awake/alert before oral intake   distractions minimized during oral intake   food consistency adjusted   food texture adjusted   upright posture maintained

## 2022-06-17 NOTE — PROGRESS NOTES
[facility] ([facility])  Rehab Progress Note    Patient Name: Sammie Belcher  MRN: [id number]  Age: 52 y.o. Sex: female  : 1970  Hospital Length of Stay: 15 days  Date of Service:  2022  Chief Complaint: Left frontal lobe 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    Subjective:     Basic Information  Admit Information: 52-year-old  female presented to [facility] ED on 2022 complaining of dysarthria and expressive aphasia x3 days.  PMH significant hyperparathyroidism s/p parathyroidectomy 2022, anxiety/depression, and HTN.  Workup significant for reactive RPR and CT head significant for left frontal 6 cm cortical base hypodensity suggesting acute to subacute nonhemorrhagic infarct of the left KENISHA vascular territory.  MRA brain significant for left frontal lobe infarct extending into the corpus callosum with acute nonhemorrhagic infarct in the territory of the KENISHA, and old lacunar infarcts. Transferred to [facility] for neurology services.  Bicillin initiated on  for syphilis with plan for 3 doses with end date on .  Also received Flagyl x 7 days due to Trichomoniasis.  TTE significant for EF 74% with severe concentric hypertrophy and hyperdynamic systolic function with negative bubble study.  ASA and statin initiated.  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  LP not revealing with no evidence of CNS infection.  Tolerating LINQ placement on .  CVA likely caused by long history cocaine abuse.  Continue to participate in progress therapy.  Tolerated transferred to [facility] inpatient rehab unit on  without incident.  Today's Information:  No acute events overnight.  Sitting in wheelchair comfortably eating breakfast.  Reports good sleep and appetite.  Last BM 6/15.  Vital signs at goal with no recent recorded fevers.  No labs or imaging today.    Review of patient's allergies indicates:  No  Known Allergies     Current Facility-Administered Medications:     acetaminophen tablet 650 mg, 650 mg, Oral, Q4H PRN, Nish FAUSTINO Destiny, FNP, 650 mg at 06/08/22 1948    alendronate tablet 70 mg, 70 mg, Oral, Q7 Days, Nish FAUSTINO Destiny, FNP, 70 mg at 06/17/22 0538    ALPRAZolam tablet 0.25 mg, 0.25 mg, Oral, TID PRN, Nish FAUSTINO Destiny, FNP, 0.25 mg at 06/16/22 2114    amLODIPine tablet 5 mg, 5 mg, Oral, Daily, Nish A Destiny, FNP, 5 mg at 06/17/22 0827    aspirin EC tablet 325 mg, 325 mg, Oral, Daily, Nish A Destiny, FNP, 325 mg at 06/17/22 0827    atorvastatin tablet 40 mg, 40 mg, Oral, Daily, Nish Lymanehart, FNP, 40 mg at 06/17/22 0827    baclofen tablet 5 mg, 5 mg, Oral, BID, Matilde Wright, FNP, 5 mg at 06/17/22 0827    benzonatate capsule 100 mg, 100 mg, Oral, TID PRN, Nish Woodart, FNP    bisacodyL suppository 10 mg, 10 mg, Rectal, Daily PRN, Nish A Destiny, FNP    bisacodyL suppository 10 mg, 10 mg, Rectal, Once, SIRISHA Murphy    bisacodyL suppository 10 mg, 10 mg, Rectal, Once, SIRISHA Murphy    enoxaparin injection 40 mg, 40 mg, Subcutaneous, Daily, Nish A Destiny, FNP, 40 mg at 06/16/22 1614    hydrALAZINE injection 10 mg, 10 mg, Intravenous, Q4H PRN, José Miguel Cesar MD    HYDROcodone-acetaminophen 5-325 mg per tablet 1 tablet, 1 tablet, Oral, Q4H PRN, Nish Lymanehart, FNP    hydrOXYzine pamoate capsule 50 mg, 50 mg, Oral, Nightly PRN, Nish Lymanehart, FNP, 50 mg at 06/16/22 2115    labetalol 20 mg/4 mL (5 mg/mL) IV syring, 10 mg, Intravenous, Q4H PRN, José Miguel Cesar MD    labetaloL tablet 200 mg, 200 mg, Oral, TID, GARY FamP, 200 mg at 06/17/22 0827    lisinopriL tablet 10 mg, 10 mg, Oral, QHS, Nish Dixon FNP, 10 mg at 06/16/22 2115    metoprolol injection 10 mg, 10 mg, Intravenous, Q2H PRN, SIRISHA Fam    multivitamin tablet, 1 tablet, Oral, Daily, SIRISHA Fam, 1 tablet at  "06/17/22 0827    nicotine 21 mg/24 hr 1 patch, 1 patch, Transdermal, Daily, Nish A Destiny, FNP, 1 patch at 06/17/22 0826    nitroGLYCERIN SL tablet 0.4 mg, 0.4 mg, Sublingual, Q5 Min PRN, Nish A Destiny, FNP    ondansetron disintegrating tablet 4 mg, 4 mg, Oral, Q6H PRN, Nish A Desitny, FNP    ondansetron disintegrating tablet 8 mg, 8 mg, Oral, Q6H PRN, Nish A Destiny, FNP    polyethylene glycol packet 17 g, 17 g, Oral, Q12H PRN, Nish A Destiny, FNP    promethazine tablet 25 mg, 25 mg, Oral, Q6H PRN, Nish A Destiny, FNP    sertraline tablet 25 mg, 25 mg, Oral, Daily, Nish A Destiny, FNP, 25 mg at 06/17/22 0827    thiamine tablet 100 mg, 100 mg, Oral, TID, Nish A Destiny, FNP, 100 mg at 06/16/22 1500    venlafaxine tablet 37.5 mg, 37.5 mg, Oral, BID, Syeda Crooks, FNP, 37.5 mg at 06/17/22 0827     Review of Systems   Complete 12-point review of symptoms negative except for what's mentioned in HPI     Objective:     /74   Pulse 70   Temp 98 °F (36.7 °C) (Oral)   Resp 18   Ht 6' 0.99" (1.854 m)   Wt 66.2 kg (145 lb 15.1 oz)   LMP  (LMP Unknown)   SpO2 99%   BMI 19.26 kg/m²        Intake/Output Summary (Last 24 hours) at 6/17/2022 0935  Last data filed at 6/17/2022 0800  Gross per 24 hour   Intake 1200 ml   Output --   Net 1200 ml       Physical Exam  Constitutional:       Appearance: Normal appearance.   HENT:      Head: Normocephalic.      Mouth/Throat:      Mouth: Mucous membranes are moist.   Eyes:      Pupils: Pupils are equal, round, and reactive to light.   Cardiovascular:      Rate and Rhythm: Normal rate and regular rhythm.      Heart sounds: Normal heart sounds.   Pulmonary:      Effort: Pulmonary effort is normal.      Breath sounds: Normal breath sounds.   Abdominal:      General: Bowel sounds are normal.      Palpations: Abdomen is soft.   Musculoskeletal:      Cervical back: Neck supple.      Comments: Slight weakness to right side.  " Weakness to left lower extremity.   Skin:     General: Skin is warm and dry.   Neurological:      Mental Status: She is alert and oriented to person, place, and time.      Comments: Dysarthria/expressive and receptive aphasia    Psychiatric:         Mood and Affect: Mood normal.     *MD performed and documented physical examination         Lines/Drains/Airways     None               Labs:  No results found for this or any previous visit (from the past 24 hour(s)).    Radiology:  MRI brain without contrast 05/19/2002 at 5:38 p.m., IMPRESSION: Motion degraded exam. In spite of this limitation: Moderate to severe multifocal flow signal abnormality about the bilateral ACAs, bilateral distal MCAs and to lesser extent bilateral PCAs.  Radiology  Transthoracic echo 05/22/2022 at 7:50 a.m., IMPRESSION:  LV is normal in size with hyperdynamic systolic function.  Estimated EF 75%.  There is grade 1 diastolic dysfunction consistent with impaired relaxation.  Left atrial pressure is normal.  RV normal size and function.  RA normal size.  AV structurally normal.  No regurgitation.  No stenosis.  There is nonspecific leaflet thickening 2 mitral valve.  There is trace mitral valve regurgitation.  There is no stenosis.  Tricuspid valve with trace regurgitation.  No valve stenosis.  PAP could not be obtained.  There is no prior care effusion.  There is no evidence 10 benign.  Negative bubble study.    Assessment/Plan:     52 y.o. AAF admitted on 6/2/2022    Left frontal lobe infarct   - with dysarthria/expressive and receptive aphasia and right-sided weakness  - continue                Aspirin 325 mg daily                Lipitor 40 mg daily                Multivitamin daily  - defer to physiatry for rehab and pain management  - PT/OT/RT/ST following     POSITIVE RPR  - s/p Bicillin x 3  - LP on 5/22 negative for CNS infection     History of alcohol dependence  - stable  - no withdrawals  - continue                Thiamine 100 mg  t.i.d.     HTN  - BP at goal!!  - continue     Lisinopril 10 mg at bedtime (initiated 6/5)                Norvasc 10 mg daily                Labetalol 200 mg t.i.d.                Hydralazine 10 mg every 2 hours as needed for BP > 160/90                Labetalol 10 mg every 2 hours as needed for BP > 160/90     Nicotine dependence  - stable  - continue                Nicotine patch 21 mg daily     Anxiety/depression  - in remission  - continue                Wellbutrin 150 mg XR daily                Zoloft 25 mg daily     Hyperparathyroidism  - stable  - s/p parathyroidectomy 02/23/2022  - continue                Vitamin-D 19630 units Q weekly                Fosamax 70 mg Q weekly     AB therapy  Bicillin x3 doses (5/19-6/2)     VTE Prophylaxis:  Lovenox 40 mg daily  COVID-19 testing:  Negative on 6/02/2022  COVID-19 vaccination status:  Vaccinated (Pfizer):  08/25/2021 and 08/04/2021     POA: No  Living will: No  Contacts:  Gamaliel Hein ([facility]) [phone]     CODE STATUS: Full Code  Internal Medicine (attending): José Miguel Cesar MD     OUTPATIENT PROVIDERS  PCP: Wanda Bell MD  Neurology:  Fede Lopez MD  Infectious disease: Marcelina Nguyen MD     DISPOSITION: Condition stable.  Vitals at goals.  No labs today.  Sleep hygiene, bowel management, and appetite at goal.  Continues to progress well with therapy. Monitor closely.  Notify of acute changes.      Staffing 6/14/2022: Incontinent of bladder and bowel.  RT: Overall supervision.  Expression deficits.  Appetite is good. PT: High fall risk.  PT: Overall CGA to mod assists ambulating 180 feet with RW.  Linited by ataxia and apraxia. Will need support at home 2/2 safety and cognition. OT: Overall supervision with ADLs, maintaining standing balance, requires cueing. ST: Limited by fluency.  Receptive language impaired. Improving.  Projected discharge pending.     Syeda Crooks NP conducted independent physical examination and assisted with  medical documentation.    Total time spent on this encounter including chart review and direct 1-on-1 patient interaction: 36 minutes   Over 50% of this time was spent in counseling and coordination of care

## 2022-06-17 NOTE — PT/OT/SLP RE-EVAL
Speech Language Pathology  Evaluation    Sammie Belcher   MRN: [id number]   Admitting Diagnosis: CVA (cerebral vascular accident)    Diet recommendations: Solid Diet Level: Regular Diet - IDDSI Level 7  Liquid Diet Level: Thin liquids - IDDSI Level 0     Billable Minutes:  Re-eval 30 minutes    Diagnosis: CVA (cerebral vascular accident)    Past Medical History:   Diagnosis Date    Hypertension     Thyroid disease      Past Surgical History:   Procedure Laterality Date    INSERTION OF IMPLANTABLE LOOP RECORDER N/A 2022    Procedure: Insertion, Implantable Loop Recorder;  Surgeon: Arias Brown MD;  Location: [facility] CATH LAB;  Service: Cardiology;  Laterality: N/A;    THYROIDECTOMY      TUBAL LIGATION         Has the patient been evaluated by SLP for swallowing? : No  Keep patient NPO?: No   General Precautions: Standard, aphasia          SUBJECTIVE:  General patient information:    Dominant hand: Right    Primary Language: English    Primary Communication used : Verbal    Behavior: alert, appropriate and cooperative    OBJECTIVE:  Fluency/Prosody: Dysfluent    SPEECH PRODUCTION   Phoneme Production: WFL   Voice Quality: WFL   Voice Production: WFL   Speech Rate: WFL   Loudness: WFL   Respiration: WFL   Resonance: WFL   Prosody: WFL   Speech Intelligibility  Known Context: Greater that 90%  Unknown Context: Greater that 90%    AUDITORY COMPREHENSION  Following Directions:   1-Step: 100%   2-Step: 80%    Yes/No Questions:   Biographical: 100%   Environmental: 100%   Simple: 80%   Complex: 80%      VERBAL EXPRESSION  Automatic Speech:   Days of the week: 100%   Months of the year: 100%   Countin%   Alphabet: 100%    Phrase Completion: 100%    Confrontation Naming   Body Parts: 100%   Objects: 100%    Wh- Questions:   Object name: 100%   Object function: 100%      COGNITION  Orientation:   Person: [facility]   Place: [facility]   Time: WFL   Situation: WFL    Attention:   Focused:  WFL   Sustained: impaired    Memory:   Immediate: WFL   Delayed: mildly impaired   Long Term: WFL    Problem Solving   Functional simple: WFL    Organization:   Convergent thinking: impaired   Divergent thinking: impaired    ASSESSMENT:  ST prognosis: Good    Impressions/interpretations: Person served presents with Mild/moderately impaired expressive language deficits, WFL receptive language deficits, Mildly impaired memory deficits, and WFL problem solving deficits.     PLAN:  Recommendations: Speech therapy Is warranted at this time.   ST recommended to improve speech, language and cognition .    Speech therapy is recommended 5 times a week, 60 minutes a day for 1 weeks pending progress    Treatment rationale: maximize functional communication and maximize high level linguistics    Estimated length of stay: pending progress    Projected Living environment post discharge: Home    Patient goal of therapy: to go home   Patient assisted with forming goals of therapy     Educated patient on: results of evaluation including deficits of speech, cognition and attention    Patient report understanding of the information provided: Regarding deficits, Need for therapy and Demonstrates understanding    Assessment:  Sammie Belcher is a 52 y.o. female with a medical diagnosis of CVA (cerebral vascular accident) and presents with expressive language and cognitive linguistic impairments. Skilled SLP intervention continues to be warranted at this time.    Spiritual, Cultural Beliefs, Jainism Practices, Values that Affect Care: no    Discharge recommendations: Discharge Facility/Level of Care Needs: home health speech therapy     Goals:   Multidisciplinary Problems     SLP Goals        Problem: SLP    Goal Priority Disciplines Outcome   SLP Goal     SLP Ongoing, Progressing   Description: LTG: To increase expressive/receptive language skills to enhance functional communication to express basic wants and needs with 100%  effectiveness.     ST. Pt will answer simple, environmental, and personal yes/no questions with 90% accuracy and minimal cues.--GOAL MET  2. Pt will complete confrontational naming tasks with 90% accuracy and minimal cues.--GOAL MET  3. Pt will use fluency shaping/stuttering modification techniques to decrease syllables stuttered to less than 5%SS.--PROGRESSING, CONTINUE    LTG: Pt will increase cognitive linguistic skills to increase safety awareness and independence to return to [facility].    ST. Pt will recall 4 out of 5 verbal stimuli following a 5 minute delay with minimal cues.   2. Pt will maintain attention to task for 5 minutes without redirection during 80% of trials.                    Plan:   Patient to be seen Therapy Frequency: 5 x/week  Planned Interventions: Language Therapy  Plan of Care expires: 22  Plan of Care reviewed with: patient  SLP Follow-up?: Yes  SLP - Next Visit Date: 22

## 2022-06-17 NOTE — PROGRESS NOTES
"   06/17/22 0830   Rec Therapy Time Calculation   Rec Start Time 0830   Rec Stop Time 0900   Rec Total Time (min) 30 min   Subjective   Patient states Pt Significant Other attended and assisted with her participation   Attendance   Activity Recreational Therapy  (Suresh)   Participation Active participation   Assessment   Assessment Sit to stand was supervision/setup. Dynamic standing balance/reaching was supervision. Standing tolerance was 10 mintues. She sang words to "My Girl" to Gamaliel her SO. Hand over hand assist needed with RUE for motor planning. She was distracted at times but remains cooperative   Plan   Planned Therapy Intervention Continue with current plan  (Suresh)   Expected Length of Stay Pending   PT Frequency Minimu of 5 visits per week;Other (see comments)  (Daily)   Time   Treatment time 2 units     "

## 2022-06-17 NOTE — PT/OT/SLP PROGRESS
Occupational Therapy  Treatment      Sammie Belcher   MRN: [id number]   Admitting Diagnosis: CVA (cerebral vascular accident)         Billable Minutes:  Self Care/Home Management 30    OT/BETH: OT          General Precautions: Standard, fall  Orthopedic Precautions:           Subjective:  Patient communicated that her significant other is named Gamaliel.       Objective:    ADLs:   OT educated pt's  on safety with tub/toilet transfers.  Recommended the use of TTB; however,  said he was going to transfer her into the tub in order to sit down into tub.  He states there is one clamp on tub bar. OT strongly recommended against that but he stated it will be fine. Also educated that pt needs CGA assist with toilet tranfers especially in tight turns due to decreased strength and proprioception in RLE. Pt  stated that he also takes care of pt's mother in same household.     Shared that pt is incontinent with bowel and bladder and that adult briefs would be needed.      Education Provided: Roles and goals of OT, ADLs and transfer training    Expected compliance: Moderate compliance    ASSESSMENT:  Sammie Belcher is a 52 y.o. female with a medical diagnosis of CVA (cerebral vascular accident) with  present for family training to increase ADL independence and safety. Pt is progressing towards OT goals      GOALS:   Multidisciplinary Problems     Occupational Therapy Goals        Problem: Occupational Therapy    Goal Priority Disciplines Outcome Interventions   Occupational Therapy Goal     OT, PT/OT Ongoing, Progressing    Description: ADLs:  Pt to perform grooming tasks with supervision and RW and min vc MET  Pt to perform feeding tasks with independence PROGRESSING REQUIRES SET UP WITH RUE WEAKNESS  Pt to perform UB dressing with set up assist and min vc PROGRESSING  Pt to perform LB dressing with CGA and min vc MET  Pt to perform putting on/off footwear task with supervision and min vc MET  Pt  to perform toileting with supervision and min vc MET BUT MOSTLY INCONTINENT     Functional Transfers:  Pt to perform toilet transfers with supervision and min vc and RW PROGRESSING  Pt to perform a tub transfer with supervision, RW, min vc MET    IADLs:  Pt to perform simple care taking/ home management tasks with min A and min vc PROGRESSING    Balance, Strengthening, Endurance, Balance:  Pt to demonstrate dynamic standing balance as required to perform ADL's from standing level with RW and min vc. PROGRESSING  Pt to demonstrate BUE strength during functional task and increased use of R UE. PROGRESSING                   Plan:  Patient to be seen 5 x/week to address the above listed problems via self-care/home management, therapeutic activities, therapeutic exercises, therapeutic groups, neuromuscular re-education  Plan of Care expires: 06/09/22  Plan of Care reviewed with: patient      06/17/2022

## 2022-06-17 NOTE — PT/OT/SLP PROGRESS
Speech Language Pathology Treatment          Sammie Belcher   MRN: [id number]     Diet recommendations: Solid Diet Level: Regular Diet - IDDSI Level 7  Liquid Diet Level: Thin liquids - IDDSI Level 0     Therapy Minutes  SLP Treatment Date: 22  Speech Start Time: 1300  Speech Stop Time: 1400  Speech Total (min): 60 min  SLP Individual: 60    Billable Minutes:  Speech Therapy Individual 60 minutes    General Precautions: Standard, aphasia    Subjective:  Pt awake, alert, and cooperative. Pt reports she is excited for painting activity today.    Objective:   Patient engaged in painting activity on this date to encourage communication and increased fluency. Pt required moderate cues to engage in conversation however able to respond quickly to conversational turns by other conversational partners. Pt using one word responses for most utterances despite moderate cues to elaborate or increase phrase length. Pt with increased fluency throughout session. When dysfluencies did arise, pt seen pausing and taking a breath with no cues from SLP.    Assessment:  Sammie Belcher is a 52 y.o. female with a SLP diagnosis of Aphasia and Cognitive-Linguistic Impairment. SLP intervention continues to be warranted at this time.    Discharge recommendations: Discharge Facility/Level of Care Needs: home health speech therapy     Goals:   Multidisciplinary Problems     SLP Goals        Problem: SLP    Goal Priority Disciplines Outcome   SLP Goal     SLP Ongoing, Progressing   Description: LTG: To increase expressive/receptive language skills to enhance functional communication to express basic wants and needs with 100% effectiveness.     ST. Pt will answer simple, environmental, and personal yes/no questions with 90% accuracy and minimal cues.--GOAL MET  2. Pt will complete confrontational naming tasks with 90% accuracy and minimal cues.--GOAL MET  3. Pt will use fluency shaping/stuttering modification techniques to decrease  syllables stuttered to less than 5%SS.--PROGRESSING, CONTINUE    LTG: Pt will increase cognitive linguistic skills to increase safety awareness and independence to return to PLOF.    ST. Pt will recall 4 out of 5 verbal stimuli following a 5 minute delay with minimal cues.   2. Pt will maintain attention to task for 5 minutes without redirection during 80% of trials.                    Plan:   Patient to be seen Therapy Frequency: 5 x/week   Planned Interventions: Language Therapy  Plan of Care Expires: 22  Plan of Care reviewed with: patient  SLP Follow-up?: Yes  SLP - Next Visit Date: 22

## 2022-06-17 NOTE — PLAN OF CARE
Problem: Adult Inpatient Plan of Care  Goal: Plan of Care Review  Outcome: Ongoing, Progressing  Flowsheets (Taken 6/17/2022 0719)  Plan of Care Reviewed With: patient  Goal: Absence of Hospital-Acquired Illness or Injury  Outcome: Ongoing, Progressing  Intervention: Identify and Manage Fall Risk  Flowsheets (Taken 6/17/2022 0719)  Safety Promotion/Fall Prevention:   assistive device/personal item within reach   chair alarm set   bed alarm set   commode/urinal/bedpan at bedside   Fall Risk reviewed with patient/family   Fall Risk signage in place   high risk medications identified   lighting adjusted   medications reviewed   nonskid shoes/socks when out of bed   room near unit station   side rails raised x 2   instructed to call staff for mobility   Supervised toileting - stay within arms reach  Intervention: Prevent Skin Injury  Flowsheets (Taken 6/17/2022 0719)  Body Position: (sitting up in wheelchair) other (see comments)  Skin Protection: incontinence pads utilized  Intervention: Prevent and Manage VTE (Venous Thromboembolism) Risk  Flowsheets (Taken 6/17/2022 0719)  Activity Management: Up in chair - L3  VTE Prevention/Management:   ambulation promoted   bleeding precations maintained   bleeding risk assessed   bleeding risk factor(s) identified, provider notified  Range of Motion: active ROM (range of motion) encouraged  Goal: Optimal Comfort and Wellbeing  Outcome: Ongoing, Progressing  Intervention: Monitor Pain and Promote Comfort  Flowsheets (Taken 6/17/2022 0719)  Pain Management Interventions:   pain management plan reviewed with patient/caregiver   pillow support provided   position adjusted   prescribed exercises encouraged   quiet environment facilitated  Intervention: Provide Person-Centered Care  Flowsheets (Taken 6/17/2022 0719)  Trust Relationship/Rapport:   care explained   choices provided   emotional support provided   empathic listening provided   questions answered   questions  encouraged   reassurance provided   thoughts/feelings acknowledged     Problem: Adjustment to Illness (Stroke, Ischemic/Transient Ischemic Attack)  Goal: Optimal Coping  Outcome: Ongoing, Progressing  Intervention: Support Psychosocial Response to Stroke  Flowsheets (Taken 6/17/2022 0719)  Supportive Measures:   active listening utilized   decision-making supported   positive reinforcement provided   relaxation techniques promoted  Family/Support System Care: support provided     Problem: Bowel Elimination Impaired (Stroke, Ischemic/Transient Ischemic Attack)  Goal: Effective Bowel Elimination  Outcome: Ongoing, Progressing  Intervention: Promote Effective Bowel Elimination  Flowsheets (Taken 6/17/2022 0719)  Bowel Elimination Management:   hygiene measures promoted   relaxation techniques promoted   sitting position facilitated   toileting offered  Bowel Program: training program maintained     Problem: Cerebral Tissue Perfusion (Stroke, Ischemic/Transient Ischemic Attack)  Goal: Optimal Cerebral Tissue Perfusion  Outcome: Ongoing, Progressing  Intervention: Protect and Optimize Cerebral Perfusion  Flowsheets (Taken 6/17/2022 0719)  Sensory Stimulation Regulation: quiet environment promoted  Cerebral Perfusion Promotion: blood pressure monitored  Fluid/Electrolyte Management: fluids provided     Problem: Cognitive Impairment (Stroke, Ischemic/Transient Ischemic Attack)  Goal: Optimal Cognitive Function  Outcome: Ongoing, Progressing  Intervention: Optimize Cognitive Function  Flowsheets (Taken 6/17/2022 0719)  Sensory Stimulation Regulation: quiet environment promoted     Problem: Communication Impairment (Stroke, Ischemic/Transient Ischemic Attack)  Goal: Improved Communication Skills  Outcome: Ongoing, Progressing  Intervention: Optimize Communication Skills  Flowsheets (Taken 6/17/2022 0719)  Communication Enhancement Strategies:   call light answered in person   extra time allowed for response   verbal  communication attempts encouraged     Problem: Functional Ability Impaired (Stroke, Ischemic/Transient Ischemic Attack)  Goal: Optimal Functional Ability  Outcome: Ongoing, Progressing  Intervention: Optimize Functional Ability  Flowsheets (Taken 6/17/2022 0719)  Self-Care Promotion:   independence encouraged   BADL personal objects within reach   BADL personal routines maintained   safe use of adaptive equipment encouraged  Activity Management: Up in chair - L3     Problem: Respiratory Compromise (Stroke, Ischemic/Transient Ischemic Attack)  Goal: Effective Oxygenation and Ventilation  Outcome: Ongoing, Progressing  Intervention: Optimize Oxygenation and Ventilation  Flowsheets (Taken 6/17/2022 0719)  Head of Bed (HOB) Positioning: (pt sitting up in wheelchair) other (see comments)     Problem: Swallowing Impairment (Stroke, Ischemic/Transient Ischemic Attack)  Goal: Optimal Eating and Swallowing without Aspiration  Outcome: Ongoing, Progressing  Intervention: Optimize Eating and Swallowing  Flowsheets (Taken 6/17/2022 0719)  Aspiration Precautions:   awake/alert before oral intake   distractions minimized during oral intake   food consistency adjusted   food texture adjusted   upright posture maintained  Swallowing Interventions: Dysphagia: upright position maintained 45 mins after intake  Feeding/Eating Techniques: close supervision provided     Problem: Urinary Elimination Impaired (Stroke, Ischemic/Transient Ischemic Attack)  Goal: Effective Urinary Elimination  Outcome: Ongoing, Progressing  Intervention: Promote Effective Bladder Elimination  Flowsheets (Taken 6/17/2022 0719)  Urinary Elimination Promotion:   absorbent pad/diaper use encouraged   toileting offered     Problem: Skin Injury Risk Increased  Goal: Skin Health and Integrity  Outcome: Ongoing, Progressing  Intervention: Optimize Skin Protection  Flowsheets (Taken 6/17/2022 0719)  Pressure Reduction Techniques:   frequent weight shift encouraged    weight shift assistance provided  Pressure Reduction Devices: pressure-redistributing mattress utilized  Skin Protection: incontinence pads utilized  Head of Bed (HOB) Positioning: (pt sitting up in wheelchair) other (see comments)  Intervention: Promote and Optimize Oral Intake  Flowsheets (Taken 6/17/2022 0719)  Oral Nutrition Promotion:   physical activity promoted   rest periods promoted

## 2022-06-17 NOTE — PLAN OF CARE
Problem: SLP  Goal: SLP Goal  Description: LTG: To increase expressive/receptive language skills to enhance functional communication to express basic wants and needs with 100% effectiveness.     ST. Pt will answer simple, environmental, and personal yes/no questions with 90% accuracy and minimal cues.--GOAL MET  2. Pt will complete confrontational naming tasks with 90% accuracy and minimal cues.--GOAL MET  3. Pt will use fluency shaping/stuttering modification techniques to decrease syllables stuttered to less than 5%SS.--PROGRESSING, CONTINUE    LTG: Pt will increase cognitive linguistic skills to increase safety awareness and independence to return to PLOF.    ST. Pt will recall 4 out of 5 verbal stimuli following a 5 minute delay with minimal cues.   2. Pt will maintain attention to task for 5 minutes without redirection during 80% of trials.  Outcome: Ongoing, Progressing

## 2022-06-17 NOTE — PT/OT/SLP PROGRESS
Physical Therapy         Treatment        Sammie Belcher   MRN: [id number]           Start time 0900  Stop Time 0930     Billable Minutes:  Therapeutic Activity 30               PTA Visit Number: 3       General Precautions: Standard, fall  Orthopedic Precautions: Orthopedic Precautions : N/A   Braces:      Patient found with: Other (comments) ( and chair alarm)         Subjective:  None stated          Objective:  Family training completed. All questions and concerns addressed.     VITALS:  BP:      144/98 HR 84    126/85 HR 82      Functional Mobility:      Transfer Training:  Sit to stand:Contact Guard Assistance with Rolling Walker .  Bed <> Chair:  Step Transfer with Contact Guard Assistance with Rolling Walker .      Gait Training:  Patient ambulates 20 feet using rolling walker with Contact Guard Assistance and  verbal cues for increased step length .       Stair Training:  Pt ascended/descend 8 stair(s) with reciprocal/non-reciprocal steps using left handrail with Contact Guard Assistance and min verbal cues for proper sequencing.    Additional Treatment:    Assisted with donning new pants and shirts and performing toilet hygiene following BM.    Family training completed. All questions and concerns addressed. HEP provided.  and friend present throughout. Family educated on safety with mobility and D/C home. Pt  with good understanding.     Activity Tolerance:  Patient tolerated treatment well    Patient left up in chair with all lines intact, call button in reach, chair alarm on and  and friend present.    Education Provided: roles and goals of PT/PTA, transfer training, bed mob, gait training, stair training, balance training, safety awareness, body mechanics, assistive device and fall prevention    Expected compliance:  Moderate compliance        Assessment:  Sammie Belcher is a 52 y.o. female with a medical diagnosis of CVA (cerebral vascular accident). She presents with poor  motor planning and apraxia . Pt to benefit from skilled PT services to address impairments with progression towards functional independence as tolerated.    Rehab potential is excellent.    Activity tolerance: Excellent    Discharge recommendations:       Equipment recommendations:       GOALS:   Multidisciplinary Problems     Physical Therapy Goals        Problem: Physical Therapy    Goal Priority Disciplines Outcome Goal Variances Interventions   Physical Therapy Goal     PT, PT/OT Ongoing, Progressing     Description: Short Term goals      Bed Mobility   Roll Right and Left Setup or Clean-up Assistance.  Lying to sitting Setup or Clean-up Assistance.  Sitting to lying Setup or Clean-up Assistance.    Transfers    Pt will be able to perform Stand step chair/bed to chair transfer With LRAD Setup or Clean-up Assistance.  Pt will be able to perform Sit to stand with LRAD   Setup or Clean-up Assistance.  Pt will be able to perform Car transfer with LRAD Setup or Clean-up Assistance.    Ambulation    Pt will ambulate 100 Feet with LRAD Setup or Clean-up Assistance.  Pt will ascend and descend 12 stairs with L rail CGA      Wheelchair Mobility   Pt will be able to propel 150 feet in [place] wheelchair Setup or Clean-up Assistance.      Timeframe: By Discharge                      PLAN:    Patient to be seen 5 x/week  to address the above listed problems via gait training, therapeutic activities, therapeutic exercises, neuromuscular re-education  Plan of Care expires: 06/09/22  Plan of Care reviewed with: patient    6/17/2022

## 2022-06-18 PROCEDURE — 63600175 PHARM REV CODE 636 W HCPCS: Performed by: NURSE PRACTITIONER

## 2022-06-18 PROCEDURE — 25000003 PHARM REV CODE 250: Performed by: NURSE PRACTITIONER

## 2022-06-18 PROCEDURE — 11800000 HC REHAB PRIVATE ROOM

## 2022-06-18 PROCEDURE — S4991 NICOTINE PATCH NONLEGEND: HCPCS | Performed by: NURSE PRACTITIONER

## 2022-06-18 PROCEDURE — 94799 UNLISTED PULMONARY SVC/PX: CPT

## 2022-06-18 PROCEDURE — 97110 THERAPEUTIC EXERCISES: CPT | Mod: CQ

## 2022-06-18 PROCEDURE — 97116 GAIT TRAINING THERAPY: CPT | Mod: CQ

## 2022-06-18 PROCEDURE — 92507 TX SP LANG VOICE COMM INDIV: CPT

## 2022-06-18 PROCEDURE — 94761 N-INVAS EAR/PLS OXIMETRY MLT: CPT

## 2022-06-18 RX ADMIN — AMLODIPINE BESYLATE 5 MG: 5 TABLET ORAL at 08:06

## 2022-06-18 RX ADMIN — THIAMINE HCL TAB 100 MG 100 MG: 100 TAB at 04:06

## 2022-06-18 RX ADMIN — VENLAFAXINE 37.5 MG: 37.5 TABLET ORAL at 08:06

## 2022-06-18 RX ADMIN — HYDROXYZINE PAMOATE 50 MG: 50 CAPSULE ORAL at 08:06

## 2022-06-18 RX ADMIN — LABETALOL HYDROCHLORIDE 200 MG: 200 TABLET, FILM COATED ORAL at 08:06

## 2022-06-18 RX ADMIN — LISINOPRIL 10 MG: 10 TABLET ORAL at 08:06

## 2022-06-18 RX ADMIN — SERTRALINE HYDROCHLORIDE 25 MG: 25 TABLET ORAL at 08:06

## 2022-06-18 RX ADMIN — BACLOFEN 5 MG: 5 TABLET ORAL at 08:06

## 2022-06-18 RX ADMIN — ENOXAPARIN SODIUM 40 MG: 40 INJECTION SUBCUTANEOUS at 04:06

## 2022-06-18 RX ADMIN — ASPIRIN 325 MG: 325 TABLET, COATED ORAL at 08:06

## 2022-06-18 RX ADMIN — THIAMINE HCL TAB 100 MG 100 MG: 100 TAB at 08:06

## 2022-06-18 RX ADMIN — NICOTINE 1 PATCH: 21 PATCH, EXTENDED RELEASE TRANSDERMAL at 08:06

## 2022-06-18 RX ADMIN — LABETALOL HYDROCHLORIDE 200 MG: 200 TABLET, FILM COATED ORAL at 04:06

## 2022-06-18 RX ADMIN — MULTIPLE VITAMINS W/ MINERALS TAB 1 TABLET: TAB at 08:06

## 2022-06-18 RX ADMIN — ATORVASTATIN CALCIUM 40 MG: 40 TABLET, FILM COATED ORAL at 08:06

## 2022-06-18 NOTE — PT/OT/SLP PROGRESS
"Speech Language Pathology Treatment          Sammie Belcher   MRN: [id number]     Diet recommendations: Solid Diet Level: Regular Diet - IDDSI Level 7  Liquid Diet Level: Thin liquids - IDDSI Level 0     Therapy Minutes  SLP Treatment Date: 06/18/22  Speech Start Time: 0930  Speech Stop Time: 1000  Speech Total (min): 30 min  SLP Individual: 30    Billable Minutes:  Speech Therapy Individual 30    General Precautions: Standard, aphasia          Subjective:  Patient with excellent participation and motivation.    Objective:   SLP instructed patient in RET to facilitate expressive language to increase number of content words, word-finding, and efficiency/effectiveness with communication. SLP provided min cues to facilitate patient success.  SLP facilitated patient success by providing marker and dry erase board for simultaneous written/verbal expression.    Pain/Comfort  Pain Rating 1: 0/10  Pain Rating Post-Intervention 1: 0/10    Assessment:  Sammie Belcher is a 52 y.o. female with a SLP diagnosis of dislfuency and Aphasia. Patient responded positively to simultaneous written/verbal tasks with RET as evidenced by production of 7-9 word utterances written/verbally. Examples of patient production include:  "They is ordering some food a their favorite restaurant."   During conversation and with pen/paper as compensatory strategy for simultaneous written/verbal productions, patient produced the following utterance: "We went outside and we talk in the front of the building."    She continues to present with disfluencies which adversely affect efficiency during functional communication.     Discharge recommendations: Discharge Facility/Level of Care Needs: home health speech therapy     Goals:   Multidisciplinary Problems     SLP Goals        Problem: SLP    Goal Priority Disciplines Outcome   SLP Goal     SLP Ongoing, Progressing   Description: LTG: To increase expressive/receptive language skills to enhance " functional communication to express basic wants and needs with 100% effectiveness.     ST. Pt will answer simple, environmental, and personal yes/no questions with 90% accuracy and minimal cues.--GOAL MET  2. Pt will complete confrontational naming tasks with 90% accuracy and minimal cues.--GOAL MET  3. Pt will use fluency shaping/stuttering modification techniques to decrease syllables stuttered to less than 5%SS.--PROGRESSING, CONTINUE    LTG: Pt will increase cognitive linguistic skills to increase safety awareness and independence to return to PLOF.    ST. Pt will recall 4 out of 5 verbal stimuli following a 5 minute delay with minimal cues.   2. Pt will maintain attention to task for 5 minutes without redirection during 80% of trials.                    Plan:   Patient to be seen Therapy Frequency: 5 x/week   Planned Interventions: Language Therapy  Plan of Care Expires: 22  Plan of Care reviewed with: patient  SLP Follow-up?: Yes  SLP - Next Visit Date: 22

## 2022-06-18 NOTE — PROGRESS NOTES
[facility] - Rehab Inpatient Services  Progress Note    Patient Name: Sammie Belcher  MRN: [id number]  Patient Class: IP- Rehab   Admission Date: 6/2/2022  Length of Stay: 16 days  Attending Physician: José Miguel Cesar MD  Primary Care Provider: Primary Doctor No    Subjective:     Past Medical History:   Diagnosis Date    Hypertension     Thyroid disease       Admit Information: 52-year-old  female presented to [facility] ED on 5/18/2022 complaining of dysarthria and expressive aphasia x3 days.  PMH significant hyperparathyroidism s/p parathyroidectomy 2/2022, anxiety/depression, and HTN.  Workup significant for reactive RPR and CT head significant for left frontal 6 cm cortical base hypodensity suggesting acute to subacute nonhemorrhagic infarct of the left KENISHA vascular territory.  MRA brain significant for left frontal lobe infarct extending into the corpus callosum with acute nonhemorrhagic infarct in the territory of the KENISHA, and old lacunar infarcts. Transferred to [facility] for neurology services.  Bicillin initiated on 5/19 for syphilis with plan for 3 doses with end date on 6/02.  Also received Flagyl x 7 days due to Trichomoniasis.  TTE significant for EF 74% with severe concentric hypertrophy and hyperdynamic systolic function with negative bubble study.  ASA and statin initiated.  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  LP not revealing with no evidence of CNS infection.  Tolerating LINQ placement on 05/26.  CVA likely caused by long history cocaine abuse.  Continue to participate in progress therapy.  Tolerated transferred to [facility] inpatient rehab unit on 6/02 without incident.    Tolerating therapy well. Denies any complaints at time of rounding.     Review of Systems   Constitutional: Negative for chills, fatigue and fever.   Eyes: Negative for visual disturbance.   Respiratory: Negative for cough, chest tightness and shortness of breath.  "   Gastrointestinal: Negative for abdominal pain, blood in stool, constipation, diarrhea, nausea and vomiting.   Musculoskeletal: Negative for back pain, joint swelling and leg pain.   Neurological: Negative for dizziness and headaches.   Psychiatric/Behavioral: Negative for agitation, behavioral problems and hallucinations. The patient is not nervous/anxious.     Review of Systems   Constitutional: Negative for chills, fatigue and fever.   Eyes: Negative for visual disturbance.   Respiratory: Negative for cough, chest tightness and shortness of breath.    Gastrointestinal: Negative for abdominal pain, blood in stool, constipation, diarrhea, nausea and vomiting.   Musculoskeletal: Negative for back pain and joint swelling.   Neurological: Negative for dizziness and headaches.   Psychiatric/Behavioral: Negative for agitation, behavioral problems and hallucinations. The patient is not nervous/anxious.        Physical Exam  HENT:      Head: Atraumatic.      Nose: Nose normal.      Mouth/Throat:      Mouth: Mucous membranes are moist.      Pharynx: Oropharynx is clear.   Eyes:      Extraocular Movements: Extraocular movements intact.      Conjunctiva/sclera: Conjunctivae normal.      Pupils: Pupils are equal, round, and reactive to light.   Cardiovascular:      Rate and Rhythm: Normal rate and regular rhythm.   Pulmonary:      Effort: Pulmonary effort is normal.      Breath sounds: Normal breath sounds.   Abdominal:      General: Abdomen is flat. Bowel sounds are normal.      Palpations: Abdomen is soft.   Musculoskeletal:         General: Normal range of motion.   Skin:     General: Skin is warm and dry.      Capillary Refill: Capillary refill takes less than 2 seconds.   Neurological:      Mental Status: She is alert. Mental status is at baseline.   Psychiatric:         Mood and Affect: Mood normal.          /82   Pulse 73   Temp 97.7 °F (36.5 °C) (Oral)   Resp 18   Ht 6' 0.99" (1.854 m)   Wt 66.5 kg (146 " lb 9.7 oz)   LMP  (LMP Unknown)   SpO2 99%   BMI 19.35 kg/m²      Admission on 06/02/2022   Component Date Value Ref Range Status    Sodium Level 06/03/2022 143  136 - 145 mmol/L Final    Potassium Level 06/03/2022 4.3  3.5 - 5.1 mmol/L Final    Chloride 06/03/2022 105  98 - 107 mmol/L Final    Carbon Dioxide 06/03/2022 28  22 - 29 mmol/L Final    Glucose Level 06/03/2022 88  74 - 100 mg/dL Final    Blood Urea Nitrogen 06/03/2022 16.2  9.8 - 20.1 mg/dL Final    Creatinine 06/03/2022 0.78  0.55 - 1.02 mg/dL Final    Calcium Level Total 06/03/2022 9.7  8.4 - 10.2 mg/dL Final    Protein Total 06/03/2022 6.6  6.4 - 8.3 gm/dL Final    Albumin Level 06/03/2022 3.3 (A) 3.5 - 5.0 gm/dL Final    Globulin 06/03/2022 3.3  2.4 - 3.5 gm/dL Final    Albumin/Globulin Ratio 06/03/2022 1.0 (A) 1.1 - 2.0 ratio Final    Bilirubin Total 06/03/2022 0.4  <=1.5 mg/dL Final    Alkaline Phosphatase 06/03/2022 71  40 - 150 unit/L Final    Alanine Aminotransferase 06/03/2022 38  0 - 55 unit/L Final    Aspartate Aminotransferase 06/03/2022 34  5 - 34 unit/L Final    Estimated GFR- 06/03/2022 >60  mls/min/1.73/m2 Final    Magnesium Level 06/03/2022 2.00  1.60 - 2.60 mg/dL Final    Phosphorus Level 06/03/2022 3.8  2.3 - 4.7 mg/dL Final    Ferritin Level 06/03/2022 107.71  4.63 - 204.00 ng/mL Final    Iron Binding Capacity Unsaturated 06/03/2022 129  70 - 310 ug/dL Final    Iron Level 06/03/2022 95  50 - 170 ug/dL Final    Transferrin 06/03/2022 185  180 - 382 mg/dL Final    Iron Binding Capacity Total 06/03/2022 224 (A) 250 - 450 ug/dL Final    Iron Saturation 06/03/2022 42  20 - 50 % Final    WBC 06/03/2022 8.6  4.5 - 11.5 x10(3)/mcL Final    RBC 06/03/2022 4.29  4.20 - 5.40 x10(6)/mcL Final    Hgb 06/03/2022 13.2  12.0 - 16.0 gm/dL Final    Hct 06/03/2022 40.5  37.0 - 47.0 % Final    MCV 06/03/2022 94.4 (A) 80.0 - 94.0 fL Final    MCH 06/03/2022 30.8  27.0 - 31.0 pg Final    MCHC 06/03/2022  32.6 (A) 33.0 - 36.0 mg/dL Final    RDW 06/03/2022 14.0  11.5 - 17.0 % Final    Platelet 06/03/2022 209  130 - 400 x10(3)/mcL Final    MPV 06/03/2022 10.2  9.4 - 12.4 fL Final    Neut % 06/03/2022 50.9  % Final    Lymph % 06/03/2022 38.5  % Final    Mono % 06/03/2022 6.3  % Final    Eos % 06/03/2022 3.6  % Final    Basophil % 06/03/2022 0.5  % Final    Lymph # 06/03/2022 3.31  0.6 - 4.6 x10(3)/mcL Final    Neut # 06/03/2022 4.4  2.1 - 9.2 x10(3)/mcL Final    Mono # 06/03/2022 0.54  0.1 - 1.3 x10(3)/mcL Final    Eos # 06/03/2022 0.31  0 - 0.9 x10(3)/mcL Final    Baso # 06/03/2022 0.04  0 - 0.2 x10(3)/mcL Final    IG# 06/03/2022 0.02 (A) 0 - 0.0155 x10(3)/mcL Final    IG% 06/03/2022 0.2  0 - 0.43 % Final    NRBC% 06/03/2022 0.0  % Final    Sodium Level 06/06/2022 144  136 - 145 mmol/L Final    Potassium Level 06/06/2022 3.9  3.5 - 5.1 mmol/L Final    Chloride 06/06/2022 108 (A) 98 - 107 mmol/L Final    Carbon Dioxide 06/06/2022 28  22 - 29 mmol/L Final    Glucose Level 06/06/2022 83  74 - 100 mg/dL Final    Blood Urea Nitrogen 06/06/2022 12.6  9.8 - 20.1 mg/dL Final    Creatinine 06/06/2022 0.75  0.55 - 1.02 mg/dL Final    Calcium Level Total 06/06/2022 9.8  8.4 - 10.2 mg/dL Final    Protein Total 06/06/2022 6.7  6.4 - 8.3 gm/dL Final    Albumin Level 06/06/2022 3.5  3.5 - 5.0 gm/dL Final    Globulin 06/06/2022 3.2  2.4 - 3.5 gm/dL Final    Albumin/Globulin Ratio 06/06/2022 1.1  1.1 - 2.0 ratio Final    Bilirubin Total 06/06/2022 0.5  <=1.5 mg/dL Final    Alkaline Phosphatase 06/06/2022 72  40 - 150 unit/L Final    Alanine Aminotransferase 06/06/2022 38  0 - 55 unit/L Final    Aspartate Aminotransferase 06/06/2022 30  5 - 34 unit/L Final    Estimated GFR- 06/06/2022 >60  mls/min/1.73/m2 Final    Magnesium Level 06/06/2022 2.10  1.60 - 2.60 mg/dL Final    Phosphorus Level 06/06/2022 3.7  2.3 - 4.7 mg/dL Final    WBC 06/06/2022 8.0  4.5 - 11.5 x10(3)/mcL Final    RBC  06/06/2022 4.40  4.20 - 5.40 x10(6)/mcL Final    Hgb 06/06/2022 13.2  12.0 - 16.0 gm/dL Final    Hct 06/06/2022 40.1  37.0 - 47.0 % Final    MCV 06/06/2022 91.1  80.0 - 94.0 fL Final    MCH 06/06/2022 30.0  27.0 - 31.0 pg Final    MCHC 06/06/2022 32.9 (A) 33.0 - 36.0 mg/dL Final    RDW 06/06/2022 13.9  11.5 - 17.0 % Final    Platelet 06/06/2022 230  130 - 400 x10(3)/mcL Final    MPV 06/06/2022 10.0  9.4 - 12.4 fL Final    Neut % 06/06/2022 54.2  % Final    Lymph % 06/06/2022 34.3  % Final    Mono % 06/06/2022 6.2  % Final    Eos % 06/06/2022 4.4  % Final    Basophil % 06/06/2022 0.6  % Final    Lymph # 06/06/2022 2.73  0.6 - 4.6 x10(3)/mcL Final    Neut # 06/06/2022 4.3  2.1 - 9.2 x10(3)/mcL Final    Mono # 06/06/2022 0.49  0.1 - 1.3 x10(3)/mcL Final    Eos # 06/06/2022 0.35  0 - 0.9 x10(3)/mcL Final    Baso # 06/06/2022 0.05  0 - 0.2 x10(3)/mcL Final    IG# 06/06/2022 0.02 (A) 0 - 0.0155 x10(3)/mcL Final    IG% 06/06/2022 0.3  0 - 0.43 % Final    NRBC% 06/06/2022 0.0  % Final    Sodium Level 06/13/2022 143  136 - 145 mmol/L Final    Potassium Level 06/13/2022 4.5  3.5 - 5.1 mmol/L Final    Chloride 06/13/2022 106  98 - 107 mmol/L Final    Carbon Dioxide 06/13/2022 28  22 - 29 mmol/L Final    Glucose Level 06/13/2022 81  74 - 100 mg/dL Final    Blood Urea Nitrogen 06/13/2022 14.7  9.8 - 20.1 mg/dL Final    Creatinine 06/13/2022 0.73  0.55 - 1.02 mg/dL Final    Calcium Level Total 06/13/2022 9.9  8.4 - 10.2 mg/dL Final    Protein Total 06/13/2022 6.7  6.4 - 8.3 gm/dL Final    Albumin Level 06/13/2022 3.5  3.5 - 5.0 gm/dL Final    Globulin 06/13/2022 3.2  2.4 - 3.5 gm/dL Final    Albumin/Globulin Ratio 06/13/2022 1.1  1.1 - 2.0 ratio Final    Bilirubin Total 06/13/2022 0.4  <=1.5 mg/dL Final    Alkaline Phosphatase 06/13/2022 70  40 - 150 unit/L Final    Alanine Aminotransferase 06/13/2022 39  0 - 55 unit/L Final    Aspartate Aminotransferase 06/13/2022 29  5 - 34 unit/L Final     Estimated GFR- 06/13/2022 >60  mls/min/1.73/m2 Final    Magnesium Level 06/13/2022 2.00  1.60 - 2.60 mg/dL Final    Phosphorus Level 06/13/2022 4.4  2.3 - 4.7 mg/dL Final    WBC 06/13/2022 7.2  4.5 - 11.5 x10(3)/mcL Final    RBC 06/13/2022 4.21  4.20 - 5.40 x10(6)/mcL Final    Hgb 06/13/2022 12.9  12.0 - 16.0 gm/dL Final    Hct 06/13/2022 40.2  37.0 - 47.0 % Final    MCV 06/13/2022 95.5 (A) 80.0 - 94.0 fL Final    MCH 06/13/2022 30.6  27.0 - 31.0 pg Final    MCHC 06/13/2022 32.1 (A) 33.0 - 36.0 mg/dL Final    RDW 06/13/2022 14.0  11.5 - 17.0 % Final    Platelet 06/13/2022 272  130 - 400 x10(3)/mcL Final    MPV 06/13/2022 10.3  9.4 - 12.4 fL Final    Neut % 06/13/2022 57.0  % Final    Lymph % 06/13/2022 31.1  % Final    Mono % 06/13/2022 7.2  % Final    Eos % 06/13/2022 4.0  % Final    Basophil % 06/13/2022 0.6  % Final    Lymph # 06/13/2022 2.25  0.6 - 4.6 x10(3)/mcL Final    Neut # 06/13/2022 4.1  2.1 - 9.2 x10(3)/mcL Final    Mono # 06/13/2022 0.52  0.1 - 1.3 x10(3)/mcL Final    Eos # 06/13/2022 0.29  0 - 0.9 x10(3)/mcL Final    Baso # 06/13/2022 0.04  0 - 0.2 x10(3)/mcL Final    IG# 06/13/2022 0.01  0 - 0.0155 x10(3)/mcL Final    IG% 06/13/2022 0.1  0 - 0.43 % Final    NRBC% 06/13/2022 0.0  % Final            Assessment/Plan:     1. Left frontal lobe infarct      2. POSITIVE RPR  - s/p Bicillin x 3  - LP on 5/22 negative for CNS infection     3. History of alcohol dependence    4. HTN     5. Nicotine dependence    6. Anxiety/depression    7. Hyperparathyroidism    Progressing well. Continue current POC    Patient Active Problem List   Diagnosis    Hypertension due to endocrine disorder    Hypercalcemia    Cigarette nicotine dependence without complication    Primary hyperparathyroidism    Vitamin D deficiency    CVA (cerebral vascular accident)    Osteopenia          Pato Brown, SIRISHA  [facility] - Rehab Inpatient Services

## 2022-06-18 NOTE — PLAN OF CARE
Problem: Adult Inpatient Plan of Care  Goal: Plan of Care Review  Outcome: Ongoing, Progressing  Goal: Patient-Specific Goal (Individualized)  Outcome: Ongoing, Progressing  Goal: Absence of Hospital-Acquired Illness or Injury  Outcome: Ongoing, Progressing  Goal: Optimal Comfort and Wellbeing  Outcome: Ongoing, Progressing  Goal: Readiness for Transition of Care  Outcome: Ongoing, Progressing     Problem: Adjustment to Illness (Stroke, Ischemic/Transient Ischemic Attack)  Goal: Optimal Coping  Outcome: Ongoing, Progressing     Problem: Bowel Elimination Impaired (Stroke, Ischemic/Transient Ischemic Attack)  Goal: Effective Bowel Elimination  Outcome: Ongoing, Progressing     Problem: Cerebral Tissue Perfusion (Stroke, Ischemic/Transient Ischemic Attack)  Goal: Optimal Cerebral Tissue Perfusion  Outcome: Ongoing, Progressing     Problem: Cognitive Impairment (Stroke, Ischemic/Transient Ischemic Attack)  Goal: Optimal Cognitive Function  Outcome: Ongoing, Progressing     Problem: Communication Impairment (Stroke, Ischemic/Transient Ischemic Attack)  Goal: Improved Communication Skills  Outcome: Ongoing, Progressing     Problem: Urinary Elimination Impaired (Stroke, Ischemic/Transient Ischemic Attack)  Goal: Effective Urinary Elimination  Outcome: Ongoing, Progressing     Problem: Skin Injury Risk Increased  Goal: Skin Health and Integrity  Outcome: Ongoing, Progressing

## 2022-06-18 NOTE — PT/OT/SLP PROGRESS
Speech Language Pathology Treatment          Sammie Belcher   MRN: [id number]     Diet recommendations: Solid Diet Level: Regular Diet - IDDSI Level 7  Liquid Diet Level: Thin liquids - IDDSI Level 0     Therapy Minutes  SLP Treatment Date: 22  Speech Start Time: 930  Speech Stop Time: 1000  Speech Total (min): 30 min  SLP Individual: 30    Billable Minutes:  Speech Therapy Individual 30    General Precautions: Standard, aphasia          Subjective:  SLP services delivered outside.      Objective:   SLP instructed patient in RET to facilitate expressive language to increase number of content words, word-finding, and efficiency/effectiveness with communication. SLP provided min-mod cues to facilitate patient success.  SLP incorporated written cues and stimuli to facilitate patient success.    Pain/Comfort  Pain Rating 1: 0/10  Pain Rating Post-Intervention 1: 0/10    Assessment:  Sammie Belcher is a 52 y.o. female with a SLP diagnosis of disfluency and Aphasia. She produced utterances with assist between 4-18 words during initial steps of RET and 6-11 during final step of RET with cues.    Discharge recommendations: Discharge Facility/Level of Care Needs: home health speech therapy     Goals:   Multidisciplinary Problems     SLP Goals        Problem: SLP    Goal Priority Disciplines Outcome   SLP Goal     SLP Ongoing, Progressing   Description: LTG: To increase expressive/receptive language skills to enhance functional communication to express basic wants and needs with 100% effectiveness.     ST. Pt will answer simple, environmental, and personal yes/no questions with 90% accuracy and minimal cues.--GOAL MET  2. Pt will complete confrontational naming tasks with 90% accuracy and minimal cues.--GOAL MET  3. Pt will use fluency shaping/stuttering modification techniques to decrease syllables stuttered to less than 5%SS.--PROGRESSING, CONTINUE    LTG: Pt will increase cognitive linguistic skills to  increase safety awareness and independence to return to PLOF.    ST. Pt will recall 4 out of 5 verbal stimuli following a 5 minute delay with minimal cues.   2. Pt will maintain attention to task for 5 minutes without redirection during 80% of trials.                    Plan:   Patient to be seen Therapy Frequency: 5 x/week   Planned Interventions: Language Therapy  Plan of Care Expires: 22  Plan of Care reviewed with: patient  SLP Follow-up?: Yes  SLP - Next Visit Date: 22

## 2022-06-18 NOTE — PT/OT/SLP PROGRESS
Physical Therapy         Treatment        Sammie Belcher   MRN: [id number]     Therapy Minutes  PT Start Time: 1000  PT Stop Time: 1100  PT Total Time (min): 60 min     Billable Minutes:  Gait [id number] and Therapeutic Exercise 30      Treatment Type: Treatment  PT/PTA: PTA     PTA Visit Number: 1       General Precautions: Standard, fall, aphasia  Orthopedic Precautions: Orthopedic Precautions : N/A   Braces: Braces: AFO              Subjective:    Pain/Comfort  Pain Rating 1: 0/10    Objective:  Therapeutic activities, therapeutic exercise performed to increase muscle strength and motor recruitment so that patient may improve quality of functionional mobility and increase functional independence. and Gait training performed to improve functional strength, to increase functional mobility, safety and independence.    VITALS:  BP: 126/84 mmHg in sitting  BP post tx: 136/87 mmHg in sitting    Gait Training:  Patient ambulates 200 feet using hand rail in hallway with Minimal Assistance and minimal verbal cues. Pt demonstrating a  swing-through gait with decreased knee extension on RLE.Impairments contributing to gait deviations include impaired motor control and decreased strength    Pt ambulated in hallway with no AD to focus on improving balance, stability, and proprioception in RLE.     Additional Treatment:  Pt performed seated LAQ, hip flex, knee flexion, and hip abduction using 3# cuff wt on LLE, 1.5# cuff wt on RLE, GTB on LLE, and RTB on RLE for 15 reps x2 sets.     Activity Tolerance:  Patient tolerated treatment well    Patient left up in chair with chair alarm on, seatbelt donned, and call bell within reach. .    Education Provided: gait training    Expected compliance:  Moderate compliance        Assessment:  Sammie Belcher is a 52 y.o. female with a medical diagnosis of CVA (cerebral vascular accident). Pt to benefit from skilled PT services to address impairments with progression towards functional  independence as tolerated.    Rehab potential is good.    Activity tolerance: Good    GOALS:   Multidisciplinary Problems     Physical Therapy Goals        Problem: Physical Therapy    Goal Priority Disciplines Outcome Goal Variances Interventions   Physical Therapy Goal     PT, PT/OT Ongoing, Progressing     Description: Short Term goals      Bed Mobility   Roll Right and Left Setup or Clean-up Assistance.  Lying to sitting Setup or Clean-up Assistance.  Sitting to lying Setup or Clean-up Assistance.    Transfers    Pt will be able to perform Stand step chair/bed to chair transfer With LRAD Setup or Clean-up Assistance.  Pt will be able to perform Sit to stand with LRAD   Setup or Clean-up Assistance.  Pt will be able to perform Car transfer with LRAD Setup or Clean-up Assistance.    Ambulation    Pt will ambulate 100 Feet with LRAD Setup or Clean-up Assistance.  Pt will ascend and descend 12 stairs with L rail CGA      Wheelchair Mobility   Pt will be able to propel 150 feet in paola wheelchair Setup or Clean-up Assistance.      Timeframe: By Discharge                      PLAN:    Patient to be seen 5 x/week  to address the above listed problems via gait training, therapeutic activities, therapeutic exercises, neuromuscular re-education  Plan of Care expires: 06/09/22  Plan of Care reviewed with: patient    6/18/2022

## 2022-06-19 PROCEDURE — 94799 UNLISTED PULMONARY SVC/PX: CPT

## 2022-06-19 PROCEDURE — S4991 NICOTINE PATCH NONLEGEND: HCPCS | Performed by: NURSE PRACTITIONER

## 2022-06-19 PROCEDURE — 11800000 HC REHAB PRIVATE ROOM

## 2022-06-19 PROCEDURE — 94761 N-INVAS EAR/PLS OXIMETRY MLT: CPT

## 2022-06-19 PROCEDURE — 25000003 PHARM REV CODE 250: Performed by: NURSE PRACTITIONER

## 2022-06-19 PROCEDURE — 63600175 PHARM REV CODE 636 W HCPCS: Performed by: NURSE PRACTITIONER

## 2022-06-19 PROCEDURE — 97116 GAIT TRAINING THERAPY: CPT | Mod: CQ

## 2022-06-19 PROCEDURE — 97110 THERAPEUTIC EXERCISES: CPT | Mod: CQ

## 2022-06-19 RX ADMIN — ATORVASTATIN CALCIUM 40 MG: 40 TABLET, FILM COATED ORAL at 08:06

## 2022-06-19 RX ADMIN — THIAMINE HCL TAB 100 MG 100 MG: 100 TAB at 08:06

## 2022-06-19 RX ADMIN — ACETAMINOPHEN 650 MG: 325 TABLET ORAL at 08:06

## 2022-06-19 RX ADMIN — ENOXAPARIN SODIUM 40 MG: 40 INJECTION SUBCUTANEOUS at 05:06

## 2022-06-19 RX ADMIN — HYDROXYZINE PAMOATE 50 MG: 50 CAPSULE ORAL at 08:06

## 2022-06-19 RX ADMIN — ASPIRIN 325 MG: 325 TABLET, COATED ORAL at 08:06

## 2022-06-19 RX ADMIN — BACLOFEN 5 MG: 5 TABLET ORAL at 08:06

## 2022-06-19 RX ADMIN — LABETALOL HYDROCHLORIDE 200 MG: 200 TABLET, FILM COATED ORAL at 08:06

## 2022-06-19 RX ADMIN — SERTRALINE HYDROCHLORIDE 25 MG: 25 TABLET ORAL at 08:06

## 2022-06-19 RX ADMIN — AMLODIPINE BESYLATE 5 MG: 5 TABLET ORAL at 08:06

## 2022-06-19 RX ADMIN — LABETALOL HYDROCHLORIDE 200 MG: 200 TABLET, FILM COATED ORAL at 02:06

## 2022-06-19 RX ADMIN — VENLAFAXINE 37.5 MG: 37.5 TABLET ORAL at 08:06

## 2022-06-19 RX ADMIN — MULTIPLE VITAMINS W/ MINERALS TAB 1 TABLET: TAB at 08:06

## 2022-06-19 RX ADMIN — LISINOPRIL 10 MG: 10 TABLET ORAL at 08:06

## 2022-06-19 RX ADMIN — NICOTINE 1 PATCH: 21 PATCH, EXTENDED RELEASE TRANSDERMAL at 08:06

## 2022-06-19 RX ADMIN — THIAMINE HCL TAB 100 MG 100 MG: 100 TAB at 02:06

## 2022-06-19 NOTE — PT/OT/SLP PROGRESS
"Physical Therapy         Treatment        Sammie Belcher   MRN: [id number]     Therapy Minutes  PT Start Time: 30  PT Stop Time: 30  PT Total Time (min): 60 min     Billable Minutes:  Gait [id number] and Therapeutic Exercise 45      Treatment Type: Treatment  PT/PTA: PTA     PTA Visit Number: 2       General Precautions: Standard, fall, aphasia  Orthopedic Precautions: Orthopedic Precautions : N/A   Braces: Braces: AFO      Subjective:  "Pt states that she is feeling good today with no complaints of pain"    Pain/Comfort  Pain Rating 1: 0/10    Objective:  Therapeutic activities, therapeutic exercise performed to increase muscle strength and motor recruitment so that patient may improve quality of functionional mobility and increase functional independence. and Gait training performed to improve functional strength, to increase functional mobility, safety and independence.    VITALS:  BP beginnin/78 mmHg in sitting   BP endin/81 mmHg in sitting    Oxygen Saturation:       at rest: HR 84 bpm       with Activity: HR 84 bpm    Gait Training:  Patient ambulates 270 feet using rolling walker with Stand-by Assistance and minimal verbal cues. Pt demonstrating a  swing-through gait with decreased step length and decreased foot clearance at times and decrease knee extension.Impairments contributing to gait deviations include impaired motor control and decreased strength    Additional Treatment:  Pt performed forward and lateral step ups using bottom step of stairs for 15 reps x2 sets on RLE with HELADIO handrails for support.     Pt performed standing hip flex and standing knee flex using // bars for support with 3# cuff wt on LLE and no wt used on RLE for 10 reps x2 sets with vc's and tc's provided occasionally to RLE to initiate movement.     Activity Tolerance:  Patient tolerated treatment well    Patient left up in chair with call button in reach and seatbelt donned..    Education Provided: gait training and " strengthening exercises    Expected compliance:  Moderate compliance        Assessment:  Sammie Belcher is a 52 y.o. female with a medical diagnosis of CVA (cerebral vascular accident). She presents with decreased strength, balance, and coordination. Pt to benefit from skilled PT services to address impairments with progression towards functional independence as tolerated.    Rehab potential is good.    Activity tolerance: Good      GOALS:   Multidisciplinary Problems     Physical Therapy Goals        Problem: Physical Therapy    Goal Priority Disciplines Outcome Goal Variances Interventions   Physical Therapy Goal     PT, PT/OT Ongoing, Progressing     Description: Short Term goals      Bed Mobility   Roll Right and Left Setup or Clean-up Assistance.  Lying to sitting Setup or Clean-up Assistance.  Sitting to lying Setup or Clean-up Assistance.    Transfers    Pt will be able to perform Stand step chair/bed to chair transfer With LRAD Setup or Clean-up Assistance.  Pt will be able to perform Sit to stand with LRAD   Setup or Clean-up Assistance.  Pt will be able to perform Car transfer with LRAD Setup or Clean-up Assistance.    Ambulation    Pt will ambulate 100 Feet with LRAD Setup or Clean-up Assistance.  Pt will ascend and descend 12 stairs with L rail CGA      Wheelchair Mobility   Pt will be able to propel 150 feet in paola wheelchair Setup or Clean-up Assistance.      Timeframe: By Discharge                      PLAN:    Patient to be seen 5 x/week  to address the above listed problems via gait training, therapeutic activities, therapeutic exercises, neuromuscular re-education  Plan of Care expires: 06/09/22  Plan of Care reviewed with: patient    6/19/2022

## 2022-06-19 NOTE — PROGRESS NOTES
[facility] - Rehab Inpatient Services  Progress Note    Patient Name: Sammie Belcher  MRN: [id number]  Patient Class: IP- Rehab   Admission Date: 6/2/2022  Length of Stay: 17 days  Attending Physician: José Miguel Cesar MD  Primary Care Provider: Primary Doctor No    Subjective:     Past Medical History:   Diagnosis Date    Hypertension     Thyroid disease       Admit Information: 52-year-old  female presented to [facility] ED on 5/18/2022 complaining of dysarthria and expressive aphasia x3 days.  PMH significant hyperparathyroidism s/p parathyroidectomy 2/2022, anxiety/depression, and HTN.  Workup significant for reactive RPR and CT head significant for left frontal 6 cm cortical base hypodensity suggesting acute to subacute nonhemorrhagic infarct of the left KENISHA vascular territory.  MRA brain significant for left frontal lobe infarct extending into the corpus callosum with acute nonhemorrhagic infarct in the territory of the KENISHA, and old lacunar infarcts. Transferred to [facility] for neurology services.  Bicillin initiated on 5/19 for syphilis with plan for 3 doses with end date on 6/02.  Also received Flagyl x 7 days due to Trichomoniasis.  TTE significant for EF 74% with severe concentric hypertrophy and hyperdynamic systolic function with negative bubble study.  ASA and statin initiated.  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  LP not revealing with no evidence of CNS infection.  Tolerating LINQ placement on 05/26.  CVA likely caused by long history cocaine abuse.  Continue to participate in progress therapy.  Tolerated transferred to [facility] inpatient rehab unit on 6/02 without incident.    Tolerating therapy well. Denies any complaints at time of rounding.     Review of Systems   Constitutional: Negative for chills, fatigue and fever.   Eyes: Negative for visual disturbance.   Respiratory: Negative for cough, chest tightness and shortness of breath.  "   Gastrointestinal: Negative for abdominal pain, blood in stool, constipation, diarrhea, nausea and vomiting.   Musculoskeletal: Negative for back pain, joint swelling and leg pain.   Neurological: Negative for dizziness and headaches.   Psychiatric/Behavioral: Negative for agitation, behavioral problems and hallucinations. The patient is not nervous/anxious.     Review of Systems   Constitutional: Negative for chills, fatigue and fever.   Eyes: Negative for visual disturbance.   Respiratory: Negative for cough, chest tightness and shortness of breath.    Gastrointestinal: Negative for abdominal pain, blood in stool, constipation, diarrhea, nausea and vomiting.   Musculoskeletal: Negative for back pain and joint swelling.   Neurological: Negative for dizziness and headaches.   Psychiatric/Behavioral: Negative for agitation, behavioral problems and hallucinations. The patient is not nervous/anxious.        Physical Exam  HENT:      Head: Atraumatic.      Nose: Nose normal.      Mouth/Throat:      Mouth: Mucous membranes are moist.      Pharynx: Oropharynx is clear.   Eyes:      Extraocular Movements: Extraocular movements intact.      Conjunctiva/sclera: Conjunctivae normal.      Pupils: Pupils are equal, round, and reactive to light.   Cardiovascular:      Rate and Rhythm: Normal rate and regular rhythm.   Pulmonary:      Effort: Pulmonary effort is normal.      Breath sounds: Normal breath sounds.   Abdominal:      General: Abdomen is flat. Bowel sounds are normal.      Palpations: Abdomen is soft.   Musculoskeletal:         General: Normal range of motion.   Skin:     General: Skin is warm and dry.      Capillary Refill: Capillary refill takes less than 2 seconds.   Neurological:      Mental Status: She is alert. Mental status is at baseline.   Psychiatric:         Mood and Affect: Mood normal.          BP (!) 148/78   Pulse 87   Temp 97.6 °F (36.4 °C) (Oral)   Resp 18   Ht 6' 0.99" (1.854 m)   Wt 66.5 kg " (146 lb 9.7 oz)   LMP  (LMP Unknown)   SpO2 98%   BMI 19.35 kg/m²      Admission on 06/02/2022   Component Date Value Ref Range Status    Sodium Level 06/03/2022 143  136 - 145 mmol/L Final    Potassium Level 06/03/2022 4.3  3.5 - 5.1 mmol/L Final    Chloride 06/03/2022 105  98 - 107 mmol/L Final    Carbon Dioxide 06/03/2022 28  22 - 29 mmol/L Final    Glucose Level 06/03/2022 88  74 - 100 mg/dL Final    Blood Urea Nitrogen 06/03/2022 16.2  9.8 - 20.1 mg/dL Final    Creatinine 06/03/2022 0.78  0.55 - 1.02 mg/dL Final    Calcium Level Total 06/03/2022 9.7  8.4 - 10.2 mg/dL Final    Protein Total 06/03/2022 6.6  6.4 - 8.3 gm/dL Final    Albumin Level 06/03/2022 3.3 (A) 3.5 - 5.0 gm/dL Final    Globulin 06/03/2022 3.3  2.4 - 3.5 gm/dL Final    Albumin/Globulin Ratio 06/03/2022 1.0 (A) 1.1 - 2.0 ratio Final    Bilirubin Total 06/03/2022 0.4  <=1.5 mg/dL Final    Alkaline Phosphatase 06/03/2022 71  40 - 150 unit/L Final    Alanine Aminotransferase 06/03/2022 38  0 - 55 unit/L Final    Aspartate Aminotransferase 06/03/2022 34  5 - 34 unit/L Final    Estimated GFR- 06/03/2022 >60  mls/min/1.73/m2 Final    Magnesium Level 06/03/2022 2.00  1.60 - 2.60 mg/dL Final    Phosphorus Level 06/03/2022 3.8  2.3 - 4.7 mg/dL Final    Ferritin Level 06/03/2022 107.71  4.63 - 204.00 ng/mL Final    Iron Binding Capacity Unsaturated 06/03/2022 129  70 - 310 ug/dL Final    Iron Level 06/03/2022 95  50 - 170 ug/dL Final    Transferrin 06/03/2022 185  180 - 382 mg/dL Final    Iron Binding Capacity Total 06/03/2022 224 (A) 250 - 450 ug/dL Final    Iron Saturation 06/03/2022 42  20 - 50 % Final    WBC 06/03/2022 8.6  4.5 - 11.5 x10(3)/mcL Final    RBC 06/03/2022 4.29  4.20 - 5.40 x10(6)/mcL Final    Hgb 06/03/2022 13.2  12.0 - 16.0 gm/dL Final    Hct 06/03/2022 40.5  37.0 - 47.0 % Final    MCV 06/03/2022 94.4 (A) 80.0 - 94.0 fL Final    MCH 06/03/2022 30.8  27.0 - 31.0 pg Final    MCHC  06/03/2022 32.6 (A) 33.0 - 36.0 mg/dL Final    RDW 06/03/2022 14.0  11.5 - 17.0 % Final    Platelet 06/03/2022 209  130 - 400 x10(3)/mcL Final    MPV 06/03/2022 10.2  9.4 - 12.4 fL Final    Neut % 06/03/2022 50.9  % Final    Lymph % 06/03/2022 38.5  % Final    Mono % 06/03/2022 6.3  % Final    Eos % 06/03/2022 3.6  % Final    Basophil % 06/03/2022 0.5  % Final    Lymph # 06/03/2022 3.31  0.6 - 4.6 x10(3)/mcL Final    Neut # 06/03/2022 4.4  2.1 - 9.2 x10(3)/mcL Final    Mono # 06/03/2022 0.54  0.1 - 1.3 x10(3)/mcL Final    Eos # 06/03/2022 0.31  0 - 0.9 x10(3)/mcL Final    Baso # 06/03/2022 0.04  0 - 0.2 x10(3)/mcL Final    IG# 06/03/2022 0.02 (A) 0 - 0.0155 x10(3)/mcL Final    IG% 06/03/2022 0.2  0 - 0.43 % Final    NRBC% 06/03/2022 0.0  % Final    Sodium Level 06/06/2022 144  136 - 145 mmol/L Final    Potassium Level 06/06/2022 3.9  3.5 - 5.1 mmol/L Final    Chloride 06/06/2022 108 (A) 98 - 107 mmol/L Final    Carbon Dioxide 06/06/2022 28  22 - 29 mmol/L Final    Glucose Level 06/06/2022 83  74 - 100 mg/dL Final    Blood Urea Nitrogen 06/06/2022 12.6  9.8 - 20.1 mg/dL Final    Creatinine 06/06/2022 0.75  0.55 - 1.02 mg/dL Final    Calcium Level Total 06/06/2022 9.8  8.4 - 10.2 mg/dL Final    Protein Total 06/06/2022 6.7  6.4 - 8.3 gm/dL Final    Albumin Level 06/06/2022 3.5  3.5 - 5.0 gm/dL Final    Globulin 06/06/2022 3.2  2.4 - 3.5 gm/dL Final    Albumin/Globulin Ratio 06/06/2022 1.1  1.1 - 2.0 ratio Final    Bilirubin Total 06/06/2022 0.5  <=1.5 mg/dL Final    Alkaline Phosphatase 06/06/2022 72  40 - 150 unit/L Final    Alanine Aminotransferase 06/06/2022 38  0 - 55 unit/L Final    Aspartate Aminotransferase 06/06/2022 30  5 - 34 unit/L Final    Estimated GFR- 06/06/2022 >60  mls/min/1.73/m2 Final    Magnesium Level 06/06/2022 2.10  1.60 - 2.60 mg/dL Final    Phosphorus Level 06/06/2022 3.7  2.3 - 4.7 mg/dL Final    WBC 06/06/2022 8.0  4.5 - 11.5 x10(3)/mcL  Final    RBC 06/06/2022 4.40  4.20 - 5.40 x10(6)/mcL Final    Hgb 06/06/2022 13.2  12.0 - 16.0 gm/dL Final    Hct 06/06/2022 40.1  37.0 - 47.0 % Final    MCV 06/06/2022 91.1  80.0 - 94.0 fL Final    MCH 06/06/2022 30.0  27.0 - 31.0 pg Final    MCHC 06/06/2022 32.9 (A) 33.0 - 36.0 mg/dL Final    RDW 06/06/2022 13.9  11.5 - 17.0 % Final    Platelet 06/06/2022 230  130 - 400 x10(3)/mcL Final    MPV 06/06/2022 10.0  9.4 - 12.4 fL Final    Neut % 06/06/2022 54.2  % Final    Lymph % 06/06/2022 34.3  % Final    Mono % 06/06/2022 6.2  % Final    Eos % 06/06/2022 4.4  % Final    Basophil % 06/06/2022 0.6  % Final    Lymph # 06/06/2022 2.73  0.6 - 4.6 x10(3)/mcL Final    Neut # 06/06/2022 4.3  2.1 - 9.2 x10(3)/mcL Final    Mono # 06/06/2022 0.49  0.1 - 1.3 x10(3)/mcL Final    Eos # 06/06/2022 0.35  0 - 0.9 x10(3)/mcL Final    Baso # 06/06/2022 0.05  0 - 0.2 x10(3)/mcL Final    IG# 06/06/2022 0.02 (A) 0 - 0.0155 x10(3)/mcL Final    IG% 06/06/2022 0.3  0 - 0.43 % Final    NRBC% 06/06/2022 0.0  % Final    Sodium Level 06/13/2022 143  136 - 145 mmol/L Final    Potassium Level 06/13/2022 4.5  3.5 - 5.1 mmol/L Final    Chloride 06/13/2022 106  98 - 107 mmol/L Final    Carbon Dioxide 06/13/2022 28  22 - 29 mmol/L Final    Glucose Level 06/13/2022 81  74 - 100 mg/dL Final    Blood Urea Nitrogen 06/13/2022 14.7  9.8 - 20.1 mg/dL Final    Creatinine 06/13/2022 0.73  0.55 - 1.02 mg/dL Final    Calcium Level Total 06/13/2022 9.9  8.4 - 10.2 mg/dL Final    Protein Total 06/13/2022 6.7  6.4 - 8.3 gm/dL Final    Albumin Level 06/13/2022 3.5  3.5 - 5.0 gm/dL Final    Globulin 06/13/2022 3.2  2.4 - 3.5 gm/dL Final    Albumin/Globulin Ratio 06/13/2022 1.1  1.1 - 2.0 ratio Final    Bilirubin Total 06/13/2022 0.4  <=1.5 mg/dL Final    Alkaline Phosphatase 06/13/2022 70  40 - 150 unit/L Final    Alanine Aminotransferase 06/13/2022 39  0 - 55 unit/L Final    Aspartate Aminotransferase 06/13/2022 29  5 - 34  unit/L Final    Estimated GFR- 06/13/2022 >60  mls/min/1.73/m2 Final    Magnesium Level 06/13/2022 2.00  1.60 - 2.60 mg/dL Final    Phosphorus Level 06/13/2022 4.4  2.3 - 4.7 mg/dL Final    WBC 06/13/2022 7.2  4.5 - 11.5 x10(3)/mcL Final    RBC 06/13/2022 4.21  4.20 - 5.40 x10(6)/mcL Final    Hgb 06/13/2022 12.9  12.0 - 16.0 gm/dL Final    Hct 06/13/2022 40.2  37.0 - 47.0 % Final    MCV 06/13/2022 95.5 (A) 80.0 - 94.0 fL Final    MCH 06/13/2022 30.6  27.0 - 31.0 pg Final    MCHC 06/13/2022 32.1 (A) 33.0 - 36.0 mg/dL Final    RDW 06/13/2022 14.0  11.5 - 17.0 % Final    Platelet 06/13/2022 272  130 - 400 x10(3)/mcL Final    MPV 06/13/2022 10.3  9.4 - 12.4 fL Final    Neut % 06/13/2022 57.0  % Final    Lymph % 06/13/2022 31.1  % Final    Mono % 06/13/2022 7.2  % Final    Eos % 06/13/2022 4.0  % Final    Basophil % 06/13/2022 0.6  % Final    Lymph # 06/13/2022 2.25  0.6 - 4.6 x10(3)/mcL Final    Neut # 06/13/2022 4.1  2.1 - 9.2 x10(3)/mcL Final    Mono # 06/13/2022 0.52  0.1 - 1.3 x10(3)/mcL Final    Eos # 06/13/2022 0.29  0 - 0.9 x10(3)/mcL Final    Baso # 06/13/2022 0.04  0 - 0.2 x10(3)/mcL Final    IG# 06/13/2022 0.01  0 - 0.0155 x10(3)/mcL Final    IG% 06/13/2022 0.1  0 - 0.43 % Final    NRBC% 06/13/2022 0.0  % Final            Assessment/Plan:     1. Left frontal lobe infarct      2. POSITIVE RPR  - s/p Bicillin x 3  - LP on 5/22 negative for CNS infection     3. History of alcohol dependence    4. HTN     5. Nicotine dependence    6. Anxiety/depression    7. Hyperparathyroidism    Progressing well. Continue current POC    Patient Active Problem List   Diagnosis    Hypertension due to endocrine disorder    Hypercalcemia    Cigarette nicotine dependence without complication    Primary hyperparathyroidism    Vitamin D deficiency    CVA (cerebral vascular accident)    Osteopenia          Pato Brown, SIRISHA  [facility] - Rehab Inpatient Services

## 2022-06-20 LAB
ALBUMIN SERPL-MCNC: 3.6 GM/DL (ref 3.5–5)
ALBUMIN/GLOB SERPL: 1.4 RATIO (ref 1.1–2)
ALP SERPL-CCNC: 66 UNIT/L (ref 40–150)
ALT SERPL-CCNC: 31 UNIT/L (ref 0–55)
AST SERPL-CCNC: 21 UNIT/L (ref 5–34)
BASOPHILS # BLD AUTO: 0.06 X10(3)/MCL (ref 0–0.2)
BASOPHILS NFR BLD AUTO: 0.9 %
BILIRUBIN DIRECT+TOT PNL SERPL-MCNC: 0.3 MG/DL
BUN SERPL-MCNC: 12.7 MG/DL (ref 9.8–20.1)
CALCIUM SERPL-MCNC: 10.3 MG/DL (ref 8.4–10.2)
CHLORIDE SERPL-SCNC: 107 MMOL/L (ref 98–107)
CO2 SERPL-SCNC: 27 MMOL/L (ref 22–29)
CREAT SERPL-MCNC: 0.77 MG/DL (ref 0.55–1.02)
EOSINOPHIL # BLD AUTO: 0.36 X10(3)/MCL (ref 0–0.9)
EOSINOPHIL NFR BLD AUTO: 5.5 %
ERYTHROCYTE [DISTWIDTH] IN BLOOD BY AUTOMATED COUNT: 14 % (ref 11.5–17)
GLOBULIN SER-MCNC: 2.5 GM/DL (ref 2.4–3.5)
GLUCOSE SERPL-MCNC: 84 MG/DL (ref 74–100)
HCT VFR BLD AUTO: 38.1 % (ref 37–47)
HGB BLD-MCNC: 12.4 GM/DL (ref 12–16)
IMM GRANULOCYTES # BLD AUTO: 0.01 X10(3)/MCL (ref 0–0.02)
IMM GRANULOCYTES NFR BLD AUTO: 0.2 % (ref 0–0.43)
LYMPHOCYTES # BLD AUTO: 2.6 X10(3)/MCL (ref 0.6–4.6)
LYMPHOCYTES NFR BLD AUTO: 39.6 %
MAGNESIUM SERPL-MCNC: 2 MG/DL (ref 1.6–2.6)
MCH RBC QN AUTO: 30.8 PG (ref 27–31)
MCHC RBC AUTO-ENTMCNC: 32.5 MG/DL (ref 33–36)
MCV RBC AUTO: 94.5 FL (ref 80–94)
MONOCYTES # BLD AUTO: 0.52 X10(3)/MCL (ref 0.1–1.3)
MONOCYTES NFR BLD AUTO: 7.9 %
NEUTROPHILS # BLD AUTO: 3 X10(3)/MCL (ref 2.1–9.2)
NEUTROPHILS NFR BLD AUTO: 45.9 %
NRBC BLD AUTO-RTO: 0 %
PHOSPHATE SERPL-MCNC: 4 MG/DL (ref 2.3–4.7)
PLATELET # BLD AUTO: 250 X10(3)/MCL (ref 130–400)
PMV BLD AUTO: 10.7 FL (ref 9.4–12.4)
POTASSIUM SERPL-SCNC: 4.6 MMOL/L (ref 3.5–5.1)
PROT SERPL-MCNC: 6.1 GM/DL (ref 6.4–8.3)
RBC # BLD AUTO: 4.03 X10(6)/MCL (ref 4.2–5.4)
SODIUM SERPL-SCNC: 144 MMOL/L (ref 136–145)
WBC # SPEC AUTO: 6.6 X10(3)/MCL (ref 4.5–11.5)

## 2022-06-20 PROCEDURE — 84100 ASSAY OF PHOSPHORUS: CPT | Performed by: NURSE PRACTITIONER

## 2022-06-20 PROCEDURE — 63600175 PHARM REV CODE 636 W HCPCS: Performed by: NURSE PRACTITIONER

## 2022-06-20 PROCEDURE — 97530 THERAPEUTIC ACTIVITIES: CPT

## 2022-06-20 PROCEDURE — 25000003 PHARM REV CODE 250: Performed by: NURSE PRACTITIONER

## 2022-06-20 PROCEDURE — 85025 COMPLETE CBC W/AUTO DIFF WBC: CPT | Performed by: NURSE PRACTITIONER

## 2022-06-20 PROCEDURE — 11800000 HC REHAB PRIVATE ROOM

## 2022-06-20 PROCEDURE — 36415 COLL VENOUS BLD VENIPUNCTURE: CPT | Performed by: NURSE PRACTITIONER

## 2022-06-20 PROCEDURE — 80053 COMPREHEN METABOLIC PANEL: CPT | Performed by: NURSE PRACTITIONER

## 2022-06-20 PROCEDURE — 83735 ASSAY OF MAGNESIUM: CPT | Performed by: NURSE PRACTITIONER

## 2022-06-20 PROCEDURE — 97168 OT RE-EVAL EST PLAN CARE: CPT

## 2022-06-20 PROCEDURE — S4991 NICOTINE PATCH NONLEGEND: HCPCS | Performed by: NURSE PRACTITIONER

## 2022-06-20 PROCEDURE — 94799 UNLISTED PULMONARY SVC/PX: CPT

## 2022-06-20 PROCEDURE — 97116 GAIT TRAINING THERAPY: CPT

## 2022-06-20 PROCEDURE — 92507 TX SP LANG VOICE COMM INDIV: CPT

## 2022-06-20 PROCEDURE — 94761 N-INVAS EAR/PLS OXIMETRY MLT: CPT

## 2022-06-20 PROCEDURE — 97110 THERAPEUTIC EXERCISES: CPT

## 2022-06-20 PROCEDURE — 97535 SELF CARE MNGMENT TRAINING: CPT

## 2022-06-20 RX ADMIN — ENOXAPARIN SODIUM 40 MG: 40 INJECTION SUBCUTANEOUS at 05:06

## 2022-06-20 RX ADMIN — NICOTINE 1 PATCH: 21 PATCH, EXTENDED RELEASE TRANSDERMAL at 08:06

## 2022-06-20 RX ADMIN — ASPIRIN 325 MG: 325 TABLET, COATED ORAL at 08:06

## 2022-06-20 RX ADMIN — ATORVASTATIN CALCIUM 40 MG: 40 TABLET, FILM COATED ORAL at 08:06

## 2022-06-20 RX ADMIN — THIAMINE HCL TAB 100 MG 100 MG: 100 TAB at 02:06

## 2022-06-20 RX ADMIN — LISINOPRIL 10 MG: 10 TABLET ORAL at 08:06

## 2022-06-20 RX ADMIN — MULTIPLE VITAMINS W/ MINERALS TAB 1 TABLET: TAB at 08:06

## 2022-06-20 RX ADMIN — LABETALOL HYDROCHLORIDE 200 MG: 200 TABLET, FILM COATED ORAL at 08:06

## 2022-06-20 RX ADMIN — ACETAMINOPHEN 650 MG: 325 TABLET ORAL at 08:06

## 2022-06-20 RX ADMIN — LABETALOL HYDROCHLORIDE 200 MG: 200 TABLET, FILM COATED ORAL at 02:06

## 2022-06-20 RX ADMIN — VENLAFAXINE 37.5 MG: 37.5 TABLET ORAL at 08:06

## 2022-06-20 RX ADMIN — AMLODIPINE BESYLATE 5 MG: 5 TABLET ORAL at 08:06

## 2022-06-20 RX ADMIN — SERTRALINE HYDROCHLORIDE 25 MG: 25 TABLET ORAL at 08:06

## 2022-06-20 RX ADMIN — BACLOFEN 5 MG: 5 TABLET ORAL at 08:06

## 2022-06-20 RX ADMIN — THIAMINE HCL TAB 100 MG 100 MG: 100 TAB at 08:06

## 2022-06-20 NOTE — PROGRESS NOTES
06/20/22 0830   Rec Therapy Time Calculation   Rec Start Time 0830   Rec Stop Time 0900   Rec Total Time (min) 30 min   Subjective   Patient states Pt let staff know that she needed to use the bathroom   Assessment   Transfers requires assistance from  other person  (w/c to toilet transfer was contact guard)   Attendance   Activity Recreational Therapy  (Bean Bag Toss)   Participation Active participation   Assessment   Assessment Sit to sstand was supervision/setup as was dynamic standing balance/reaching. Standing tolerance was 10 minutes. RUE coordination/dexteriety was supervision. More spontaneously with grasp and release. Sequencing skills and problem solving skills were supervision. More spontaneous with verbal expression. Playful staff   Plan   Planned Therapy Intervention Continue with current plan  (Bean Bag Toss)   Expected Length of Stay Pending   PT Frequency Minimu of 5 visits per week;Other (see comments)  (Daily)   Time   Treatment time 2 units

## 2022-06-20 NOTE — PT/OT/SLP PROGRESS
Speech Language Pathology Treatment          Sammie Belcher   MRN: [id number]     Diet recommendations: Solid Diet Level: Regular Diet - IDDSI Level 7  Liquid Diet Level: Thin liquids - IDDSI Level 0      Billable Minutes:  Speech Therapy Individual 30 minutes    General Precautions: Standard, aphasia          Subjective:  Pt awake, alert, and cooperative throughout session.     Objective:   Pt participated in response elaboration therapy to facilitate increase in communicative effectiveness. Pt requiring min-mod cues on this date for sentence expansion. Pt able to increase utterances by an average of 8 words during this session.    Assessment:  Sammie Belcher is a 52 y.o. female with a SLP diagnosis of Aphasia. SLP intervention continues to be warranted to increase communicative effectiveness and ability to have needs/wants met.    Discharge recommendations: Discharge Facility/Level of Care Needs: home health speech therapy     Goals:   Multidisciplinary Problems     SLP Goals        Problem: SLP    Goal Priority Disciplines Outcome   SLP Goal     SLP Ongoing, Progressing   Description: LTG: To increase expressive/receptive language skills to enhance functional communication to express basic wants and needs with 100% effectiveness.     ST. Pt will answer simple, environmental, and personal yes/no questions with 90% accuracy and minimal cues.--GOAL MET  2. Pt will complete confrontational naming tasks with 90% accuracy and minimal cues.--GOAL MET  3. Pt will use fluency shaping/stuttering modification techniques to decrease syllables stuttered to less than 5%SS.--PROGRESSING, CONTINUE    LTG: Pt will increase cognitive linguistic skills to increase safety awareness and independence to return to PLOF.    ST. Pt will recall 4 out of 5 verbal stimuli following a 5 minute delay with minimal cues.   2. Pt will maintain attention to task for 5 minutes without redirection during 80% of trials.                     Plan:   Patient to be seen Therapy Frequency: 5 x/week   Planned Interventions: Cognitive-Linguistic Therapy, Language Therapy  Plan of Care Expires: 06/24/22  Plan of Care reviewed with: patient  SLP Follow-up?: Yes  SLP - Next Visit Date: 06/24/22 6/20/2022

## 2022-06-20 NOTE — PT/OT/SLP PROGRESS
"Physical Therapy         Treatment        Sammie Belcher   MRN: [id number]      Start time 0830  Stop time: 0930     Billable Minutes:  Gait [id number] and Therapeutic Activity 30      General Precautions: Standard, fall, aphasia  Orthopedic Precautions: Orthopedic Precautions : N/A   Braces:      Patient found with: Other (comments) ( and chair alarm)         Subjective:  "I slept good."    Pain/Comfort  Pain Rating 1: 0/10    Objective:  Therapeutic activities, therapeutic exercise performed to increase muscle strength and motor recruitment so that patient may improve quality of functionional mobility and increase functional independence. and Gait training performed to improve functional strength, to increase functional mobility, safety and independence.    VITALS:  BP:        146/84 Hr 85   128/88 HR 84      Functional Mobility:    Transfer Training:  Sit to stand:Stand-by Assistance with Rolling Walker .  Bed <> Chair:  Step Transfer with Stand-by Assistance with Rolling Walker    Toilet Transfer:  Pt Step Transfer with Contact Guard Assistance with Rolling Walker ( PT assisted SBa with toilet hygiene)       Gait Training:  Patient ambulates 360 feet using rolling walker with Contact Guard Assistance and min verbal cues for increased step length and toe clearance. Donned Anterior prefab AFO to assist with DF and knee ext.      Stair Training:  Pt ascended/descend 4" curb step x 2 trials with reciprocal/non-reciprocal steps using no handrails and RW with Contact Guard Assistance and min verbal cues for proper sequencing     Additional Treatment:  Static/dynamic standing balance with recreational therapy. Working of coordination and motor control playing bean bag toss. Overall CGA.     Static/dyanmic standing balance with balloon toss using RUE only. PT assist with LUE. Overall CGA     Activity Tolerance:  Patient tolerated treatment well    Patient left up in chair with all lines intact, call button in reach, " chair alarm on and  present.    Education Provided: roles and goals of PT/PTA, transfer training, gait training, stair training, balance training, safety awareness and assistive device    Expected compliance:  High compliance        Assessment:  Sammie Belcher is a 52 y.o. female with a medical diagnosis of CVA (cerebral vascular accident). She presents with weakness, cognitive deficits, impaired coordination, impaired balance, gait deviations, difficulty with functional transfers, difficulty with wheelchair propulsion, impaired proprioception and decreased motor control. Pt to benefit from skilled PT services to address impairments with progression towards functional independence as tolerated.    Rehab potential is excellent.    Activity tolerance: Good    Discharge recommendations:       Equipment recommendations:       GOALS:   Multidisciplinary Problems     Physical Therapy Goals        Problem: Physical Therapy    Goal Priority Disciplines Outcome Goal Variances Interventions   Physical Therapy Goal     PT, PT/OT Ongoing, Progressing     Description: Short Term goals      Bed Mobility   Roll Right and Left Setup or Clean-up Assistance.  Lying to sitting Setup or Clean-up Assistance.  Sitting to lying Setup or Clean-up Assistance.    Transfers    Pt will be able to perform Stand step chair/bed to chair transfer With LRAD Setup or Clean-up Assistance.  Pt will be able to perform Sit to stand with LRAD   Setup or Clean-up Assistance.  Pt will be able to perform Car transfer with LRAD Setup or Clean-up Assistance.    Ambulation    Pt will ambulate 100 Feet with LRAD Setup or Clean-up Assistance.  Pt will ascend and descend 12 stairs with L rail CGA      Wheelchair Mobility   Pt will be able to propel 150 feet in paola wheelchair Setup or Clean-up Assistance.      Timeframe: By Discharge                      PLAN:    Patient to be seen 5 x/week  to address the above listed problems via gait training,  therapeutic activities, therapeutic exercises, neuromuscular re-education  Plan of Care expires: 06/09/22  Plan of Care reviewed with: patient    6/20/2022

## 2022-06-20 NOTE — PT/OT/SLP RE-EVAL
"      Occupational Therapy         Re-Evaluation     Sammie Belcher   MRN: [id number]   Admitting Diagnosis: CVA (cerebral vascular accident)    Therapy Minutes  OT Date of Treatment: 06/20/22    Billable Minutes:   Re-eval 15, Self Care/Home Management 30 and Therapeutic Exercise 15    Diagnosis: CVA (cerebral vascular accident)      Past Medical History:   Diagnosis Date    Hypertension     Thyroid disease       Past Surgical History:   Procedure Laterality Date    INSERTION OF IMPLANTABLE LOOP RECORDER N/A 5/26/2022    Procedure: Insertion, Implantable Loop Recorder;  Surgeon: Arias Brown MD;  Location: [facility] CATH LAB;  Service: Cardiology;  Laterality: N/A;    THYROIDECTOMY      TUBAL LIGATION         General Precautions: Standard, fall, aphasia  Orthopedic Precautions:    Braces:            Patient History:       Prior level of function:    Bed Mobility/Transfers: independent  Grooming: independent  Bathing: independent  Upper Body Dressing: independent  Lower Body Dressing: independent  Toileting: independent  Homemaking Responsibilities: No  Driving License: Yes  Mode of Transportation: Car  Occupation: Unemployed  IADL Comments: her fiance does everything, but she normally helps out, she has a daughter that she helps care for     Dominant hand: right    Subjective:  Patient communicated  Chief Complaint: R sided weakness  Patient stated goals: "go home"    Pain/Comfort  Pain Rating 1: 0/10    Objective:       Cognitive Exam:  Oriented to: Person, Place, Time and Situation  Follows Commands/attention: Follows one-step commands  Communication: expressive aphasia  Memory:  Impaired STM  Safety awareness/insight to disability: impaired  Coping skills/emotional control: Appropriate to situation    Visual/perceptual:  Intact    Physical Exam:  Postural examination/scapula alignment:    -       No postural abnormalities identified  Skin integrity: Visible skin intact  Edema: None noted      Sensation:      -   "     Intact    Upper Extremity Range of Motion:  Right Upper Extremity: WFL  Left Upper Extremity: WFL    Upper Extremity Strength:  Right Upper Extremity: WFL  Left Upper Extremity: WFL   Strength: good    Fine motor coordination:      -       Impaired  decreased motor planning    Gross motor coordination: WFL    Functional Mobility:  Bed Mobility:   Transfers:   Sit to stand:Stand-by Assistance with Rolling Walker    Toilet Transfer:  Pt Step Transfer with Stand-by Assistance with Rolling Walker    Patient tub bench transfer Step Transfer with Stand-by Assistance with Rolling Walker.     Current Status   Functional Area: Care Score:   Eating 5   Oral Hygiene 5   Toileting Hygiene 4   Shower/Bathe Self 4   Upper Body Dressing 4   Lower Body Dressing 4   Putting On/Taking Off Footwear 6   Toilet Transfer 4       Assessment:  Sammie Belcher is a good rehab candidate and would benefit from skilled OT treatment to increase ADL independence and safety.  Sammie Belcher is a 52 y.o. female with a medical diagnosis of CVA (cerebral vascular accident) and presents with the following impairments, limitations, and capabilities.     Impairments: Cognitive limits, coordination, R/L UE weakness, R/L LE weakness and decreased safety  Functional Limitations: ADLs, home mgmt, functional t/fs and stand balance  Activity capabilities: Independent premorbid, family support and endurance  Activity limitations: Cognitive function, endurance, functional mobility, precautions and safety awareness    Recommended length of stay: 7  Patient agrees with need for treatment:   Yes    Rehab potential is excellent    Activity tolerance: Excellent      GOALS:   Multidisciplinary Problems     Occupational Therapy Goals        Problem: Occupational Therapy    Goal Priority Disciplines Outcome Interventions   Occupational Therapy Goal     OT, PT/OT Ongoing, Progressing    Description: ADLs:  Pt to perform grooming tasks with supervision and  RW and min vc MET  Pt to perform feeding tasks with independence PROGRESSING REQUIRES SET UP WITH RUE WEAKNESS  Pt to perform UB dressing with set up assist and min vc PROGRESSING  Pt to perform LB dressing with CGA and min vc MET  Pt to perform putting on/off footwear task with supervision and min vc MET  Pt to perform toileting with supervision and min vc MET BUT MOSTLY INCONTINENT     Functional Transfers:  Pt to perform toilet transfers with supervision and min vc and RW PROGRESSING  Pt to perform a tub transfer with supervision, RW, min vc MET    IADLs:  Pt to perform simple care taking/ home management tasks with min A and min vc PROGRESSING    Balance, Strengthening, Endurance, Balance:  Pt to demonstrate dynamic standing balance as required to perform ADL's from standing level with RW and min vc. PROGRESSING  Pt to demonstrate BUE strength during functional task and increased use of R UE. PROGRESSING                   PLAN: Patient to be seen 5 x/week to address the above listed problems via self-care/home management, therapeutic activities, therapeutic exercises, therapeutic groups, neuromuscular re-education  Plan of Care expires: 06/09/22  Plan of Care reviewed with: patient    06/20/2022

## 2022-06-20 NOTE — PLAN OF CARE
Problem: Occupational Therapy  Goal: Occupational Therapy Goal  Description: ADLs:  Pt to perform grooming tasks with supervision and RW and min vc MET  Pt to perform feeding tasks with independence PROGRESSING REQUIRES SET UP WITH RUE WEAKNESS  Pt to perform UB dressing with set up assist and min vc MET  Pt to perform LB dressing with CGA and min vc MET  Pt to perform putting on/off footwear task with supervision and min vc MET  Pt to perform toileting with supervision and min vc MET BUT MOSTLY INCONTINENT     Functional Transfers:  Pt to perform toilet transfers with supervision and min vc and RW MET  Pt to perform a tub transfer with supervision, RW, min vc MET    IADLs:  Pt to perform simple care taking/ home management tasks with min A and min vc PROGRESSING    Balance, Strengthening, Endurance, Balance:  Pt to demonstrate dynamic standing balance as required to perform ADL's from standing level with RW and min vc. PROGRESSING  Pt to demonstrate BUE strength during functional task and increased use of R UE. PROGRESSING  Outcome: Ongoing, Progressing

## 2022-06-20 NOTE — PROGRESS NOTES
[facility] - Rehab Inpatient Services  Adult Nutrition  Progress Note    SUMMARY       Recommendations    - Continue current diet per MD/SLP    - Continue MVI + thiamine as feasible    - Added ONS Boost Plus to provide 360 kcals, 14 gm pro per serving.         Goals: Meet >75% of est energy needs by f/u  Nutrition Goal Status: Met     Assessment and Plan    6/20: Rounded with pt in dining room. Continues to report good appetite. Per EMR is averaging ~90% po intake x 3 days. Continues to drink Boost.     6/15: Pt continues to report good appetite. Per EMR, is averaging ~75% po intake x 3 days. Fly bring food, has snacks in room. SLP aware. Denies any nutrition related concerns at this time.      6/10: Pt reports continues with good appetite. Per EMR is averaging ~80% meals x last 3 days.      6/7: Rounded with pt in dining room. Able to answer questions about appetite. Reports good appetite. Observed lunch tray with ~100% meal consumed. Did not drink much boost, will monitor and d/c at f/u if pt continues to not drink.  Wt increased +1.1kg per most recent wt.      6/3: Pt with good appetite. Observed eating >75% lunch in dining room. Per EMR, pt averaging ~85% po intake x last 2 meals. Attempted to interview pt. Pt having difficulty communicating d/t expressive aphasia. Noted pt with significant wt loss per RD note at [facility] on (5/25). Continued Strawberry Boost Plus pt was receiving from [facility] prior to admit.      Malnutrition Assessment  Malnutrition Type: chronic illness  Energy Intake: moderate energy intake          Weight Loss (Malnutrition): 7.5% in 3 months  Energy Intake (Malnutrition): less than 75% for greater than or equal to 1 month  Subcutaneous Fat (Malnutrition): mild depletion  Muscle Mass (Malnutrition): mild depletion   Orbital Region (Subcutaneous Fat Loss): mild depletion  Upper Arm Region (Subcutaneous Fat Loss): mild depletion   Quaker Region (Muscle Loss): mild  "depletion  Clavicle Bone Region (Muscle Loss): mild depletion  Dorsal Hand (Muscle Loss): mild depletion                 Reason for Assessment    Reason For Assessment: consult (rehab)  Diagnosis: other (see comments) (Left frontal lobe 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,POSITIVE RPR  History of alcohol dependence  HTN  Nicotine dependence  Anxiety/depression   Hyperparathyroidism)  Relevant Medical History: Essential HTN  HLD  Hypothyroidism  Anxiety  Depression  Hx Cocaine Use Disorder (sober over 1 yr)  THC Use, ETOH abuse    Nutrition Risk Screen    Nutrition Risk Screen: no indicators present    Nutrition/Diet History    Spiritual, Cultural Beliefs, Religion Practices, Values that Affect Care: no    Anthropometrics    Temp: 97.4 °F (36.3 °C)  Height: 6' 0.99" (185.4 cm)  Height (inches): 72.99 in  Weight Method:  (standing)  Weight: 66.5 kg (146 lb 9.7 oz)  Weight (lb): 146.61 lb  Ideal Body Weight (IBW), Female: 164.95 lb  % Ideal Body Weight, Female (lb): 86.21 %  BMI (Calculated): 19.3  BMI Grade: 18.5-24.9 - normal  Usual Body Weight (UBW), k.73 kg  % Usual Body Weight: 88.87  % Weight Change From Usual Weight: -11.32 %     : 66.5 kg   6/15: 66.2 kg   6/10: No new wt   : 65.5 kg   6/3: 64.5 kg     Lab/Procedures/Meds    Pertinent Labs Comments: No new labs  Pertinent Medications Comments: statin, aspirin, MVI, metoprolol, thiamine    Estimated/Assessed Needs    Weight Used For Calorie Calculations: 64.5 kg (142 lb 3.2 oz)  Energy Calorie Requirements (kcal):   Energy Need Method: Fulton-St Jeor (x 1.4 SF)  Protein Requirements: 97 (1.5 g/kg)  Weight Used For Protein Calculations: 64.5 kg (142 lb 3.2 oz)        RDA Method (mL):          Nutrition Prescription Ordered    Current Diet Order: Easy to Chew    Evaluation of Received Nutrient/Fluid Intake       % Intake of Estimated Energy Needs: 75 - 100 %  % Meal Intake: 75 - 100 %        Nutrition " Problem  Malnutrition     Related to (etiology):   Thyroid disease     Signs and Symptoms (as evidenced by):   <75% est energy needs > 1 month  Physical evidence of mild muscle/fat wasting     Interventions(treatment strategy):  Commercial food, Multivitamin / Mineral supplement therapy and Collaboration with other providers     Nutrition Diagnosis Status:   Improving     Nutrition Risk    Level of Risk/Frequency of Follow-up: moderate     Monitor and Evaluation      Wt, po intake, and nutrition related labs     Nutrition Follow-Up    RD Follow-up?: Yes

## 2022-06-20 NOTE — PT/OT/SLP PROGRESS
Speech Language Pathology Treatment          Sammie Belcher   MRN: [id number]     Diet recommendations: Solid Diet Level: Regular Diet - IDDSI Level 7  Liquid Diet Level: Thin liquids - IDDSI Level 0      Billable Minutes:  Speech Therapy Individual 30 minutes    General Precautions: Standard, aphasia          Subjective:  Pt awake, alert, and cooperative throughout session.     Objective:   Pt participated in response elaboration therapy to facilitate increase in communicative effectiveness. Pt requiring min-mod cues on this date for sentence expansion. Pt able to increase utterances by an average of 7.25 words during this session.    Assessment:  Sammie Belcher is a 52 y.o. female with a SLP diagnosis of Aphasia. SLP intervention continues to be warranted to increase communicative effectiveness and ability to have needs/wants met.    Discharge recommendations: Discharge Facility/Level of Care Needs: home health speech therapy     Goals:   Multidisciplinary Problems     SLP Goals        Problem: SLP    Goal Priority Disciplines Outcome   SLP Goal     SLP Ongoing, Progressing   Description: LTG: To increase expressive/receptive language skills to enhance functional communication to express basic wants and needs with 100% effectiveness.     ST. Pt will answer simple, environmental, and personal yes/no questions with 90% accuracy and minimal cues.--GOAL MET  2. Pt will complete confrontational naming tasks with 90% accuracy and minimal cues.--GOAL MET  3. Pt will use fluency shaping/stuttering modification techniques to decrease syllables stuttered to less than 5%SS.--PROGRESSING, CONTINUE    LTG: Pt will increase cognitive linguistic skills to increase safety awareness and independence to return to PLOF.    ST. Pt will recall 4 out of 5 verbal stimuli following a 5 minute delay with minimal cues.   2. Pt will maintain attention to task for 5 minutes without redirection during 80% of trials.                     Plan:   Patient to be seen Therapy Frequency: 5 x/week   Planned Interventions: Cognitive-Linguistic Therapy, Language Therapy  Plan of Care Expires: 06/24/22  Plan of Care reviewed with: patient  SLP Follow-up?: Yes  SLP - Next Visit Date: 06/24/22 6/20/2022

## 2022-06-20 NOTE — PT/OT/SLP PROGRESS
Physical Therapy         Treatment        Sammie Belcher   MRN: [id number]     Therapy Minutes  PT Received On: 22  PT Start Time: 1130  PT Stop Time: 1200  PT Total Time (min): 30 min  PT Individual: 30     Billable Minutes:  Gait Training8 and Therapeutic Activity 22      Treatment Type: Treatment  PT/PTA: PTA (student)     PTA Visit Number: 1       General Precautions: Standard, fall, aphasia  Orthopedic Precautions: Orthopedic Precautions : N/A   Braces: Braces: N/A              Subjective:  Pt reported that her right eye is hurting today. When asked to put a number to the pain, pt verbalized that it was over a 10.     Pain/Comfort  Pain Rating 1: 0/10    Objective:  Therapeutic activities, therapeutic exercise performed to increase muscle strength and motor recruitment so that patient may improve quality of functionional mobility and increase functional independence. and Gait training performed to improve functional strength, to increase functional mobility, safety and independence.    VITALS:  BP:  Startin/74 72HR; Endin/75 74HR In sitting       Transfer Training:  Sit to stand:Stand-by Assistance with Rolling Walker        Gait Training:  Patient ambulates 100 feet using rolling walker with Contact Guard Assistance and no verbal cues. Pt demonstrating a  swing-through gait with decreased step length, decreased stride length, decreased weight-shifting ability and decreased R knee flexion.Impairments contributing to gait deviations include impaired coordination and impaired motor control      Additional Treatment:  Pt performed 3x30 reps of RLE forward/backward steps in front of mirror using RW and Priscilla to help promote increased R knee flexion.    Pt performed 2x15 reps of R step ups onto 4 inch step box using RW and CGA. Pt able to count reps aloud while performing task when prompted.     Activity Tolerance:  Patient tolerated treatment well    Patient left up in chair in pt dining area with  CNA present.    Education Provided: gait training, balance training, safety awareness and body mechanics    Expected compliance:  High compliance        Assessment:  Sammie Belcher is a 52 y.o. female with a medical diagnosis of CVA (cerebral vascular accident). She presents with impaired coordination and motor planning. Pt to benefit from skilled PT services to address impairments with progression towards functional independence as tolerated.    Rehab potential is excellent.    Activity tolerance: Good    Discharge recommendations:       Equipment recommendations:       GOALS:   Multidisciplinary Problems     Physical Therapy Goals        Problem: Physical Therapy    Goal Priority Disciplines Outcome Goal Variances Interventions   Physical Therapy Goal     PT, PT/OT Ongoing, Progressing     Description: Short Term goals      Bed Mobility   Roll Right and Left Setup or Clean-up Assistance.  Lying to sitting Setup or Clean-up Assistance.  Sitting to lying Setup or Clean-up Assistance.    Transfers    Pt will be able to perform Stand step chair/bed to chair transfer With LRAD Setup or Clean-up Assistance.  Pt will be able to perform Sit to stand with LRAD   Setup or Clean-up Assistance.  Pt will be able to perform Car transfer with LRAD Setup or Clean-up Assistance.    Ambulation    Pt will ambulate 100 Feet with LRAD Setup or Clean-up Assistance.  Pt will ascend and descend 12 stairs with L rail CGA      Wheelchair Mobility   Pt will be able to propel 150 feet in [place] wheelchair Setup or Clean-up Assistance.      Timeframe: By Discharge                      PLAN:    Patient to be seen 5 x/week  to address the above listed problems via gait training, therapeutic activities, therapeutic exercises, neuromuscular re-education  Plan of Care expires: 06/09/22  Plan of Care reviewed with: patient    6/20/2022     Joshua Block PTA, present throughout treatment session and reviewed documentation.

## 2022-06-20 NOTE — PROGRESS NOTES
[facility] ([facility])  Rehab Progress Note    Patient Name: Sammie Belcher  MRN: [id number]  Age: 52 y.o. Sex: female  : 1970  Hospital Length of Stay: 18 days  Date of Service:  2022  Chief Complaint: Left frontal lobe 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    Subjective:     Basic Information  Admit Information: 52-year-old  female presented to [facility] ED on 2022 complaining of dysarthria and expressive aphasia x3 days.  PMH significant hyperparathyroidism s/p parathyroidectomy 2022, anxiety/depression, and HTN.  Workup significant for reactive RPR and CT head significant for left frontal 6 cm cortical base hypodensity suggesting acute to subacute nonhemorrhagic infarct of the left KENISHA vascular territory.  MRA brain significant for left frontal lobe infarct extending into the corpus callosum with acute nonhemorrhagic infarct in the territory of the KENISHA, and old lacunar infarcts. Transferred to [facility] for neurology services.  Bicillin initiated on  for syphilis with plan for 3 doses with end date on .  Also received Flagyl x 7 days due to Trichomoniasis.  TTE significant for EF 74% with severe concentric hypertrophy and hyperdynamic systolic function with negative bubble study.  ASA and statin initiated.  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  LP not revealing with no evidence of CNS infection.  Tolerating LINQ placement on .  CVA likely caused by long history cocaine abuse.  Continue to participate in progress therapy.  Tolerated transferred to [facility] inpatient rehab unit on  without incident.  Today's Information:  No acute events overnight.  Sitting comfortably in wheelchair.  Reports good sleep and appetite.  Last BM .  BP moderately controlled.   Lab work not obtained.  No imaging today.    Review of patient's allergies indicates:  No Known Allergies      Current Facility-Administered Medications:     acetaminophen tablet 650 mg, 650 mg, Oral, Q4H PRN, Nish FAUSTINO Destiny, FNP, 650 mg at 06/19/22 0842    alendronate tablet 70 mg, 70 mg, Oral, Q7 Days, Nish FAUSTINO Destiny, FNP, 70 mg at 06/17/22 0538    ALPRAZolam tablet 0.25 mg, 0.25 mg, Oral, TID PRN, Nish FAUSTINO Destiny, FNP, 0.25 mg at 06/16/22 2114    amLODIPine tablet 5 mg, 5 mg, Oral, Daily, Nish FAUSTINO Destiny, FNP, 5 mg at 06/19/22 0842    aspirin EC tablet 325 mg, 325 mg, Oral, Daily, Nish A Destiny, FNP, 325 mg at 06/19/22 0842    atorvastatin tablet 40 mg, 40 mg, Oral, Daily, Nish Lymanehart, FNP, 40 mg at 06/19/22 0842    baclofen tablet 5 mg, 5 mg, Oral, BID, Matilde Wright, FNP, 5 mg at 06/19/22 2058    benzonatate capsule 100 mg, 100 mg, Oral, TID PRN, Nish Woodart, FNP    bisacodyL suppository 10 mg, 10 mg, Rectal, Daily PRN, Nish Lymanehart, FNP    bisacodyL suppository 10 mg, 10 mg, Rectal, Once, SIRISHA Murphy    bisacodyL suppository 10 mg, 10 mg, Rectal, Once, SIRISHA Murphy    enoxaparin injection 40 mg, 40 mg, Subcutaneous, Daily, Nish HARMON Destiny, FNP, 40 mg at 06/19/22 1704    hydrALAZINE injection 10 mg, 10 mg, Intravenous, Q4H PRN, José Miguel Cesar MD    HYDROcodone-acetaminophen 5-325 mg per tablet 1 tablet, 1 tablet, Oral, Q4H PRN, Nish Lymanehart, FNP    hydrOXYzine pamoate capsule 50 mg, 50 mg, Oral, Nightly PRN, Nish Lymanehart, FNP, 50 mg at 06/19/22 2059    labetalol 20 mg/4 mL (5 mg/mL) IV syring, 10 mg, Intravenous, Q4H PRN, José Miguel Cesar MD    labetaloL tablet 200 mg, 200 mg, Oral, TID, Nish Dixon FNP, 200 mg at 06/19/22 2058    lisinopriL tablet 10 mg, 10 mg, Oral, QHS, Nish Dixon, FNP, 10 mg at 06/19/22 2059    metoprolol injection 10 mg, 10 mg, Intravenous, Q2H PRN, SIRISHA Fam    multivitamin tablet, 1 tablet, Oral, Daily, GARY FamP, 1 tablet at 06/19/22 0842     "nicotine 21 mg/24 hr 1 patch, 1 patch, Transdermal, Daily, Nish HARMON Destiny, FNP, 1 patch at 06/19/22 0841    nitroGLYCERIN SL tablet 0.4 mg, 0.4 mg, Sublingual, Q5 Min PRN, Nish A Destiny, FNP    ondansetron disintegrating tablet 4 mg, 4 mg, Oral, Q6H PRN, Nish A Destiny, FNP    ondansetron disintegrating tablet 8 mg, 8 mg, Oral, Q6H PRN, Nish A Destiny, FNP    polyethylene glycol packet 17 g, 17 g, Oral, Q12H PRN, Nish A Destiny, FNP    promethazine tablet 25 mg, 25 mg, Oral, Q6H PRN, Nish A Destiny, FNP    sertraline tablet 25 mg, 25 mg, Oral, Daily, Nish A Destiny, FNP, 25 mg at 06/19/22 0842    thiamine tablet 100 mg, 100 mg, Oral, TID, Nish A Destiny, FNP, 100 mg at 06/19/22 2059    venlafaxine tablet 37.5 mg, 37.5 mg, Oral, BID, Syeda Crooks, FNP, 37.5 mg at 06/19/22 2058     Review of Systems   Complete 12-point review of symptoms negative except for what's mentioned in HPI     Objective:     /81   Pulse 80   Temp 97.4 °F (36.3 °C) (Oral)   Resp 16   Ht 6' 0.99" (1.854 m)   Wt 66.5 kg (146 lb 9.7 oz)   LMP  (LMP Unknown)   SpO2 99%   BMI 19.35 kg/m²        Intake/Output Summary (Last 24 hours) at 6/20/2022 0735  Last data filed at 6/19/2022 1700  Gross per 24 hour   Intake 480 ml   Output --   Net 480 ml     Physical Exam  Constitutional:       Appearance: Normal appearance.   HENT:      Head: Normocephalic.      Mouth/Throat:      Mouth: Mucous membranes are moist.   Eyes:      Pupils: Pupils are equal, round, and reactive to light.   Cardiovascular:      Rate and Rhythm: Normal rate and regular rhythm.      Heart sounds: Normal heart sounds.   Pulmonary:      Effort: Pulmonary effort is normal.      Breath sounds: Normal breath sounds.   Abdominal:      General: Bowel sounds are normal.      Palpations: Abdomen is soft.   Musculoskeletal:      Cervical back: Neck supple.      Comments: Slight weakness to right side.  Weakness to left lower " extremity.   Skin:     General: Skin is warm and dry.   Neurological:      Mental Status: She is alert and oriented to person, place, and time.      Comments: Dysarthria/expressive and receptive aphasia    Psychiatric:         Mood and Affect: Mood normal.     *MD performed and documented physical examination         Lines/Drains/Airways     None               Labs:  No results found for this or any previous visit (from the past 24 hour(s)).    Radiology:  MRI brain without contrast 05/19/2002 at 5:38 p.m., IMPRESSION: Motion degraded exam. In spite of this limitation: Moderate to severe multifocal flow signal abnormality about the bilateral ACAs, bilateral distal MCAs and to lesser extent bilateral PCAs.  Radiology  Transthoracic echo 05/22/2022 at 7:50 a.m., IMPRESSION:  LV is normal in size with hyperdynamic systolic function.  Estimated EF 75%.  There is grade 1 diastolic dysfunction consistent with impaired relaxation.  Left atrial pressure is normal.  RV normal size and function.  RA normal size.  AV structurally normal.  No regurgitation.  No stenosis.  There is nonspecific leaflet thickening 2 mitral valve.  There is trace mitral valve regurgitation.  There is no stenosis.  Tricuspid valve with trace regurgitation.  No valve stenosis.  PAP could not be obtained.  There is no prior care effusion.  There is no evidence 10 benign.  Negative bubble study.    Assessment/Plan:     52 y.o. AAF admitted on 6/2/2022    Left frontal lobe infarct   - with dysarthria/expressive and receptive aphasia and right-sided weakness  - continue                Aspirin 325 mg daily                Lipitor 40 mg daily                Multivitamin daily  - defer to physiatry for rehab and pain management  - PT/OT/RT/ST following     POSITIVE RPR  - s/p Bicillin x 3  - LP on 5/22 negative for CNS infection     History of alcohol dependence  - stable  - no withdrawals  - continue                Thiamine 100 mg t.i.d.     HTN  - BP at  goal!!  - continue     Lisinopril 10 mg at bedtime (initiated 6/5)                Norvasc 10 mg daily                Labetalol 200 mg t.i.d.                Hydralazine 10 mg every 2 hours as needed for BP > 160/90                Labetalol 10 mg every 2 hours as needed for BP > 160/90     Nicotine dependence  - stable  - continue                Nicotine patch 21 mg daily     Anxiety/depression  - in remission  - continue                Wellbutrin 150 mg XR daily                Zoloft 25 mg daily     Hyperparathyroidism  - stable  - s/p parathyroidectomy 02/23/2022  - continue                Vitamin-D 56199 units Q weekly                Fosamax 70 mg Q weekly     AB therapy  Bicillin x3 doses (5/19-6/2)     VTE Prophylaxis:  Lovenox 40 mg daily  COVID-19 testing:  Negative on 6/02/2022  COVID-19 vaccination status:  Vaccinated (Pfizer):  08/25/2021 and 08/04/2021     POA: No  Living will: No  Contacts:  Gamaliel Hein ([facility]) [phone]     CODE STATUS: Full Code  Internal Medicine (attending): José Miguel Cesar MD     OUTPATIENT PROVIDERS  PCP: Wanda Bell MD  Neurology:  Fede Lopez MD  Infectious disease: Marcelina Nguyen MD     DISPOSITION: Condition stable.   Sleep hygiene, bowel maintenance, and appetite at goal.  Reports right eye tender after rubbing.  Vital signs at goal with no recorded fevers.  Lab work not obtained this morning.  Pending lab work.  Continue current POC.  Monitor closely.  Notify of acute changes.    Staffing 6/14/2022: Incontinent of bladder and bowel.  RT: Overall supervision.  Expression deficits.  Appetite is good. PT: High fall risk.  PT: Overall CGA to mod assists ambulating 180 feet with RW.  Linited by ataxia and apraxia. Will need support at home 2/2 safety and cognition. OT: Overall supervision with ADLs, maintaining standing balance, requires cueing. ST: Limited by fluency.  Receptive language impaired. Improving.  Projected discharge pending.     Dejan Dixon NP  conducted independent physical examination and assisted with medical documentation.    Total time spent on this encounter including chart review and direct MD + NP 1-on-1 patient interaction: 38 minutes   Over 50% of this time was spent in counseling and coordination of care

## 2022-06-21 PROCEDURE — 63600175 PHARM REV CODE 636 W HCPCS: Performed by: NURSE PRACTITIONER

## 2022-06-21 PROCEDURE — 11800000 HC REHAB PRIVATE ROOM

## 2022-06-21 PROCEDURE — S4991 NICOTINE PATCH NONLEGEND: HCPCS | Performed by: NURSE PRACTITIONER

## 2022-06-21 PROCEDURE — 97530 THERAPEUTIC ACTIVITIES: CPT

## 2022-06-21 PROCEDURE — 99233 PR SUBSEQUENT HOSPITAL CARE,LEVL III: ICD-10-PCS | Mod: ,,, | Performed by: PHYSICAL MEDICINE & REHABILITATION

## 2022-06-21 PROCEDURE — 97110 THERAPEUTIC EXERCISES: CPT

## 2022-06-21 PROCEDURE — 99900035 HC TECH TIME PER 15 MIN (STAT)

## 2022-06-21 PROCEDURE — 25000003 PHARM REV CODE 250: Performed by: NURSE PRACTITIONER

## 2022-06-21 PROCEDURE — 92507 TX SP LANG VOICE COMM INDIV: CPT

## 2022-06-21 PROCEDURE — 97164 PT RE-EVAL EST PLAN CARE: CPT

## 2022-06-21 PROCEDURE — 99233 SBSQ HOSP IP/OBS HIGH 50: CPT | Mod: ,,, | Performed by: PHYSICAL MEDICINE & REHABILITATION

## 2022-06-21 PROCEDURE — 97535 SELF CARE MNGMENT TRAINING: CPT

## 2022-06-21 PROCEDURE — 94799 UNLISTED PULMONARY SVC/PX: CPT

## 2022-06-21 RX ADMIN — ATORVASTATIN CALCIUM 40 MG: 40 TABLET, FILM COATED ORAL at 09:06

## 2022-06-21 RX ADMIN — BACLOFEN 5 MG: 5 TABLET ORAL at 08:06

## 2022-06-21 RX ADMIN — ENOXAPARIN SODIUM 40 MG: 40 INJECTION SUBCUTANEOUS at 04:06

## 2022-06-21 RX ADMIN — ASPIRIN 325 MG: 325 TABLET, COATED ORAL at 09:06

## 2022-06-21 RX ADMIN — LISINOPRIL 10 MG: 10 TABLET ORAL at 08:06

## 2022-06-21 RX ADMIN — THIAMINE HCL TAB 100 MG 100 MG: 100 TAB at 09:06

## 2022-06-21 RX ADMIN — LABETALOL HYDROCHLORIDE 200 MG: 200 TABLET, FILM COATED ORAL at 04:06

## 2022-06-21 RX ADMIN — THIAMINE HCL TAB 100 MG 100 MG: 100 TAB at 04:06

## 2022-06-21 RX ADMIN — SERTRALINE HYDROCHLORIDE 25 MG: 25 TABLET ORAL at 09:06

## 2022-06-21 RX ADMIN — AMLODIPINE BESYLATE 5 MG: 5 TABLET ORAL at 09:06

## 2022-06-21 RX ADMIN — LABETALOL HYDROCHLORIDE 200 MG: 200 TABLET, FILM COATED ORAL at 08:06

## 2022-06-21 RX ADMIN — VENLAFAXINE 37.5 MG: 37.5 TABLET ORAL at 08:06

## 2022-06-21 RX ADMIN — NICOTINE 1 PATCH: 21 PATCH, EXTENDED RELEASE TRANSDERMAL at 09:06

## 2022-06-21 RX ADMIN — MULTIPLE VITAMINS W/ MINERALS TAB 1 TABLET: TAB at 09:06

## 2022-06-21 RX ADMIN — VENLAFAXINE 37.5 MG: 37.5 TABLET ORAL at 09:06

## 2022-06-21 RX ADMIN — LABETALOL HYDROCHLORIDE 200 MG: 200 TABLET, FILM COATED ORAL at 09:06

## 2022-06-21 RX ADMIN — BACLOFEN 5 MG: 5 TABLET ORAL at 09:06

## 2022-06-21 RX ADMIN — THIAMINE HCL TAB 100 MG 100 MG: 100 TAB at 08:06

## 2022-06-21 NOTE — PROGRESS NOTES
Subjective  HPI: Admit Information: 52-year-old  female presented to [facility] ED on 5/18/2022 complaining of dysarthria and expressive aphasia x3 days.  PMH significant hyperparathyroidism s/p parathyroidectomy 2/2022, anxiety/depression, and HTN.  Workup significant for reactive RPR and CT head significant for left frontal 6 cm cortical base hypodensity suggesting acute to subacute nonhemorrhagic infarct of the left KENISHA vascular territory.  MRA brain significant for left frontal lobe infarct extending into the corpus callosum with acute nonhemorrhagic infarct in the territory of the KENISHA, and old lacunar infarcts. Transferred to [facility] for neurology services.  Bicillin initiated on 5/19 for syphilis with plan for 3 doses with end date on 6/02.  Also received Flagyl x 7 days due to Trichomoniasis.  TTE significant for EF 74% with severe concentric hypertrophy and hyperdynamic systolic function with negative bubble study.  ASA and statin initiated.  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  LP not revealing with no evidence of CNS infection.  Tolerating LINQ placement on 05/26.  CVA likely caused by long history cocaine abuse.  Continue to participate in progress therapy.  Tolerated transferred to [facility] inpatient rehab unit on 6/02 without incident.     Tolerating therapy well. Denies any complaints at time of rounding.      Review of Systems   Constitutional: Negative for chills, fatigue and fever.   Eyes: Negative for visual disturbance.   Respiratory: Negative for cough, chest tightness and shortness of breath.    Gastrointestinal: Negative for abdominal pain, blood in stool, constipation, diarrhea, nausea and vomiting.   Musculoskeletal: Negative for back pain, joint swelling and leg pain.   Neurological: Negative for dizziness and headaches.   Psychiatric/Behavioral: Negative for agitation, behavioral problems and hallucinations. The patient is not nervous/anxious.   "   Review of Systems   Constitutional: Negative for chills, fatigue and fever.   Eyes: Negative for visual disturbance.   Respiratory: Negative for cough, chest tightness and shortness of breath.    Gastrointestinal: Negative for abdominal pain, blood in stool, constipation, diarrhea, nausea and vomiting.   Musculoskeletal: Negative for back pain and joint swelling.   Neurological: Negative for dizziness and headaches.   Psychiatric/Behavioral: Negative for agitation, behavioral problems and hallucinations. The patient is not nervous/anxious.      Objective;   Seen in family training yesterday  Language improving- new words and phrases daily  Staffing done- d/c planned Th 6/23    Physical Exam  HENT:      Head: Atraumatic.      Nose: Nose normal.      Mouth/Throat:      Mouth: Mucous membranes are moist.      Pharynx: Oropharynx is clear.   Eyes:      Extraocular Movements: Extraocular movements intact.      Conjunctiva/sclera: Conjunctivae normal.      Pupils: Pupils are equal, round, and reactive to light.   Cardiovascular:      Rate and Rhythm: Normal rate and regular rhythm.   Pulmonary:      Effort: Pulmonary effort is normal.      Breath sounds: Normal breath sounds.   Abdominal:      General: Abdomen is flat. Bowel sounds are normal.      Palpations: Abdomen is soft.   Musculoskeletal:         General: Normal range of motion.   Skin:     General: Skin is warm and dry.      Capillary Refill: Capillary refill takes less than 2 seconds.   Neurological:      Mental Status: She is alert. Mental status is at baseline.   Psychiatric:         Mood and Affect: Mood normal.            /82   Pulse 73   Temp 97.7 °F (36.5 °C) (Oral)   Resp 18   Ht 6' 0.99" (1.854 m)   Wt 66.5 kg (146 lb 9.7 oz)   LMP  (LMP Unknown)   SpO2 99%   BMI 19.35 kg/m²                   Assessment/Plan  (L) CVA with (R) hemiparesis and aphasia- improving; has done wonderfully and is prepared for transition home    Plan d/c " Thursday

## 2022-06-21 NOTE — PLAN OF CARE
Called  Gamaliel ([phone]) to discuss discharge planned for Thurs, 6/23.      Discussed plans for HH and sent orders to [facility] (PT/OT/ST/RN services) via [employer] after patient was assigned to this agency by Ailyn JACK CM Medicaid rotation mgr.      Ordered RW, BSC, and shower chair with no back via [employer] to [place] and requested delivery to pt's room by 1030 on 6/23.    Confirmed pharmacy as [facility] ([address]) with Gamaliel.    Confirmed PCP as King Morales DO, in [facility].  He is the one who had been prescribing patient's meds prior to hospitalization (Gamaliel read me his name from med bottle at home).    Gamaliel has a MD appt on 6/23 at 0945 so will come to  patient afterward.

## 2022-06-21 NOTE — PLAN OF CARE
Problem: Bowel Elimination Impaired (Stroke, Ischemic/Transient Ischemic Attack)  Goal: Effective Bowel Elimination  Outcome: Ongoing, Progressing  Intervention: Promote Effective Bowel Elimination  Flowsheets (Taken 6/21/2022 5645)  Bowel Elimination Management:   sitting position facilitated   toileting offered     Problem: Urinary Elimination Impaired (Stroke, Ischemic/Transient Ischemic Attack)  Goal: Effective Urinary Elimination  Outcome: Ongoing, Progressing  Intervention: Promote Effective Bladder Elimination  Flowsheets (Taken 6/21/2022 4619)  Urinary Elimination Promotion:   positioned for ease of voiding   toileting device within reach   toileting offered   absorbent pad/diaper use encouraged

## 2022-06-21 NOTE — PT/OT/SLP PROGRESS
Occupational Therapy  Treatment      Sammie Belcher   MRN: [id number]   Admitting Diagnosis: CVA (cerebral vascular accident)    Therapy Minutes  OT Date of Treatment: 06/21/22  OT Start Time: 1100  OT Stop Time: 1130  OT Total Time (min): 30 min    Billable Minutes:  Self Care/Home Management 30    OT/BETH: OT          General Precautions: Standard, fall, aphasia  Orthopedic Precautions: N/A         Subjective:  Patient communicated she needed to use the restroom.    Pain/Comfort  Pain Rating 1: 0/10  Objective:       Functional Mobility:  Transfer Training:   Sit to stand:Supervision or Set-up Assistance with Rolling Walker    Toilet Transfer:  Pt Step Transfer with Contact Guard Assistance with Rolling Walker    Balance:  Patient performed standing balance with RW and CGA assist for 2 minutes while performing pulling up pants after toileting     ADLs:    Current Status   Functional Area: Care Score:   Toileting Hygiene 4   Toilet Transfer 4     OT changed shoe string on R shoe back to regular lace per PT request due to need to stabilize AFO.    Home management  Patient transferred clothes to dryer and managed controls with VC's from standing position with Contact Guard/Minimal Assistance due to LOB backwards.      Education Provided: Roles and goals of OT, ADLs, transfer training and sequencing    Expected compliance: High compliance    ASSESSMENT:  Sammie Belcher is a 52 y.o. female with a medical diagnosis of CVA (cerebral vascular accident) performed IADLs to increase ADL independence and safety. Pt is progressing towards OT goals      GOALS:   Multidisciplinary Problems     Occupational Therapy Goals        Problem: Occupational Therapy    Goal Priority Disciplines Outcome Interventions   Occupational Therapy Goal     OT, PT/OT Ongoing, Progressing    Description: ADLs:  Pt to perform grooming tasks with supervision and RW and min vc MET  Pt to perform feeding tasks with independence PROGRESSING REQUIRES  SET UP WITH RUE WEAKNESS  Pt to perform UB dressing with set up assist and min vc PROGRESSING  Pt to perform LB dressing with CGA and min vc MET  Pt to perform putting on/off footwear task with supervision and min vc MET  Pt to perform toileting with supervision and min vc MET BUT MOSTLY INCONTINENT     Functional Transfers:  Pt to perform toilet transfers with supervision and min vc and RW PROGRESSING  Pt to perform a tub transfer with supervision, RW, min vc MET    IADLs:  Pt to perform simple care taking/ home management tasks with min A and min vc PROGRESSING    Balance, Strengthening, Endurance, Balance:  Pt to demonstrate dynamic standing balance as required to perform ADL's from standing level with RW and min vc. PROGRESSING  Pt to demonstrate BUE strength during functional task and increased use of R UE. PROGRESSING                   Plan:  Patient to be seen 5 x/week to address the above listed problems via self-care/home management, therapeutic activities, therapeutic exercises, therapeutic groups, neuromuscular re-education  Plan of Care expires: 06/09/22  Plan of Care reviewed with: patient      06/21/2022

## 2022-06-21 NOTE — PT/OT/SLP RE-EVAL
Physical Therapy          Inpatient Rehab Re-Evaluation    Sammie Belcher   MRN: [id number]      Start Time: 10:00 am  End Time: 11:00 am    Billable Minutes:   Re-eval 23, Therapeutic Activity 27 and Therapeutic Exercise 10    Diagnosis: CVA (cerebral vascular accident)  Past Medical History:   Diagnosis Date    Hypertension     Thyroid disease       Past Surgical History:   Procedure Laterality Date    INSERTION OF IMPLANTABLE LOOP RECORDER N/A 5/26/2022    Procedure: Insertion, Implantable Loop Recorder;  Surgeon: Arias Brown MD;  Location: [facility] CATH LAB;  Service: Cardiology;  Laterality: N/A;    THYROIDECTOMY      TUBAL LIGATION           General Precautions: Standard, fall, aphasia  Orthopedic Precautions: N/A   Braces: anterior leaf spring AFO on R.    Spiritual, Cultural Beliefs, Anabaptism Practices, Values that Affect Care: no    Patient History:  Lives With: significant other  Living Arrangements: apartment  Home Accessibility: stairs to enter home  Home Layout: Bathroom on 2nd floor, Bedroom on 2nd floor  Living Environment Comment: All Prior level of function from CM notes. Pt unable to expressive 2/2 speech deficits  Equipment Currently Used at Home: none        Previous Level of Function:  Ambulation Skills: independent  Transfer Skills: independent  ADL Skills: independent  Work/Leisure Activity: independent    Subjective:  Pt notes that she is feeling good today and is ready for therapy session.       Pain/Comfort  Pain Rating 1: 0/10    Objective:    Start of treatment BP: 129/87  MmHG, HR: 82         In sitting  End of treatment  BP: 135/85 MmHG, HR: 75         In sitting        Patient found W/C;      Cognitive Exam:  Safety awareness/insight to disability: Mostly entact, however continues to require occassional verbal cueing for sequencing intermittnetly.     Physical Exam:        Upper Extremity Range of Motion:  Refer to OT evaluation for UE ROM.    Upper Extremity  Strength:  Refer to OT evaluation for UE strength.    Lower Extremity Range of Motion:  Right Lower Extremity: WFL  Left Lower Extremity: WFL    Lower Extremity Strength:  Right Lower Extremity: WFL  Left Lower Extremity: WFL except L quad and hamstring MMT strength: 4/5, and L ankle DF 4/5 MMT strength    Gross motor coordination: Pt displays decreased motor planning overall and requires occasional verbal cueing for sequencing.     Functional Mobility:    Bed Mobility :   Supine to sit: Independent   Sit to supine: Independent   Rolling: Independent   Scooting: Independent    Transfers:  Sit to stand:Stand-by Assistance with Rolling Walker    Bed <> Chair:  Step Transfer with Stand-by Assistance with Rolling Walker    Chair <> Mat: Step Transfer with Stand-by Assistance with  Rolling Walker    Car Transfer:  Step Transfer with Supervision or Set-up Assistance with Rolling Walker      Wheelchair:  Pt not requiring W/C for ambulation.     Gait:  Patient ambulates approximately 1000  feet using rolling walker with Stand-by Assistance and occassional verbal cues for turns and obstacle avoidance. Pt demonstrating a  swing-through gait and full weightbearing with decreased step length, decreased stride length and decreased toe-to-floor clearance with increased R knee flexion with R anterior leaf AFO donned .Impairments contributing to gait deviations include impaired balance, impaired coordination, impaired motor control and decreased strength.    Stairs:  Pt ascended/descend 12 stair(s) with non-reciprocal steps using left handrail with Stand-by Assistance and  verbal cues for hand placement, sequencing, step pattern, and motor planning.        Picking up object:    From a standing position, patient picks up an object from the floor using RW with Supervision and minimal verbal cues.    Ramp/Uneven surfaces:     Patient ambulate onto uneven surfaces 15 feet using RW with Stand-by Assistance.     Current   Status    Functional Area: Care Score:   Roll Left and Right 6   Sit to Lying 6   Lying to Sitting on Side of Bed 6   Sit to Stand 4   Chair/Bed-to-Chair Transfer 4   Car Transfer 5   Walk 10 Feet 4   Walk 50 Feet with Two Turns 4   Walk 150 Feet 4   Walk 10 Feet Uneven Surface 4   1 Step (Curb) 4   4 Steps 4   12 Steps 4   Picking Up Object 4   Wheel 50 Feet with Two Turns 3   Wheel 150 Feet 3       Additional Treatment:  Pt performed:  3 set of 10 reps of B heel raises at RW with supervision Assistance  2 set of 10 reps of standing marching with red TB around B feet at RW with Supervision assistance. Pt displayed decreased motor planning with R knee flexion and increase fatigue.   2 set of 10 reps of seated resisted B ankle DF with red T band.      Education Provided: gait training, stair training and safety awareness    Expected compliance: High compliance        Patient left up in chair with hand off to OT due to pt having OT session after PT session. .    Assessment:  Sammie Belcher is a 52 y.o. female with a medical diagnosis of CVA (cerebral vascular accident). She presents with   limiting tolerance and safety during functional mobility tasks.  Pt to benefit from skilled PT services to address impairments with progression towards functional independence as tolerated. Pt displays improved overall endurance with standing and gait related activities. Pt displays increased gait distances with use of 2WW, however continues to display decreased R knee extension during gait with R anterior leaf AFO donned, but displayed improvements after normal shoe strings donned onto R shoe. Pt progressing towards reaching all goals with transfers, gait, and stair navigation, however continues to require occasional verbal cueing for sequencing and hand placement on L railing during stair navigation. Pt continues to display impaired motor planning and decreased L LE muscle weakness possibly contributing to impaired gait pattern.  Pt  will continue to benefit from skilled PT due to previously stated deficits.        IMPAIRMENTS  Cognitive limits, coordination, R/L LE weakness and functional strength    FUNCTIONAL LIMITATIONS  gait impairments; stair mobility    ACTIVITY LIMITATIONS  Cognitive function, functional mobility, safety awareness and impulsivity    Rehab potential is good.    Activity tolerance: Good    Plan: continue with current plan    Discharge recommendations:       Equipment recommendations: walker, rolling    GOALS:   Multidisciplinary Problems     Physical Therapy Goals        Problem: Physical Therapy    Goal Priority Disciplines Outcome Goal Variances Interventions   Physical Therapy Goal     PT, PT/OT Ongoing, Progressing     Description: Short Term goals      Bed Mobility: (MET)  Roll Right and Left Setup or Clean-up Assistance.  Lying to sitting Setup or Clean-up Assistance.  Sitting to lying Setup or Clean-up Assistance.    Transfers    Pt will be able to perform Stand step chair/bed to chair transfer With LRAD Setup or Clean-up Assistance.  Pt will be able to perform Sit to stand with LRAD   Setup or Clean-up Assistance.  Pt will be able to perform Car transfer with LRAD Setup or Clean-up Assistance.    Ambulation    Pt will ambulate 100 Feet with LRAD Setup or Clean-up Assistance.  Pt will ascend and descend 12 stairs with L rail CGA      Wheelchair Mobility: (D/C)  Pt will be able to propel 150 feet in paola wheelchair Setup or Clean-up Assistance.      Timeframe: By Discharge                      PLAN:    Patient to be seen 5 x/week to address the above listed problems via gait training, therapeutic activities, therapeutic exercises, neuromuscular re-education  Plan of Care expires: 06/09/22  Plan of Care reviewed with: patient    6/21/2022

## 2022-06-21 NOTE — PLAN OF CARE
06/21/22 0800   Skin   Skin WDL WDL   David Risk Assessment   Sensory Perception 3-->slightly limited   Moisture 4-->rarely moist   Activity 3-->walks occasionally   Mobility 3-->slightly limited   Nutrition 3-->adequate   Friction and Shear 2-->potential problem   David Score 18        Incision/Site Left Chest upper;anterior   No Date First Assessed or Time First Assessed found.   Side: Left  Location: Chest  Orientation: upper;anterior  Closure Method: Steri-strips   Incision WDL WDL   Dressing Appearance Open to air     Creating to send to

## 2022-06-21 NOTE — PROGRESS NOTES
[facility] ([facility])  Rehab Progress Note    Patient Name: Sammie Belcher  MRN: [id number]  Age: 52 y.o. Sex: female  : 1970  Hospital Length of Stay: 19 days  Date of Service:  2022  Chief Complaint: Left frontal lobe 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    Subjective:     Basic Information  Admit Information: 52-year-old  female presented to [facility] ED on 2022 complaining of dysarthria and expressive aphasia x3 days.  PMH significant hyperparathyroidism s/p parathyroidectomy 2022, anxiety/depression, and HTN.  Workup significant for reactive RPR and CT head significant for left frontal 6 cm cortical base hypodensity suggesting acute to subacute nonhemorrhagic infarct of the left KENISHA vascular territory.  MRA brain significant for left frontal lobe infarct extending into the corpus callosum with acute nonhemorrhagic infarct in the territory of the KENISHA, and old lacunar infarcts. Transferred to [facility] for neurology services.  Bicillin initiated on  for syphilis with plan for 3 doses with end date on .  Also received Flagyl x 7 days due to Trichomoniasis.  TTE significant for EF 74% with severe concentric hypertrophy and hyperdynamic systolic function with negative bubble study.  ASA and statin initiated.  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  LP not revealing with no evidence of CNS infection.  Tolerating LINQ placement on .  CVA likely caused by long history cocaine abuse.  Continue to participate in progress therapy.  Tolerated transferred to [facility] inpatient rehab unit on  without incident.  Today's Information:  No acute events overnight.  Sitting comfortably in chair.  Reports good sleep and appetite.  Last BM .  Vital signs at goal with no reported fevers.  No labs or imaging today.    Review of patient's allergies indicates:  No Known Allergies     Current  Facility-Administered Medications:     acetaminophen tablet 650 mg, 650 mg, Oral, Q4H PRN, Nish FAUSTINO Destiny, FNP, 650 mg at 06/20/22 0820    alendronate tablet 70 mg, 70 mg, Oral, Q7 Days, Nish FAUSTINO Destiny, FNP, 70 mg at 06/17/22 0538    ALPRAZolam tablet 0.25 mg, 0.25 mg, Oral, TID PRN, Nish FAUSTINO Destiny, FNP, 0.25 mg at 06/16/22 2114    amLODIPine tablet 5 mg, 5 mg, Oral, Daily, Nish FAUSTINO Destiny, FNP, 5 mg at 06/20/22 0820    aspirin EC tablet 325 mg, 325 mg, Oral, Daily, Nish HARMON Destiny, FNP, 325 mg at 06/20/22 0819    atorvastatin tablet 40 mg, 40 mg, Oral, Daily, Nish Lymanehart, FNP, 40 mg at 06/20/22 0819    baclofen tablet 5 mg, 5 mg, Oral, BID, Matilde Wright, FNP, 5 mg at 06/20/22 2019    benzonatate capsule 100 mg, 100 mg, Oral, TID PRN, Nish Lymanehart, FNP    bisacodyL suppository 10 mg, 10 mg, Rectal, Daily PRN, Nish Lymanehart, FNP    bisacodyL suppository 10 mg, 10 mg, Rectal, Once, SIRISHA Murphy    bisacodyL suppository 10 mg, 10 mg, Rectal, Once, SIRISHA Murphy    enoxaparin injection 40 mg, 40 mg, Subcutaneous, Daily, Nish HARMON Destiny, FNP, 40 mg at 06/20/22 1714    hydrALAZINE injection 10 mg, 10 mg, Intravenous, Q4H PRN, José Miguel Cesar MD    HYDROcodone-acetaminophen 5-325 mg per tablet 1 tablet, 1 tablet, Oral, Q4H PRN, Nish Lymanehart, FNP    hydrOXYzine pamoate capsule 50 mg, 50 mg, Oral, Nightly PRN, Nish Lymanehart, FNP, 50 mg at 06/19/22 2059    labetalol 20 mg/4 mL (5 mg/mL) IV syring, 10 mg, Intravenous, Q4H PRN, José Miguel Cesar MD    labetaloL tablet 200 mg, 200 mg, Oral, TID, SIRISHA Fam, 200 mg at 06/20/22 2019    lisinopriL tablet 10 mg, 10 mg, Oral, QHS, SIRISHA Fam, 10 mg at 06/20/22 2019    metoprolol injection 10 mg, 10 mg, Intravenous, Q2H PRN, SIRISHA Fam    multivitamin tablet, 1 tablet, Oral, Daily, SIRISHA Fam, 1 tablet at 06/20/22 0820    nicotine 21  "mg/24 hr 1 patch, 1 patch, Transdermal, Daily, Nish HARMON Destiny, FNP, 1 patch at 06/20/22 0819    nitroGLYCERIN SL tablet 0.4 mg, 0.4 mg, Sublingual, Q5 Min PRN, Nish HARMON Destiny, FNP    ondansetron disintegrating tablet 4 mg, 4 mg, Oral, Q6H PRN, Nish A Destiny, FNP    ondansetron disintegrating tablet 8 mg, 8 mg, Oral, Q6H PRN, Nish A Destiny, FNP    polyethylene glycol packet 17 g, 17 g, Oral, Q12H PRN, Nish A Destiny, FNP    promethazine tablet 25 mg, 25 mg, Oral, Q6H PRN, Nish A Destiny, FNP    sertraline tablet 25 mg, 25 mg, Oral, Daily, Nish A Destiny, FNP, 25 mg at 06/20/22 0820    thiamine tablet 100 mg, 100 mg, Oral, TID, Nish A Destiny, FNP, 100 mg at 06/20/22 2019    venlafaxine tablet 37.5 mg, 37.5 mg, Oral, BID, Syeda ARLEEN Crooks, FNP, 37.5 mg at 06/20/22 2019     Review of Systems   Complete 12-point review of symptoms negative except for what's mentioned in HPI     Objective:     /76   Pulse 80   Temp 98.4 °F (36.9 °C)   Resp 18   Ht 6' 0.99" (1.854 m)   Wt 66.5 kg (146 lb 9.7 oz)   LMP  (LMP Unknown)   SpO2 96%   BMI 19.35 kg/m²        Intake/Output Summary (Last 24 hours) at 6/21/2022 0923  Last data filed at 6/21/2022 0800  Gross per 24 hour   Intake 720 ml   Output --   Net 720 ml     Physical Exam  Constitutional:       Appearance: Normal appearance.   HENT:      Head: Normocephalic.      Mouth/Throat:      Mouth: Mucous membranes are moist.   Eyes:      Pupils: Pupils are equal, round, and reactive to light.   Cardiovascular:      Rate and Rhythm: Normal rate and regular rhythm.      Heart sounds: Normal heart sounds.   Pulmonary:      Effort: Pulmonary effort is normal.      Breath sounds: Normal breath sounds.   Abdominal:      General: Bowel sounds are normal.      Palpations: Abdomen is soft.   Musculoskeletal:      Cervical back: Neck supple.      Comments: Slight weakness to right side.  Weakness to left lower extremity.   Skin:     " General: Skin is warm and dry.   Neurological:      Mental Status: She is alert and oriented to person, place, and time.      Comments: Dysarthria/expressive and receptive aphasia    Psychiatric:         Mood and Affect: Mood normal.     *MD performed and documented physical examination         Lines/Drains/Airways     None               Labs:  Recent Results (from the past 24 hour(s))   CBC with Differential    Collection Time: 06/20/22  1:39 PM   Result Value Ref Range    WBC 6.6 4.5 - 11.5 x10(3)/mcL    RBC 4.03 (L) 4.20 - 5.40 x10(6)/mcL    Hgb 12.4 12.0 - 16.0 gm/dL    Hct 38.1 37.0 - 47.0 %    MCV 94.5 (H) 80.0 - 94.0 fL    MCH 30.8 27.0 - 31.0 pg    MCHC 32.5 (L) 33.0 - 36.0 mg/dL    RDW 14.0 11.5 - 17.0 %    Platelet 250 130 - 400 x10(3)/mcL    MPV 10.7 9.4 - 12.4 fL    Neut % 45.9 %    Lymph % 39.6 %    Mono % 7.9 %    Eos % 5.5 %    Basophil % 0.9 %    Lymph # 2.60 0.6 - 4.6 x10(3)/mcL    Neut # 3.0 2.1 - 9.2 x10(3)/mcL    Mono # 0.52 0.1 - 1.3 x10(3)/mcL    Eos # 0.36 0 - 0.9 x10(3)/mcL    Baso # 0.06 0 - 0.2 x10(3)/mcL    IG# 0.01 0 - 0.0155 x10(3)/mcL    IG% 0.2 0 - 0.43 %    NRBC% 0.0 %   Comprehensive Metabolic Panel    Collection Time: 06/20/22  1:40 PM   Result Value Ref Range    Sodium Level 144 136 - 145 mmol/L    Potassium Level 4.6 3.5 - 5.1 mmol/L    Chloride 107 98 - 107 mmol/L    Carbon Dioxide 27 22 - 29 mmol/L    Glucose Level 84 74 - 100 mg/dL    Blood Urea Nitrogen 12.7 9.8 - 20.1 mg/dL    Creatinine 0.77 0.55 - 1.02 mg/dL    Calcium Level Total 10.3 (H) 8.4 - 10.2 mg/dL    Protein Total 6.1 (L) 6.4 - 8.3 gm/dL    Albumin Level 3.6 3.5 - 5.0 gm/dL    Globulin 2.5 2.4 - 3.5 gm/dL    Albumin/Globulin Ratio 1.4 1.1 - 2.0 ratio    Bilirubin Total 0.3 <=1.5 mg/dL    Alkaline Phosphatase 66 40 - 150 unit/L    Alanine Aminotransferase 31 0 - 55 unit/L    Aspartate Aminotransferase 21 5 - 34 unit/L    Estimated GFR- >60 mls/min/1.73/m2   Magnesium    Collection Time: 06/20/22   1:40 PM   Result Value Ref Range    Magnesium Level 2.00 1.60 - 2.60 mg/dL   Phosphorus    Collection Time: 06/20/22  1:40 PM   Result Value Ref Range    Phosphorus Level 4.0 2.3 - 4.7 mg/dL       Radiology:  MRI brain without contrast 05/19/2002 at 5:38 p.m., IMPRESSION: Motion degraded exam. In spite of this limitation: Moderate to severe multifocal flow signal abnormality about the bilateral ACAs, bilateral distal MCAs and to lesser extent bilateral PCAs.  Radiology  Transthoracic echo 05/22/2022 at 7:50 a.m., IMPRESSION:  LV is normal in size with hyperdynamic systolic function.  Estimated EF 75%.  There is grade 1 diastolic dysfunction consistent with impaired relaxation.  Left atrial pressure is normal.  RV normal size and function.  RA normal size.  AV structurally normal.  No regurgitation.  No stenosis.  There is nonspecific leaflet thickening 2 mitral valve.  There is trace mitral valve regurgitation.  There is no stenosis.  Tricuspid valve with trace regurgitation.  No valve stenosis.  PAP could not be obtained.  There is no prior care effusion.  There is no evidence 10 benign.  Negative bubble study.    Assessment/Plan:     52 y.o. AAF admitted on 6/2/2022    Left frontal lobe infarct   - with dysarthria/expressive and receptive aphasia and right-sided weakness  - continue                Aspirin 325 mg daily                Lipitor 40 mg daily                Multivitamin daily  - defer to physiatry for rehab and pain management  - PT/OT/RT/ST following     POSITIVE RPR  - s/p Bicillin x 3  - LP on 5/22 negative for CNS infection     History of alcohol dependence  - stable  - no withdrawals  - continue                Thiamine 100 mg t.i.d.     HTN  - BP at goal!!  - continue     Lisinopril 10 mg at bedtime (initiated 6/5)                Norvasc 10 mg daily                Labetalol 200 mg t.i.d.                Hydralazine 10 mg every 2 hours as needed for BP > 160/90                Labetalol 10 mg every 2  hours as needed for BP > 160/90     Nicotine dependence  - stable  - continue                Nicotine patch 21 mg daily     Anxiety/depression  - in remission  - continue                Wellbutrin 150 mg XR daily                Zoloft 25 mg daily     Hyperparathyroidism  - stable  - s/p parathyroidectomy 02/23/2022  - continue                Vitamin-D 07926 units Q weekly                Fosamax 70 mg Q weekly     AB therapy  Bicillin x3 doses (5/19-6/2)     VTE Prophylaxis:  Lovenox 40 mg daily  COVID-19 testing:  Negative on 6/02/2022  COVID-19 vaccination status:  Vaccinated (Pfizer):  08/25/2021 and 08/04/2021     POA: No  Living will: No  Contacts:  Gamaliel Hein ([facility]) [phone]     CODE STATUS: Full Code  Internal Medicine (attending): José Miguel Cesar MD     OUTPATIENT PROVIDERS  PCP: Wanda Bell MD  Neurology:  Fede Lopez MD  Infectious disease: Marcelina Nguyen MD     DISPOSITION: Condition stable.   Sleep hygiene, bowel maintenance, and appetite at goal.  No acute complaints.  Vital signs at goal with no reported fevers.  No labs or imaging today.  Continues to participate progress in therapy.  Monitor closely.  Notify of acute changes.    Staffing 6/21/2022: Incontinent of bladder and bowel.  She does have random times of continence.  Appetite is good.   RT: Overall supervision.  Right upper extremity and communication is improving.  Red fall risk.  PT: Overall SBA to CGA for safety.  Ambulating 200 feet with walker.  Family training went well.  Beginning to plateau.  OT: Overall supervision, occassionally CGA.  Fine motor continues to improve to RUE.  ST: Continues to improve.  Working on fluency and expanding phrases.  Working on problem solving.  Will need DME. Projected discharge 6/23.     Dejan Dixon NP conducted independent physical examination and assisted with medical documentation.    Total time spent on this encounter including chart review and direct MD + NP 1-on-1 patient  interaction: 42 minutes   Over 50% of this time was spent in counseling and coordination of care

## 2022-06-21 NOTE — PT/OT/SLP PROGRESS
Speech Language Pathology Treatment          Sammie Belcher   MRN: [id number]     Diet recommendations: Solid Diet Level: Regular  Liquid Diet Level: Thin      Billable Minutes:  Speech Therapy Individual 30 minutes    General Precautions: Standard, aphasia          Subjective:  Pt awake, alert, and cooperative throughout session.     Objective:   Pt participated in sentence elaboration tasks with start sentences at 3-5 units with increase to 12-13 during task. Picture scenes utilized as stim with Pt labeling and describing pictures with min-mod cues required. Written stim and cues used throughout. Motor speech drills completed with repetitions and progressive word tasks. SLP mouthed as visual cue at times.     Assessment:  Sammie Beclher is a 52 y.o. female with a SLP diagnosis of Aphasia. SLP intervention continues to be warranted to increase communicative effectiveness and ability to have needs/wants met.    Discharge recommendations: Discharge Facility/Level of Care Needs: home health speech therapy     Goals:   Multidisciplinary Problems     SLP Goals        Problem: SLP    Goal Priority Disciplines Outcome   SLP Goal     SLP Ongoing, Progressing   Description: LTG: To increase expressive/receptive language skills to enhance functional communication to express basic wants and needs with 100% effectiveness.     ST. Pt will answer simple, environmental, and personal yes/no questions with 90% accuracy and minimal cues.--GOAL MET  2. Pt will complete confrontational naming tasks with 90% accuracy and minimal cues.--GOAL MET  3. Pt will use fluency shaping/stuttering modification techniques to decrease syllables stuttered to less than 5%SS.--PROGRESSING, CONTINUE    LTG: Pt will increase cognitive linguistic skills to increase safety awareness and independence to return to PLOF.    ST. Pt will recall 4 out of 5 verbal stimuli following a 5 minute delay with minimal cues.   2. Pt will maintain attention  to task for 5 minutes without redirection during 80% of trials.                    Plan:   Patient to be seen Therapy Frequency: 5 x/week   Planned Interventions: Language Therapy, Cognitive-Linguistic Therapy  Plan of Care Expires: 06/24/22  Plan of Care reviewed with: patient  SLP Follow-up?: Yes  SLP - Next Visit Date: 06/24/22      Noelle Rankin MA, CCC-SLP  6/21/2022

## 2022-06-21 NOTE — PT/OT/SLP PROGRESS
Speech Language Pathology Treatment          Sammie Belcher   MRN: [id number]     Diet recommendations: Solid Diet Level: Regular  Liquid Diet Level: Thin Therapy Minutes  SLP Treatment Date: 22  Speech Start Time: 1300  Speech Stop Time: 1330  Speech Total (min): 30 min  SLP Individual: 30    Billable Minutes:  Speech Therapy Individual 30 minutes    General Precautions: Standard, aphasia          Subjective:  Pt awake, alert, and cooperative throughout session.     Objective:   Response elaboration tasks completed with Pt utilizing content words to expand sentences. Min-mod cues required for sentence expansion from 3-5 units to 15+ units. SLP attempted pausing strategy and metronome for dysfluency with no-minimal improvement noted. Pt completed singing/nursery rhyme task with cues for rhythm and rate required. Pt hummed familiar songs with <50% acc - reduced control of isolated vocalizations noted.    Assessment:  Sammie Belcher is a 52 y.o. female with a SLP diagnosis of Aphasia. SLP intervention continues to be warranted to increase communicative effectiveness and ability to have needs/wants met.    Discharge recommendations: Discharge Facility/Level of Care Needs: home health speech therapy     Goals:   Multidisciplinary Problems     SLP Goals        Problem: SLP    Goal Priority Disciplines Outcome   SLP Goal     SLP Ongoing, Progressing   Description: LTG: To increase expressive/receptive language skills to enhance functional communication to express basic wants and needs with 100% effectiveness.     ST. Pt will answer simple, environmental, and personal yes/no questions with 90% accuracy and minimal cues.--GOAL MET  2. Pt will complete confrontational naming tasks with 90% accuracy and minimal cues.--GOAL MET  3. Pt will use fluency shaping/stuttering modification techniques to decrease syllables stuttered to less than 5%SS.--PROGRESSING, CONTINUE    LTG: Pt will increase cognitive linguistic  skills to increase safety awareness and independence to return to PLOF.    ST. Pt will recall 4 out of 5 verbal stimuli following a 5 minute delay with minimal cues.   2. Pt will maintain attention to task for 5 minutes without redirection during 80% of trials.                    Plan:   Patient to be seen Therapy Frequency: 5 x/week   Planned Interventions: Cognitive-Linguistic Therapy, Language Therapy  Plan of Care Expires: 22  Plan of Care reviewed with: patient  SLP Follow-up?: Yes  SLP - Next Visit Date: 22      Noelle Rankin MA, CCC-SLP  2022

## 2022-06-21 NOTE — PT/OT/SLP PROGRESS
Progress Note    Pt was scheduled from [id number]    Pt progress, plan of care and discharge plans were discussed during staffing at 0900

## 2022-06-21 NOTE — PT/OT/SLP PROGRESS
Occupational Therapy  Treatment      Sammie Belcher   MRN: [id number]   Admitting Diagnosis: CVA (cerebral vascular accident)    Therapy Minutes  OT Date of Treatment: 06/21/22  OT Start Time: 0730  OT Stop Time: 0830  OT Total Time (min): 60 min  OT Individual: 60    Billable Minutes:  Self Care/Home Management 30 and Therapeutic Activity 30    OT/BETH: OT          General Precautions: Standard, fall, aphasia  Orthopedic Precautions:           Subjective:  Patient communicated she needed to go to restroom.    Pain/Comfort  Pain Rating 1: 0/10  Objective:       Functional Mobility:  Transfer Training:   Sit to stand:Supervision or Set-up Assistance with Rolling Walker    Toilet Transfer:  Pt Step Transfer with Supervision or Set-up Assistance with Rolling Walker    Balance:  Patient performed standing balance with RW and spv assist for 2 minutes while performing pulling up pants after bowel movement    ADLs:    Current Status   Functional Area: Care Score:   Toileting Hygiene 4   Toilet Transfer 4     Fine/Gross motor coordination   Patient performed game of Po.Ke.No in seated with Supervision using right upper extremity to flip cards and  flat chips off table.  Pt needed occasional clues to slow down in that she wanted to flip to next card before she announce with card was previously flipped. Pt was able to multitask well with this game by scanning, announcing and flipping all at same time with 90% accuracy.    Home management  Patient performed washing clothes home management task from standing position with Supervision or Set-up Assistance      Education Provided: Roles and goals of OT, ADLs and transfer training    Expected compliance: High compliance    ASSESSMENT:  Sammie Belcher is a 52 y.o. female with a medical diagnosis of CVA (cerebral vascular accident) performed IADL, FM activity and toileting/dressing to increase ADL independence and safety. Pt is progressing towards OT goals      GOALS:    Multidisciplinary Problems     Occupational Therapy Goals        Problem: Occupational Therapy    Goal Priority Disciplines Outcome Interventions   Occupational Therapy Goal     OT, PT/OT Ongoing, Progressing    Description: ADLs:  Pt to perform grooming tasks with supervision and RW and min vc MET  Pt to perform feeding tasks with independence PROGRESSING REQUIRES SET UP WITH RUE WEAKNESS  Pt to perform UB dressing with set up assist and min vc PROGRESSING  Pt to perform LB dressing with CGA and min vc MET  Pt to perform putting on/off footwear task with supervision and min vc MET  Pt to perform toileting with supervision and min vc MET BUT MOSTLY INCONTINENT     Functional Transfers:  Pt to perform toilet transfers with supervision and min vc and RW PROGRESSING  Pt to perform a tub transfer with supervision, RW, min vc MET    IADLs:  Pt to perform simple care taking/ home management tasks with min A and min vc PROGRESSING    Balance, Strengthening, Endurance, Balance:  Pt to demonstrate dynamic standing balance as required to perform ADL's from standing level with RW and min vc. PROGRESSING  Pt to demonstrate BUE strength during functional task and increased use of R UE. PROGRESSING                   Plan:  Patient to be seen 5 x/week to address the above listed problems via self-care/home management, therapeutic activities, therapeutic exercises, therapeutic groups, neuromuscular re-education  Plan of Care expires: 06/09/22  Plan of Care reviewed with: patient      06/21/2022

## 2022-06-21 NOTE — PROGRESS NOTES
06/21/22 0830   Rec Therapy Time Calculation   Rec Start Time 0830   Rec Stop Time 0900   Rec Total Time (min) 30 min   Subjective   Patient states Pt state that she was feeling a little weak during RT Therapy. She said she hadn't gotten her morning meds yet.   Precautions   General Precautions fall;aphasia   Assessment   Cognitive Concerns problem solving;attention span   Attendance   Activity   (Washer Toss)   Participation Active participation   Therapeutic Recreation   Problem Solving Activity Assistance verbal cues;tactile cues;Supervision   Assessment   Assessment Sit to stnd was supervision/setup as was dynamic standing balance/reaching. Standing tolerance was 3 minutes. RUE coordinaton/dexteriety improve to supervision. Decreased hand over hand assist was needed for motor planning of RUE. She continues to be more spontaneous with verbal expression. Remains Pleasant   Plan   Planned Therapy Intervention Continue with current plan  (Washer Toss)   Expected Length of Stay 3 days   PT Frequency Minimu of 5 visits per week;Other (see comments)  (Daily)   Time   Treatment time 2 units

## 2022-06-22 PROCEDURE — 92507 TX SP LANG VOICE COMM INDIV: CPT

## 2022-06-22 PROCEDURE — 97116 GAIT TRAINING THERAPY: CPT

## 2022-06-22 PROCEDURE — 94761 N-INVAS EAR/PLS OXIMETRY MLT: CPT

## 2022-06-22 PROCEDURE — 97530 THERAPEUTIC ACTIVITIES: CPT | Mod: CQ

## 2022-06-22 PROCEDURE — 25000003 PHARM REV CODE 250: Performed by: NURSE PRACTITIONER

## 2022-06-22 PROCEDURE — 63600175 PHARM REV CODE 636 W HCPCS: Performed by: NURSE PRACTITIONER

## 2022-06-22 PROCEDURE — S4991 NICOTINE PATCH NONLEGEND: HCPCS | Performed by: NURSE PRACTITIONER

## 2022-06-22 PROCEDURE — 97530 THERAPEUTIC ACTIVITIES: CPT

## 2022-06-22 PROCEDURE — 94799 UNLISTED PULMONARY SVC/PX: CPT

## 2022-06-22 PROCEDURE — 11800000 HC REHAB PRIVATE ROOM

## 2022-06-22 RX ORDER — VENLAFAXINE 37.5 MG/1
75 TABLET ORAL 2 TIMES DAILY
Status: DISCONTINUED | OUTPATIENT
Start: 2022-06-22 | End: 2022-06-23 | Stop reason: HOSPADM

## 2022-06-22 RX ORDER — THIAMINE HCL 100 MG
100 TABLET ORAL DAILY
Status: DISCONTINUED | OUTPATIENT
Start: 2022-06-22 | End: 2022-06-23 | Stop reason: HOSPADM

## 2022-06-22 RX ORDER — LOPERAMIDE HYDROCHLORIDE 2 MG/1
4 CAPSULE ORAL ONCE
Status: COMPLETED | OUTPATIENT
Start: 2022-06-22 | End: 2022-06-22

## 2022-06-22 RX ADMIN — AMLODIPINE BESYLATE 5 MG: 5 TABLET ORAL at 08:06

## 2022-06-22 RX ADMIN — VENLAFAXINE 75 MG: 37.5 TABLET ORAL at 08:06

## 2022-06-22 RX ADMIN — HYDROXYZINE PAMOATE 50 MG: 50 CAPSULE ORAL at 08:06

## 2022-06-22 RX ADMIN — ASPIRIN 325 MG: 325 TABLET, COATED ORAL at 08:06

## 2022-06-22 RX ADMIN — BACLOFEN 5 MG: 5 TABLET ORAL at 08:06

## 2022-06-22 RX ADMIN — LABETALOL HYDROCHLORIDE 200 MG: 200 TABLET, FILM COATED ORAL at 08:06

## 2022-06-22 RX ADMIN — LOPERAMIDE HYDROCHLORIDE 4 MG: 2 CAPSULE ORAL at 12:06

## 2022-06-22 RX ADMIN — ATORVASTATIN CALCIUM 40 MG: 40 TABLET, FILM COATED ORAL at 08:06

## 2022-06-22 RX ADMIN — LABETALOL HYDROCHLORIDE 200 MG: 200 TABLET, FILM COATED ORAL at 02:06

## 2022-06-22 RX ADMIN — MULTIPLE VITAMINS W/ MINERALS TAB 1 TABLET: TAB at 08:06

## 2022-06-22 RX ADMIN — ALPRAZOLAM 0.25 MG: 0.25 TABLET ORAL at 08:06

## 2022-06-22 RX ADMIN — LISINOPRIL 10 MG: 10 TABLET ORAL at 08:06

## 2022-06-22 RX ADMIN — Medication 100 MG: at 08:06

## 2022-06-22 RX ADMIN — ENOXAPARIN SODIUM 40 MG: 40 INJECTION SUBCUTANEOUS at 05:06

## 2022-06-22 RX ADMIN — NICOTINE 1 PATCH: 21 PATCH, EXTENDED RELEASE TRANSDERMAL at 08:06

## 2022-06-22 NOTE — PLAN OF CARE
Problem: Adult Inpatient Plan of Care  Goal: Absence of Hospital-Acquired Illness or Injury  Outcome: Ongoing, Progressing     Problem: Bowel Elimination Impaired (Stroke, Ischemic/Transient Ischemic Attack)  Goal: Effective Bowel Elimination  Outcome: Ongoing, Progressing

## 2022-06-22 NOTE — PROGRESS NOTES
[facility] ([facility])  Rehab Progress Note    Patient Name: Sammie Belcher  MRN: [id number]  Age: 52 y.o. Sex: female  : 1970  Hospital Length of Stay: 20 days  Date of Service:  2022  Chief Complaint: Left frontal lobe 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    Subjective:     Basic Information  Admit Information: 52-year-old  female presented to [facility] ED on 2022 complaining of dysarthria and expressive aphasia x3 days.  PMH significant hyperparathyroidism s/p parathyroidectomy 2022, anxiety/depression, and HTN.  Workup significant for reactive RPR and CT head significant for left frontal 6 cm cortical base hypodensity suggesting acute to subacute nonhemorrhagic infarct of the left KENISHA vascular territory.  MRA brain significant for left frontal lobe infarct extending into the corpus callosum with acute nonhemorrhagic infarct in the territory of the KENISHA, and old lacunar infarcts. Transferred to [facility] for neurology services.  Bicillin initiated on  for syphilis with plan for 3 doses with end date on .  Also received Flagyl x 7 days due to Trichomoniasis.  TTE significant for EF 74% with severe concentric hypertrophy and hyperdynamic systolic function with negative bubble study.  ASA and statin initiated.  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  LP not revealing with no evidence of CNS infection.  Tolerating LINQ placement on .  CVA likely caused by long history cocaine abuse.  Continue to participate in progress therapy.  Tolerated transferred to [facility] inpatient rehab unit on  without incident.  Today's Information:  No acute events overnight.  Sitting comfortably in chair eating breakfast.  Reports good sleep and appetite.  Last BM 6/21 x2.  Staff reports she continues to participate in progress with therapy.  No acute complaints.  Vital signs at goal with no reported  fevers no labs or imaging today.    Review of patient's allergies indicates:  No Known Allergies     Current Facility-Administered Medications:     acetaminophen tablet 650 mg, 650 mg, Oral, Q4H PRN, Nish Woodart, FNP, 650 mg at 06/20/22 0820    alendronate tablet 70 mg, 70 mg, Oral, Q7 Days, Nish Woodart, FNP, 70 mg at 06/17/22 0538    ALPRAZolam tablet 0.25 mg, 0.25 mg, Oral, TID PRN, Nish Lymanehart, FNP, 0.25 mg at 06/16/22 2114    amLODIPine tablet 5 mg, 5 mg, Oral, Daily, Nish Lymanehart, FNP, 5 mg at 06/21/22 0957    aspirin EC tablet 325 mg, 325 mg, Oral, Daily, Nish Lymanehart, FNP, 325 mg at 06/21/22 0957    atorvastatin tablet 40 mg, 40 mg, Oral, Daily, Nish Dixon, FNP, 40 mg at 06/21/22 0957    baclofen tablet 5 mg, 5 mg, Oral, BID, Matilde Wright, FNP, 5 mg at 06/21/22 2014    benzonatate capsule 100 mg, 100 mg, Oral, TID PRN, Nish Dixon, FNP    bisacodyL suppository 10 mg, 10 mg, Rectal, Daily PRN, Nish Woodart, FNP    bisacodyL suppository 10 mg, 10 mg, Rectal, Once, SIRISHA Murphy    bisacodyL suppository 10 mg, 10 mg, Rectal, Once, SIRISHA Murphy    enoxaparin injection 40 mg, 40 mg, Subcutaneous, Daily, Nish Lymanehart, FNP, 40 mg at 06/21/22 1643    hydrALAZINE injection 10 mg, 10 mg, Intravenous, Q4H PRN, José Miguel Cesar MD    HYDROcodone-acetaminophen 5-325 mg per tablet 1 tablet, 1 tablet, Oral, Q4H PRN, iNsh Dixon, FNP    hydrOXYzine pamoate capsule 50 mg, 50 mg, Oral, Nightly PRN, Nish Dixon, FNP, 50 mg at 06/19/22 2059    labetalol 20 mg/4 mL (5 mg/mL) IV syring, 10 mg, Intravenous, Q4H PRN, José Miguel Cesar MD    labetaloL tablet 200 mg, 200 mg, Oral, TID, SIRISHA Fam, 200 mg at 06/21/22 2014    lisinopriL tablet 10 mg, 10 mg, Oral, QHS, SIRISHA Fam, 10 mg at 06/21/22 2014    metoprolol injection 10 mg, 10 mg, Intravenous, Q2H PRN, SIRISHA Fam     "multivitamin tablet, 1 tablet, Oral, Daily, Nish Woodart, FNP, 1 tablet at 06/21/22 0957    nicotine 21 mg/24 hr 1 patch, 1 patch, Transdermal, Daily, Nish A Destiny, FNP, 1 patch at 06/21/22 0957    nitroGLYCERIN SL tablet 0.4 mg, 0.4 mg, Sublingual, Q5 Min PRN, Nish A Destiny, FNP    ondansetron disintegrating tablet 4 mg, 4 mg, Oral, Q6H PRN, Nish A Destiny, FNP    ondansetron disintegrating tablet 8 mg, 8 mg, Oral, Q6H PRN, Nish A Destiny, FNP    polyethylene glycol packet 17 g, 17 g, Oral, Q12H PRN, Nish A Destiny, FNP    promethazine tablet 25 mg, 25 mg, Oral, Q6H PRN, Nish A Destiny, FNP    sertraline tablet 25 mg, 25 mg, Oral, Daily, Nish A Destiny, FNP, 25 mg at 06/21/22 0957    thiamine tablet 100 mg, 100 mg, Oral, TID, Nish A Destiny, FNP, 100 mg at 06/21/22 2014    venlafaxine tablet 37.5 mg, 37.5 mg, Oral, BID, Syeda Crooks, FNP, 37.5 mg at 06/21/22 2014     Review of Systems   Complete 12-point review of symptoms negative except for what's mentioned in HPI     Objective:     BP (!) 144/78   Pulse 75   Temp 97.8 °F (36.6 °C) (Oral)   Resp 20   Ht 6' 0.99" (1.854 m)   Wt 66.5 kg (146 lb 9.7 oz)   LMP  (LMP Unknown)   SpO2 99%   BMI 19.35 kg/m²        Intake/Output Summary (Last 24 hours) at 6/22/2022 0711  Last data filed at 6/22/2022 0300  Gross per 24 hour   Intake 960 ml   Output --   Net 960 ml     Physical Exam  Constitutional:       Appearance: Normal appearance.   HENT:      Head: Normocephalic.      Mouth/Throat:      Mouth: Mucous membranes are moist.   Eyes:      Pupils: Pupils are equal, round, and reactive to light.   Cardiovascular:      Rate and Rhythm: Normal rate and regular rhythm.      Heart sounds: Normal heart sounds.   Pulmonary:      Effort: Pulmonary effort is normal.      Breath sounds: Normal breath sounds.   Abdominal:      General: Bowel sounds are normal.      Palpations: Abdomen is soft.   Musculoskeletal:    "   Cervical back: Neck supple.      Comments: Slight weakness to right side.  Weakness to left lower extremity.   Skin:     General: Skin is warm and dry.   Neurological:      Mental Status: She is alert and oriented to person, place, and time.      Comments: Dysarthria/expressive and receptive aphasia    Psychiatric:         Mood and Affect: Mood normal.     *MD performed and documented physical examination         Lines/Drains/Airways     None               Labs:  No results found for this or any previous visit (from the past 24 hour(s)).    Radiology:  MRI brain without contrast 05/19/2002 at 5:38 p.m., IMPRESSION: Motion degraded exam. In spite of this limitation: Moderate to severe multifocal flow signal abnormality about the bilateral ACAs, bilateral distal MCAs and to lesser extent bilateral PCAs.  Radiology  Transthoracic echo 05/22/2022 at 7:50 a.m., IMPRESSION:  LV is normal in size with hyperdynamic systolic function.  Estimated EF 75%.  There is grade 1 diastolic dysfunction consistent with impaired relaxation.  Left atrial pressure is normal.  RV normal size and function.  RA normal size.  AV structurally normal.  No regurgitation.  No stenosis.  There is nonspecific leaflet thickening 2 mitral valve.  There is trace mitral valve regurgitation.  There is no stenosis.  Tricuspid valve with trace regurgitation.  No valve stenosis.  PAP could not be obtained.  There is no prior care effusion.  There is no evidence 10 benign.  Negative bubble study.    Assessment/Plan:     52 y.o. AAF admitted on 6/2/2022    Left frontal lobe infarct   - with dysarthria/expressive and receptive aphasia and right-sided weakness  - continue                Aspirin 325 mg daily                Lipitor 40 mg daily                Multivitamin daily     Baclofen 5 mg b.i.d.   - defer to physiatry for rehab and pain management  - PT/OT/RT/ST following     POSITIVE RPR  - s/p Bicillin x 3  - LP on 5/22 negative for CNS  infection     History of alcohol dependence  - stable  - no withdrawals  - continue                Thiamine 100 mg daily     HTN  - BP at goal!!  - continue     Lisinopril 10 mg at bedtime (initiated 6/5)                Norvasc 10 mg daily                Labetalol 200 mg t.i.d.                Hydralazine 10 mg every 2 hours as needed for BP > 160/90                Labetalol 10 mg every 2 hours as needed for BP > 160/90     Nicotine dependence  - stable  - continue                Nicotine patch 21 mg daily     Anxiety/depression  - in remission  - continue                Wellbutrin 150 mg XR daily                 Venlafaxine 75 mg b.i.d. (increased 6/22)     Hyperparathyroidism  - stable  - s/p parathyroidectomy 02/23/2022  - continue                Vitamin-D 03138 units Q weekly                Fosamax 70 mg Q weekly     AB therapy  Bicillin x3 doses (5/19-6/2)     VTE Prophylaxis:  Lovenox 40 mg daily  COVID-19 testing:  Negative on 6/02/2022  COVID-19 vaccination status:  Vaccinated (Pfizer):  08/25/2021 and 08/04/2021     POA: No  Living will: No  Contacts:  Gamaliel Hein ([facility]) [phone]     CODE STATUS: Full Code  Internal Medicine (attending): José Miguel Cesar MD     OUTPATIENT PROVIDERS  PCP: Wanda Bell MD  Neurology:  Fede Lopez MD  Infectious disease: Marcelina Nguyen MD     DISPOSITION: Condition stable.   Sleep hygiene, bowel maintenance, and appetite at goal.  No acute complaints.  Vital signs at goal with no reported fevers.  No labs or imaging today.  Med reconciliation reviewed.  Zoloft discontinued and venlafaxine increased to 75 mg b.i.d..  Continues to participate progress in therapy.  Monitor closely.  Notify of acute changes.    Staffing 6/21/2022: Incontinent of bladder and bowel.  She does have random times of continence.  Appetite is good.   RT: Overall supervision.  Right upper extremity and communication is improving.  Red fall risk.  PT: Overall SBA to CGA for safety.   Ambulating 200 feet with walker.  Family training went well.  Beginning to plateau.  OT: Overall supervision, occassionally CGA.  Fine motor continues to improve to RUE.  ST: Continues to improve.  Working on fluency and expanding phrases.  Working on problem solving.  Will need DME. Projected discharge 6/23.     Dejan Dixon NP conducted independent physical examination and assisted with medical documentation.    Total time spent on this encounter including chart review and direct MD + NP 1-on-1 patient interaction: 39 minutes   Over 50% of this time was spent in counseling and coordination of care

## 2022-06-22 NOTE — PT/OT/SLP PROGRESS
"Physical Therapy         Treatment        Sammie Belcher   MRN: [id number]     Therapy Minutes  PT Start Time: 1430  PT Stop Time: 1515  PT Total Time (min): 45 min  PT Individual: 45     Billable Minutes:  Gait [id number] and Therapeutic Activity 30      General Precautions: Standard, fall, aphasia  Orthopedic Precautions: Orthopedic Precautions : N/A   Braces: Braces: AFO    Patient found with: Other (comments) ( and chair alarm)         Subjective:  "Im doing good."    Pain/Comfort  Pain Rating 1: 0/10    Objective:  Therapeutic activities, therapeutic exercise performed to increase muscle strength and motor recruitment so that patient may improve quality of functionional mobility and increase functional independence. and Gait training performed to improve functional strength, to increase functional mobility, safety and independence.    VITALS:  BP:        158/93 HR 79     146/ 90 HR77      Functional Mobility:      Transfer Training:  Sit to stand: Set-up Assistance with Rolling Walker    Bed <> Chair:  Step Transfer with Stand-by Assistance with Rolling Walker    Toilet Transfer:  Pt Step Transfer with Set-up Assistance with Rolling Walker ( accidental void, assisted with hygiene, donned new brief)     Gait Training:  Patient ambulates 600 feet using rolling walker with Contact Guard Assistance and min verbal cues. AFo donned throughout. Assisted and adjusted AFO for proper fit. Pt demos proper gait pattern and no LOB. Pt requires increased time and VC to come to complete turn prior to sitting.     Activity Tolerance:  Patient tolerated treatment well    Patient left up in chair with all lines intact, call button in reach, chair alarm on and Lap tray and  present .    Education Provided: roles and goals of PT/PTA, transfer training, gait training and safety awareness    Expected compliance:  High compliance        Assessment:  Sammie Belcher is a 52 y.o. female with a medical diagnosis of CVA " (cerebral vascular accident). She presents with decreased coordination and poor motor planning. Pt to benefit from skilled PT services to address impairments with progression towards functional independence as tolerated.    Rehab potential is good.    Activity tolerance: Good    Discharge recommendations:       Equipment recommendations:       GOALS:   Multidisciplinary Problems     Physical Therapy Goals        Problem: Physical Therapy    Goal Priority Disciplines Outcome Goal Variances Interventions   Physical Therapy Goal     PT, PT/OT Ongoing, Progressing     Description: Short Term goals      Bed Mobility: (MET)  Roll Right and Left Setup or Clean-up Assistance.  Lying to sitting Setup or Clean-up Assistance.  Sitting to lying Setup or Clean-up Assistance.    Transfers    Pt will be able to perform Stand step chair/bed to chair transfer With LRAD Setup or Clean-up Assistance.  Pt will be able to perform Sit to stand with LRAD   Setup or Clean-up Assistance. (MET)  Pt will be able to perform Car transfer with LRAD Setup or Clean-up Assistance.    Ambulation    Pt will ambulate 100 Feet with LRAD Setup or Clean-up Assistance.  Pt will ascend and descend 12 stairs with L rail CGA      Wheelchair Mobility: (D/C)  Pt will be able to propel 150 feet in paola wheelchair Setup or Clean-up Assistance.      Timeframe: By Discharge                      PLAN:    Patient to be seen 5 x/week  to address the above listed problems via gait training, therapeutic activities, therapeutic exercises, neuromuscular re-education  Plan of Care expires: 06/09/22  Plan of Care reviewed with: patient    6/22/2022

## 2022-06-22 NOTE — PT/OT/SLP PROGRESS
Occupational Therapy  Treatment      Sammie Belcher   MRN: [id number]   Admitting Diagnosis: CVA (cerebral vascular accident)    Therapy Minutes  OT Date of Treatment: 06/22/22  OT Start Time: 1100  OT Stop Time: 1200  OT Total Time (min): 60 min  OT Individual: 60    Billable Minutes:  Therapeutic Activity 4    OT/BETH: OT          General Precautions: Standard, fall, aphasia  Orthopedic Precautions: N/A         Subjective:  Patient communicated that she is doing well today.    Pain/Comfort  Pain Rating 1: 0/10  Objective:       Functional Mobility:  Transfer Training:   Sit to stand:Supervision or Set-up Assistance with Rolling Walker     FM with RW room 402 <> 50' with CGA.  Balance:  Patient performed standing balance with RW and SBA for 4 minutes while performing FM     Fine/Gross motor coordination   Patient performed: Stenciling onto cup and coozy in seated with initially hand over hand assist using R upper extremity mainly for stippling technique. Pt able to verbally choose items to paint and to pick out stencils.    Education Provided: Roles and goals of OT, ADLs and transfer training    Expected compliance: High compliance    ASSESSMENT:  Sammie Belcher is a 52 y.o. female with a medical diagnosis of CVA (cerebral vascular accident) performed FM coordination/eyehand coordination to increase ADL independence and safety. Pt is progressing towards OT goals      GOALS:   Multidisciplinary Problems     Occupational Therapy Goals        Problem: Occupational Therapy    Goal Priority Disciplines Outcome Interventions   Occupational Therapy Goal     OT, PT/OT Ongoing, Progressing    Description: ADLs:  Pt to perform grooming tasks with supervision and RW and min vc MET  Pt to perform feeding tasks with independence PROGRESSING REQUIRES SET UP WITH RUE WEAKNESS  Pt to perform UB dressing with set up assist and min vc PROGRESSING  Pt to perform LB dressing with CGA and min vc MET  Pt to perform putting on/off  footwear task with supervision and min vc MET  Pt to perform toileting with supervision and min vc MET BUT MOSTLY INCONTINENT     Functional Transfers:  Pt to perform toilet transfers with supervision and min vc and RW PROGRESSING  Pt to perform a tub transfer with supervision, RW, min vc MET    IADLs:  Pt to perform simple care taking/ home management tasks with min A and min vc PROGRESSING    Balance, Strengthening, Endurance, Balance:  Pt to demonstrate dynamic standing balance as required to perform ADL's from standing level with RW and min vc. PROGRESSING  Pt to demonstrate BUE strength during functional task and increased use of R UE. PROGRESSING                   Plan:  Patient to be seen 5 x/week to address the above listed problems via self-care/home management, therapeutic activities, therapeutic exercises, therapeutic groups, neuromuscular re-education  Plan of Care expires: 06/09/22  Plan of Care reviewed with: patient      06/22/2022

## 2022-06-22 NOTE — PLAN OF CARE
Problem: Adjustment to Illness (Stroke, Ischemic/Transient Ischemic Attack)  Goal: Optimal Coping  Outcome: Ongoing, Progressing  Intervention: Support Psychosocial Response to Stroke  Flowsheets (Taken 6/22/2022 0621)  Supportive Measures:   self-care encouraged   relaxation techniques promoted   positive reinforcement provided   verbalization of feelings encouraged     Problem: Bowel Elimination Impaired (Stroke, Ischemic/Transient Ischemic Attack)  Goal: Effective Bowel Elimination  Outcome: Ongoing, Progressing  Intervention: Promote Effective Bowel Elimination  Flowsheets (Taken 6/22/2022 0621)  Bowel Elimination Management:   toileting offered   hygiene measures promoted   sitting position facilitated

## 2022-06-22 NOTE — PT/OT/SLP PROGRESS
"Speech Language Pathology Treatment          Sammie Belcher   MRN: [id number]     Diet recommendations: Solid Diet Level: Regular  Liquid Diet Level: Thin      Billable Minutes:  Speech Therapy Individual 15 minutes  Missed minutes:15 minutes (asked SLP to return, on a phone call)    General Precautions: Standard, aphasia    Subjective:  Pt awake, alert, and cooperative throughout session.     Objective:   Response elaboration tasks completed with pt utilizing content words to expand sentences. Min-mod cues required for sentence expansion from 4-5 words to 20+ words. Pt able to produce the following sentences: "I'm going home to cook some fried chicken for my  and my children. He is so excited that I'm coming home. We're both hesitant."    Assessment:  Sammie Belcher is a 52 y.o. female with a SLP diagnosis of Aphasia. SLP intervention continues to be warranted to increase communicative effectiveness and ability to have needs/wants met.    Discharge recommendations: Discharge Facility/Level of Care Needs: home health speech therapy     Goals:   Multidisciplinary Problems     SLP Goals        Problem: SLP    Goal Priority Disciplines Outcome   SLP Goal     SLP Ongoing, Progressing   Description: LTG: To increase expressive/receptive language skills to enhance functional communication to express basic wants and needs with 100% effectiveness.     ST. Pt will answer simple, environmental, and personal yes/no questions with 90% accuracy and minimal cues.--GOAL MET  2. Pt will complete confrontational naming tasks with 90% accuracy and minimal cues.--GOAL MET  3. Pt will use fluency shaping/stuttering modification techniques to decrease syllables stuttered to less than 5%SS.--PROGRESSING, CONTINUE    LTG: Pt will increase cognitive linguistic skills to increase safety awareness and independence to return to [facility].    ST. Pt will recall 4 out of 5 verbal stimuli following a 5 minute delay with minimal " cues.   2. Pt will maintain attention to task for 5 minutes without redirection during 80% of trials.                    Plan:   Patient to be seen Therapy Frequency: 5 x/week   Planned Interventions: Language Therapy, Cognitive-Linguistic Therapy  Plan of Care Expires: 06/24/22  Plan of Care reviewed with: patient  SLP Follow-up?: Yes  SLP - Next Visit Date: 06/24/22 6/22/2022

## 2022-06-22 NOTE — PT/OT/SLP PROGRESS
Physical Therapy         Treatment        Sammie Belcher   MRN: [id number]         Start: 930  End: 1030  Total: 60 min     Billable Minutes:  Gait [id number] and Therapeutic Activity 40      Treatment Type: Treatment  PT/PTA: PTA (student)     PTA Visit Number: 0       General Precautions: Standard, fall, aphasia  Orthopedic Precautions: Orthopedic Precautions : N/A   Braces: Braces: AFO              Subjective:  Pt reported that she is excited to go home, but little nervous because of her brother.    Pain/Comfort  Pain Rating 1: 0/10    Objective:  Therapeutic activities, therapeutic exercise performed to increase muscle strength and motor recruitment so that patient may improve quality of functionional mobility and increase functional independence. and Gait training performed to improve functional strength, to increase functional mobility, safety and independence.    VITALS:  BP: Startin/91 76HR; Endin/89 74HR          In sitting    Transfer Training:  Sit to stand:Stand-by Assistance with Rolling Walker    Toilet Transfer:  Pt Step Transfer with Contact Guard Assistance with Rolling Walker  Pt able to stand and pull up her own pants with CGA for safety. (-) void.    Gait Training:  Patient ambulates 200 feet using rolling walker with Contact Guard Assistance and verbal cues to stay inside walker, especially when managing turns. Pt demonstrating a  swing through with decreased rajiv, decreased step length, decreased stride length and decreased weight-shifting ability.Impairments contributing to gait deviations include impaired coordination, impaired motor control and decreased strength    Additional Treatment:  Pt performed the following activities to help practice proficiency in turning while ambulating:    -2 trials of turning practice, ambulating about 10 feet from wheelchair to chair straight ahead using RW and CGA for safety. Pt practiced turing towards both involved and uninvolved side with  difficulties more evident when turning toward involved side. Pt required verbal cues throughout to stay within the walker during turns.     -3 trials of weaving between 3 cones, there and back, with RW and CGA. Pt again exhibited greater difficulties when going toward involved side and required verbal cues to stay within the walker.     -2x5 laps of figure 8 ambulation between two cones with RW and CGA. Pt still required the same verbal cues for walker management.       Activity Tolerance:  Patient tolerated treatment well and Patient limited by fatigue    Patient left up in chair with call button in reach and chair alarm on.    Education Provided: transfer training, gait training, balance training, safety awareness and turning practice    Expected compliance:  High compliance        Assessment:  Sammie Belcher is a 52 y.o. female with a medical diagnosis of CVA (cerebral vascular accident). She presents with impaired functional mobility and motor planning skills. Pt to benefit from skilled PT services to address impairments with progression towards functional independence as tolerated.    Rehab potential is good.    Activity tolerance: Good    Discharge recommendations:       Equipment recommendations:       GOALS:   Multidisciplinary Problems     Physical Therapy Goals        Problem: Physical Therapy    Goal Priority Disciplines Outcome Goal Variances Interventions   Physical Therapy Goal     PT, PT/OT Ongoing, Progressing     Description: Short Term goals      Bed Mobility: (MET)  Roll Right and Left Setup or Clean-up Assistance.  Lying to sitting Setup or Clean-up Assistance.  Sitting to lying Setup or Clean-up Assistance.    Transfers    Pt will be able to perform Stand step chair/bed to chair transfer With LRAD Setup or Clean-up Assistance.  Pt will be able to perform Sit to stand with LRAD   Setup or Clean-up Assistance.  Pt will be able to perform Car transfer with LRAD Setup or Clean-up  Assistance.    Ambulation    Pt will ambulate 100 Feet with LRAD Setup or Clean-up Assistance.  Pt will ascend and descend 12 stairs with L rail CGA      Wheelchair Mobility: (D/C)  Pt will be able to propel 150 feet in [place] wheelchair Setup or Clean-up Assistance.      Timeframe: By Discharge                      PLAN:    Patient to be seen 5 x/week  to address the above listed problems via gait training, therapeutic activities, therapeutic exercises, neuromuscular re-education  Plan of Care expires: 06/09/22  Plan of Care reviewed with: patient    6/22/2022     I certify that I was present in the room directing the student in service delivery and guiding them using my skilled judgement. As the co-signing physical therapist assistant, I have reviewed the student's documentation and am responsible for the assessment, treatment, and plan.

## 2022-06-22 NOTE — PROGRESS NOTES
06/22/22 0830   Rec Therapy Time Calculation   Rec Start Time 0830   Rec Stop Time 0900   Rec Total Time (min) 30 min   Subjective   Patient states Pt said that she felt good today   Precautions   General Precautions aphasia;fall   Assessment   Cognitive Concerns problem solving   Attendance   Activity Recreational Therapy  (Bocce Ball)   Participation Active participation   Therapeutic Recreation   Problem Solving Activity Assistance Supervision;verbal cues   Assessment   Assessment Sit to stand was supervision as was dynamic standng balance/reching. Standing tolerance was 5 minutes. She was more spontaneous with grasp and release of RUE. Was able to aim at target ball with increased accuracy. Verbal expression continues to be more spomtaneous and accurate. She was determined and motivated   Goals   Additional Documentation yes   Goal 1 STG: Will increase sit to stand to contact guard. Goal Met   Goal 2 STG: Will improve dynamic standing balance/reaching to contact guard. Goal Met   Goal 3 STG: Will increase RUE coordination/dexteriety to contact guard. Goal Met   Goal 4 LTG; Will increase standing tolerance to 5 minutes. Goal Met   Goal 5 LTG: Will improve dynamic standing blance/reaching to supervision. Goal Met  (LTG: Will increase RUE coordination/dexteriety to supervision. Goal Met)   Plan   Planned Therapy Intervention   (Complete d/c plans)   Expected Length of Stay 1 day   PT Frequency Other (see comments)  (Daily)   Time   Treatment time 2 units

## 2022-06-22 NOTE — PT/OT/SLP PROGRESS
Speech Language Pathology Treatment          Sammie Belcher   MRN: [id number]     Diet recommendations: Solid Diet Level: Regular  Liquid Diet Level: Thin      Billable Minutes:  Speech Therapy Individual 30 minutes    General Precautions: Standard, aphasia    Subjective:  Pt awake, alert, and cooperative throughout session.     Objective:   Response elaboration tasks completed with pt utilizing content words to expand sentences. Min-mod cues required for sentence expansion from 4-8 words to 14-18 words.     Assessment:  Sammie Belcher is a 52 y.o. female with a SLP diagnosis of Aphasia. SLP intervention continues to be warranted to increase communicative effectiveness and ability to have needs/wants met.    Discharge recommendations: Discharge Facility/Level of Care Needs: home health speech therapy     Goals:   Multidisciplinary Problems     SLP Goals        Problem: SLP    Goal Priority Disciplines Outcome   SLP Goal     SLP Ongoing, Progressing   Description: LTG: To increase expressive/receptive language skills to enhance functional communication to express basic wants and needs with 100% effectiveness.     ST. Pt will answer simple, environmental, and personal yes/no questions with 90% accuracy and minimal cues.--GOAL MET  2. Pt will complete confrontational naming tasks with 90% accuracy and minimal cues.--GOAL MET  3. Pt will use fluency shaping/stuttering modification techniques to decrease syllables stuttered to less than 5%SS.--PROGRESSING, CONTINUE    LTG: Pt will increase cognitive linguistic skills to increase safety awareness and independence to return to [facility].    ST. Pt will recall 4 out of 5 verbal stimuli following a 5 minute delay with minimal cues.   2. Pt will maintain attention to task for 5 minutes without redirection during 80% of trials.                    Plan:   Patient to be seen Therapy Frequency: 5 x/week   Planned Interventions: Language Therapy, Cognitive-Linguistic  Therapy  Plan of Care Expires: 06/24/22  Plan of Care reviewed with: patient  SLP Follow-up?: Yes  SLP - Next Visit Date: 06/24/22 6/22/2022

## 2022-06-22 NOTE — PROGRESS NOTES
[facility] - Rehab Inpatient Services  Adult Nutrition  Progress Note     SUMMARY         Recommendations     - Continue current diet per SLP     - Continue MVI + thiamine as feasible     - Decrease frequency Boost Plus to BID  (360 kcals, 14 gm pro per serving)          Goals: Meet >75% of est energy needs by f/u  Nutrition Goal Status: Met      Assessment and Plan    6/22: Pt continues with good appetite. Per EMR is averaging ~95% meals x 3 days.      6/20: Rounded with pt in dining room. Continues to report good appetite. Per EMR is averaging ~90% po intake x 3 days. Continues to drink Boost.      6/15: Pt continues to report good appetite. Per EMR, is averaging ~75% po intake x 3 days. Fly bring food, has snacks in room. SLP aware. Denies any nutrition related concerns at this time.      6/10: Pt reports continues with good appetite. Per EMR is averaging ~80% meals x last 3 days.      6/7: Rounded with pt in dining room. Able to answer questions about appetite. Reports good appetite. Observed lunch tray with ~100% meal consumed. Did not drink much boost, will monitor and d/c at f/u if pt continues to not drink.  Wt increased +1.1kg per most recent wt.      6/3: Pt with good appetite. Observed eating >75% lunch in dining room. Per EMR, pt averaging ~85% po intake x last 2 meals. Attempted to interview pt. Pt having difficulty communicating d/t expressive aphasia. Noted pt with significant wt loss per RD note at [facility] on (5/25). Continued Strawberry Boost Plus pt was receiving from [facility] prior to admit.       Malnutrition Assessment  Malnutrition Type: chronic illness  Energy Intake: moderate energy intake          Weight Loss (Malnutrition): 7.5% in 3 months  Energy Intake (Malnutrition): less than 75% for greater than or equal to 1 month  Subcutaneous Fat (Malnutrition): mild depletion  Muscle Mass (Malnutrition): mild depletion   Orbital Region (Subcutaneous Fat Loss): mild  "depletion  Upper Arm Region (Subcutaneous Fat Loss): mild depletion   [place] Region (Muscle Loss): mild depletion  Clavicle Bone Region (Muscle Loss): mild depletion  Dorsal Hand (Muscle Loss): mild depletion               Reason for Assessment     Reason For Assessment: consult (rehab)  Diagnosis: other (see comments) (Left frontal lobe 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,POSITIVE RPR  History of alcohol dependence  HTN  Nicotine dependence  Anxiety/depression   Hyperparathyroidism)  Relevant Medical History: Essential HTN  HLD  Hypothyroidism  Anxiety  Depression  Hx Cocaine Use Disorder (sober over 1 yr)  THC Use, ETOH abuse     Nutrition Risk Screen     Nutrition Risk Screen: no indicators present     Nutrition/Diet History     Spiritual, Cultural Beliefs, Faith Practices, Values that Affect Care: no     Anthropometrics     Temp: 97.4 °F (36.3 °C)  Height: 6' 0.99" (185.4 cm)  Height (inches): 72.99 in  Weight Method:  (standing)  Weight: 66.5 kg (146 lb 9.7 oz)  Weight (lb): 146.61 lb  Ideal Body Weight (IBW), Female: 164.95 lb  % Ideal Body Weight, Female (lb): 86.21 %  BMI (Calculated): 19.3  BMI Grade: 18.5-24.9 - normal  Usual Body Weight (UBW), k.73 kg  % Usual Body Weight: 88.87  % Weight Change From Usual Weight: -11.32 %      : 66.5 kg   6/15: 66.2 kg   6/10: No new wt   : 65.5 kg   6/3: 64.5 kg      Lab/Procedures/Meds     Pertinent Labs Comments: Ca 10.3(H), GFR >60   Pertinent Medications Comments: statin, aspirin, MVI, metoprolol, thiamine     Estimated/Assessed Needs     Weight Used For Calorie Calculations: 64.5 kg (142 lb 3.2 oz)  Energy Calorie Requirements (kcal):   Energy Need Method: Simpson-St Jeor (x 1.4 SF)  Protein Requirements: 97 (1.5 g/kg)  Weight Used For Protein Calculations: 64.5 kg (142 lb 3.2 oz)  RDA Method (mL):         Nutrition Prescription Ordered     Current Diet Order: Easy to Chew     Evaluation of Received " Nutrient/Fluid Intake     % Intake of Estimated Energy Needs: 75 - 100 %  % Meal Intake: 75 - 100 %         Nutrition Problem  Malnutrition     Related to (etiology):   Thyroid disease     Signs and Symptoms (as evidenced by):   <75% est energy needs > 1 month  Physical evidence of mild muscle/fat wasting     Interventions(treatment strategy):  Commercial food, Multivitamin / Mineral supplement therapy and Collaboration with other providers     Nutrition Diagnosis Status:   Improving      Nutrition Risk     Level of Risk/Frequency of Follow-up: moderate      Monitor and Evaluation       Wt, po intake, and nutrition related labs      Nutrition Follow-Up     RD Follow-up?: Yes

## 2022-06-22 NOTE — PT/OT/SLP DISCHARGE
"Discharge Summary    Pt was scheduled from [id number]    Pt able to recall previously discussed lifestyle change of LIFESTYLE CHANGE.    "To stop smoking and to eat healthier."  "

## 2022-06-23 VITALS
OXYGEN SATURATION: 96 % | HEART RATE: 82 BPM | HEIGHT: 72 IN | RESPIRATION RATE: 16 BRPM | SYSTOLIC BLOOD PRESSURE: 112 MMHG | WEIGHT: 146.63 LBS | DIASTOLIC BLOOD PRESSURE: 74 MMHG | BODY MASS INDEX: 19.86 KG/M2 | TEMPERATURE: 98 F

## 2022-06-23 PROBLEM — I63.9 CVA (CEREBRAL VASCULAR ACCIDENT): Status: RESOLVED | Noted: 2022-05-18 | Resolved: 2022-06-23

## 2022-06-23 PROCEDURE — 25000003 PHARM REV CODE 250: Performed by: NURSE PRACTITIONER

## 2022-06-23 PROCEDURE — 94799 UNLISTED PULMONARY SVC/PX: CPT

## 2022-06-23 PROCEDURE — 97535 SELF CARE MNGMENT TRAINING: CPT

## 2022-06-23 PROCEDURE — 97116 GAIT TRAINING THERAPY: CPT

## 2022-06-23 PROCEDURE — S4991 NICOTINE PATCH NONLEGEND: HCPCS | Performed by: NURSE PRACTITIONER

## 2022-06-23 RX ORDER — ALENDRONATE SODIUM 70 MG/1
70 TABLET ORAL
Qty: 12 TABLET | Refills: 3 | Status: SHIPPED | OUTPATIENT
Start: 2022-06-24 | End: 2023-06-24

## 2022-06-23 RX ORDER — LISINOPRIL 10 MG/1
10 TABLET ORAL NIGHTLY
Qty: 90 TABLET | Refills: 3 | Status: SHIPPED | OUTPATIENT
Start: 2022-06-23 | End: 2023-06-23

## 2022-06-23 RX ORDER — BACLOFEN 5 MG/1
5 TABLET ORAL 2 TIMES DAILY
Qty: 60 TABLET | Refills: 0 | Status: SHIPPED | OUTPATIENT
Start: 2022-06-23 | End: 2022-07-23

## 2022-06-23 RX ORDER — LANOLIN ALCOHOL/MO/W.PET/CERES
100 CREAM (GRAM) TOPICAL DAILY
Qty: 30 TABLET | Refills: 0 | Status: SHIPPED | OUTPATIENT
Start: 2022-06-24 | End: 2022-07-24

## 2022-06-23 RX ORDER — HYDROCODONE BITARTRATE AND ACETAMINOPHEN 5; 325 MG/1; MG/1
1 TABLET ORAL EVERY 6 HOURS PRN
Qty: 10 TABLET | Refills: 0 | Status: SHIPPED | OUTPATIENT
Start: 2022-06-23

## 2022-06-23 RX ORDER — VENLAFAXINE 75 MG/1
75 TABLET ORAL 2 TIMES DAILY
Qty: 180 TABLET | Refills: 3 | Status: SHIPPED | OUTPATIENT
Start: 2022-06-23 | End: 2023-06-23

## 2022-06-23 RX ADMIN — NICOTINE 1 PATCH: 21 PATCH, EXTENDED RELEASE TRANSDERMAL at 08:06

## 2022-06-23 RX ADMIN — ASPIRIN 325 MG: 325 TABLET, COATED ORAL at 08:06

## 2022-06-23 RX ADMIN — Medication 100 MG: at 08:06

## 2022-06-23 RX ADMIN — VENLAFAXINE 75 MG: 37.5 TABLET ORAL at 08:06

## 2022-06-23 RX ADMIN — ATORVASTATIN CALCIUM 40 MG: 40 TABLET, FILM COATED ORAL at 08:06

## 2022-06-23 RX ADMIN — MULTIPLE VITAMINS W/ MINERALS TAB 1 TABLET: TAB at 08:06

## 2022-06-23 RX ADMIN — BACLOFEN 5 MG: 5 TABLET ORAL at 08:06

## 2022-06-23 RX ADMIN — LABETALOL HYDROCHLORIDE 200 MG: 200 TABLET, FILM COATED ORAL at 08:06

## 2022-06-23 RX ADMIN — AMLODIPINE BESYLATE 5 MG: 5 TABLET ORAL at 08:06

## 2022-06-23 NOTE — PT/OT/SLP PROGRESS
Occupational Therapy  Treatment      Sammie Belcher   MRN: [id number]   Admitting Diagnosis: CVA (cerebral vascular accident)    Therapy Minutes  OT Date of Treatment: 06/23/22  OT Start Time: 0830  OT Stop Time: 0915  OT Total Time (min): 45 min  OT Individual: 45    Billable Minutes:  Self Care/Home Management 45               General Precautions: Standard, fall, aphasia         Subjective:  Patient communicated she feels like she has improved from therapy, but she is ready to go home and relax.    Pain/Comfort  Pain Rating 1: 0/10    Objective:     Vitals:  Beginning of session: 133/82, HR:85  End of session: 123/75, HR: 88    Functional Mobility:  Transfer Training:   Sit to stand:Supervision or Set-up Assistance with Rolling Walker    Toilet Transfer:  Pt Step Transfer with Contact Guard Assistance with Rolling Walker    Patient tub bench transfer Step Transfer with Supervision or Set-up Assistance with Rolling Walker.      ADLs:    Current Status   Functional Area: Care Score:   Toileting Hygiene 4   Shower/Bathe Self 4   Upper Body Dressing 4   Lower Body Dressing 4   Putting On/Taking Off Footwear 4   Toilet Transfer 4     Pt is overall supervision for ADLs, but she did require CGA for toileting hygeie due to a loss of balance. Pt needed min vc to maintain a wider base of support when standing. Pt did not need any physical assistance with showering, dressing, and toileting, but she needs supervision due to decreased safety awareness and balance impairments.         Education Provided: Roles and goals of OT, ADLs and transfer training    Expected compliance: High compliance    ASSESSMENT:  Sammie Belcher is a 52 y.o. female with a medical diagnosis of CVA (cerebral vascular accident) performed ADL training to increase ADL independence and safety. Pt is progressing towards OT goals      GOALS:   Multidisciplinary Problems       Occupational Therapy Goals          Problem: Occupational Therapy    Goal  Priority Disciplines Outcome Interventions   Occupational Therapy Goal     OT, PT/OT Ongoing, Progressing    Description: ADLs:  Pt to perform grooming tasks with supervision and RW and min vc MET  Pt to perform feeding tasks with independence PROGRESSING REQUIRES SET UP WITH RUE WEAKNESS  Pt to perform UB dressing with set up assist and min vc PROGRESSING  Pt to perform LB dressing with CGA and min vc MET  Pt to perform putting on/off footwear task with supervision and min vc MET  Pt to perform toileting with supervision and min vc MET BUT MOSTLY INCONTINENT     Functional Transfers:  Pt to perform toilet transfers with supervision and min vc and RW PROGRESSING  Pt to perform a tub transfer with supervision, RW, min vc MET    IADLs:  Pt to perform simple care taking/ home management tasks with min A and min vc PROGRESSING    Balance, Strengthening, Endurance, Balance:  Pt to demonstrate dynamic standing balance as required to perform ADL's from standing level with RW and min vc. PROGRESSING  Pt to demonstrate BUE strength during functional task and increased use of R UE. PROGRESSING                       Plan:  Patient to be seen daily to address the above listed problems via self-care/home management, community/work re-entry, therapeutic activities, therapeutic exercises, neuromuscular re-education, cognitive retraining  Plan of Care expires: 06/23/22  Plan of Care reviewed with: patient      06/23/2022

## 2022-06-23 NOTE — PT/OT/SLP DISCHARGE
Speech Language Pathology Discharge Summary    Sammie Belcher  MRN: [id number]   CVA (cerebral vascular accident)     Date of Last Treatment Session: 22    Past Medical History:   Diagnosis Date    Hypertension     Thyroid disease        Status at initiation of therapy: impaired receptive/expressive language, impaired fluency    Treatment Area(s):  Communication and Cognition    Goals:   Multidisciplinary Problems     SLP Goals        Problem: SLP    Goal Priority Disciplines Outcome   SLP Goal     SLP Adequate for Care Transition   Description: LTG: To increase expressive/receptive language skills to enhance functional communication to express basic wants and needs with 100% effectiveness.     ST. Pt will answer simple, environmental, and personal yes/no questions with 90% accuracy and minimal cues.--GOAL MET  2. Pt will complete confrontational naming tasks with 90% accuracy and minimal cues.--GOAL MET  3. Pt will use fluency shaping/stuttering modification techniques to decrease syllables stuttered to less than 5%SS.--PROGRESSING, CONTINUE    LTG: Pt will increase cognitive linguistic skills to increase safety awareness and independence to return to PLOF.    ST. Pt will recall 4 out of 5 verbal stimuli following a 5 minute delay with minimal cues.   2. Pt will maintain attention to task for 5 minutes without redirection during 80% of trials.                   Participation in Treatment (at discharge):  Cooperative    Functional Status at time of Discharge:    Cognition: Patient demonstrates minimal cognitive deficits.    Communication: Patient demonstrates minimal-moderate communication deficits.    Language: Patient demonstrates minimal-moderate language deficits.    Motor Speech: Patient demonstrates minimal motor speech deficits.    Swallow: Patient demonstrates no dysphagia.           Speaking Valve: Patient was not discharged with speaking valve.     Patient is discharged to Home      Recommendations:  Diet: Regular  Liquids: Thin Liquids  Continued speech therapy via home health services  24 hour supervision    Pt able to recall previously discussed lifestyle change of eating healthier.

## 2022-06-23 NOTE — PT/OT/SLP PROGRESS
"Physical Therapy         Treatment        Sammie Belcher   MRN: [id number]         Start time: 0930  End time:  1000    Billable Minutes:  Gait [id number]          General Precautions: Standard, fall, aphasia  Orthopedic Precautions: Orthopedic Precautions : N/A   Braces:      Patient found with: Other (comments) ( and chair alarm)         Subjective:  "Im ready to go."          Objective:  Gait training performed to improve functional strength, to increase functional mobility, safety and independence.    VITALS:  BP:           See flowsheets          Transfer Training:  Sit to stand:Supervision or Set-up Assistance with Rolling Walker .  Bed <> Chair:  Step Transfer with Stand-by Assistance with Rolling Walker .  Toilet Transfer:  Pt Step Transfer with Contact Guard Assistance with Rolling walker (+void, VC for safety and to come to complete turn prior to sitting. Pt requires increased time and VC for safety with turns)        Gait Training:  Patient ambulates 500 feet using rolling walker with Contact Guard Assistance and min verbal cues. AFO donned throughout. VC for increased step length     Activity Tolerance:  Patient tolerated treatment well    Patient left up in chair with all lines intact, call button in reach, chair alarm on and  present.    Education Provided: roles and goals of PT/PTA, transfer training, gait training, stair training, balance training, safety awareness and assistive device    Expected compliance:  High compliance        Assessment:  Sammie Blecher is a 52 y.o. female with a medical diagnosis of CVA (cerebral vascular accident). She presents with R sided weakness and poor motor control . Pt to benefit from skilled PT services to address impairments with progression towards functional independence as tolerated.    Rehab potential is excellent.    Activity tolerance: Excellent    Discharge recommendations:       Equipment recommendations:       GOALS:   Multidisciplinary Problems  "    Physical Therapy Goals        Problem: Physical Therapy    Goal Priority Disciplines Outcome Goal Variances Interventions   Physical Therapy Goal     PT, PT/OT Ongoing, Progressing     Description: Short Term goals      Bed Mobility: (MET)  Roll Right and Left Setup or Clean-up Assistance.  Lying to sitting Setup or Clean-up Assistance.  Sitting to lying Setup or Clean-up Assistance.    Transfers    Pt will be able to perform Stand step chair/bed to chair transfer With LRAD Setup or Clean-up Assistance.  Pt will be able to perform Sit to stand with LRAD   Setup or Clean-up Assistance. (MET)  Pt will be able to perform Car transfer with LRAD Setup or Clean-up Assistance.    Ambulation    Pt will ambulate 100 Feet with LRAD Setup or Clean-up Assistance.  Pt will ascend and descend 12 stairs with L rail CGA      Wheelchair Mobility: (D/C)  Pt will be able to propel 150 feet in paola wheelchair Setup or Clean-up Assistance.      Timeframe: By Discharge                      PLAN:    Patient to be seen 5 x/week  to address the above listed problems via gait training, therapeutic activities, therapeutic exercises, neuromuscular re-education  Plan of Care expires: 06/09/22  Plan of Care reviewed with: patient    6/23/2022

## 2022-06-23 NOTE — PLAN OF CARE
Discharging today as planned.    Alerted [facility] via [facility].  They will provide HH PT/OT/ST/RN services.  Will send AVS later (once complete).    Alerted [facility] (providing RW, BSC, shower chair) via [facility] as well.    Previously confirmed PCP and pharmacy with .      Family training already completed.

## 2022-06-23 NOTE — PT/OT/SLP DISCHARGE
Physical Therapy Discharge Summary    Name: Sammie Belcher  MRN: [id number]   Principal Problem: CVA (cerebral vascular accident)       Assessment:     Goals partially met.    Objective:     GOALS:   Multidisciplinary Problems     Physical Therapy Goals        Problem: Physical Therapy    Goal Priority Disciplines Outcome Goal Variances Interventions   Physical Therapy Goal     PT, PT/OT Ongoing, Progressing     Description: Short Term goals      Bed Mobility: (MET)  Roll Right and Left Setup or Clean-up Assistance.  Lying to sitting Setup or Clean-up Assistance.  Sitting to lying Setup or Clean-up Assistance.    Transfers    Pt will be able to perform Stand step chair/bed to chair transfer With LRAD Setup or Clean-up Assistance. (NOT MET)  Pt will be able to perform Sit to stand with LRAD   Setup or Clean-up Assistance. (MET)  Pt will be able to perform Car transfer with LRAD Setup or Clean-up Assistance. (MET)    Ambulation    Pt will ambulate 100 Feet with LRAD Setup or Clean-up Assistance. (NOT MET)  Pt will ascend and descend 12 stairs with L rail CGA (MET)      Wheelchair Mobility: (D/C)  Pt will be able to propel 150 feet in [place]ola wheelchair Setup or Clean-up Assistance. (NOT MET)       Timeframe: By Discharge                      Care Scores:   Admission Assessment Current   Status  Discharge   Goal   Functional Area: Care Score:  Care Score:    Roll Left and Right 4 6 Set-up/clean-up   Sit to Lying 3 6 Set-up/clean-up   Lying to Sitting on Side of Bed 3 6 Set-up/clean-up   Sit to Stand 3 5 Set-up/clean-up   Chair/Bed-to-Chair Transfer 3 4 Set-up/clean-up   Car Transfer 3 5 Set-up/clean-up   Walk 10 Feet 3 4 Supervision or touching assistance   Walk 50 Feet with Two Turns 88 4 Supervision or touching assistance   Walk 150 Feet 88 4 Supervision or touching assistance   Walk 10 Feet Uneven Surface 88 4 Supervision or touching assistance   1 Step (Curb) 88 4 Supervision or touching assistance   4 Steps 88 4  Supervision or touching assistance   12 Steps 88 4 Supervision or touching assistance   Picking Up Object 3 4 Set-up/clean-up   Wheel 50 Feet with Two Turns 3 3 Not applicable   Wheel 150 Feet 3 3 Not applicable       Reasons for Discontinuation of Therapy Services  Satisfactory goal achievement.      Plan:     Patient Discharged to: Home with Home Health Service.    Pt lifestyle change discussed and she agrees to eat healthy food upon D/C.   Pt able to recall previously discussed lifestyle change of LIFESTYLE CHANGE.    6/23/2022

## 2022-06-23 NOTE — DISCHARGE SUMMARY
[facility] ([facility])  Rehab Discharge Summary    Patient Name: Sammie Belcher  MRN: [id number]  Age: 52 y.o. Sex: female  : 1970  Hospital Length of Stay: 21 days   Date of Service: 2022      Discharge Information   Date of Admission: 2022  Date of Discharge: 2022  Admit Diagnosis:  Left frontal lobe infarct          Positive RPR          History of alcohol dependence          HTN          Nicotine dependence          Anxiety/depression          Hyperparathyroidism  Discharge Diagnosis:  Left frontal lobe infarct (improved)            Positive RPR (stable)            History of alcohol dependence (stable)            HTN (stable)            Nicotine dependence (stable)            Anxiety/depression (stable)            Hyperparathyroidism (stable)  COVID-19 testing:  Negative on 2022  COVID-19 vaccination status:  Vaccinated (Pfizer):  2021 and 2021  Internal Medicine (attending): José Miguel Cesar MD     OUTPATIENT PROVIDERS  PCP: Wanda Bell MD  Neurology:  Fede Lopez MD  Infectious disease: Marcelina Nguyen MD    Hospital Course   52-year-old  female presented to [facility] ED on 2022 complaining of dysarthria and expressive aphasia x3 days.  PMH significant hyperparathyroidism s/p parathyroidectomy 2022, anxiety/depression, and HTN.  Workup significant for reactive RPR and CT head significant for left frontal 6 cm cortical base hypodensity suggesting acute to subacute nonhemorrhagic infarct of the left KENISHA vascular territory.  MRA brain significant for left frontal lobe infarct extending into the corpus callosum with acute nonhemorrhagic infarct in the territory of the KENISHA, and old lacunar infarcts. Transferred to [facility] for neurology services.  Bicillin initiated on  for syphilis with plan for 3 doses with end date on .  Also received Flagyl x 7 days due to Trichomoniasis.  TTE significant for EF 74%  "with severe concentric hypertrophy and hyperdynamic systolic function with negative bubble study.  ASA and statin initiated.  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  LP not revealing with no evidence of CNS infection.  Tolerating LINQ placement on 05/26.  CVA likely caused by long history cocaine abuse.  Continue to participate in progress therapy.  Tolerated transferred to [facility] inpatient rehab unit on 6/02 without incident.    During inpatient rehab course lisinopril 10 mg at bedtime initiated on 6/5.  Continued to be incontinent of bladder and bowel.  Randomly continent.  Appetite remained good.  OT reports overall supervision.  Mobility to right upper extremity improving.  Communication continues to improve with the help of speech therapy.  ST continues to work on problem solving and phrase expansion.  PT overall SBA to CGA for safety.  Ambulating 200 ft with walker.  OT:  Overall supervision, occasionally contact guard assist.  Fine motor continues to improved to right upper extremity.  Vital signs and lab work stable.  Sleep hygiene, bowel maintenance, and appetite at goal.  Med reconciliation completed.  Discharge orders initiated.  Stable for transfer home with home health.  Follow-up with scheduled appointments documented below.    Chief Complaint: Left frontal lobe 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    BP (!) 154/85   Pulse 79   Temp 97.7 °F (36.5 °C) (Oral)   Resp 16   Ht 6' 0.99" (1.854 m)   Wt 66.5 kg (146 lb 9.7 oz)   LMP  (LMP Unknown)   SpO2 96%   BMI 19.35 kg/m²      Physical Exam  Constitutional:       Appearance: Normal appearance.   HENT:      Head: Normocephalic.      Mouth/Throat:      Mouth: Mucous membranes are moist.   Eyes:      Pupils: Pupils are equal, round, and reactive to light.   Cardiovascular:      Rate and Rhythm: Normal rate and regular rhythm.      Heart sounds: Normal heart sounds.   Pulmonary:      " Effort: Pulmonary effort is normal.      Breath sounds: Normal breath sounds.   Abdominal:      General: Bowel sounds are normal.      Palpations: Abdomen is soft.   Musculoskeletal:      Cervical back: Neck supple.      Comments: Slight weakness to right side.  Weakness to left lower extremity.   Skin:     General: Skin is warm and dry.   Neurological:      Mental Status: She is alert and oriented to person, place, and time.      Comments: Dysarthria/expressive and receptive aphasia    Psychiatric:         Mood and Affect: Mood normal.      *MD performed and documented physical examination        Significant Labs:  Admission on 06/02/2022   Component Date Value Ref Range Status    Sodium Level 06/03/2022 143  136 - 145 mmol/L Final    Potassium Level 06/03/2022 4.3  3.5 - 5.1 mmol/L Final    Chloride 06/03/2022 105  98 - 107 mmol/L Final    Carbon Dioxide 06/03/2022 28  22 - 29 mmol/L Final    Glucose Level 06/03/2022 88  74 - 100 mg/dL Final    Blood Urea Nitrogen 06/03/2022 16.2  9.8 - 20.1 mg/dL Final    Creatinine 06/03/2022 0.78  0.55 - 1.02 mg/dL Final    Calcium Level Total 06/03/2022 9.7  8.4 - 10.2 mg/dL Final    Protein Total 06/03/2022 6.6  6.4 - 8.3 gm/dL Final    Albumin Level 06/03/2022 3.3 (A) 3.5 - 5.0 gm/dL Final    Globulin 06/03/2022 3.3  2.4 - 3.5 gm/dL Final    Albumin/Globulin Ratio 06/03/2022 1.0 (A) 1.1 - 2.0 ratio Final    Bilirubin Total 06/03/2022 0.4  <=1.5 mg/dL Final    Alkaline Phosphatase 06/03/2022 71  40 - 150 unit/L Final    Alanine Aminotransferase 06/03/2022 38  0 - 55 unit/L Final    Aspartate Aminotransferase 06/03/2022 34  5 - 34 unit/L Final    Estimated GFR- 06/03/2022 >60  mls/min/1.73/m2 Final    Magnesium Level 06/03/2022 2.00  1.60 - 2.60 mg/dL Final    Phosphorus Level 06/03/2022 3.8  2.3 - 4.7 mg/dL Final    Ferritin Level 06/03/2022 107.71  4.63 - 204.00 ng/mL Final    Iron Binding Capacity Unsaturated 06/03/2022 129  70 - 310  ug/dL Final    Iron Level 06/03/2022 95  50 - 170 ug/dL Final    Transferrin 06/03/2022 185  180 - 382 mg/dL Final    Iron Binding Capacity Total 06/03/2022 224 (A) 250 - 450 ug/dL Final    Iron Saturation 06/03/2022 42  20 - 50 % Final    WBC 06/03/2022 8.6  4.5 - 11.5 x10(3)/mcL Final    RBC 06/03/2022 4.29  4.20 - 5.40 x10(6)/mcL Final    Hgb 06/03/2022 13.2  12.0 - 16.0 gm/dL Final    Hct 06/03/2022 40.5  37.0 - 47.0 % Final    MCV 06/03/2022 94.4 (A) 80.0 - 94.0 fL Final    MCH 06/03/2022 30.8  27.0 - 31.0 pg Final    MCHC 06/03/2022 32.6 (A) 33.0 - 36.0 mg/dL Final    RDW 06/03/2022 14.0  11.5 - 17.0 % Final    Platelet 06/03/2022 209  130 - 400 x10(3)/mcL Final    MPV 06/03/2022 10.2  9.4 - 12.4 fL Final    Neut % 06/03/2022 50.9  % Final    Lymph % 06/03/2022 38.5  % Final    Mono % 06/03/2022 6.3  % Final    Eos % 06/03/2022 3.6  % Final    Basophil % 06/03/2022 0.5  % Final    Lymph # 06/03/2022 3.31  0.6 - 4.6 x10(3)/mcL Final    Neut # 06/03/2022 4.4  2.1 - 9.2 x10(3)/mcL Final    Mono # 06/03/2022 0.54  0.1 - 1.3 x10(3)/mcL Final    Eos # 06/03/2022 0.31  0 - 0.9 x10(3)/mcL Final    Baso # 06/03/2022 0.04  0 - 0.2 x10(3)/mcL Final    IG# 06/03/2022 0.02 (A) 0 - 0.0155 x10(3)/mcL Final    IG% 06/03/2022 0.2  0 - 0.43 % Final    NRBC% 06/03/2022 0.0  % Final    Sodium Level 06/06/2022 144  136 - 145 mmol/L Final    Potassium Level 06/06/2022 3.9  3.5 - 5.1 mmol/L Final    Chloride 06/06/2022 108 (A) 98 - 107 mmol/L Final    Carbon Dioxide 06/06/2022 28  22 - 29 mmol/L Final    Glucose Level 06/06/2022 83  74 - 100 mg/dL Final    Blood Urea Nitrogen 06/06/2022 12.6  9.8 - 20.1 mg/dL Final    Creatinine 06/06/2022 0.75  0.55 - 1.02 mg/dL Final    Calcium Level Total 06/06/2022 9.8  8.4 - 10.2 mg/dL Final    Protein Total 06/06/2022 6.7  6.4 - 8.3 gm/dL Final    Albumin Level 06/06/2022 3.5  3.5 - 5.0 gm/dL Final    Globulin 06/06/2022 3.2  2.4 - 3.5 gm/dL Final     Albumin/Globulin Ratio 06/06/2022 1.1  1.1 - 2.0 ratio Final    Bilirubin Total 06/06/2022 0.5  <=1.5 mg/dL Final    Alkaline Phosphatase 06/06/2022 72  40 - 150 unit/L Final    Alanine Aminotransferase 06/06/2022 38  0 - 55 unit/L Final    Aspartate Aminotransferase 06/06/2022 30  5 - 34 unit/L Final    Estimated GFR- 06/06/2022 >60  mls/min/1.73/m2 Final    Magnesium Level 06/06/2022 2.10  1.60 - 2.60 mg/dL Final    Phosphorus Level 06/06/2022 3.7  2.3 - 4.7 mg/dL Final    WBC 06/06/2022 8.0  4.5 - 11.5 x10(3)/mcL Final    RBC 06/06/2022 4.40  4.20 - 5.40 x10(6)/mcL Final    Hgb 06/06/2022 13.2  12.0 - 16.0 gm/dL Final    Hct 06/06/2022 40.1  37.0 - 47.0 % Final    MCV 06/06/2022 91.1  80.0 - 94.0 fL Final    MCH 06/06/2022 30.0  27.0 - 31.0 pg Final    MCHC 06/06/2022 32.9 (A) 33.0 - 36.0 mg/dL Final    RDW 06/06/2022 13.9  11.5 - 17.0 % Final    Platelet 06/06/2022 230  130 - 400 x10(3)/mcL Final    MPV 06/06/2022 10.0  9.4 - 12.4 fL Final    Neut % 06/06/2022 54.2  % Final    Lymph % 06/06/2022 34.3  % Final    Mono % 06/06/2022 6.2  % Final    Eos % 06/06/2022 4.4  % Final    Basophil % 06/06/2022 0.6  % Final    Lymph # 06/06/2022 2.73  0.6 - 4.6 x10(3)/mcL Final    Neut # 06/06/2022 4.3  2.1 - 9.2 x10(3)/mcL Final    Mono # 06/06/2022 0.49  0.1 - 1.3 x10(3)/mcL Final    Eos # 06/06/2022 0.35  0 - 0.9 x10(3)/mcL Final    Baso # 06/06/2022 0.05  0 - 0.2 x10(3)/mcL Final    IG# 06/06/2022 0.02 (A) 0 - 0.0155 x10(3)/mcL Final    IG% 06/06/2022 0.3  0 - 0.43 % Final    NRBC% 06/06/2022 0.0  % Final    Sodium Level 06/13/2022 143  136 - 145 mmol/L Final    Potassium Level 06/13/2022 4.5  3.5 - 5.1 mmol/L Final    Chloride 06/13/2022 106  98 - 107 mmol/L Final    Carbon Dioxide 06/13/2022 28  22 - 29 mmol/L Final    Glucose Level 06/13/2022 81  74 - 100 mg/dL Final    Blood Urea Nitrogen 06/13/2022 14.7  9.8 - 20.1 mg/dL Final    Creatinine 06/13/2022 0.73  0.55  - 1.02 mg/dL Final    Calcium Level Total 06/13/2022 9.9  8.4 - 10.2 mg/dL Final    Protein Total 06/13/2022 6.7  6.4 - 8.3 gm/dL Final    Albumin Level 06/13/2022 3.5  3.5 - 5.0 gm/dL Final    Globulin 06/13/2022 3.2  2.4 - 3.5 gm/dL Final    Albumin/Globulin Ratio 06/13/2022 1.1  1.1 - 2.0 ratio Final    Bilirubin Total 06/13/2022 0.4  <=1.5 mg/dL Final    Alkaline Phosphatase 06/13/2022 70  40 - 150 unit/L Final    Alanine Aminotransferase 06/13/2022 39  0 - 55 unit/L Final    Aspartate Aminotransferase 06/13/2022 29  5 - 34 unit/L Final    Estimated GFR- 06/13/2022 >60  mls/min/1.73/m2 Final    Magnesium Level 06/13/2022 2.00  1.60 - 2.60 mg/dL Final    Phosphorus Level 06/13/2022 4.4  2.3 - 4.7 mg/dL Final    WBC 06/13/2022 7.2  4.5 - 11.5 x10(3)/mcL Final    RBC 06/13/2022 4.21  4.20 - 5.40 x10(6)/mcL Final    Hgb 06/13/2022 12.9  12.0 - 16.0 gm/dL Final    Hct 06/13/2022 40.2  37.0 - 47.0 % Final    MCV 06/13/2022 95.5 (A) 80.0 - 94.0 fL Final    MCH 06/13/2022 30.6  27.0 - 31.0 pg Final    MCHC 06/13/2022 32.1 (A) 33.0 - 36.0 mg/dL Final    RDW 06/13/2022 14.0  11.5 - 17.0 % Final    Platelet 06/13/2022 272  130 - 400 x10(3)/mcL Final    MPV 06/13/2022 10.3  9.4 - 12.4 fL Final    Neut % 06/13/2022 57.0  % Final    Lymph % 06/13/2022 31.1  % Final    Mono % 06/13/2022 7.2  % Final    Eos % 06/13/2022 4.0  % Final    Basophil % 06/13/2022 0.6  % Final    Lymph # 06/13/2022 2.25  0.6 - 4.6 x10(3)/mcL Final    Neut # 06/13/2022 4.1  2.1 - 9.2 x10(3)/mcL Final    Mono # 06/13/2022 0.52  0.1 - 1.3 x10(3)/mcL Final    Eos # 06/13/2022 0.29  0 - 0.9 x10(3)/mcL Final    Baso # 06/13/2022 0.04  0 - 0.2 x10(3)/mcL Final    IG# 06/13/2022 0.01  0 - 0.0155 x10(3)/mcL Final    IG% 06/13/2022 0.1  0 - 0.43 % Final    NRBC% 06/13/2022 0.0  % Final    Sodium Level 06/20/2022 144  136 - 145 mmol/L Final    Potassium Level 06/20/2022 4.6  3.5 - 5.1 mmol/L Final    Chloride  06/20/2022 107  98 - 107 mmol/L Final    Carbon Dioxide 06/20/2022 27  22 - 29 mmol/L Final    Glucose Level 06/20/2022 84  74 - 100 mg/dL Final    Blood Urea Nitrogen 06/20/2022 12.7  9.8 - 20.1 mg/dL Final    Creatinine 06/20/2022 0.77  0.55 - 1.02 mg/dL Final    Calcium Level Total 06/20/2022 10.3 (A) 8.4 - 10.2 mg/dL Final    Protein Total 06/20/2022 6.1 (A) 6.4 - 8.3 gm/dL Final    Albumin Level 06/20/2022 3.6  3.5 - 5.0 gm/dL Final    Globulin 06/20/2022 2.5  2.4 - 3.5 gm/dL Final    Albumin/Globulin Ratio 06/20/2022 1.4  1.1 - 2.0 ratio Final    Bilirubin Total 06/20/2022 0.3  <=1.5 mg/dL Final    Alkaline Phosphatase 06/20/2022 66  40 - 150 unit/L Final    Alanine Aminotransferase 06/20/2022 31  0 - 55 unit/L Final    Aspartate Aminotransferase 06/20/2022 21  5 - 34 unit/L Final    Estimated GFR- 06/20/2022 >60  mls/min/1.73/m2 Final    Magnesium Level 06/20/2022 2.00  1.60 - 2.60 mg/dL Final    Phosphorus Level 06/20/2022 4.0  2.3 - 4.7 mg/dL Final    WBC 06/20/2022 6.6  4.5 - 11.5 x10(3)/mcL Final    RBC 06/20/2022 4.03 (A) 4.20 - 5.40 x10(6)/mcL Final    Hgb 06/20/2022 12.4  12.0 - 16.0 gm/dL Final    Hct 06/20/2022 38.1  37.0 - 47.0 % Final    MCV 06/20/2022 94.5 (A) 80.0 - 94.0 fL Final    MCH 06/20/2022 30.8  27.0 - 31.0 pg Final    MCHC 06/20/2022 32.5 (A) 33.0 - 36.0 mg/dL Final    RDW 06/20/2022 14.0  11.5 - 17.0 % Final    Platelet 06/20/2022 250  130 - 400 x10(3)/mcL Final    MPV 06/20/2022 10.7  9.4 - 12.4 fL Final    Neut % 06/20/2022 45.9  % Final    Lymph % 06/20/2022 39.6  % Final    Mono % 06/20/2022 7.9  % Final    Eos % 06/20/2022 5.5  % Final    Basophil % 06/20/2022 0.9  % Final    Lymph # 06/20/2022 2.60  0.6 - 4.6 x10(3)/mcL Final    Neut # 06/20/2022 3.0  2.1 - 9.2 x10(3)/mcL Final    Mono # 06/20/2022 0.52  0.1 - 1.3 x10(3)/mcL Final    Eos # 06/20/2022 0.36  0 - 0.9 x10(3)/mcL Final    Baso # 06/20/2022 0.06  0 - 0.2 x10(3)/mcL  Final    IG# 06/20/2022 0.01  0 - 0.0155 x10(3)/mcL Final    IG% 06/20/2022 0.2  0 - 0.43 % Final    NRBC% 06/20/2022 0.0  % Final     Radiology:  MRI brain without contrast 05/19/2002 at 5:38 p.m., IMPRESSION: Motion degraded exam. In spite of this limitation: Moderate to severe multifocal flow signal abnormality about the bilateral ACAs, bilateral distal MCAs and to lesser extent bilateral PCAs.  Radiology  Transthoracic echo 05/22/2022 at 7:50 a.m., IMPRESSION:  LV is normal in size with hyperdynamic systolic function.  Estimated EF 75%.  There is grade 1 diastolic dysfunction consistent with impaired relaxation.  Left atrial pressure is normal.  RV normal size and function.  RA normal size.  AV structurally normal.  No regurgitation.  No stenosis.  There is nonspecific leaflet thickening 2 mitral valve.  There is trace mitral valve regurgitation.  There is no stenosis.  Tricuspid valve with trace regurgitation.  No valve stenosis.  PAP could not be obtained.  There is no prior care effusion.  There is no evidence 10 benign.  Negative bubble study.    Discharge Summary Plan   Discharge Status: Improved    Location: Discharge home with     Medications: See discharge medicine reconciliation    Activity: as tolerated    Diet: [place] to chew    Instructions:  Take all medications as prescribed.      Attend appointments as scheduled.      Return to ED if symptoms worsen, or if t > 100.4.    Education: CVA. HTN    Follow-up:  King Morales DO on 7/5/2022 at 1400      Romi Mcmahan NP on 9/10/2022 at 0800      Fede Lopez MD- [facility]      [facility] on 8/16/2022 at 1430    Discussed plan of care, and patient communicated understanding. Agreed to comply with recommendations.    Dejan Dixon NP conducted independent examination and assisted with medical documentation.     Discharge Time: 49 minutes

## 2022-06-23 NOTE — PT/OT/SLP DISCHARGE
"Occupational Therapy Discharge Summary    Sammie Belcher  MRN: [id number]   Principal Problem: CVA (cerebral vascular accident)      Patient Discharged from acute Occupational Therapy on 6/23/2022      Assessment:      Patient has met all goals and is not appropriate for therapy.     Lifestyle Change: Pt verbally recalled that she would discontinue drinking alcohol and "get better" overall.     Objective:     GOALS:   Multidisciplinary Problems     Occupational Therapy Goals        Problem: Occupational Therapy    Goal Priority Disciplines Outcome Interventions   Occupational Therapy Goal     OT, PT/OT Ongoing, Progressing    Description: ADLs:  Pt to perform grooming tasks with supervision and RW and min vc MET  Pt to perform feeding tasks with independence PROGRESSING REQUIRES SET UP WITH RUE WEAKNESS  Pt to perform UB dressing with set up assist and min vc PROGRESSING  Pt to perform LB dressing with CGA and min vc MET  Pt to perform putting on/off footwear task with supervision and min vc MET  Pt to perform toileting with supervision and min vc MET BUT MOSTLY INCONTINENT     Functional Transfers:  Pt to perform toilet transfers with supervision and min vc and RW PROGRESSING  Pt to perform a tub transfer with supervision, RW, min vc MET    IADLs:  Pt to perform simple care taking/ home management tasks with min A and min vc PROGRESSING    Balance, Strengthening, Endurance, Balance:  Pt to demonstrate dynamic standing balance as required to perform ADL's from standing level with RW and min vc. PROGRESSING  Pt to demonstrate BUE strength during functional task and increased use of R UE. PROGRESSING                   Care Scores:   Admission Assessment Current Status Discharge  Goal   Functional Area: Care Score:  Care Score:    Eating 5 5 Independent   Oral Hygiene 4 5 Independent   Toileting Hygiene 3 4 Independent   Shower/Bathe Self 3 4 Supervision or touching assistance   Upper Body Dressing 3 4 Independent "   Lower Body Dressing 3 4 Supervision or touching assistance   Putting On/Taking Off Footwear 4 4 Independent   Toilet Transfer 3 4 Supervision or touching assistance     Reasons for Discontinuation of Therapy Services   Satisfactory goal achievement.      Plan:     Patient Discharged to: Home with Home Health Service      6/23/2022

## 2022-06-23 NOTE — PLAN OF CARE
Problem: Cerebral Tissue Perfusion (Stroke, Ischemic/Transient Ischemic Attack)  Goal: Optimal Cerebral Tissue Perfusion  Outcome: Ongoing, Progressing  Intervention: Protect and Optimize Cerebral Perfusion  Flowsheets (Taken 6/22/2022 2311)  Cerebral Perfusion Promotion: blood pressure monitored

## 2022-08-23 ENCOUNTER — CLINICAL SUPPORT (OUTPATIENT)
Dept: OTOLARYNGOLOGY | Facility: CLINIC | Age: 52
End: 2022-08-23
Payer: MEDICAID

## 2022-08-23 DIAGNOSIS — E21.0 PRIMARY HYPERPARATHYROIDISM: Primary | ICD-10-CM

## 2022-08-23 NOTE — PROGRESS NOTES
Established Patient - Audio Only Telehealth Visit     The patient location is: [place]  The chief complaint leading to consultation is: Follow up PTH  Visit type: Virtual visit with audio only (telephone)  Total time spent with patient: 10 min       The reason for the audio only service rather than synchronous audio and video virtual visit was related to technical difficulties or patient preference/necessity.     Each patient to whom I provide medical services by telemedicine is:  (1) informed of the relationship between the physician and patient and the respective role of any other health care provider with respect to management of the patient; and (2) notified that they may decline to receive medical services by telemedicine and may withdraw from such care at any time. Patient verbally consented to receive this service via voice-only telephone call.       HPI: Patient with primary hyperparathyroidism 2/2 parathyroid adenoma s/p excision. Unfortunately patient recently had a stroke and has been recovering from that and unable to obtain PTH labs.      Assessment and plan:  Will reorder PTH to be done at patients convenience.  Can r/s telemed visit in 2 months, PRN patient if normal.                         This service was not originating from a related E/M service provided within the previous 7 days nor will  to an E/M service or procedure within the next 24 hours or my soonest available appointment.  Prevailing standard of care was able to be met in this audio-only visit.

## 2022-08-25 NOTE — PROGRESS NOTES
I have reviewed the notes, assessments, and/or procedures performed this visit, and I concur with the documentation.    Kenton Estes M.D.

## 2022-11-07 NOTE — PT/OT/SLP PROGRESS
OCCUPATIONAL THERAPY TREATMENT NOTE     [facility]  OT BEDSIDE TREATMENT NOTE      Date:2022  Patient Name: Cait Freire  MRN: [id number]  : 1944  Room: [address] [place] [address]     Per OT Eval:    Evaluating OT: Mandi CHAO, OTR/L, [id number]       Referring Provider: BERONICA Simmons    Specific Provider Orders/Date: OT eval and treat (22)    Diagnosis: Weakness [R53.1]  General weakness [R53.1]  Injury of head, initial encounter [S09.90XA]    Surgeries/Procedures: None this admission        Pt admitted with weakness and multiple falls. 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 Precautions:  Fall Risk, full liquid diet, chronic PEG, purewick, LLE skin graft.       Assessment of current deficits    [x] Functional mobility            [x]ADLs           [x] Strength                   []Cognition    [x] Functional transfers          [x] IADLs          [x] Safety Awareness   [x]Endurance    [] Fine Coordination              [x] Balance      [] Vision/perception    []Sensation      []Gross Motor Coordination  [] ROM           [] Delirium                   [] Motor Control      OT PLAN OF CARE   OT POC based on physician orders, patient diagnosis and results of clinical assessment     Frequency/Duration 1-3 days/wk for 2 weeks PRN   Specific OT Treatment Interventions to include:   * Instruction/training on adapted ADL techniques and AE recommendations to increase functional independence within precautions       * Training on energy conservation strategies, correct breathing pattern and techniques to improve Occupational Therapy  Treatment      Sammie Belcher   MRN: [id number]   Admitting Diagnosis: CVA (cerebral vascular accident)         Billable Minutes:  Self Care/Home Management 60    OT/BETH: OT          General Precautions: Standard, fall  Orthopedic Precautions:           Subjective:  Patient communicated need to use the rest room    Pain/Comfort  Pain Rating 1: 0/10  Objective:       Functional Mobility:  Transfer Training:   Sit to stand:Contact Guard Assistance with Rolling Walker    Toilet Transfer:  Pt Step Transfer with Contact Guard Assistance with Rolling Walker    Patient tub bench transfer Step Transfer with Contact Guard Assistance with Rolling Walker.  Balance:  Patient performed standing balance with GB and CGA assist for 1-2 minutes while performing showering/mina cleaning.    ADLs:    Current Status   Functional Area: Care Score:   Toileting Hygiene 4   SPV needed for standing balance to bringing down garments   Shower/Bathe Self 4      Upper Body Dressing 6   Lower Body Dressing 4   SPV needed for standing to pull up garments   Putting On/Taking Off Footwear 6   Toilet Transfer 4   For balance for safety       Education Provided: Roles and goals of OT, ADLs and transfer training    Expected compliance: Moderate compliance    ASSESSMENT:  Sammie Belcher is a 52 y.o. female with a medical diagnosis of CVA (cerebral vascular accident) performed ADLs and functional mobilityto increase ADL independence and safety. Pt is progressing towards OT goals.    Pt returned to W/C at end of session with alarms set and call light within reach.    GOALS:   Multidisciplinary Problems     Occupational Therapy Goals        Problem: Occupational Therapy    Goal Priority Disciplines Outcome Interventions   Occupational Therapy Goal     OT, PT/OT Ongoing, Progressing    Description: ADLs:  Pt to perform grooming tasks with supervision and RW and min vc MET  Pt to perform feeding tasks with independence PROGRESSING  REQUIRES SET UP WITH RUE WEAKNESS  Pt to perform UB dressing with set up assist and min vc PROGRESSING  Pt to perform LB dressing with CGA and min vc MET  Pt to perform putting on/off footwear task with supervision and min vc MET  Pt to perform toileting with supervision and min vc MET BUT MOSTLY INCONTINENT     Functional Transfers:  Pt to perform toilet transfers with supervision and min vc and RW PROGRESSING  Pt to perform a tub transfer with supervision, RW, min vc MET    IADLs:  Pt to perform simple care taking/ home management tasks with min A and min vc PROGRESSING    Balance, Strengthening, Endurance, Balance:  Pt to demonstrate dynamic standing balance as required to perform ADL's from standing level with RW and min vc. PROGRESSING  Pt to demonstrate BUE strength during functional task and increased use of R UE. PROGRESSING                   Plan:  Patient to be seen 5 x/week to address the above listed problems via self-care/home management, therapeutic activities, therapeutic exercises, therapeutic groups, neuromuscular re-education  Plan of Care expires: 06/09/22  Plan of Care reviewed with: patient      06/13/2022     independence/tolerance for self-care routine  * Functional transfer/mobility training/DME recommendations for increased independence, safety, and fall prevention  * Patient/Family education to increase follow through with safety techniques and functional independence  * Recommendation of environmental modifications for increased safety with functional transfers/mobility and ADLs  * Cognitive retraining/development of therapeutic activities to improve problem solving, judgement, memory, and attention for increased safety/participation in ADL/IADL tasks  * Therapeutic exercise to improve motor endurance, ROM, and functional strength for ADLs/functional transfers  * Therapeutic activities to facilitate/challenge dynamic balance, stand tolerance for increased safety and independence with ADLs  * Therapeutic activities to facilitate gross/fine motor skills for increased independence with ADLs  * Positioning to improve skin integrity, interaction with environment and functional independence  * Delirium prevention/treatment     Recommended Adaptive Equipment/DME: BSC for hospital use, TBD      Home Living: Pt lives alone in [address] [place] [address] Highway with 1 JEWELL and 2 HR and bed and bath on main level. Bathroom setup:walk-in shower, standard commode   Equipment owned: FWW, grab bars, shower chair     Prior Level of Function: Ind with ADLs , Assist from daughter with IADLs; Used FWW for functional mobility. Driving: No  Occupation: None stated     Pain Level: Pt with no c/o pain during session     Cognition: A&O: x3; Follows 2 step directions              Memory: F              Sequencing: F              Problem solving: F              Judgement/safety: F-                Functional Assessment:  AM-PAC Daily Activity Raw Score: 17/24    Initial Eval Status  Date: 11/4/22 Treatment Status  Date: 11/6/22 STGs = LTGs  Time frame: 10-14 days   Feeding Independent      IND     Grooming Minimal Assist     SBA  Pt stood at sink to wash hands. Independent    UB Dressing Minimal Assist      Min A  To doff soiled gown & don clean gown seated EOB. Independent    LB Dressing Moderate Assist      Mod A  Pt able to don RLE sock long sitting in bed with assist to don LLE sock. Independent    Bathing Moderate Assist     Mod A  Simulated task seated EOB with assist for upper posterior care. Independent    Toileting Moderate Assist   BSC requested from RN for patient. Min A  Assist for clothing management - pt able to complete posterior hygiene care while seated. Independent    Bed Mobility  Supine to sit: Stand by Assist   Sit to supine: Stand by Assist    Supine to sit: SBA    Supine to sit: Independent   Sit to supine: Independent    Functional Transfers Sit to stand:Minimal Assist  Stand to sit: Minimal Assist       Sit<>stand: SBA  Commode: SBA    Independent    Functional Mobility Minimal Assist with FWW  For short distance from EOB. Pt reporting mild dizziness, alleviated with increased time. SBA  Use of ww to<>from bathroom. Independent    Balance Sitting:     Static: S    Dynamic:SBA  Standing: Min A    during functional activity Sitting:     Static: Modified [place]    Dynamic:Supervision  Standing: SBA with ww Sitting:     Static:  Ind    Dynamic:Ind  Standing: ind   Activity Tolerance Fair with light activity  Pt limited by dizziness at EOB. Per nursing +orthostatic     Fair  Pt with no c/o dizziness during session. WFL  For full ADL/IADL tasks   Visual/  Perceptual Glasses: No          Safety G-                   Fair          Comments: Upon arrival pt supine in bed & agreeable to OT session with daughter in room. Pt able to don RLE sock long sitting in bed requiring assist to don LLE. Pt completed supine to sit transfer with light assist for trunk control. Pt doff soiled gown & donned clean gown seated EOB with light assist to tie & button gown.  Pt completed functional mobility to<>from bathroom with use of ww demo'ing improved stability & standing balance. Pt required assist for clothing management to complete toileting task - pt able to complete posterior hygiene care while seated. Pt stood at sink ~2 minutes to wash hands. SpO2 96% & above on RA during session. At end of session pt seated in bedside chair with all lines and tubes intact, call light within reach. Pt has made fair progress towards set goals.    Continue with current plan of care      Treatment Time In:11:45a            Treatment Time Out: 12:09p                Treatment Charges: Mins Units   Ther Ex  [id number]     Manual Therapy [id number] [facility]     Thera Activities [id number] 10 1   ADL/Home Mgt [id number] 14 1   Neuro Re-ed [id number]     Group Therapy      Orthotic manage/training  [id number]     Non-Billable Time     Total Timed Treatment 24 2         Marianela Rooney OTR/L; [id number]

## 2022-11-10 PROBLEM — Z86.73 HISTORY OF CEREBROVASCULAR ACCIDENT (CVA) DUE TO ISCHEMIA: Status: ACTIVE | Noted: 2022-07-05

## 2022-11-10 PROBLEM — I10 ESSENTIAL HYPERTENSION: Status: ACTIVE | Noted: 2021-05-26

## 2023-08-18 NOTE — Clinical Note
"Sammie Tamez" Simi was seen and treated in our emergency department on 5/18/2022.  She may return to work on 05/20/2022.  Katheryn Alston was required to stay with her family member in the hospital.     If you have any questions or concerns, please don't hesitate to call.      Coreen lopez RN    "
An incision was made at the left  upper chest.
ID band present and verified. 
LINQ inserted
The procedural consent was signed. 
The site was marked. The left chest was prepped. The site was prepped with ChloraPrep. The patient was draped. The patient was positioned supine. 
The skin at the left upper chest .
The skin at the left upper chest .
corneal abrasion noted at 9:00 position/fluorescein/woods lamp

## 2024-02-05 NOTE — PROGRESS NOTES
Called and left message for pt to schedule a new appt. Will follow up in 7 days.   Infectious Diseases Progress Note  52-year-old female with past medical history of depression and anxiety, hypothyroidism, HTN, is admitted to [facility] on 05/18/2022, brought in through the ED with complaints of dysarthria and expressive aphasia of about 3 day duration.  She was extensively evaluated and noted to have no fevers but had slight leukocytosis of 12.2.  Urine toxicology was positive for cannabis.  Urinalysis was abnormal with small leukocyte history is few bacteria, few Trichomonas and 0-2 wbc's.  She was noted to have some syphilis antibody reactive and RPR 1:1.  She is nonverbal and unable to provide any significant history and hence history obtained from her  of 4 years.  He denies any knowledge of previous syphilis diagnosis and tells me he has tested himself since finding was told of his wife's test result and was told his test was negative.  She has had extensive neuroimaging studies with MRI of the brain showing left frontal acute CVA in the territory of anterior carotid artery.  MRA of brain showed moderate to severe multifocal flow signal abnormalities.  MRA of the neck showed no large vessel occlusion or critical stenosis.  CTA of neck done today 5/20 showed moderate narrowing of left CCA moderate to severe narrowing of left vertebral artery.   She is currently on Flagyl    Subjective:  Lying in bed, no new complaints reported. Afebrile.     ROS  Unable to perform ROS: Patient nonverbal     Review of patient's allergies indicates:  No Known Allergies    Past Medical History:   Diagnosis Date    Hypertension     Thyroid disease        Past Surgical History:   Procedure Laterality Date    THYROIDECTOMY      TUBAL LIGATION         Social History     Socioeconomic History    Marital status: Single   Tobacco Use    Smoking status: Current Every Day Smoker     Types: Cigarettes    Smokeless tobacco: Current User   Substance and Sexual Activity    Alcohol  "use: Yes     Alcohol/week: 3.0 standard drinks     Types: 3 Standard drinks or equivalent per week    Drug use: No         Scheduled Meds:   amLODIPine  10 mg Oral Daily    [START ON 5/25/2022] aspirin  325 mg Oral Daily    atorvastatin  40 mg Oral Daily    buPROPion  150 mg Oral Daily    carvediloL  12.5 mg Oral BID    enoxaparin  40 mg Subcutaneous Daily    metroNIDAZOLE  500 mg Oral Q8H    multivitamin  1 tablet Oral Daily    nicotine  1 patch Transdermal Daily    sertraline  25 mg Oral Daily    thiamine  100 mg Oral Daily     Continuous Infusions:  PRN Meds:cloNIDine, dextrose 10%, dextrose 10%, enalaprilat, glucagon (human recombinant), glucose, glucose, hydrALAZINE, insulin aspart U-100, labetalol, ondansetron, sodium chloride 0.9%, sodium chloride 0.9%    Objective:  BP (!) 179/97   Pulse 81   Temp 98.1 °F (36.7 °C) (Oral)   Resp 20   Ht 6' 0.84" (1.85 m)   Wt 65 kg (143 lb 4.8 oz) Comment: Bed wt 5/24: 68kg  LMP  (LMP Unknown)   SpO2 98%   Breastfeeding No   BMI 18.99 kg/m²     Physical Exam:   Physical Exam  Vitals reviewed.   Constitutional:       General: She is not in acute distress.     Comments:  at the bedside   HENT:      Head: Normocephalic and atraumatic.   Eyes:      Pupils: Pupils are equal, round, and reactive to light.   Cardiovascular:      Rate and Rhythm: Normal rate and regular rhythm.   Pulmonary:      Breath sounds: Normal breath sounds.   Abdominal:      General: Bowel sounds are normal. There is no distension.      Palpations: Abdomen is soft.      Tenderness: There is no abdominal tenderness.   Genitourinary:     Comments: No suprapubic tenderness  Musculoskeletal:      Cervical back: Neck supple.      Right lower leg: No edema.      Left lower leg: No edema.   Skin:     Findings: No erythema or rash.   Neurological:      Mental Status: She is alert.      Comments: Noted with right hemiparesis. Awake and alert, expressive aphasia. Nods to answer questions " appropriately. Follows commands.   Psychiatric:      Comments: Calm and cooperative     Imaging      Lab Review   Recent Results (from the past 24 hour(s))   POCT glucose    Collection Time: 05/24/22  3:57 AM   Result Value Ref Range    POCT Glucose 98 70 - 110 mg/dL   Comprehensive Metabolic Panel    Collection Time: 05/24/22  4:02 AM   Result Value Ref Range    Sodium Level 142 136 - 145 mmol/L    Potassium Level 4.2 3.5 - 5.1 mmol/L    Chloride 106 98 - 107 mmol/L    Carbon Dioxide 30 (H) 22 - 29 mmol/L    Glucose Level 89 74 - 100 mg/dL    Blood Urea Nitrogen 14.7 9.8 - 20.1 mg/dL    Creatinine 0.77 0.55 - 1.02 mg/dL    Calcium Level Total 9.6 8.4 - 10.2 mg/dL    Protein Total 6.2 (L) 6.4 - 8.3 gm/dL    Albumin Level 3.4 (L) 3.5 - 5.0 gm/dL    Globulin 2.8 2.4 - 3.5 gm/dL    Albumin/Globulin Ratio 1.2 1.1 - 2.0 ratio    Bilirubin Total 0.5 <=1.5 mg/dL    Alkaline Phosphatase 70 40 - 150 unit/L    Alanine Aminotransferase 8 0 - 55 unit/L    Aspartate Aminotransferase 16 5 - 34 unit/L    Estimated GFR- >60 mls/min/1.73/m2   CBC with Differential    Collection Time: 05/24/22  4:02 AM   Result Value Ref Range    WBC 8.4 4.5 - 11.5 x10(3)/mcL    RBC 4.68 4.20 - 5.40 x10(6)/mcL    Hgb 14.2 12.0 - 16.0 gm/dL    Hct 44.4 37.0 - 47.0 %    MCV 94.9 (H) 80.0 - 94.0 fL    MCH 30.3 27.0 - 31.0 pg    MCHC 32.0 (L) 33.0 - 36.0 mg/dL    RDW 14.3 11.5 - 17.0 %    Platelet 223 130 - 400 x10(3)/mcL    MPV 10.8 9.4 - 12.4 fL    Neut % 55.2 %    Lymph % 31.9 %    Mono % 7.8 %    Eos % 4.0 %    Basophil % 0.7 %    Lymph # 2.69 0.6 - 4.6 x10(3)/mcL    Neut # 4.7 2.1 - 9.2 x10(3)/mcL    Mono # 0.66 0.1 - 1.3 x10(3)/mcL    Eos # 0.34 0 - 0.9 x10(3)/mcL    Baso # 0.06 0 - 0.2 x10(3)/mcL    IG# 0.03 (H) 0 - 0.0155 x10(3)/mcL    IG% 0.4 0 - 0.43 %    NRBC% 0.0 %   POCT glucose    Collection Time: 05/24/22 11:01 AM   Result Value Ref Range    POCT Glucose 162 (H) 70 - 110 mg/dL   POCT glucose    Collection Time: 05/24/22   5:24 PM   Result Value Ref Range    POCT Glucose 120 (H) 70 - 110 mg/dL   POCT glucose    Collection Time: 05/24/22  7:20 PM   Result Value Ref Range    POCT Glucose 94 70 - 110 mg/dL       Assessment/Plan:  1. Syphilis  2. 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 Uncontrolled hypertension  4. Polysubstance abuse  5. Trichomoniasis     -Continue Flagyl #6/7  -No fever and no leukocytosis  -S/P LP on 5/21, bloody tap. Follow CSF VDRL. CSF culture negative thus far  -Syphilis Ab (+) with RPR 1:1  -Neurology on board, inputs noted including plan for MARIO with LINQ if CSF VDRL negative  -Discussed with patient and nursing

## (undated) DEVICE — CLOSURE SKIN STERI STRIP 1/2X4

## (undated) DEVICE — ADHESIVE DERMABOND ADVANCED